# Patient Record
Sex: MALE | Race: WHITE | NOT HISPANIC OR LATINO | Employment: OTHER | URBAN - METROPOLITAN AREA
[De-identification: names, ages, dates, MRNs, and addresses within clinical notes are randomized per-mention and may not be internally consistent; named-entity substitution may affect disease eponyms.]

---

## 2017-01-09 ENCOUNTER — TRANSFERRED RECORDS (OUTPATIENT)
Dept: FAMILY MEDICINE | Facility: CLINIC | Age: 67
End: 2017-01-09

## 2017-01-19 ENCOUNTER — TRANSFERRED RECORDS (OUTPATIENT)
Dept: FAMILY MEDICINE | Facility: CLINIC | Age: 67
End: 2017-01-19

## 2017-02-08 ENCOUNTER — HOSPITAL ENCOUNTER (OUTPATIENT)
Dept: ULTRASOUND IMAGING | Facility: CLINIC | Age: 67
Discharge: HOME | End: 2017-02-08
Attending: ORTHOPAEDIC SURGERY | Admitting: ORTHOPAEDIC SURGERY
Payer: MEDICARE

## 2017-02-08 DIAGNOSIS — I82.4Z2 ACUTE DEEP VEIN THROMBOSIS (DVT) OF DISTAL VEIN OF LEFT LOWER EXTREMITY (H): ICD-10-CM

## 2017-02-08 PROCEDURE — 93971 EXTREMITY STUDY: CPT | Mod: LT

## 2017-02-09 ENCOUNTER — DOCUMENTATION ONLY (OUTPATIENT)
Dept: OTHER | Facility: CLINIC | Age: 67
End: 2017-02-09

## 2017-02-09 DIAGNOSIS — Z71.89 ACP (ADVANCE CARE PLANNING): Primary | Chronic | ICD-10-CM

## 2017-02-16 ENCOUNTER — TRANSFERRED RECORDS (OUTPATIENT)
Dept: FAMILY MEDICINE | Facility: CLINIC | Age: 67
End: 2017-02-16

## 2017-03-02 ENCOUNTER — OFFICE VISIT (OUTPATIENT)
Dept: FAMILY MEDICINE | Facility: CLINIC | Age: 67
End: 2017-03-02

## 2017-03-02 VITALS
HEIGHT: 72 IN | BODY MASS INDEX: 33.32 KG/M2 | SYSTOLIC BLOOD PRESSURE: 134 MMHG | RESPIRATION RATE: 16 BRPM | DIASTOLIC BLOOD PRESSURE: 84 MMHG | OXYGEN SATURATION: 94 % | HEART RATE: 69 BPM | WEIGHT: 246 LBS

## 2017-03-02 DIAGNOSIS — R21 RASH: Primary | ICD-10-CM

## 2017-03-02 DIAGNOSIS — R09.81 NASAL CONGESTION: ICD-10-CM

## 2017-03-02 PROCEDURE — 99213 OFFICE O/P EST LOW 20 MIN: CPT | Performed by: FAMILY MEDICINE

## 2017-03-02 RX ORDER — LIDOCAINE 50 MG/G
OINTMENT TOPICAL PRN
Refills: 10 | COMMUNITY
Start: 2017-01-19 | End: 2017-05-04

## 2017-03-02 RX ORDER — METHOCARBAMOL 500 MG/1
1-3 TABLET, FILM COATED ORAL 3 TIMES DAILY
COMMUNITY
Start: 2016-12-22 | End: 2017-05-04

## 2017-03-02 RX ORDER — CELECOXIB 200 MG/1
1 CAPSULE ORAL AT BEDTIME
COMMUNITY
Start: 2016-12-14 | End: 2017-03-02

## 2017-03-02 ASSESSMENT — PATIENT HEALTH QUESTIONNAIRE - PHQ9: 5. POOR APPETITE OR OVEREATING: SEVERAL DAYS

## 2017-03-02 ASSESSMENT — ANXIETY QUESTIONNAIRES
6. BECOMING EASILY ANNOYED OR IRRITABLE: SEVERAL DAYS
5. BEING SO RESTLESS THAT IT IS HARD TO SIT STILL: SEVERAL DAYS
GAD7 TOTAL SCORE: 4
3. WORRYING TOO MUCH ABOUT DIFFERENT THINGS: NOT AT ALL
1. FEELING NERVOUS, ANXIOUS, OR ON EDGE: SEVERAL DAYS
7. FEELING AFRAID AS IF SOMETHING AWFUL MIGHT HAPPEN: NOT AT ALL
2. NOT BEING ABLE TO STOP OR CONTROL WORRYING: NOT AT ALL
IF YOU CHECKED OFF ANY PROBLEMS ON THIS QUESTIONNAIRE, HOW DIFFICULT HAVE THESE PROBLEMS MADE IT FOR YOU TO DO YOUR WORK, TAKE CARE OF THINGS AT HOME, OR GET ALONG WITH OTHER PEOPLE: SOMEWHAT DIFFICULT

## 2017-03-02 NOTE — PROGRESS NOTES
Problem(s) Oriented visit        SUBJECTIVE:                                                    Jose Carlos Escamilla is a 67 year old male who presents to clinic today for the following health issues :        1. Rash  Working on getting off a number of medsl  Does have swelling of the legs since surgery    2. Nasal congestion  thoat will be congested for no reason at cande         Problem list, Medication list, Allergies, and Medical/Social/Surgical histories reviewed in [facility] and updated as appropriate.   Additional history: as documented    ROS:  General and CV completed and negative except as noted above    Histories:   Patient Active Problem List   Diagnosis     Hypercholesterolemia     Chronic rhinitis     IBS (irritable bowel syndrome)     Hiatal hernia     ACP (advance care planning)     Family history of hemochromatosis     Esophageal reflux     Esophageal stenosis     Diverticulosis of large intestine     Obesity     History of rheumatic fever     Chronic pain syndrome     Narcotic dependence (H)     Post-traumatic osteoarthritis of both knees     Benign essential hypertension     Total knee replacement status     Past Surgical History   Procedure Laterality Date     Discectomy cervical minimally invasive one level       Arthroplasty knee Left 12/2/2016     Procedure: ARTHROPLASTY KNEE;  Surgeon: Marisa Page MD;  Location:  OR       Social History   Substance Use Topics     Smoking status: Former Smoker     Types: Cigarettes     Quit date: 7/7/1995     Smokeless tobacco: Never Used     Alcohol use 0.0 - 2.0 oz/week     0 - 4 Standard drinks or equivalent per week     Family History   Problem Relation Age of Onset     DIABETES Mother            OBJECTIVE:                                                    /84  Pulse 69  Resp 16  Ht 1.829 m (6')  Wt 111.6 kg (246 lb)  SpO2 94%  BMI 33.36 kg/m2  Body mass index is 33.36 kg/(m^2).   APPEARANCE: = Relaxed and in no distress  Conj/Eyelids = noninjected and  lids and lashes are without inflammation  PERRLA/Irises = Pupils Round Reactive to Light and Irisis without inflammation  Neck = No anterior or posterior adenopathy appreciated.  Thyroid = Not enlarged and no masses felt  Resp effort = Calm regular breathing  Breath Sounds = Good air movement with no rales or rhonchi in any lung fields  Heart Rate, Rythym, & sounds (no Murm)  = Regular rate and rythym with no S3, S4, or murmer appreciated.  Carotid Art's = Pulses full and equal and no bruits appreciated  Abdomen = Soft, nontender, no masses, & bowel sounds in all quadrants  Liver/Spleen = Normal span and no splenomegaly noted  Digits and Nails = FROM in all finger joints, no nail dystrophy  Ext (edema) =  2+ and to mid shin  Musculsktl =  Strength and ROM of major joints are within normal limits  SKIN = absent significant rashes or lesions   Recent/Remote Memory = Alert and Oriented x 3  Mood/Affect = Cooperative and interested     ASSESSMENT/PLAN:                                                        Jose Carlos was seen today for leg swelling, ear problem and derm problem.    Diagnoses and all orders for this visit:    Rash    Nasal congestion    Other orders  -     Cancel: HCV Antibody ([facility])        Try and see if getting off the meds is helpful    The following health maintenance items are reviewed in Epic and correct as of today:  Health Maintenance   Topic Date Due     MELISSA QUESTIONNAIRE 1 YEAR  1950     PHQ-9 Q1YR (NO INBASKET)  1950     URINE DRUG SCREEN Q1 YR  01/29/1967     HEPATITIS C SCREENING  01/29/1970     FALL RISK ASSESSMENT  01/29/2017     PNEUMOCOCCAL (1 of 2 - PCV13) 01/29/2017     AORTIC ANEURYSM SCREENING (SYSTEM ASSIGNED)  01/29/2017     INFLUENZA VACCINE (SYSTEM ASSIGNED)  09/01/2017     COLON CANCER SCREEN (SYSTEM ASSIGNED)  05/07/2018     LIPID SCREEN Q5 YR MALE (SYSTEM ASSIGNED)  06/11/2020     TETANUS IMMUNIZATION (SYSTEM ASSIGNED)  09/30/2020     ADVANCE DIRECTIVE PLANNING Q5 YRS  (NO INBASKET)  02/09/2022       Baljit Salazar MD  [facility] [place]  [phone]    For any issues my office # is [phone]

## 2017-03-03 ASSESSMENT — PATIENT HEALTH QUESTIONNAIRE - PHQ9: SUM OF ALL RESPONSES TO PHQ QUESTIONS 1-9: 3

## 2017-03-03 ASSESSMENT — ANXIETY QUESTIONNAIRES: GAD7 TOTAL SCORE: 4

## 2017-03-14 ENCOUNTER — OFFICE VISIT (OUTPATIENT)
Dept: FAMILY MEDICINE | Facility: CLINIC | Age: 67
End: 2017-03-14

## 2017-03-14 VITALS
DIASTOLIC BLOOD PRESSURE: 70 MMHG | HEART RATE: 79 BPM | OXYGEN SATURATION: 94 % | WEIGHT: 250 LBS | SYSTOLIC BLOOD PRESSURE: 122 MMHG | HEIGHT: 72 IN | BODY MASS INDEX: 33.86 KG/M2

## 2017-03-14 DIAGNOSIS — J32.0 CHRONIC MAXILLARY SINUSITIS: Primary | ICD-10-CM

## 2017-03-14 PROCEDURE — 99213 OFFICE O/P EST LOW 20 MIN: CPT | Performed by: FAMILY MEDICINE

## 2017-03-14 NOTE — MR AVS SNAPSHOT
After Visit Summary   3/14/2017    Jose Carlos Escamilla    MRN: [id number]           Patient Information     Date Of Birth          1950        Visit Information        Provider Department      3/14/2017 3:15 PM Baljit Salazar MD [facility]        Today's Diagnoses     Chronic maxillary sinusitis    -  1       Follow-ups after your visit        Additional Services     OTOLARYNGOLOGY REFERRAL       Your provider has referred you to: ENT Dr. Theodore Valenzuela  [facility]   [phone]    Please be aware that coverage of these services is subject to the terms and limitations of your health insurance plan.  Call member services at your health plan with any benefit or coverage questions.      Please bring the following to your appointment:  >>   Any x-rays, CTs or MRIs which have been performed.  Contact the facility where they were done to arrange for  prior to your scheduled appointment.  Any new CT, MRI or other procedures ordered by your specialist must be performed at a [place] facility or coordinated by your clinic's referral office.    >>   List of current medications   >>   This referral request   >>   Any documents/labs given to you for this referral                  Who to contact     If you have questions or need follow up information about today's clinic visit or your schedule please contact [facility] directly at [phone].  Normal or non-critical lab and imaging results will be communicated to you by MyChart, letter or phone within 4 business days after the clinic has received the results. If you do not hear from us within 7 days, please contact the clinic through MyChart or phone. If you have a critical or abnormal lab result, we will notify you by phone as soon as possible.  Submit refill requests through [employer] or call your pharmacy and they will forward the refill request to us. Please allow 3 business days for your refill to  "be completed.          Additional Information About Your Visit        [employer] Information     [employer] lets you send messages to your doctor, view your test results, renew your prescriptions, schedule appointments and more. To sign up, go to [email or web address] . Click on \"Log in\" on the left side of the screen, which will take you to the Welcome page. Then click on \"Sign up Now\" on the right side of the page.     You will be asked to enter the access code listed below, as well as some personal information. Please follow the directions to create your username and password.     Your access code is: [email or web address]  Expires: 2017  4:52 PM     Your access code will  in 90 days. If you need help or a new code, please call your [place] clinic or [phone].        Care EveryWhere ID     This is your Care EveryWhere ID. This could be used by other organizations to access your [place] medical records  [id number]        Your Vitals Were     Pulse Height Pulse Oximetry BMI (Body Mass Index)          79 1.829 m (6') 94% 33.91 kg/m2         Blood Pressure from Last 3 Encounters:   17 122/70   17 134/84   16 142/90    Weight from Last 3 Encounters:   17 113.4 kg (250 lb)   17 111.6 kg (246 lb)   16 111.1 kg (245 lb)              We Performed the Following     OTOLARYNGOLOGY REFERRAL          Today's Medication Changes          These changes are accurate as of: 3/14/17  3:44 PM.  If you have any questions, ask your nurse or doctor.               These medicines have changed or have updated prescriptions.        Dose/Directions    oxyCODONE 10 MG 12 hr tablet   Commonly known as:  OxyCONTIN   This may have changed:  Another medication with the same name was removed. Continue taking this medication, and follow the directions you see here.   Used for:  Status post total left knee replacement, Narcotic dependence (H), Peripheral polyneuropathy (H)   Changed by:  Baljit Salazar " MD Shamar        Dose:  10 mg   Take 1 tablet (10 mg) by mouth every morning   Quantity:  30 tablet   Refills:  0                Primary Care Provider Office Phone # Fax #    Baljit Shamar Salazar [phone]       [facility] [address]  [facility] [address]        Thank you!     Thank you for choosing [facility]  for your care. Our goal is always to provide you with excellent care. Hearing back from our patients is one way we can continue to improve our services. Please take a few minutes to complete the written survey that you may receive in the mail after your visit with us. Thank you!             Your Updated Medication List - Protect others around you: Learn how to safely use, store and throw away your medicines at www.disposemymeds.org.          This list is accurate as of: 3/14/17  3:44 PM.  Always use your most recent med list.                   Brand Name Dispense Instructions for use    celecoxib 200 MG capsule    celeBREX     TAKE ONE CAPSULE BY MOUTH AT BEDTIME       diclofenac 1 % Gel topical gel    VOLTAREN     Reported on 3/14/2017       GABAPENTIN PO      Take 900 mg by mouth 2 times daily (Patient takes 2 x 300 mg morning 2 x 300 mg bedtime 3 x 300 mg capsule = 2400mg dose) In the morning and at bedtime.       lidocaine 5 % ointment    XYLOCAINE     as needed Pain in legs       lisinopril 20 MG tablet    PRINIVIL/ZESTRIL    90 tablet    Take 1 tablet (20 mg) by mouth daily       LYRICA PO      Take 25 mg by mouth daily (with breakfast) Tapering off to be on Gabapentin       methocarbamol 500 MG tablet    ROBAXIN     Take 1-3 tablets by mouth 3 times daily       OMEPRAZOLE PO      Take 10 mg by mouth every morning (OTC)       oxyCODONE 10 MG 12 hr tablet    OxyCONTIN    30 tablet    Take 1 tablet (10 mg) by mouth every morning       senna-docusate 8.6-50 MG per tablet    SENOKOT-S;PERICOLACE    20 tablet    Take 1-2 tablets by mouth 2 times daily        terbinafine 1 % cream    lamISIL     Apply topically daily as needed (Fungal rash on chest)       TRAZODONE HCL PO      Take 100 mg by mouth At Bedtime (Patient takes 2 x 50 mg tablet = 100 mg dose)       UNABLE TO FIND      MEDICATION NAME: pill for rash on chest From dermatologist

## 2017-03-14 NOTE — PROGRESS NOTES
If sits around symptoms of nausea come on      Rash and itch are part of the issue and the same time above is going on.    Pains in the legs are unable to tolerate at all,    Assessment/Plan:  Jose Carlos was seen today for sinus problem and derm problem.    Diagnoses and all orders for this visit:    Chronic maxillary sinusitis  -     OTOLARYNGOLOGY REFERRAL      Baljit Salazar MD  [facility]  [phone]

## 2017-03-20 ENCOUNTER — TRANSFERRED RECORDS (OUTPATIENT)
Dept: FAMILY MEDICINE | Facility: CLINIC | Age: 67
End: 2017-03-20

## 2017-05-03 ENCOUNTER — DOCUMENTATION ONLY (OUTPATIENT)
Dept: OTHER | Facility: CLINIC | Age: 67
End: 2017-05-03

## 2017-05-04 ENCOUNTER — OFFICE VISIT (OUTPATIENT)
Dept: FAMILY MEDICINE | Facility: CLINIC | Age: 67
End: 2017-05-04

## 2017-05-04 VITALS
OXYGEN SATURATION: 97 % | BODY MASS INDEX: 33.09 KG/M2 | DIASTOLIC BLOOD PRESSURE: 88 MMHG | SYSTOLIC BLOOD PRESSURE: 130 MMHG | WEIGHT: 244 LBS | HEART RATE: 72 BPM

## 2017-05-04 DIAGNOSIS — F11.20 NARCOTIC DEPENDENCE (H): ICD-10-CM

## 2017-05-04 DIAGNOSIS — G89.4 CHRONIC PAIN SYNDROME: Primary | ICD-10-CM

## 2017-05-04 PROCEDURE — 99213 OFFICE O/P EST LOW 20 MIN: CPT | Performed by: FAMILY MEDICINE

## 2017-05-04 RX ORDER — OXYCODONE HCL 10 MG/1
TABLET, FILM COATED, EXTENDED RELEASE ORAL
COMMUNITY
Start: 2017-04-19 | End: 2017-05-04

## 2017-05-04 RX ORDER — HYDROCODONE BITARTRATE AND ACETAMINOPHEN 10; 325 MG/1; MG/1
TABLET ORAL
COMMUNITY
Start: 2017-04-21 | End: 2017-05-20

## 2017-05-04 NOTE — PROGRESS NOTES
>15 min spent with patient, greater than 50% spent on discussion/education/planning, etc about The encounter diagnosis was Narcotic dependence (H).   He will try and wean with the pain clinics help.  TAMI

## 2017-05-04 NOTE — MR AVS SNAPSHOT
"              After Visit Summary   5/4/2017    Jose Carlos Escamilla    MRN: [id number]           Patient Information     Date Of Birth          1950        Visit Information        Provider Department      5/4/2017 12:30 PM Baljit Salazar MD [facility]        Today's Diagnoses     Chronic pain syndrome    -  1    Narcotic dependence (H)           Follow-ups after your visit        Your next 10 appointments already scheduled     May 04, 2017 12:30 PM CDT   [facility]    [address] [place] [address] [phone]            May 15, 2017  9:45 AM CDT   [facility] physical with [facility]    [address] [place] [address] [phone]              Who to contact     If you have questions or need follow up information about today's clinic visit or your schedule please contact [facility] directly at [phone].  Normal or non-critical lab and imaging results will be communicated to you by [employer], letter or phone within 4 business days after the clinic has received the results. If you do not hear from us within 7 days, please contact the clinic through [employer] or phone. If you have a critical or abnormal lab result, we will notify you by phone as soon as possible.  Submit refill requests through [email or web address] or call your pharmacy and they will forward the refill request to us. Please allow 3 business days for your refill to be completed.          Additional Information About Your Visit        [employer] Information     [email or web address] lets you send messages to your doctor, view your test results, renew your prescriptions, schedule appointments and more. To sign up, go to [email or web address] . Click on \"Log in\" on the left side of the screen, which will take you to the Welcome page. Then click on \"Sign up Now\" on the right side of " the page.     You will be asked to enter the access code listed below, as well as some personal information. Please follow the directions to create your username and password.     Your access code is: [email or web address]  Expires: 2017  4:52 PM     Your access code will  in 90 days. If you need help or a new code, please call your [place] clinic or [phone].        Care EveryWhere ID     This is your Care EveryWhere ID. This could be used by other organizations to access your [place] medical records  [id number]        Your Vitals Were     Pulse Pulse Oximetry BMI (Body Mass Index)             72 97% 33.09 kg/m2          Blood Pressure from Last 3 Encounters:   17 130/88   17 122/70   17 134/84    Weight from Last 3 Encounters:   17 110.7 kg (244 lb)   17 113.4 kg (250 lb)   17 111.6 kg (246 lb)              Today, you had the following     No orders found for display         Today's Medication Changes          These changes are accurate as of: 17 12:29 PM.  If you have any questions, ask your nurse or doctor.               These medicines have changed or have updated prescriptions.        Dose/Directions    oxyCODONE 10 MG 12 hr tablet   Commonly known as:  OxyCONTIN   This may have changed:  additional instructions   Used for:  Status post total left knee replacement, Narcotic dependence (H), Peripheral polyneuropathy (H)        Dose:  10 mg   Take 1 tablet (10 mg) by mouth every morning   Quantity:  30 tablet   Refills:  0                Primary Care Provider Office Phone # Fax #    Baljit Salazar [phone]       [facility] [address]  [facility] [address]        Thank you!     Thank you for choosing [facility]  for your care. Our goal is always to provide you with excellent care. Hearing back from our patients is one way we can continue to improve our services. Please take a few minutes to complete the  written survey that you may receive in the mail after your visit with us. Thank you!             Your Updated Medication List - Protect others around you: Learn how to safely use, store and throw away your medicines at www.disposemymeds.org.          This list is accurate as of: 5/4/17 12:29 PM.  Always use your most recent med list.                   Brand Name Dispense Instructions for use    celecoxib 200 MG capsule    celeBREX     TAKE ONE CAPSULE BY MOUTH AT BEDTIME       diclofenac 1 % Gel topical gel    VOLTAREN     Reported on 3/14/2017       GABAPENTIN PO      Take 900 mg by mouth 2 times daily (Patient takes 3 x 300 mg morning, 4 x 300 mg bedtime= 2100mg dose) In the morning and at bedtime.       HYDROcodone-acetaminophen  MG per tablet    NORCO         lisinopril 20 MG tablet    PRINIVIL/ZESTRIL    90 tablet    Take 1 tablet (20 mg) by mouth daily       OMEPRAZOLE PO      Take 10 mg by mouth every morning (OTC)       oxyCODONE 10 MG 12 hr tablet    OxyCONTIN    30 tablet    Take 1 tablet (10 mg) by mouth every morning       senna-docusate 8.6-50 MG per tablet    SENOKOT-S;PERICOLACE    20 tablet    Take 1-2 tablets by mouth 2 times daily       TRAZODONE HCL PO      Take 100 mg by mouth At Bedtime (Patient takes 2 x 50 mg tablet = 100 mg dose)       ZYRTEC ALLERGY PO

## 2017-05-07 DIAGNOSIS — Z76.0 ENCOUNTER FOR MEDICATION REFILL: ICD-10-CM

## 2017-05-08 RX ORDER — TRAZODONE HYDROCHLORIDE 50 MG/1
TABLET, FILM COATED ORAL
Qty: 180 TABLET | Refills: 2 | Status: SHIPPED | OUTPATIENT
Start: 2017-05-08 | End: 2018-01-31

## 2017-05-15 ENCOUNTER — TRANSFERRED RECORDS (OUTPATIENT)
Dept: FAMILY MEDICINE | Facility: CLINIC | Age: 67
End: 2017-05-15

## 2017-05-15 ENCOUNTER — OFFICE VISIT (OUTPATIENT)
Dept: FAMILY MEDICINE | Facility: CLINIC | Age: 67
End: 2017-05-15

## 2017-05-15 VITALS
SYSTOLIC BLOOD PRESSURE: 128 MMHG | HEART RATE: 72 BPM | BODY MASS INDEX: 33.05 KG/M2 | DIASTOLIC BLOOD PRESSURE: 82 MMHG | WEIGHT: 244 LBS | HEIGHT: 72 IN | OXYGEN SATURATION: 95 %

## 2017-05-15 DIAGNOSIS — F11.20 NARCOTIC DEPENDENCE (H): ICD-10-CM

## 2017-05-15 DIAGNOSIS — G62.9 PERIPHERAL POLYNEUROPATHY: ICD-10-CM

## 2017-05-15 DIAGNOSIS — Z01.818 PREOP GENERAL PHYSICAL EXAM: Primary | ICD-10-CM

## 2017-05-15 DIAGNOSIS — E78.00 HYPERCHOLESTEROLEMIA: ICD-10-CM

## 2017-05-15 DIAGNOSIS — K21.00 GASTROESOPHAGEAL REFLUX DISEASE WITH ESOPHAGITIS: ICD-10-CM

## 2017-05-15 LAB
% GRANULOCYTES: 69 % (ref 42.2–75.2)
HCT VFR BLD AUTO: 45.1 % (ref 39–51)
HEMOGLOBIN: 15 G/DL (ref 13.4–17.5)
LYMPHOCYTES NFR BLD AUTO: 23.5 % (ref 20.5–51.1)
MCH RBC QN AUTO: 30.1 PG (ref 27–31)
MCHC RBC AUTO-ENTMCNC: 33.3 G/DL (ref 33–37)
MCV RBC AUTO: 90.2 FL (ref 80–100)
MONOCYTES NFR BLD AUTO: 7.5 % (ref 1.7–9.3)
PLATELET # BLD AUTO: 272 K/UL (ref 140–450)
RBC # BLD AUTO: 5 X10/CMM (ref 4.2–5.9)
WBC # BLD AUTO: 5.4 X10/CMM (ref 3.8–11)

## 2017-05-15 PROCEDURE — 85025 COMPLETE CBC W/AUTO DIFF WBC: CPT | Performed by: FAMILY MEDICINE

## 2017-05-15 PROCEDURE — 36415 COLL VENOUS BLD VENIPUNCTURE: CPT | Performed by: FAMILY MEDICINE

## 2017-05-15 PROCEDURE — 99215 OFFICE O/P EST HI 40 MIN: CPT | Performed by: FAMILY MEDICINE

## 2017-05-15 NOTE — PROGRESS NOTES
[facility]  [address] [place] [id number] [phone]  Dept: [phone]    PRE-OP EVALUATION:  Today's date: 5/15/2017    Jose Carlos Escamilla (: 1950) presents for pre-operative evaluation assessment as requested by Dr. Macias.  He requires evaluation and anesthesia risk assessment prior to undergoing surgery/procedure for treatment of sinus/breathing problems .  Proposed procedure: Septoplasty    Date of Surgery/ Procedure: 17  Time of Surgery/ Procedure: [facility]  Hospital/Surgical Facility: [facility]  Fax number for surgical facility: [phone]  Primary Physician: Baljit Salazar  Type of Anesthesia Anticipated: to be determined    Patient has a Health Care Directive or Living Will:  YES     1. NO - Do you have a history of heart attack, stroke, stent, bypass or surgery on an artery in the head, neck, heart or legs?  2. NO - Do you ever have any pain or discomfort in your chest?  3. NO - Do you have a history of  Heart Failure?  4. NO - Are you troubled by shortness of breath when: walking on the level, up a slight hill or at night?  5. NO - Do you currently have a cold, bronchitis or other respiratory infection?  6. NO - Do you have a cough, shortness of breath or wheezing?  7. YES - DO YOU SOMETIMES GET PAINS IN THE CALVES OF YOUR LEGS WHEN YOU WALK?   8. NO - Do you or anyone in your family have previous history of blood clots?  9. NO - Do you or does anyone in your family have a serious bleeding problem such as prolonged bleeding following surgeries or cuts?  10. NO - Have you ever had problems with anemia or been told to take iron pills?  11. NO - Have you had any abnormal blood loss such as black, tarry or bloody stools, or abnormal vaginal bleeding?  12. NO - Have you ever had a blood transfusion?  13. NO - Have you or any of your relatives ever had problems with anesthesia?  14. NO - Do you have sleep apnea, excessive snoring or daytime drowsiness?  15.  NO - Do you have any prosthetic heart valves?  16. YES - DO YOU HAVE PROSTHETIC JOINTS? LEFT knee  17. NO - Is there any chance that you may be pregnant?      HPI:                                                      Brief HPI related to upcoming procedure: Difficulty breathing due to deviated septum now ready for surgical intervention.      See problem list for active medical problems.  Problems all longstanding and stable, except as noted/documented.  See ROS for pertinent symptoms related to these conditions.                                                                                                  .    MEDICAL HISTORY:                                                      Patient Active Problem List    Diagnosis Date Noted     Peripheral polyneuropathy (H) 05/15/2017     Priority: Medium     Total knee replacement status 12/02/2016     Priority: Medium     Post-traumatic osteoarthritis of both knees 11/21/2016     Priority: Medium     Benign essential hypertension 11/21/2016     Priority: Medium     Narcotic dependence (H) 09/08/2016     Priority: Medium     1/19/2017 Patient is now seeing [facility].  They are witting his prescriptions for pain medication now.  Dr. Salazar was made aware.  Ramon Sheldon,   [facility]  [phone]      Patient is followed by Baljit Salazar MD for ongoing prescription of pain medication.  All refills should be approved by this provider, or covering partner.    Medication(s): Norco.   Maximum quantity per month: 90  Clinic visit frequency required: Q 3 months     Controlled substance agreement:  Encounter-Level CSA:     There are no encounter-level csa.        Pain Clinic evaluation in the past: No  [facility] next  DIRE Total Score(s):  No flowsheet data found.    Last [facility] website verification:  done on 9/8/16   [email or web address]         Chronic pain syndrome 06/20/2016     Priority: Medium     Pain is in the legs  and seemed to start after starting a statin. Huge w/u at [facility]. Done, gabapentin helps They are referring him to the [facility] as nothing is making sense for pain.       History of rheumatic fever 02/16/2016     Priority: Medium     Esophageal stenosis 06/11/2015     Priority: Medium     Diverticulosis of large intestine 06/11/2015     Priority: Medium     Obesity 06/11/2015     Priority: Medium     Esophageal reflux 04/10/2014     Priority: Medium     Family history of hemochromatosis 03/06/2013     Priority: Medium     ACP (advance care planning)      Priority: Medium     Advance Care Planning 2/9/2017: Receipt of ACP document:  Received: invalid HCD document dated 11-28-16.  Document previously scanned on 12-5-16.  Validation form completed indicating invalid document. Copy sent to client with information and facilitation resources. Validation form sent to be scanned as notation of invalid document received. Request made to delete invalid document.  Confirmed/documented designated decision maker(s).  Added by Trena Ronquillo RN Advance Care Planning Liaison with [employer]             Hypercholesterolemia      Priority: Medium     Chronic rhinitis      Priority: Medium     IBS (irritable bowel syndrome)      Priority: Medium     Hiatal hernia      Priority: Medium      Past Medical History:   Diagnosis Date     ACP (advance care planning)      Chronic rhinitis      Diverticulosis      Esophageal stenosis      Hiatal hernia      HTN (hypertension)      Hypercholesterolemia      IBS (irritable bowel syndrome)      Post-traumatic osteoarthritis of both knees 11/21/2016     Past Surgical History:   Procedure Laterality Date     ARTHROPLASTY KNEE Left 12/2/2016    Procedure: ARTHROPLASTY KNEE;  Surgeon: Marisa Page MD;  Location: [facility] OR     DISCECTOMY CERVICAL MINIMALLY INVASIVE ONE LEVEL       Current Outpatient Prescriptions   Medication Sig Dispense Refill     traZODone (DESYREL) 50 MG tablet TAKE  ONE TABLET BY MOUTH TWICE DAILY 180 tablet 2     HYDROcodone-acetaminophen (NORCO)  MG per tablet        Cetirizine HCl (ZYRTEC ALLERGY PO)        diclofenac (VOLTAREN) 1 % GEL topical gel Reported on 3/14/2017  3     celecoxib (CELEBREX) 200 MG capsule TAKE ONE CAPSULE BY MOUTH AT BEDTIME  4     oxyCODONE (OXYCONTIN) 10 MG 12 hr tablet Take 1 tablet (10 mg) by mouth every morning (Patient taking differently: Take 10 mg by mouth every morning Takes 1 in AM, 1 at PM) 30 tablet 0     senna-docusate (SENOKOT-S;PERICOLACE) 8.6-50 MG per tablet Take 1-2 tablets by mouth 2 times daily 20 tablet 0     GABAPENTIN PO Take 900 mg by mouth 2 times daily (Patient takes 3 x 300 mg morning, 4 x 300 mg bedtime= 2100mg dose) In the morning and at bedtime.       lisinopril (PRINIVIL,ZESTRIL) 20 MG tablet Take 1 tablet (20 mg) by mouth daily 90 tablet 2     OMEPRAZOLE PO Take 10 mg by mouth every morning (OTC)       [DISCONTINUED] TRAZODONE HCL PO Take 100 mg by mouth At Bedtime (Patient takes 2 x 50 mg tablet = 100 mg dose)       OTC products: None, except as noted above    Allergies   Allergen Reactions     Amoxicillin      Amoxicillin [A-Cillin]      In his 30's     Penicillin G      Penicillins Rash     Other reaction(s): Breathing Difficulty      Latex Allergy: NO    Social History   Substance Use Topics     Smoking status: Former Smoker     Types: Cigarettes     Quit date: 7/7/1995     Smokeless tobacco: Never Used     Alcohol use 0.0 - 2.0 oz/week     0 - 4 Standard drinks or equivalent per week     History   Drug Use No       REVIEW OF SYSTEMS:                                                    Constitutional, neuro, ENT, endocrine, pulmonary, cardiac, gastrointestinal, genitourinary, musculoskeletal, integument and psychiatric systems are negative, except as otherwise noted.    EXAM:                                                    /82  Pulse 72  Ht 1.829 m (6')  Wt 110.7 kg (244 lb)  SpO2 95%  BMI 33.09  kg/m2    GENERAL APPEARANCE: healthy, alert and no distress     EYES: EOMI,  PERRL     HENT: ear canals and TM's normal and nose and mouth without ulcers or lesions     NECK: no adenopathy, no asymmetry, masses, or scars and thyroid normal to palpation     RESP: lungs clear to auscultation - no rales, rhonchi or wheezes     CV: regular rates and rhythm, normal S1 S2, no S3 or S4 and no murmur, click or rub     ABDOMEN:  soft, nontender, no HSM or masses and bowel sounds normal     MS: extremities normal- no gross deformities noted, no evidence of inflammation in joints, FROM in all extremities.     SKIN: no suspicious lesions or rashes     NEURO: Normal strength and tone, sensory exam grossly normal, mentation intact and speech normal     PSYCH: mentation appears normal. and affect normal/bright     LYMPHATICS: No axillary, cervical, or supraclavicular nodes    DIAGNOSTICS:                                                    EKG: Not indicated due to non-vascular surgery and last ekg on 11/21/2016     Recent Labs   Lab Test  12/04/16   0710  12/03/16   0610  12/02/16   0829  11/21/16   1616  11/21/16   1355  02/16/16   1616  02/16/16   1612   08/31/15   1710   HGB  11.3*  12.3*   --    --   13.8  14.1   --    < >   --    PLT   --    --    --    --   277  308   --    < >   --    NA   --    --    --    --    --    --   138   --   140   POTASSIUM   --    --   4.3  4.5   --    --   4.3   --   5.0   CR   --    --   0.75   --    --    --   0.70*   --   0.79    < > = values in this interval not displayed.        IMPRESSION:                                                    Reason for surgery/procedure: Difficulty breathing due to deviated septum now ready for surgical intervention.      The proposed surgical procedure is considered LOW risk.    REVISED CARDIAC RISK INDEX  The patient has the following serious cardiovascular risks for perioperative complications such as (MI, PE, VFib and 3  AV Block):  No serious cardiac  risks  INTERPRETATION: 0 risks: Class I (very low risk - 0.4% complication rate)    The patient has the following additional risks for perioperative complications:  No identified additional risks      ICD-10-CM    1. Preop general physical exam Z01.818 CBC with Diff/Plt ([facility])   2. Hypercholesterolemia E78.00    3. Gastroesophageal reflux disease with esophagitis K21.0    4. Narcotic dependence (H) F11.20    5. Peripheral polyneuropathy (H) G62.9        RECOMMENDATIONS:                                                        --Patient is to take all scheduled medications on the day of surgery EXCEPT for modifications listed below.    APPROVAL GIVEN to proceed with proposed procedure, without further diagnostic evaluation       Signed Electronically by: Baljit Salazar MD    Copy of this evaluation report is provided to requesting physician.    [facility] Preop Guidelines

## 2017-05-15 NOTE — MR AVS SNAPSHOT
After Visit Summary   5/15/2017    Jose Carlos Escamilla    MRN: [id number]           Patient Information     Date Of Birth          1950        Visit Information        Provider Department      5/15/2017 9:45 AM Baljit Salazar MD [facility]        Today's Diagnoses     Preop general physical exam    -  1    Hypercholesterolemia        Gastroesophageal reflux disease with esophagitis        Narcotic dependence (H)        Peripheral polyneuropathy (H)          Care Instructions      Before Your Surgery      Call your surgeon if there is any change in your health. This includes signs of a cold or flu (such as a sore throat, runny nose, cough, rash or fever).    Do not smoke, drink alcohol or take over the counter medicine (unless your surgeon or primary care doctor tells you to) for the 24 hours before and after surgery.    If you take prescribed drugs: Follow your doctor s orders about which medicines to take and which to stop until after surgery.    Eating and drinking prior to surgery: follow the instructions from your surgeon    Take a shower or bath the night before surgery. Use the soap your surgeon gave you to gently clean your skin. If you do not have soap from your surgeon, use your regular soap. Do not shave or scrub the surgery site.  Wear clean pajamas and have clean sheets on your bed.         Follow-ups after your visit        Who to contact     If you have questions or need follow up information about today's clinic visit or your schedule please contact [facility] directly at [phone].  Normal or non-critical lab and imaging results will be communicated to you by MyChart, letter or phone within 4 business days after the clinic has received the results. If you do not hear from us within 7 days, please contact the clinic through MyChart or phone. If you have a critical or abnormal lab result, we will notify you by phone as soon as possible.  Submit refill  "requests through [employer] or call your pharmacy and they will forward the refill request to us. Please allow 3 business days for your refill to be completed.          Additional Information About Your Visit        [employer] Information     [employer] lets you send messages to your doctor, view your test results, renew your prescriptions, schedule appointments and more. To sign up, go to [email or web address] . Click on \"Log in\" on the left side of the screen, which will take you to the Welcome page. Then click on \"Sign up Now\" on the right side of the page.     You will be asked to enter the access code listed below, as well as some personal information. Please follow the directions to create your username and password.     Your access code is: [email or web address]  Expires: 2017  4:52 PM     Your access code will  in 90 days. If you need help or a new code, please call your [place] clinic or [phone].        Care EveryWhere ID     This is your Care EveryWhere ID. This could be used by other organizations to access your [place] medical records  [id number]        Your Vitals Were     Pulse Height Pulse Oximetry BMI (Body Mass Index)          72 1.829 m (6') 95% 33.09 kg/m2         Blood Pressure from Last 3 Encounters:   05/15/17 128/82   17 130/88   17 122/70    Weight from Last 3 Encounters:   05/15/17 110.7 kg (244 lb)   17 110.7 kg (244 lb)   17 113.4 kg (250 lb)              We Performed the Following     CBC with Diff/Plt ([facility])          Today's Medication Changes          These changes are accurate as of: 5/15/17 10:48 AM.  If you have any questions, ask your nurse or doctor.               These medicines have changed or have updated prescriptions.        Dose/Directions    oxyCODONE 10 MG 12 hr tablet   Commonly known as:  OxyCONTIN   This may have changed:  additional instructions   Used for:  Status post total left knee replacement, Narcotic dependence (H), Peripheral " polyneuropathy (H)        Dose:  10 mg   Take 1 tablet (10 mg) by mouth every morning   Quantity:  30 tablet   Refills:  0                Primary Care Provider Office Phone # Fax #    Baljit Salazar [phone]       [facility] [address]  [facility] [address]        Thank you!     Thank you for choosing [facility]  for your care. Our goal is always to provide you with excellent care. Hearing back from our patients is one way we can continue to improve our services. Please take a few minutes to complete the written survey that you may receive in the mail after your visit with us. Thank you!             Your Updated Medication List - Protect others around you: Learn how to safely use, store and throw away your medicines at www.disposemymeds.org.          This list is accurate as of: 5/15/17 10:48 AM.  Always use your most recent med list.                   Brand Name Dispense Instructions for use    celecoxib 200 MG capsule    celeBREX     TAKE ONE CAPSULE BY MOUTH AT BEDTIME       diclofenac 1 % Gel topical gel    VOLTAREN     Reported on 3/14/2017       GABAPENTIN PO      Take 900 mg by mouth 2 times daily (Patient takes 3 x 300 mg morning, 4 x 300 mg bedtime= 2100mg dose) In the morning and at bedtime.       HYDROcodone-acetaminophen  MG per tablet    NORCO         lisinopril 20 MG tablet    PRINIVIL/ZESTRIL    90 tablet    Take 1 tablet (20 mg) by mouth daily       OMEPRAZOLE PO      Take 10 mg by mouth every morning (OTC)       oxyCODONE 10 MG 12 hr tablet    OxyCONTIN    30 tablet    Take 1 tablet (10 mg) by mouth every morning       senna-docusate 8.6-50 MG per tablet    SENOKOT-S;PERICOLACE    20 tablet    Take 1-2 tablets by mouth 2 times daily       traZODone 50 MG tablet    DESYREL    180 tablet    TAKE ONE TABLET BY MOUTH TWICE DAILY       ZYRTEC ALLERGY PO

## 2017-06-06 NOTE — PROGRESS NOTES
5/24/17 faxed this to [facility] @ [phone]    Ramon Sheldon,   [facility]  [phone]

## 2017-06-13 ENCOUNTER — TRANSFERRED RECORDS (OUTPATIENT)
Dept: FAMILY MEDICINE | Facility: CLINIC | Age: 67
End: 2017-06-13

## 2017-07-19 ENCOUNTER — TRANSFERRED RECORDS (OUTPATIENT)
Dept: FAMILY MEDICINE | Facility: CLINIC | Age: 67
End: 2017-07-19

## 2017-09-01 ENCOUNTER — TRANSFERRED RECORDS (OUTPATIENT)
Dept: FAMILY MEDICINE | Facility: CLINIC | Age: 67
End: 2017-09-01

## 2017-09-30 DIAGNOSIS — Z76.0 ENCOUNTER FOR MEDICATION REFILL: ICD-10-CM

## 2017-09-30 RX ORDER — LISINOPRIL 20 MG/1
TABLET ORAL
Qty: 30 TABLET | Refills: 0 | Status: SHIPPED | OUTPATIENT
Start: 2017-09-30 | End: 2017-10-29

## 2017-09-30 NOTE — TELEPHONE ENCOUNTER
BP Medication refill        BP Readings from Last 3 Encounters:   05/15/17 128/82   05/04/17 130/88   03/14/17 122/70         BMP within 6 months? NO DUE FOR LABS  Last Basic Metabolic Panel:  Lab Results   Component Value Date     02/16/2016      Lab Results   Component Value Date    POTASSIUM 4.3 12/02/2016     Lab Results   Component Value Date    CHLORIDE 99 02/16/2016     Lab Results   Component Value Date    TRI 9.2 02/16/2016     No results found for: CO2  Lab Results   Component Value Date    BUN 16 02/16/2016    BUN 23 02/16/2016     Lab Results   Component Value Date    CR 0.75 12/02/2016     Lab Results   Component Value Date     12/04/2016

## 2017-10-29 DIAGNOSIS — Z76.0 ENCOUNTER FOR MEDICATION REFILL: ICD-10-CM

## 2017-10-29 RX ORDER — LISINOPRIL 20 MG/1
TABLET ORAL
Qty: 30 TABLET | Refills: 0 | Status: SHIPPED | OUTPATIENT
Start: 2017-10-29 | End: 2017-12-03

## 2017-11-14 ENCOUNTER — TRANSFERRED RECORDS (OUTPATIENT)
Dept: FAMILY MEDICINE | Facility: CLINIC | Age: 67
End: 2017-11-14

## 2017-12-03 DIAGNOSIS — Z76.0 ENCOUNTER FOR MEDICATION REFILL: ICD-10-CM

## 2017-12-03 RX ORDER — HYDROCODONE BITARTRATE AND ACETAMINOPHEN 10; 325 MG/1; MG/1
10-325 TABLET ORAL 4 TIMES DAILY PRN
Refills: 0 | Status: ON HOLD | COMMUNITY
Start: 2017-11-16 | End: 2021-07-16

## 2017-12-03 RX ORDER — LISINOPRIL 20 MG/1
TABLET ORAL
Qty: 30 TABLET | Refills: 0 | Status: SHIPPED | OUTPATIENT
Start: 2017-12-03 | End: 2017-12-20

## 2017-12-07 NOTE — TELEPHONE ENCOUNTER
Called patient to let him know he is due to see  for BP check and labs.  He says he will call to schedule an appointment.  Also states he does not get his pain meds from us anymore as he   [facility] meds.

## 2017-12-20 DIAGNOSIS — Z76.0 ENCOUNTER FOR MEDICATION REFILL: ICD-10-CM

## 2017-12-21 RX ORDER — LISINOPRIL 20 MG/1
TABLET ORAL
Qty: 30 TABLET | Refills: 0 | Status: SHIPPED | OUTPATIENT
Start: 2017-12-21 | End: 2018-01-05

## 2018-01-05 ENCOUNTER — OFFICE VISIT (OUTPATIENT)
Dept: FAMILY MEDICINE | Facility: CLINIC | Age: 68
End: 2018-01-05

## 2018-01-05 VITALS
WEIGHT: 239 LBS | TEMPERATURE: 98.5 F | BODY MASS INDEX: 32.37 KG/M2 | RESPIRATION RATE: 24 BRPM | HEART RATE: 76 BPM | OXYGEN SATURATION: 97 % | DIASTOLIC BLOOD PRESSURE: 84 MMHG | SYSTOLIC BLOOD PRESSURE: 122 MMHG | HEIGHT: 72 IN

## 2018-01-05 DIAGNOSIS — Z11.59 NEED FOR HEPATITIS C SCREENING TEST: ICD-10-CM

## 2018-01-05 DIAGNOSIS — E78.00 HYPERCHOLESTEROLEMIA: ICD-10-CM

## 2018-01-05 DIAGNOSIS — Z23 NEED FOR VACCINATION WITH 13-POLYVALENT PNEUMOCOCCAL CONJUGATE VACCINE: Primary | ICD-10-CM

## 2018-01-05 DIAGNOSIS — F11.20 NARCOTIC DEPENDENCE (H): ICD-10-CM

## 2018-01-05 DIAGNOSIS — K21.00 GASTROESOPHAGEAL REFLUX DISEASE WITH ESOPHAGITIS: ICD-10-CM

## 2018-01-05 DIAGNOSIS — Z12.5 SCREENING FOR PROSTATE CANCER: ICD-10-CM

## 2018-01-05 DIAGNOSIS — Z83.49 FAMILY HISTORY OF HEMOCHROMATOSIS: ICD-10-CM

## 2018-01-05 DIAGNOSIS — I10 BENIGN ESSENTIAL HYPERTENSION: ICD-10-CM

## 2018-01-05 DIAGNOSIS — Z76.0 ENCOUNTER FOR MEDICATION REFILL: ICD-10-CM

## 2018-01-05 PROCEDURE — 90670 PCV13 VACCINE IM: CPT | Performed by: FAMILY MEDICINE

## 2018-01-05 PROCEDURE — 99214 OFFICE O/P EST MOD 30 MIN: CPT | Mod: 25 | Performed by: FAMILY MEDICINE

## 2018-01-05 PROCEDURE — G0009 ADMIN PNEUMOCOCCAL VACCINE: HCPCS | Performed by: FAMILY MEDICINE

## 2018-01-05 RX ORDER — LISINOPRIL 20 MG/1
TABLET ORAL
Qty: 90 TABLET | Refills: 3 | Status: SHIPPED | OUTPATIENT
Start: 2018-01-05 | End: 2018-04-17

## 2018-01-05 NOTE — PATIENT INSTRUCTIONS
"Thanks for coming in to see me today!    Your next visit with us should be scheduled for Follow up in 6 months.    Listed next are the medical health Maintenance items we are tracking for your care and when they are next due (or overdue!)  Our goal is 100% \"up to date.\" Keep this list handy to call and schedule what you are due for in the future!    Health Maintenance   Topic Date Due     HEPATITIS C SCREENING  01/29/1970     PNEUMOCOCCAL (1 of 2 - PCV13) 01/29/2017     AORTIC ANEURYSM SCREENING (SYSTEM ASSIGNED)  01/29/2017     MELISSA QUESTIONNAIRE 1 YEAR  03/02/2018     PHQ-9 Q1YR  03/02/2018     FALL RISK ASSESSMENT  05/04/2018     COLON CANCER SCREEN (SYSTEM ASSIGNED)  05/07/2018     LIPID SCREEN Q5 YR MALE (SYSTEM ASSIGNED)  06/11/2020     TETANUS IMMUNIZATION (SYSTEM ASSIGNED)  09/30/2020     ADVANCE DIRECTIVE PLANNING Q5 YRS  02/09/2022     INFLUENZA VACCINE (SYSTEM ASSIGNED)  Completed         "

## 2018-01-05 NOTE — MR AVS SNAPSHOT
"              After Visit Summary   1/5/2018    Jose Carlos Escamilla    MRN: [id number]           Patient Information     Date Of Birth          1950        Visit Information        Provider Department      1/5/2018 11:45 AM Baljit Salazar MD [facility]        Today's Diagnoses     Need for vaccination with 13-polyvalent pneumococcal conjugate vaccine    -  1    Need for hepatitis C screening test        Hypercholesterolemia        Family history of hemochromatosis        Gastroesophageal reflux disease with esophagitis        Narcotic dependence (H)        Screening for prostate cancer        Encounter for medication refill        Benign essential hypertension          Care Instructions    Thanks for coming in to see me today!    Your next visit with us should be scheduled for Follow up in 6 months.    Listed next are the medical health Maintenance items we are tracking for your care and when they are next due (or overdue!)  Our goal is 100% \"up to date.\" Keep this list handy to call and schedule what you are due for in the future!    Health Maintenance   Topic Date Due     HEPATITIS C SCREENING  01/29/1970     PNEUMOCOCCAL (1 of 2 - PCV13) 01/29/2017     AORTIC ANEURYSM SCREENING (SYSTEM ASSIGNED)  01/29/2017     MELISSA QUESTIONNAIRE 1 YEAR  03/02/2018     PHQ-9 Q1YR  03/02/2018     FALL RISK ASSESSMENT  05/04/2018     COLON CANCER SCREEN (SYSTEM ASSIGNED)  05/07/2018     LIPID SCREEN Q5 YR MALE (SYSTEM ASSIGNED)  06/11/2020     TETANUS IMMUNIZATION (SYSTEM ASSIGNED)  09/30/2020     ADVANCE DIRECTIVE PLANNING Q5 YRS  02/09/2022     INFLUENZA VACCINE (SYSTEM ASSIGNED)  Completed                 Follow-ups after your visit        Future tests that were ordered for you today     Open Future Orders        Priority Expected Expires Ordered    HCV Antibody ([facility]) Routine  3/5/2018 1/5/2018    Comp. Metabolic Panel (14) ([facility]) Routine 1/12/2018 4/5/2018 1/5/2018    Lipid Panel ([facility]) Routine " "2018    CBC with Diff/Plt ([facility]) Routine 2018 3/7/2018 2018    PSA Serum ([facility]) Routine 2018            Who to contact     If you have questions or need follow up information about today's clinic visit or your schedule please contact [facility] directly at [phone].  Normal or non-critical lab and imaging results will be communicated to you by Reebeehart, letter or phone within 4 business days after the clinic has received the results. If you do not hear from us within 7 days, please contact the clinic through Reebeehart or phone. If you have a critical or abnormal lab result, we will notify you by phone as soon as possible.  Submit refill requests through Fotoshkola or call your pharmacy and they will forward the refill request to us. Please allow 3 business days for your refill to be completed.          Additional Information About Your Visit        [facility] Information     Fotoshkola lets you send messages to your doctor, view your test results, renew your prescriptions, schedule appointments and more. To sign up, go to [email or web address] . Click on \"Log in\" on the left side of the screen, which will take you to the Welcome page. Then click on \"Sign up Now\" on the right side of the page.     You will be asked to enter the access code listed below, as well as some personal information. Please follow the directions to create your username and password.     Your access code is: [email or web address]  Expires: 2018 12:18 PM     Your access code will  in 90 days. If you need help or a new code, please call your [place] clinic or [phone].        Care EveryWhere ID     This is your Care EveryWhere ID. This could be used by other organizations to access your [place] medical records  [id number]        Your Vitals Were     Pulse Temperature Respirations Height Pulse Oximetry BMI (Body Mass Index)    76 98.5  F (36.9  C) (Oral) 24 1.835 m (6' 0.25\") " 97% 32.19 kg/m2       Blood Pressure from Last 3 Encounters:   01/05/18 122/84   05/15/17 128/82   05/04/17 130/88    Weight from Last 3 Encounters:   01/05/18 108.4 kg (239 lb)   05/15/17 110.7 kg (244 lb)   05/04/17 110.7 kg (244 lb)              We Performed the Following     PNEUMOCOCCAL CONJ VACCINE 13 VALENT IM          Today's Medication Changes          These changes are accurate as of: 1/5/18 12:18 PM.  If you have any questions, ask your nurse or doctor.               These medicines have changed or have updated prescriptions.        Dose/Directions    oxyCODONE 10 MG 12 hr tablet   Commonly known as:  OxyCONTIN   This may have changed:  additional instructions   Used for:  Status post total left knee replacement, Narcotic dependence (H), Peripheral polyneuropathy        Dose:  10 mg   Take 1 tablet (10 mg) by mouth every morning   Quantity:  30 tablet   Refills:  0       traZODone 50 MG tablet   Commonly known as:  DESYREL   This may have changed:  See the new instructions.   Used for:  Encounter for medication refill        TAKE ONE TABLET BY MOUTH TWICE DAILY   Quantity:  180 tablet   Refills:  2            Where to get your medicines      These medications were sent to [place] [address] [place] [facility] [address] [place] [address] [address]  [address] [facility], [facility] [address]     Phone:  [phone]     lisinopril 20 MG tablet                Primary Care Provider Office Phone # Fax #    Baljit Salazar [phone] [address]  [facility] [address]        Equal Access to Services     [facility] Hospital[facility] : Hadii eris ku hadasho Soomaali, waaxda luqadaha, qaybta kaalmada adeegyada, ryder escudero . So [facility] [phone].    ATENCIÓN: Si habla español, tiene a ramirez disposición servicios gratuitos de asistencia lingüística. Llame al [phone].    We comply with applicable federal civil rights laws and [place] laws. We do not discriminate on the basis of race,  color, national origin, age, disability, sex, sexual orientation, or gender identity.            Thank you!     Thank you for choosing [facility]  for your care. Our goal is always to provide you with excellent care. Hearing back from our patients is one way we can continue to improve our services. Please take a few minutes to complete the written survey that you may receive in the mail after your visit with us. Thank you!             Your Updated Medication List - Protect others around you: Learn how to safely use, store and throw away your medicines at www.disposemymeds.org.          This list is accurate as of: 1/5/18 12:18 PM.  Always use your most recent med list.                   Brand Name Dispense Instructions for use Diagnosis    celecoxib 200 MG capsule    celeBREX     TAKE ONE CAPSULE BY MOUTH AT BEDTIME    Status post total left knee replacement, Narcotic dependence (H), Peripheral polyneuropathy       diclofenac 1 % Gel topical gel    VOLTAREN     Reported on 3/14/2017        GABAPENTIN PO      Take 900 mg by mouth 2 times daily (Patient takes 4 x 300 mg morning, 5 x 300 mg bedtime= 2700mg dose) In the morning and at bedtime.        HYDROcodone-acetaminophen  MG per tablet    NORCO     Take  tablets by mouth 4 times daily as needed for pain        lisinopril 20 MG tablet    PRINIVIL/ZESTRIL    90 tablet    TAKE 1 TABLET (20 MG) BY MOUTH DAILY. NEED APPOINTMENT FOR REFILLS!    Encounter for medication refill       OMEPRAZOLE PO      Take 10 mg by mouth every morning (OTC)        oxyCODONE 10 MG 12 hr tablet    OxyCONTIN    30 tablet    Take 1 tablet (10 mg) by mouth every morning    Status post total left knee replacement, Narcotic dependence (H), Peripheral polyneuropathy       RANITIDINE HCL PO      Take 75 mg by mouth every morning (before breakfast)        senna-docusate 8.6-50 MG per tablet    SENOKOT-S;PERICOLACE    20 tablet    Take 1-2 tablets by mouth 2 times daily     Status post total left knee replacement       traZODone 50 MG tablet    DESYREL    180 tablet    TAKE ONE TABLET BY MOUTH TWICE DAILY    Encounter for medication refill       ZYRTEC ALLERGY PO

## 2018-01-05 NOTE — PROGRESS NOTES
Going to the pain clinic regularly,    Did go to [place] [facility] and then to their pain clinic PT, psychs,   Has now gone 5 days, feeling well and that is very unusal.  Using much less narcotics.    Life is reasonably doable.    Patient Active Problem List    Diagnosis Date Noted     Peripheral polyneuropathy 05/15/2017     Priority: Medium     Total knee replacement status 12/02/2016     Priority: Medium     Post-traumatic osteoarthritis of both knees 11/21/2016     Priority: Medium     Benign essential hypertension 11/21/2016     Priority: Medium     Narcotic dependence (H) 09/08/2016     Priority: Medium     1/19/2017 Patient is now seeing [facility].  They are witting his prescriptions for pain medication now.  Dr. Salazar was made aware.  Ramon Sheldon,   [facility]  [phone]      Patient is followed by Baljit Salazar MD for ongoing prescription of pain medication.  All refills should be approved by this provider, or covering partner.    Medication(s): Norco.   Maximum quantity per month: 90  Clinic visit frequency required: Q 3 months     Controlled substance agreement:  Encounter-Level CSA:     There are no encounter-level csa.        Pain Clinic evaluation in the past: No  [place] clinic next  DIRE Total Score(s):  No flowsheet data found.    Last [facility] website verification:  done on 9/8/16   [email or web address]         Chronic pain syndrome 06/20/2016     Priority: Medium     Pain is in the legs and seemed to start after starting a statin. Huge w/u at [facility]. Done, gabapentin helps They are referring him to the [facility] as nothing is making sense for pain.       History of rheumatic fever 02/16/2016     Priority: Medium     Esophageal stenosis 06/11/2015     Priority: Medium     Diverticulosis of large intestine 06/11/2015     Priority: Medium     Obesity 06/11/2015     Priority: Medium     Esophageal reflux 04/10/2014     Priority: Medium      Family history of hemochromatosis 03/06/2013     Priority: Medium     ACP (advance care planning)      Priority: Medium     Advance Care Planning 2/9/2017: Receipt of ACP document:  Received: invalid HCD document dated 11-28-16.  Document previously scanned on 12-5-16.  Validation form completed indicating invalid document. Copy sent to client with information and facilitation resources. Validation form sent to be scanned as notation of invalid document received. Request made to delete invalid document.  Confirmed/documented designated decision maker(s).  Added by Trena Ronquillo RN Advance Care Planning Liaison with [employer]             Hypercholesterolemia      Priority: Medium     Chronic rhinitis      Priority: Medium     IBS (irritable bowel syndrome)      Priority: Medium     Hiatal hernia      Priority: Medium     >25 min spent with patient, greater than 50% spent on discussion/education/planning, etc. About The primary encounter diagnosis was Need for vaccination with 13-polyvalent pneumococcal conjugate vaccine. Diagnoses of Need for hepatitis C screening test, Hypercholesterolemia, Family history of hemochromatosis, Gastroesophageal reflux disease with esophagitis, Narcotic dependence (H), Screening for prostate cancer, Encounter for medication refill, and Benign essential hypertension were also pertinent to this visit.    Assessment/Plan:  Jose Carlos was seen today for recheck medication, orders and musculoskeletal problem.    Diagnoses and all orders for this visit:    Need for vaccination with 13-polyvalent pneumococcal conjugate vaccine  -     PNEUMOCOCCAL CONJ VACCINE 13 VALENT IM    Need for hepatitis C screening test  -     Cancel: HCV Antibody ([facility])  -     HCV Antibody ([facility]); Future    Hypercholesterolemia  -     Lipid Panel ([facility]); Future    Family history of hemochromatosis    Gastroesophageal reflux disease with esophagitis  -     Comp. Metabolic Panel (14) ([facility]); Future  -      CBC with Diff/Plt ([facility]); Future    Narcotic dependence (H)    Screening for prostate cancer  -     PSA Serum ([facility]); Future    Encounter for medication refill  -     lisinopril (PRINIVIL/ZESTRIL) 20 MG tablet; TAKE 1 TABLET (20 MG) BY MOUTH DAILY. NEED APPOINTMENT FOR REFILLS!    Benign essential hypertension    Other orders  -     Cancel: ToxASSURE Urine Drug Screen ([facility])      Baljit Salazar MD  [facility]  [phone]

## 2018-01-09 ENCOUNTER — TRANSFERRED RECORDS (OUTPATIENT)
Dept: FAMILY MEDICINE | Facility: CLINIC | Age: 68
End: 2018-01-09

## 2018-01-15 DIAGNOSIS — G89.4 CHRONIC PAIN SYNDROME: ICD-10-CM

## 2018-01-15 DIAGNOSIS — Z79.899 ENCOUNTER FOR LONG-TERM (CURRENT) USE OF MEDICATIONS: Primary | ICD-10-CM

## 2018-01-15 DIAGNOSIS — E78.00 HYPERCHOLESTEROLEMIA: ICD-10-CM

## 2018-01-15 DIAGNOSIS — Z12.5 SCREENING FOR PROSTATE CANCER: ICD-10-CM

## 2018-01-15 DIAGNOSIS — K21.00 GASTROESOPHAGEAL REFLUX DISEASE WITH ESOPHAGITIS: ICD-10-CM

## 2018-01-15 DIAGNOSIS — Z11.59 NEED FOR HEPATITIS C SCREENING TEST: ICD-10-CM

## 2018-01-15 LAB
% GRANULOCYTES: 65.4 % (ref 42.2–75.2)
HCT VFR BLD AUTO: 44.1 % (ref 39–51)
HEMOGLOBIN: 14.7 G/DL (ref 13.4–17.5)
LYMPHOCYTES NFR BLD AUTO: 27.2 % (ref 20.5–51.1)
MCH RBC QN AUTO: 30.6 PG (ref 27–31)
MCHC RBC AUTO-ENTMCNC: 33.4 G/DL (ref 33–37)
MCV RBC AUTO: 91.8 FL (ref 80–100)
MONOCYTES NFR BLD AUTO: 7.4 % (ref 1.7–9.3)
PLATELET # BLD AUTO: 262 K/UL (ref 140–450)
RBC # BLD AUTO: 4.8 X10/CMM (ref 4.2–5.9)
WBC # BLD AUTO: 5.6 X10/CMM (ref 3.8–11)

## 2018-01-15 PROCEDURE — 36415 COLL VENOUS BLD VENIPUNCTURE: CPT | Performed by: FAMILY MEDICINE

## 2018-01-15 PROCEDURE — G0103 PSA SCREENING: HCPCS | Mod: 90 | Performed by: FAMILY MEDICINE

## 2018-01-15 PROCEDURE — 80053 COMPREHEN METABOLIC PANEL: CPT | Mod: 90 | Performed by: FAMILY MEDICINE

## 2018-01-15 PROCEDURE — 85025 COMPLETE CBC W/AUTO DIFF WBC: CPT | Performed by: FAMILY MEDICINE

## 2018-01-15 PROCEDURE — 86803 HEPATITIS C AB TEST: CPT | Mod: 90 | Performed by: FAMILY MEDICINE

## 2018-01-15 PROCEDURE — 80061 LIPID PANEL: CPT | Mod: 90 | Performed by: FAMILY MEDICINE

## 2018-01-15 PROCEDURE — 86618 LYME DISEASE ANTIBODY: CPT | Mod: 90 | Performed by: FAMILY MEDICINE

## 2018-01-17 ENCOUNTER — THERAPY VISIT (OUTPATIENT)
Dept: PHYSICAL THERAPY | Facility: CLINIC | Age: 68
End: 2018-01-17
Payer: MEDICARE

## 2018-01-17 DIAGNOSIS — M25.511 BILATERAL SHOULDER PAIN: Primary | ICD-10-CM

## 2018-01-17 DIAGNOSIS — M25.512 BILATERAL SHOULDER PAIN: Primary | ICD-10-CM

## 2018-01-17 LAB
ALBUMIN SERPL-MCNC: 4.4 G/DL (ref 3.6–4.8)
ALBUMIN/GLOB SERPL: 2.2 {RATIO} (ref 1.2–2.2)
ALP SERPL-CCNC: 57 IU/L (ref 39–117)
ALT SERPL-CCNC: 18 IU/L (ref 0–44)
AST SERPL-CCNC: 15 IU/L (ref 0–40)
BILIRUB SERPL-MCNC: 0.5 MG/DL (ref 0–1.2)
BUN SERPL-MCNC: 17 MG/DL (ref 8–27)
BUN/CREATININE RATIO: 21 (ref 10–24)
CALCIUM SERPL-MCNC: 9.2 MG/DL (ref 8.6–10.2)
CHLORIDE SERPLBLD-SCNC: 101 MMOL/L (ref 96–106)
CHOLEST SERPL-MCNC: 218 MG/DL (ref 100–199)
CREAT SERPL-MCNC: 0.81 MG/DL (ref 0.76–1.27)
EGFR IF AFRICN AM: 108 ML/MIN/1.73
EGFR IF NONAFRICN AM: 93 ML/MIN/1.73
GLOBULIN, TOTAL: 2 G/DL (ref 1.5–4.5)
GLUCOSE SERPL-MCNC: 100 MG/DL (ref 65–99)
HCV AB SERPL QL IA: 0.1 S/CO RATIO (ref 0–0.9)
HDLC SERPL-MCNC: 58 MG/DL
LDL/HDL RATIO: 2.4 RATIO UNITS (ref 0–3.6)
LDLC SERPL CALC-MCNC: 137 MG/DL (ref 0–99)
LYME IGG/IGM AB: <0.91 ISR (ref 0–0.9)
POTASSIUM SERPL-SCNC: 5.2 MMOL/L (ref 3.5–5.2)
PROT SERPL-MCNC: 6.4 G/DL (ref 6–8.5)
PSA NG/ML: 1 NG/ML (ref 0–4)
SODIUM SERPL-SCNC: 139 MMOL/L (ref 134–144)
TOTAL CO2: 24 MMOL/L (ref 18–28)
TRIGL SERPL-MCNC: 114 MG/DL (ref 0–149)
VLDLC SERPL CALC-MCNC: 23 MG/DL (ref 5–40)

## 2018-01-17 PROCEDURE — 97161 PT EVAL LOW COMPLEX 20 MIN: CPT | Mod: GP | Performed by: PHYSICAL THERAPIST

## 2018-01-17 PROCEDURE — 97110 THERAPEUTIC EXERCISES: CPT | Mod: GP | Performed by: PHYSICAL THERAPIST

## 2018-01-17 PROCEDURE — G8985 CARRY GOAL STATUS: HCPCS | Mod: GP | Performed by: PHYSICAL THERAPIST

## 2018-01-17 PROCEDURE — G8984 CARRY CURRENT STATUS: HCPCS | Mod: GP | Performed by: PHYSICAL THERAPIST

## 2018-01-17 NOTE — LETTER
[facility] PHYSICAL [facility]  [address] [facility] [address]  [phone]    2018    Re: Jose Carlos Escamilla   :   1950  MRN:  [id number]   REFERRING PHYSICIAN:   Alfonso Matos    [facility] PHYSICAL [facility]    Date of Initial Evaluation:  18  Visits:  Rxs Used: 1  Reason for Referral:  Bilateral shoulder pain    EVALUATION SUMMARY    [facility]    Subjective:  Pt presents to PT with a chief complaint of B shoulder pain (L>R) with reported onset ~2 months ago with potential correlation to swimming and repetitive overhead activity (washing his boat). Pain reported to be worse with reaching and lifting overhead. Pt also has complaints of widespread chronic muscle pain in which he has visit multiple specialists at [facility] and has not received a diagnosis. Pt reports that his deep muscle pain seems to flare up after increased activity, and is concerned about exercise progressions related to his shoulder pain. Pt also has secondary c/o significant R hip pain and has a CSI planned for tomorrow. PT to initially focus on Bilateral shoulder pain with plans to assess R hip pain in future visits.    Patient is a 67 year old male presenting with rehab left shoulder hpi.   Jose Carlos Escamilla is a 67 year old male with a bilateral shoulders (L>R) condition.  Condition occurred with:  Repetition/overuse.  Condition occurred: during recreation/sport (swimming and cleaning).  This is a new condition  ~2 months ago; chronic muscle pain since 2016.    Patient reports pain:  Lateral.  Radiates to:  Upper arm.  Pain is described as sharp and is intermittent and reported as 4/10.  Associated symptoms:  Loss of motion/stiffness, loss of strength and painful arc. Pain is worse in the P.M. and worse during the night.  Symptoms are exacerbated by using arm at shoulder level, using arm behind  back, using arm overhead, lifting and carrying and relieved by rest.  Since onset symptoms are gradually improving.  Special tests:  X-ray.      General health as reported by patient is good.  Pertinent medical history includes:  Overweight and high blood pressure.  Medical allergies: yes (see chart).  Other surgeries include:  Orthopedic surgery (L TKA 2016).  Current medications:  Sleep medication, pain medication and high blood pressure medication.  Current occupation .  Patient is working in normal job without restrictions.  Primary job tasks include:  Prolonged sitting.    Barriers include:  None as reported by the patient.    Red flags:  None as reported by the patient.       Re: Jose Carlos Escamilla   :   1950    Objective:  Standing Alignment:    Cervical/Thoracic:  Forward head  Shoulder/UE:  Rounded shoulders    Shoulder Evaluation:  ROM:  AROM:    Flexion:  Left:  160    Right:  165  Abduction:  Left: 145   Right:  150  Internal Rotation:  Left:  Thumb to L2    Right:  Thumb to T10  External Rotation:  Left:  80    Right:  80  Pain: Pain with end range IR on L     Strength:    Flexion: Left:5-/5   Pain:    Right: 5-/5     Pain:   Abduction:  Left: 5-/5   Pain:+    Right: 5-/5     Pain:  Internal Rotation:  Left:5-/5      Pain:++    Right: 5/5     Pain:  External Rotation:   Left:4+/5     Pain:   Right:4+/5     Pain:      Special Tests:    Left shoulder positive for the following special tests:  Impingement  Left shoulder negative for the following special tests:  Rotator cuff tear    Mobility Tests:    Glenohumeral posterior left:  Hypomobile      Assessment/Plan:    Patient is a 67 year old male with both sides shoulder complaints.    Patient has the following significant findings with corresponding treatment plan.                Diagnosis 1:  Bilateral shoulder pain    Pain -  hot/cold therapy, manual therapy, splint/taping/bracing/orthotics, self management and education  Decreased  ROM/flexibility - manual therapy and therapeutic exercise  Decreased joint mobility - manual therapy and therapeutic exercise  Decreased strength - therapeutic exercise and therapeutic activities  Impaired muscle performance - neuro re-education  Impaired posture - neuro re-education    Therapy Evaluation Codes:   1) History comprised of:   Personal factors that impact the plan of care:      Overall behavior pattern, Past/current experiences and Time since onset of   symptoms.    Comorbidity factors that impact the plan of care are:      Pain at night/rest.     Medications impacting care: None.  2) Examination of Body Systems comprised of:   Body structures and functions that impact the plan of care:      Shoulder.  Re: Jose Carlos Escamilla   :   1950     Activity limitations that impact the plan of care are:      Bathing, Cooking, Driving, Dressing, Lifting and Throwing.  3) Clinical presentation characteristics are:   Evolving/Changing.  4) Decision-Making    Moderate complexity using standardized patient assessment instrument and/or   measureable assessment of functional outcome.  Cumulative Therapy Evaluation is: Moderate complexity.    Previous and current functional limitations:  (See Goal Flow Sheet for this information)    Short term and Long term goals: (See Goal Flow Sheet for this information)     Communication ability:  Patient appears to be able to clearly communicate and understand verbal and written communication and follow directions correctly.  Treatment Explanation - The following has been discussed with the patient:   RX ordered/plan of care  Anticipated outcomes  Possible risks and side effects  This patient would benefit from PT intervention to resume normal activities.   Rehab potential is fair. Pt presents with manageable shoulder findings however exhibits widespread generalized muscle pain preventing progressive strengthening/exercises per patient report    Frequency:  1 X week, once  daily  Duration:  for 6 weeks tapering to 2 X a month over 2 months  Discharge Plan:  Achieve all LTG.  Independent in home treatment program.  Reach maximal therapeutic benefit.    Please refer to the daily flowsheet for treatment today, total treatment time and time spent performing 1:1 timed codes.       Thank you for your referral.    INQUIRIES  Therapist: Rashard Gupta DPT  [facility] PHYSICAL THERAPY  [address] [place] [address]  Phone: [phone]  Fax: [phone]

## 2018-01-17 NOTE — LETTER
DEPARTMENT OF HEALTH AND HUMAN SERVICES  CENTERS FOR MEDICARE & MEDICAID SERVICES    PLAN/UPDATED PLAN OF PROGRESS FOR OUTPATIENT REHABILITATION    PATIENTS NAME:  Jose Carlos Escamilla   : 1950  PROVIDER NUMBER:    [id number]  [id number]   PROVIDER NAME: [facility] [facility] [place] [facility]  MEDICAL RECORD NUMBER: [id number]   START OF CARE DATE:  SOC Date: 18   TYPE:  PT  PRIMARY/TREATMENT DIAGNOSIS: (Pertinent Medical Diagnosis)  Bilateral shoulder pain    VISITS FROM START OF CARE:  Rxs Used: 1     [place] [facility] Initial Evaluation    Subjective:  Pt presents to PT with a chief complaint of B shoulder pain (L>R) with reported onset ~2 months ago(2017) with potential correlation to swimming and repetitive overhead activity (washing his boat). Pain reported to be worse with reaching and lifting overhead. Pt also has complaints of widespread chronic muscle pain in which he has visit multiple specialists at [facility] and has not received a diagnosis. Pt reports that his deep muscle pain seems to flare up after increased activity, and is concerned about exercise progressions related to his shoulder pain. Pt also has secondary c/o significant R hip pain and has a CSI planned for tomorrow. PT to initially focus on Bilateral shoulder pain with plans to assess R hip pain in future visits.    Patient is a 67 year old male presenting with rehab left shoulder hpi.   Jose Carlos Escamilla is a 67 year old male with a bilateral shoulders (L>R) condition.  Condition occurred with:  Repetition/overuse.  Condition occurred: during recreation/sport (swimming and cleaning).  This is a new condition  ~2 months ago; chronic muscle pain since 2016.    Patient reports pain:  Lateral.  Radiates to:  Upper arm.  Pain is described as sharp and is intermittent and reported as 4/10.  Associated symptoms:  Loss of motion/stiffness, loss of strength and painful arc. Pain is worse in  the P.M. and worse during the night.  Symptoms are exacerbated by using arm at shoulder level, using arm behind back, using arm overhead, lifting and carrying and relieved by rest.  Since onset symptoms are gradually improving.  Special tests:  X-ray.      General health as reported by patient is good.  Pertinent medical history includes:  Overweight and high blood pressure.  Medical allergies: yes (see chart).  Other surgeries include:  Orthopedic surgery (L TKA 2016).  Current medications:  Sleep medication, pain medication and high blood pressure medication.  Current occupation .  Patient is working in normal job without restrictions.  Primary job tasks include:  Prolonged sitting.    Barriers include:  None as reported by the patient.    Red flags:  None as reported by the patient.    Objective:  Standing Alignment:    Cervical/Thoracic:  Forward head  Shoulder/UE:  Rounded shoulders            PATIENTS NAME:  Jose Carlos Escamilla   : 1950    Shoulder Evaluation:  ROM:  AROM:    Flexion:  Left:  160    Right:  165  Abduction:  Left: 145   Right:  150  Internal Rotation:  Left:  Thumb to L2    Right:  Thumb to T10  External Rotation:  Left:  80    Right:  80  Pain: Pain with end range IR on L     Strength:    Flexion: Left:5-/5   Pain:    Right: 5-/5     Pain:   Abduction:  Left: 5-/5   Pain:+    Right: 5-/5     Pain:  Internal Rotation:  Left:5-/5      Pain:++    Right: 5/5     Pain:  External Rotation:   Left:4+/5     Pain:   Right:4+/5     Pain:      Special Tests:    Left shoulder positive for the following special tests:  Impingement  Left shoulder negative for the following special tests:  Rotator cuff tear      Mobility Tests:    Glenohumeral posterior left:  Hypomobile      Assessment/Plan:    Patient is a 67 year old male with both sides shoulder complaints.    Patient has the following significant findings with corresponding treatment plan.                Diagnosis 1:  Bilateral shoulder  pain    Pain -  hot/cold therapy, manual therapy, splint/taping/bracing/orthotics, self management and education  Decreased ROM/flexibility - manual therapy and therapeutic exercise  Decreased joint mobility - manual therapy and therapeutic exercise  Decreased strength - therapeutic exercise and therapeutic activities  Impaired muscle performance - neuro re-education  Impaired posture - neuro re-education    Therapy Evaluation Codes:   1) History comprised of:   Personal factors that impact the plan of care:      Overall behavior pattern, Past/current experiences and Time since onset of   symptoms.    Comorbidity factors that impact the plan of care are:      Pain at night/rest.     Medications impacting care: None.  2) Examination of Body Systems comprised of:   Body structures and functions that impact the plan of care:      Shoulder.   Activity limitations that impact the plan of care are:      Bathing, Cooking, Driving, Dressing, Lifting and Throwing.  3) Clinical presentation characteristics are:   Evolving/Changing.  4) Decision-Making    Moderate complexity using standardized patient assessment instrument and/or   measureable assessment of functional outcome.    Cumulative Therapy Evaluation is: Moderate complexity.  PATIENTS NAME:  Jose Carlos Escamilla   : 1950    Previous and current functional limitations:  (See Goal Flow Sheet for this information)    Short term and Long term goals: (See Goal Flow Sheet for this information)     Communication ability:  Patient appears to be able to clearly communicate and understand verbal and written communication and follow directions correctly.  Treatment Explanation - The following has been discussed with the patient:   RX ordered/plan of care  Anticipated outcomes  Possible risks and side effects  This patient would benefit from PT intervention to resume normal activities.   Rehab potential is fair. Pt presents with manageable shoulder findings however exhibits  "widespread generalized muscle pain preventing progressive strengthening/exercises per patient report    Frequency:  1 X week, once daily  Duration:  for 6 weeks tapering to 2 X a month over 2 months  Discharge Plan:  Achieve all LTG.  Independent in home treatment program.  Reach maximal therapeutic benefit.    Please refer to the daily flowsheet for treatment today, total treatment time and time spent performing 1:1 timed codes.           Caregiver Signature/Credentials _____________________________ Date ________        Rashard Gupta DPT     I have reviewed and certified the need for these services and plan of treatment while under my care.          PHYSICIAN'S SIGNATURE:   _____________________________________ Date___________     Alfonso Matos MD    Certification period:  Beginning of Cert date period: 01/17/18 to  End of Cert period date: 04/16/18     Functional Level Progress Report: Please see attached \"Goal Flow sheet for Functional level.\"    ____X____ Continue Services or       ________ DC Services                Service dates: From  SOC Date: 01/17/18 date to present                         "

## 2018-01-17 NOTE — PROGRESS NOTES
[place] [facility] Initial Evaluation    Subjective:  Pt presents to PT with a chief complaint of B shoulder pain (L>R) with reported onset ~2 months ago (11/2017) with potential correlation to swimming and repetitive overhead activity (washing his boat). Pain reported to be worse with reaching and lifting overhead. Pt also has complaints of widespread chronic muscle pain in which he has visit multiple specialists at [facility] and has not received a diagnosis. Pt reports that his deep muscle pain seems to flare up after increased activity, and is concerned about exercise progressions related to his shoulder pain. Pt also has secondary c/o significant R hip pain and has a CSI planned for tomorrow. PT to initially focus on Bilateral shoulder pain with plans to assess R hip pain in future visits.    Patient is a 67 year old male presenting with rehab left shoulder hpi.   Jose Carlos Escamilla is a 67 year old male with a bilateral shoulders (L>R) condition.  Condition occurred with:  Repetition/overuse.  Condition occurred: during recreation/sport (swimming and cleaning).  This is a new condition  ~2 months ago 11/2017; chronic muscle pain since 2016.    Patient reports pain:  Lateral.  Radiates to:  Upper arm.  Pain is described as sharp and is intermittent and reported as 4/10.  Associated symptoms:  Loss of motion/stiffness, loss of strength and painful arc. Pain is worse in the P.M. and worse during the night.  Symptoms are exacerbated by using arm at shoulder level, using arm behind back, using arm overhead, lifting and carrying and relieved by rest.  Since onset symptoms are gradually improving.  Special tests:  X-ray.      General health as reported by patient is good.  Pertinent medical history includes:  Overweight and high blood pressure.  Medical allergies: yes (see chart).  Other surgeries include:  Orthopedic surgery (L TKA 2016).  Current medications:  Sleep medication, pain medication and high  blood pressure medication.  Current occupation .  Patient is working in normal job without restrictions.  Primary job tasks include:  Prolonged sitting.    Barriers include:  None as reported by the patient.    Red flags:  None as reported by the patient.                        Objective:  Standing Alignment:    Cervical/Thoracic:  Forward head  Shoulder/UE:  Rounded shoulders                                       Shoulder Evaluation:  ROM:  AROM:    Flexion:  Left:  160    Right:  165    Abduction:  Left: 145   Right:  150    Internal Rotation:  Left:  Thumb to L2    Right:  Thumb to T10  External Rotation:  Left:  80    Right:  80                  Pain: Pain with end range IR on L     Strength:    Flexion: Left:5-/5   Pain:    Right: 5-/5     Pain:     Abduction:  Left: 5-/5   Pain:+    Right: 5-/5     Pain:    Internal Rotation:  Left:5-/5      Pain:++    Right: 5/5     Pain:  External Rotation:   Left:4+/5     Pain:   Right:4+/5     Pain:              Special Tests:    Left shoulder positive for the following special tests:  Impingement  Left shoulder negative for the following special tests:  Rotator cuff tear      Mobility Tests:      Glenohumeral posterior left:  Hypomobile                                               General     ROS    Assessment/Plan:    Patient is a 67 year old male with both sides shoulder complaints.    Patient has the following significant findings with corresponding treatment plan.                Diagnosis 1:  Bilateral shoulder pain    Pain -  hot/cold therapy, manual therapy, splint/taping/bracing/orthotics, self management and education  Decreased ROM/flexibility - manual therapy and therapeutic exercise  Decreased joint mobility - manual therapy and therapeutic exercise  Decreased strength - therapeutic exercise and therapeutic activities  Impaired muscle performance - neuro re-education  Impaired posture - neuro re-education    Therapy Evaluation Codes:   1) History  comprised of:   Personal factors that impact the plan of care:      Overall behavior pattern, Past/current experiences and Time since onset of symptoms.    Comorbidity factors that impact the plan of care are:      Pain at night/rest.     Medications impacting care: None.  2) Examination of Body Systems comprised of:   Body structures and functions that impact the plan of care:      Shoulder.   Activity limitations that impact the plan of care are:      Bathing, Cooking, Driving, Dressing, Lifting and Throwing.  3) Clinical presentation characteristics are:   Evolving/Changing.  4) Decision-Making    Moderate complexity using standardized patient assessment instrument and/or measureable assessment of functional outcome.  Cumulative Therapy Evaluation is: Moderate complexity.    Previous and current functional limitations:  (See Goal Flow Sheet for this information)    Short term and Long term goals: (See Goal Flow Sheet for this information)     Communication ability:  Patient appears to be able to clearly communicate and understand verbal and written communication and follow directions correctly.  Treatment Explanation - The following has been discussed with the patient:   RX ordered/plan of care  Anticipated outcomes  Possible risks and side effects  This patient would benefit from PT intervention to resume normal activities.   Rehab potential is fair. Pt presents with manageable shoulder findings however exhibits widespread generalized muscle pain preventing progressive strengthening/exercises per patient report    Frequency:  1 X week, once daily  Duration:  for 6 weeks tapering to 2 X a month over 2 months  Discharge Plan:  Achieve all LTG.  Independent in home treatment program.  Reach maximal therapeutic benefit.    Please refer to the daily flowsheet for treatment today, total treatment time and time spent performing 1:1 timed codes.

## 2018-01-17 NOTE — MR AVS SNAPSHOT
After Visit Summary   1/17/2018    Jose Carlos Escamilla    MRN: [id number]           Patient Information     Date Of Birth          1950        Visit Information        Provider Department      1/17/2018 10:10 AM Rashard Gupta PT [facility] Physical Therapy        Today's Diagnoses     Bilateral shoulder pain    -  1       Follow-ups after your visit        Your next 10 appointments already scheduled     Jan 31, 2018  9:30 AM CST   [facility] Spine with Rashard Gupta PT   [facility] ([facility]  )    [address] [place] [id number]   [phone]            Feb 07, 2018  9:30 AM CST   [facility] Sp[facility] with Rashard Gupta PT   [facility] ([facility]  )    [address] [place] [id number]   [phone]            Apr 17, 2018  1:00 PM CDT   PHYSICAL with Baljit Salazar MD   [facility] ([facility])    [address] [place] [address] [phone]              Who to contact     If you have questions or need follow up information about today's clinic visit or your schedule please contact [facility] PHYSICAL THERAP[facility] directly at [phone].  Normal or non-critical lab and imaging results will be communicated to you by [employer], letter or phone within 4 business days after the clinic has received the results. If you do not hear from us within 7 days, please contact the clinic through [employer] or phone. If you have a critical or abnormal lab result, we will notify you by phone as soon as possible.  Submit refill requests through [employer] or call your pharmacy and they will forward the refill request to us. Please allow 3 business days for your refill to be completed.          Additional Information About Your Visit        [employer] Information     [employer] lets you send  "messages to your doctor, view your test results, renew your prescriptions, schedule appointments and more. To sign up, go to [email or web address] . Click on \"Log in\" on the left side of the screen, which will take you to the Welcome page. Then click on \"Sign up Now\" on the right side of the page.     You will be asked to enter the access code listed below, as well as some personal information. Please follow the directions to create your username and password.     Your access code is: [email or web address]  Expires: 2018 12:18 PM     Your access code will  in 90 days. If you need help or a new code, please call your [facility] clinic or [phone].        Care EveryWhere ID     This is your Care EveryWhere ID. This could be used by other organizations to access your [facility] medical records  [id number]         Blood Pressure from Last 3 Encounters:   18 122/84   05/15/17 128/82   17 130/88    Weight from Last 3 Encounters:   18 108.4 kg (239 lb)   05/15/17 110.7 kg (244 lb)   17 110.7 kg (244 lb)              We Performed the Following     HC PT EVAL, LOW COMPLEXITY     TERRANCE CERT REPORT     [facility]ANCE INITIAL EVAL REPORT     THERAPEUTIC EXERCISES          Today's Medication Changes          These changes are accurate as of: 18  1:54 PM.  If you have any questions, ask your nurse or doctor.               These medicines have changed or have updated prescriptions.        Dose/Directions    oxyCODONE 10 MG 12 hr tablet   Commonly known as:  OxyCONTIN   This may have changed:  additional instructions   Used for:  Status post total left knee replacement, Narcotic dependence (H), Peripheral polyneuropathy        Dose:  10 mg   Take 1 tablet (10 mg) by mouth every morning   Quantity:  30 tablet   Refills:  0       traZODone 50 MG tablet   Commonly known as:  DESYREL   This may have changed:  See the new instructions.   Used for:  Encounter for medication refill        TAKE ONE TABLET BY MOUTH " TWICE DAILY   Quantity:  180 tablet   Refills:  2                Primary Care Provider Office Phone # Fax #    Baljit Salazar [phone] [address]  [facility] [place] 2[facility] [address]        Equal Access to Services     BRUNILDAVANESA LANA : Hadii eris ku hadlorrieo Sojordonali, waaxda luqadaha, qaybta kaalmada adeegyada, waxobie jackelynin hayaan adediana mendenhall kena . So [facility] [phone].    ATENCIÓN: Si habla español, tiene a ramirez disposición servicios gratuitos de asistencia lingüística. Nickame al [phone].    We comply with applicable federal civil rights laws and [place] laws. We do not discriminate on the basis of race, color, national origin, age, disability, sex, sexual orientation, or gender identity.            Thank you!     Thank you for choosing [place] [facility]ERAP[facility]  for your care. Our goal is always to provide you with excellent care. Hearing back from our patients is one way we can continue to improve our services. Please take a few minutes to complete the written survey that you may receive in the mail after your visit with us. Thank you!             Your Updated Medication List - Protect others around you: Learn how to safely use, store and throw away your medicines at www.disposemymeds.org.          This list is accurate as of: 1/17/18  1:54 PM.  Always use your most recent med list.                   Brand Name Dispense Instructions for use Diagnosis    celecoxib 200 MG capsule    celeBREX     TAKE ONE CAPSULE BY MOUTH AT BEDTIME    Status post total left knee replacement, Narcotic dependence (H), Peripheral polyneuropathy       diclofenac 1 % Gel topical gel    VOLTAREN     Reported on 3/14/2017        GABAPENTIN PO      Take 900 mg by mouth 2 times daily (Patient takes 4 x 300 mg morning, 5 x 300 mg bedtime= 2700mg dose) In the morning and at bedtime.        HYDROcodone-acetaminophen  MG per tablet    NORCO     Take  tablets by  mouth 4 times daily as needed for pain        lisinopril 20 MG tablet    PRINIVIL/ZESTRIL    90 tablet    TAKE 1 TABLET (20 MG) BY MOUTH DAILY. NEED APPOINTMENT FOR REFILLS!    Encounter for medication refill       OMEPRAZOLE PO      Take 10 mg by mouth every morning (OTC)        oxyCODONE 10 MG 12 hr tablet    OxyCONTIN    30 tablet    Take 1 tablet (10 mg) by mouth every morning    Status post total left knee replacement, Narcotic dependence (H), Peripheral polyneuropathy       RANITIDINE HCL PO      Take 75 mg by mouth every morning (before breakfast)        senna-docusate 8.6-50 MG per tablet    SENOKOT-S;PERICOLACE    20 tablet    Take 1-2 tablets by mouth 2 times daily    Status post total left knee replacement       traZODone 50 MG tablet    DESYREL    180 tablet    TAKE ONE TABLET BY MOUTH TWICE DAILY    Encounter for medication refill       ZYRTEC ALLERGY PO

## 2018-01-31 ENCOUNTER — THERAPY VISIT (OUTPATIENT)
Dept: PHYSICAL THERAPY | Facility: CLINIC | Age: 68
End: 2018-01-31
Payer: MEDICARE

## 2018-01-31 DIAGNOSIS — Z79.899 ENCOUNTER FOR LONG-TERM (CURRENT) USE OF MEDICATIONS: Primary | ICD-10-CM

## 2018-01-31 DIAGNOSIS — M25.511 BILATERAL SHOULDER PAIN: ICD-10-CM

## 2018-01-31 DIAGNOSIS — M25.512 BILATERAL SHOULDER PAIN: ICD-10-CM

## 2018-01-31 PROCEDURE — 97110 THERAPEUTIC EXERCISES: CPT | Mod: GP | Performed by: PHYSICAL THERAPIST

## 2018-01-31 PROCEDURE — 97112 NEUROMUSCULAR REEDUCATION: CPT | Mod: GP | Performed by: PHYSICAL THERAPIST

## 2018-01-31 RX ORDER — TRAZODONE HYDROCHLORIDE 50 MG/1
TABLET, FILM COATED ORAL
Qty: 180 TABLET | Refills: 2 | Status: SHIPPED | OUTPATIENT
Start: 2018-01-31 | End: 2018-04-17

## 2018-01-31 NOTE — TELEPHONE ENCOUNTER
trazodone (DESYREL) 50 MG tablet   LAST  Med check 1/5/18   last labs(for RX) 1/15/18  Next  appt scheduled =  none  Maribeth uHang MA

## 2018-02-14 ENCOUNTER — THERAPY VISIT (OUTPATIENT)
Dept: PHYSICAL THERAPY | Facility: CLINIC | Age: 68
End: 2018-02-14
Payer: MEDICARE

## 2018-02-14 DIAGNOSIS — M25.512 BILATERAL SHOULDER PAIN: ICD-10-CM

## 2018-02-14 DIAGNOSIS — M25.511 BILATERAL SHOULDER PAIN: ICD-10-CM

## 2018-02-14 PROCEDURE — 97110 THERAPEUTIC EXERCISES: CPT | Mod: GP | Performed by: PHYSICAL THERAPIST

## 2018-02-14 PROCEDURE — G8985 CARRY GOAL STATUS: HCPCS | Mod: GP | Performed by: PHYSICAL THERAPIST

## 2018-02-14 PROCEDURE — G8984 CARRY CURRENT STATUS: HCPCS | Mod: GP | Performed by: PHYSICAL THERAPIST

## 2018-02-14 PROCEDURE — 97112 NEUROMUSCULAR REEDUCATION: CPT | Mod: GP | Performed by: PHYSICAL THERAPIST

## 2018-02-14 NOTE — MR AVS SNAPSHOT
"              After Visit Summary   2/14/2018    Jose Carlos Escamilla    MRN: [id number]           Patient Information     Date Of Birth          1950        Visit Information        Provider Department      2/14/2018 10:50 AM Rashard Gupta PT [facility]        Today's Diagnoses     Bilateral shoulder pain           Follow-ups after your visit        Your next 10 appointments already scheduled     Apr 17, 2018  1:00 PM CDT   PHYSICAL with Baljit Salazar MD   [facility] ([facility])    [address] [place] [address] [phone]              Who to contact     If you have questions or need follow up information about today's clinic visit or your schedule please contact [facility] PHYSICAL [facility] directly at [phone].  Normal or non-critical lab and imaging results will be communicated to you by [employer], letter or phone within 4 business days after the clinic has received the results. If you do not hear from us within 7 days, please contact the clinic through [employer] or phone. If you have a critical or abnormal lab result, we will notify you by phone as soon as possible.  Submit refill requests through TextMaster or call your pharmacy and they will forward the refill request to us. Please allow 3 business days for your refill to be completed.          Additional Information About Your Visit        [employer] Information     TextMaster lets you send messages to your doctor, view your test results, renew your prescriptions, schedule appointments and more. To sign up, go to [email or web address] . Click on \"Log in\" on the left side of the screen, which will take you to the Welcome page. Then click on \"Sign up Now\" on the right side of the page.     You will be asked to enter the access code listed below, as well as some personal information. Please follow the directions to create " your username and password.     Your access code is: [email or web address]  Expires: 2018 12:18 PM     Your access code will  in 90 days. If you need help or a new code, please call your [place] clinic or [phone].        Care EveryWhere ID     This is your Care EveryWhere ID. This could be used by other organizations to access your [place] medical records  [id number]         Blood Pressure from Last 3 Encounters:   18 122/84   05/15/17 128/82   17 130/88    Weight from Last 3 Encounters:   18 108.4 kg (239 lb)   05/15/17 110.7 kg (244 lb)   17 110.7 kg (244 lb)              We Performed the Following     TERRANCE PROGRESS NOTES REPORT     NEUROMUSCULAR RE-EDUCATION     THERAPEUTIC EXERCISES          Today's Medication Changes          These changes are accurate as of 18 11:59 PM.  If you have any questions, ask your nurse or doctor.               These medicines have changed or have updated prescriptions.        Dose/Directions    oxyCODONE 10 MG 12 hr tablet   Commonly known as:  OxyCONTIN   This may have changed:  additional instructions   Used for:  Status post total left knee replacement, Narcotic dependence (H), Peripheral polyneuropathy        Dose:  10 mg   Take 1 tablet (10 mg) by mouth every morning   Quantity:  30 tablet   Refills:  0                Primary Care Provider Office Phone # Fax #    Baljit Salazar [phone] [address]  [facility] [address]        Equal Access to Services     [facility] : Hadii aad ku hadasho Soomaali, waaxda luqadaha, qaybta kaalmada adeegyada, ryder escudero . So [facility] [phone].    ATENCIÓN: Si habla español, tiene a ramirez disposición servicios gratuitos de asistencia lingüística. Llame al [phone].    We comply with applicable federal civil rights laws and [place] laws. We do not discriminate on the basis of race, color, national origin, age, disability, sex, sexual  orientation, or gender identity.            Thank you!     Thank you for choosing [place] [facility] PH[facility] THERAP[facility]  for your care. Our goal is always to provide you with excellent care. Hearing back from our patients is one way we can continue to improve our services. Please take a few minutes to complete the written survey that you may receive in the mail after your visit with us. Thank you!             Your Updated Medication List - Protect others around you: Learn how to safely use, store and throw away your medicines at www.disposemymeds.org.          This list is accurate as of 2/14/18 11:59 PM.  Always use your most recent med list.                   Brand Name Dispense Instructions for use Diagnosis    celecoxib 200 MG capsule    celeBREX     TAKE ONE CAPSULE BY MOUTH AT BEDTIME    Status post total left knee replacement, Narcotic dependence (H), Peripheral polyneuropathy       diclofenac 1 % Gel topical gel    VOLTAREN     Reported on 3/14/2017        GABAPENTIN PO      Take 900 mg by mouth 2 times daily (Patient takes 4 x 300 mg morning, 5 x 300 mg bedtime= 2700mg dose) In the morning and at bedtime.        HYDROcodone-acetaminophen  MG per tablet    NORCO     Take  tablets by mouth 4 times daily as needed for pain        lisinopril 20 MG tablet    PRINIVIL/ZESTRIL    90 tablet    TAKE 1 TABLET (20 MG) BY MOUTH DAILY. NEED APPOINTMENT FOR REFILLS!    Encounter for medication refill       OMEPRAZOLE PO      Take 10 mg by mouth every morning (OTC)        oxyCODONE 10 MG 12 hr tablet    OxyCONTIN    30 tablet    Take 1 tablet (10 mg) by mouth every morning    Status post total left knee replacement, Narcotic dependence (H), Peripheral polyneuropathy       RANITIDINE HCL PO      Take 75 mg by mouth every morning (before breakfast)        senna-docusate 8.6-50 MG per tablet    SENOKOT-S;PERICOLACE    20 tablet    Take 1-2 tablets by mouth 2 times daily    Status post  total left knee replacement       traZODone 50 MG tablet    DESYREL    180 tablet    TAKE ONE TABLET BY MOUTH TWICE DAILY    Encounter for long-term (current) use of medications       ZYRTEC ALLERGY PO

## 2018-02-14 NOTE — LETTER
[facility] ATHLETIC [facility] PHYSICAL THERAPY  [address] [address]  [phone]    February 15, 2018    Re: Jose Carlos Escamilla   :   1950  MRN:  [id number]   REFERRING PHYSICIAN:   Alfonso Matos    [facility] PHYSICAL THERAPY    Date of Initial Evaluation:  18  Visits:  Rxs Used: 3  Reason for Referral:  Bilateral shoulder pain    PROGRESS  REPORT    Progress reporting period is from 18 to 18. Pt has attended 3 PT sessions addressing patient's chief complaints of B shoulder pain (L>R) with reported onset ~3 months ago (2017) with potential correlation to swimming and repetitive overhead activity (washing his boat). Pt reports moderate improvement in shoulder pain throughout PT with a primary treatment emphasis on RTC strengthening, scapular stabilization, and posterior capsule stretching. Pt to continues to exhibit a slight loss of L shoulder elevation and IR ROM, but much improved. Pt would like to continue HEP independently with plans to f/u as needed.       SUBJECTIVE  Subjective: Pt reports moderate improvement in shoulder pain and feels like his exercises have been helpful. Would like to continue HEP independently with plans to f/u as needed. Plans to have a [place] appt to manage his chronic widespread muscle pain with PT    Current Pain level: 3/10.     Initial Pain level: 9/10.   Changes in function:  Yes (See Goal flowsheet attached for changes in current functional level)  Adverse reaction to treatment or activity: None    OBJECTIVE  Changes noted in objective findings:  Yes,   Mild loss of L shoulder elevation and IR, but much improved.   Painfree B shoulder resisted motions in all directions.   Recommend cont emphasis on RTC and scapular strength with post capsule stretching     ASSESSMENT/PLAN  Updated problem list and treatment plan: Diagnosis 1:  B RTC tendinosis    STG/LTGs have been met or progress  has been made towards goals:  Yes (See Goal flow sheet completed today.)  Assessment of Progress: The patient's condition is improving.  Self Management Plans:  Patient has been instructed in a home treatment program.  Patient is independent in a home treatment program.  Re: Jose Carlos Escamilla   :   1950    I have re-evaluated this patient and find that the nature, scope, duration and intensity of the therapy is appropriate for the medical condition of the patient.  Jose Carlos continues to require the following intervention to meet STG and LTG's:  PT    Recommendations:  This patient would benefit from continued HEP management with plans to f/u as needed        Please refer to the daily flowsheet for treatment today, total treatment time and time spent performing 1:1 timed codes.      Thank you for your referral.    INQUIRIES  Therapist: Rashard Gupta DPT  [facility] PH[facility] TH[facility]AP[facility]  [address] [place] [address]  Phone: [phone]  Fax: [phone]

## 2018-02-15 NOTE — PROGRESS NOTES
Subjective:  HPI                    Objective:  System    Physical Exam    General     ROS    Assessment/Plan:    PROGRESS  REPORT    Progress reporting period is from 1/17/18 to 2/14/18. Pt has attended 3 PT sessions addressing patient's chief complaints of B shoulder pain (L>R) with reported onset ~3 months ago (11/2017) with potential correlation to swimming and repetitive overhead activity (washing his boat). Pt reports moderate improvement in shoulder pain throughout PT with a primary treatment emphasis on RTC strengthening, scapular stabilization, and posterior capsule stretching. Pt to continues to exhibit a slight loss of L shoulder elevation and IR ROM, but much improved. Pt would like to continue HEP independently with plans to f/u as needed.       SUBJECTIVE  Subjective: Pt reports moderate improvement in shoulder pain and feels like his exercises have been helpful. Would like to continue HEP independently with plans to f/u as needed. Plans to have a [place] appt to manage his chronic widespread muscle pain with PT    Current Pain level: 3/10.     Initial Pain level: 9/10.   Changes in function:  Yes (See Goal flowsheet attached for changes in current functional level)  Adverse reaction to treatment or activity: None    OBJECTIVE  Changes noted in objective findings:  Yes,   Mild loss of L shoulder elevation and IR, but much improved.   Painfree B shoulder resisted motions in all directions.   Recommend cont emphasis on RTC and scapular strength with post capsule stretching     ASSESSMENT/PLAN  Updated problem list and treatment plan: Diagnosis 1:  B RTC tendinosis    STG/LTGs have been met or progress has been made towards goals:  Yes (See Goal flow sheet completed today.)  Assessment of Progress: The patient's condition is improving.  Self Management Plans:  Patient has been instructed in a home treatment program.  Patient is independent in a home treatment program.  I have re-evaluated this patient and  find that the nature, scope, duration and intensity of the therapy is appropriate for the medical condition of the patient.  Jose Carlos continues to require the following intervention to meet STG and LTG's:  PT    Recommendations:  This patient would benefit from continued HEP management with plans to f/u as needed        Please refer to the daily flowsheet for treatment today, total treatment time and time spent performing 1:1 timed codes.

## 2018-02-16 ENCOUNTER — TRANSFERRED RECORDS (OUTPATIENT)
Dept: FAMILY MEDICINE | Facility: CLINIC | Age: 68
End: 2018-02-16

## 2018-02-17 DIAGNOSIS — Z76.0 ENCOUNTER FOR MEDICATION REFILL: ICD-10-CM

## 2018-02-19 RX ORDER — LISINOPRIL 20 MG/1
TABLET ORAL
Qty: 30 TABLET | Refills: 0 | Status: SHIPPED | OUTPATIENT
Start: 2018-02-19 | End: 2018-03-23

## 2018-02-28 ENCOUNTER — TRANSFERRED RECORDS (OUTPATIENT)
Dept: FAMILY MEDICINE | Facility: CLINIC | Age: 68
End: 2018-02-28

## 2018-03-06 ENCOUNTER — THERAPY VISIT (OUTPATIENT)
Dept: PHYSICAL THERAPY | Facility: CLINIC | Age: 68
End: 2018-03-06
Payer: MEDICARE

## 2018-03-06 DIAGNOSIS — M72.2 PLANTAR FASCIITIS: Primary | ICD-10-CM

## 2018-03-06 PROCEDURE — 97110 THERAPEUTIC EXERCISES: CPT | Mod: GP | Performed by: PHYSICAL THERAPIST

## 2018-03-06 PROCEDURE — 97161 PT EVAL LOW COMPLEX 20 MIN: CPT | Mod: GP | Performed by: PHYSICAL THERAPIST

## 2018-03-06 PROCEDURE — G8978 MOBILITY CURRENT STATUS: HCPCS | Mod: GP | Performed by: PHYSICAL THERAPIST

## 2018-03-06 PROCEDURE — G8979 MOBILITY GOAL STATUS: HCPCS | Mod: GP | Performed by: PHYSICAL THERAPIST

## 2018-03-06 NOTE — LETTER
DEPARTMENT OF HEALTH AND HUMAN SERVICES  C[facility]ERS FOR MEDICARE & MEDICAID SERVICES    PLAN/UPDATED PLAN OF PROGRESS FOR OUTPATIENT REHABILITATION    PATIENTS NAME:  Jose Carlos Escamilla   : 1950  PROVIDER NUMBER:    [id number]  [place] Chapel[place]:  [id number]  PROVIDER NAME: [place] [facility] PH[facility]  MEDICAL RECORD NUMBER: [id number]   START OF CARE DATE:  SOC Date: 18   TYPE:  PT  PRIMARY/TREATMENT DIAGNOSIS: (Pertinent Medical Diagnosis)  Plantar fasciitis    VISITS FROM START OF CARE:  Rxs Used: 1     [place] [facility] Initial Evaluation    Subjective:  Pt presents to PT with a chief complaint of R plantar fascia with reported onset ~2 months ago with recent worsening when attempted to use a heel cup for correction. R foot pain reported to be worse early in the morning and with prolonged ambulation.   Patient is a 68 year old male presenting with rehab right ankle/foot hpi.   Jose Carlos Escamilla is a 68 year old male with a right foot condition.  Condition occurred with:  Repetition/overuse.  Condition occurred: for unknown reasons.  This is a new condition  2 months ago (2018).    Patient reports pain:  Medial calcaneal tuberosity and longitudinal arch.  Radiates to:  No radiation.  Pain is described as sharp and is intermittent and reported as 8/10.  Associated symptoms:  Loss of motion/stiffness and loss of strength. Pain is worse in the A.M..  Symptoms are exacerbated by walking, ascending stairs, descending stairs and weight bearing and relieved by rest.  Since onset symptoms are gradually worsening.        General health as reported by patient is fair.  Pertinent medical history includes:  Overweight and high blood pressure (general nerve/muscle pain).  Medical allergies: see chart.  Surgical history: TKA.  Current medications:  Anti-seizure medication and pain medication.  Current occupation .  Barriers include:  None as reported by patient.  Red  flags:  None as reported by patient.    Objective:  Ankle/Foot Evaluation  ROM:    PROM:    Dorsiflexion:  Left:    15     Right:   10   Plantarflexion:  Left:    45     Right:  45  Inversion:  Left:      40     Right:   40  Eversion: Left:   15     Right:  15    Strength:    Dorsiflexion:  Left: 5/5     Pain:   Right: 5/5   Pain:  Plantarflexion: Left: 4/5   Pain:   Right: 4/5  Pain:  Inversion:Left: 5-/5  Pain:     Right: 5-/5  Pain:  Eversion:Left: 4+/5  Pain:  Right: 4+/5  Pain:  PATIENTS NAME:  Jose Carlos Escamilla   : 1950    PALPATION:   Right ankle tenderness present at:   plantar fascia    FUNCTIONAL TESTS:     Quad:  Bilateral Leg Squat:  Control is mild loss of control and excessive anterior knee excursion    Assessment/Plan:    Patient is a 68 year old male with right side ankle complaints.    Patient has the following significant findings with corresponding treatment plan.                Diagnosis 1:  R plantar fasciitis    Pain -  manual therapy, splint/taping/bracing/orthotics, self management and education  Decreased ROM/flexibility - manual therapy and therapeutic exercise  Decreased strength - therapeutic exercise and therapeutic activities  Impaired muscle performance - neuro re-education    Therapy Evaluation Codes:   1) History comprised of:   Personal factors that impact the plan of care:      Widespread muscle pain.    Comorbidity factors that impact the plan of care are:      None.     Medications impacting care: None.  2) Examination of Body Systems comprised of:   Body structures and functions that impact the plan of care:      Ankle.   Activity limitations that impact the plan of care are:      Stairs and Walking.  3) Clinical presentation characteristics are:   Stable/Uncomplicated.  4) Decision-Making    Low complexity using standardized patient assessment instrument and/or   measureable assessment of functional outcome.    Cumulative Therapy Evaluation is: Low complexity.    Previous  "and current functional limitations:  (See Goal Flow Sheet for this information)    Short term and Long term goals: (See Goal Flow Sheet for this information)     Communication ability:  Patient appears to be able to clearly communicate and understand verbal and written communication and follow directions correctly.  Treatment Explanation - The following has been discussed with the patient:   RX ordered/plan of care  Anticipated outcomes  Possible risks and side effects  This patient would benefit from PT intervention to resume normal activities.   Rehab potential is good.    PATIENTS NAME:  Jose Carlos Escamilla   : 1950    Frequency:  2 X week, once daily  Duration:  for 2 weeks tapering to 1 x week for 4 weeks  Discharge Plan:  Achieve all LTG.  Independent in home treatment program.  Reach maximal therapeutic benefit.    Please refer to the daily flowsheet for treatment today, total treatment time and time spent performing 1:1 timed codes.         Caregiver Signature/Credentials _____________________________ Date ________        Rashard Gupta DPT   I have reviewed and certified the need for these services and plan of treatment while under my care.        PHYSICIAN'S SIGNATURE:   ______________________________________ Date___________     Alfonso Matos MD    Certification period:  Beginning of Cert date period: 18 to  End of Cert period date: 18     Functional Level Progress Report: Please see attached \"Goal Flow sheet for Functional level.\"    ____X____ Continue Services or       ________ DC Services                Service dates: From  SOC Date: 18 date to present                         "

## 2018-03-06 NOTE — MR AVS SNAPSHOT
"              After Visit Summary   3/6/2018    Jose Carlos Escamilla    MRN: [id number]           Patient Information     Date Of Birth          1950        Visit Information        Provider Department      3/6/2018 4:00 PM Rashard Gupta PT [facility]        Today's Diagnoses     Plantar fasciitis    -  1       Follow-ups after your visit        Your next 10 appointments already scheduled     Mar 13, 2018  8:10 AM CDT   TERRANCE Extremity with Rashard Gupta PT   [facility] ([facility]  )    [address] [place] [address] [phone]            Apr 17, 2018  1:00 PM CDT   PHYSICAL with Baljit Salazar MD   [facility] ([facility])    [address] [place] [address] [phone]              Who to contact     If you have questions or need follow up information about today's clinic visit or your schedule please contact [facility] PHYSICAL THERAPY directly at [phone].  Normal or non-critical lab and imaging results will be communicated to you by SlideRockethart, letter or phone within 4 business days after the clinic has received the results. If you do not hear from us within 7 days, please contact the clinic through ParkVut or phone. If you have a critical or abnormal lab result, we will notify you by phone as soon as possible.  Submit refill requests through Aductions or call your pharmacy and they will forward the refill request to us. Please allow 3 business days for your refill to be completed.          Additional Information About Your Visit        SlideRockethart Information     Aductions lets you send messages to your doctor, view your test results, renew your prescriptions, schedule appointments and more. To sign up, go to [email or web address] . Click on \"Log in\" on the left side of the screen, which will take you to " "the Welcome page. Then click on \"Sign up Now\" on the right side of the page.     You will be asked to enter the access code listed below, as well as some personal information. Please follow the directions to create your username and password.     Your access code is: [email or web address]  Expires: 2018 12:18 PM     Your access code will  in 90 days. If you need help or a new code, please call your [facility] clinic or [phone].        Care EveryWhere ID     This is your Care EveryWhere ID. This could be used by other organizations to access your [facility] medical records  [id number]         Blood Pressure from Last 3 Encounters:   18 122/84   05/15/17 128/82   17 130/88    Weight from Last 3 Encounters:   18 108.4 kg (239 lb)   05/15/17 110.7 kg (244 lb)   17 110.7 kg (244 lb)              We Performed the Following     HC PT EVAL, LOW COMPLEXITY     TERRANCE CERT REPORT     TERRANCE INITIAL EVAL REPORT     THERAPEUTIC EXERCISES          Today's Medication Changes          These changes are accurate as of 3/6/18 11:59 PM.  If you have any questions, ask your nurse or doctor.               These medicines have changed or have updated prescriptions.        Dose/Directions    oxyCODONE 10 MG 12 hr tablet   Commonly known as:  OxyCONTIN   This may have changed:  additional instructions   Used for:  Status post total left knee replacement, Narcotic dependence (H), Peripheral polyneuropathy        Dose:  10 mg   Take 1 tablet (10 mg) by mouth every morning   Quantity:  30 tablet   Refills:  0                Primary Care Provider Office Phone # Fax #    Baljit Salazar [phone] [address]  [facility] [address]        Equal Access to Services     DALJIT SCHWARTZ : Jadyn Eugene, ulises wells, yovana borjas, ryder alvarez. So [facility] [phone].    ATENCIÓN: Si habla español, tiene a ramirez disposición servicios " mary de asistencia lingüística. Brea hunter [phone].    We comply with applicable federal civil rights laws and [place] laws. We do not discriminate on the basis of race, color, national origin, age, disability, sex, sexual orientation, or gender identity.            Thank you!     Thank you for choosing [facility] PH[facility]  for your care. Our goal is always to provide you with excellent care. Hearing back from our patients is one way we can continue to improve our services. Please take a few minutes to complete the written survey that you may receive in the mail after your visit with us. Thank you!             Your Updated Medication List - Protect others around you: Learn how to safely use, store and throw away your medicines at www.disposemymeds.org.          This list is accurate as of 3/6/18 11:59 PM.  Always use your most recent med list.                   Brand Name Dispense Instructions for use Diagnosis    celecoxib 200 MG capsule    celeBREX     TAKE ONE CAPSULE BY MOUTH AT BEDTIME    Status post total left knee replacement, Narcotic dependence (H), Peripheral polyneuropathy       diclofenac 1 % Gel topical gel    VOLTAREN     Reported on 3/14/2017        GABAPENTIN PO      Take 900 mg by mouth 2 times daily (Patient takes 4 x 300 mg morning, 5 x 300 mg bedtime= 2700mg dose) In the morning and at bedtime.        HYDROcodone-acetaminophen  MG per tablet    NORCO     Take  tablets by mouth 4 times daily as needed for pain        * lisinopril 20 MG tablet    PRINIVIL/ZESTRIL    90 tablet    TAKE 1 TABLET (20 MG) BY MOUTH DAILY. NEED APPOINTMENT FOR REFILLS!    Encounter for medication refill       * lisinopril 20 MG tablet    PRINIVIL/ZESTRIL    30 tablet    TAKE 1 TABLET BY MOUTH DAILY. NEED APPOINTMENT FOR MORE REFILLS.    Encounter for medication refill       OMEPRAZOLE PO      Take 10 mg by mouth every morning (OTC)        oxyCODONE 10 MG  12 hr tablet    OxyCONTIN    30 tablet    Take 1 tablet (10 mg) by mouth every morning    Status post total left knee replacement, Narcotic dependence (H), Peripheral polyneuropathy       RANITIDINE HCL PO      Take 75 mg by mouth every morning (before breakfast)        senna-docusate 8.6-50 MG per tablet    SENOKOT-S;PERICOLACE    20 tablet    Take 1-2 tablets by mouth 2 times daily    Status post total left knee replacement       traZODone 50 MG tablet    DESYREL    180 tablet    TAKE ONE TABLET BY MOUTH TWICE DAILY    Encounter for long-term (current) use of medications       ZYRTEC ALLERGY PO           * Notice:  This list has 2 medication(s) that are the same as other medications prescribed for you. Read the directions carefully, and ask your doctor or other care provider to review them with you.

## 2018-03-07 PROBLEM — M25.511 BILATERAL SHOULDER PAIN: Status: RESOLVED | Noted: 2018-01-17 | Resolved: 2018-03-07

## 2018-03-07 PROBLEM — M72.2 PLANTAR FASCIITIS: Status: ACTIVE | Noted: 2018-03-07

## 2018-03-07 PROBLEM — M25.512 BILATERAL SHOULDER PAIN: Status: RESOLVED | Noted: 2018-01-17 | Resolved: 2018-03-07

## 2018-03-07 NOTE — PROGRESS NOTES
[facility] Initial Evaluation    Subjective:  Pt presents to PT with a chief complaint of R plantar fascia with reported onset ~2 months ago with recent worsening when attempted to use a heel cup for correction. R foot pain reported to be worse early in the morning and with prolonged ambulation.   Patient is a 68 year old male presenting with rehab right ankle/foot hpi.   Jose Carlos Escamilla is a 68 year old male with a right foot condition.  Condition occurred with:  Repetition/overuse.  Condition occurred: for unknown reasons.  This is a new condition  2 months ago (01/2018).    Patient reports pain:  Medial calcaneal tuberosity and longitudinal arch.  Radiates to:  No radiation.  Pain is described as sharp and is intermittent and reported as 8/10.  Associated symptoms:  Loss of motion/stiffness and loss of strength. Pain is worse in the A.M..  Symptoms are exacerbated by walking, ascending stairs, descending stairs and weight bearing and relieved by rest.  Since onset symptoms are gradually worsening.        General health as reported by patient is fair.  Pertinent medical history includes:  Overweight and high blood pressure (general nerve/muscle pain).  Medical allergies: see chart.  Surgical history: TKA.  Current medications:  Anti-seizure medication and pain medication.  Current occupation   .        Barriers include:  None as reported by patient.    Red flags:  None as reported by patient.                        Objective:  System    Ankle/Foot Evaluation  ROM:      PROM:    Dorsiflexion:  Left:    15     Right:   10   Plantarflexion:  Left:    45     Right:  45  Inversion:  Left:      40     Right:   40  Eversion: Left:   15     Right:  15          Strength:    Dorsiflexion:  Left: 5/5     Pain:   Right: 5/5   Pain:  Plantarflexion: Left: 4/5   Pain:   Right: 4/5  Pain:  Inversion:Left: 5-/5  Pain:     Right: 5-/5  Pain:  Eversion:Left: 4+/5  Pain:  Right: 4+/5   Pain:                      PALPATION:     Right ankle tenderness present at:   plantar fascia      FUNCTIONAL TESTS:         Quad:      Bilateral Leg Squat:  Control is mild loss of control and excessive anterior knee excursion                                                        General     ROS    Assessment/Plan:    Patient is a 68 year old male with right side ankle complaints.    Patient has the following significant findings with corresponding treatment plan.                Diagnosis 1:  R plantar fasciitis    Pain -  manual therapy, splint/taping/bracing/orthotics, self management and education  Decreased ROM/flexibility - manual therapy and therapeutic exercise  Decreased strength - therapeutic exercise and therapeutic activities  Impaired muscle performance - neuro re-education    Therapy Evaluation Codes:   1) History comprised of:   Personal factors that impact the plan of care:      Widespread muscle pain.    Comorbidity factors that impact the plan of care are:      None.     Medications impacting care: None.  2) Examination of Body Systems comprised of:   Body structures and functions that impact the plan of care:      Ankle.   Activity limitations that impact the plan of care are:      Stairs and Walking.  3) Clinical presentation characteristics are:   Stable/Uncomplicated.  4) Decision-Making    Low complexity using standardized patient assessment instrument and/or measureable assessment of functional outcome.  Cumulative Therapy Evaluation is: Low complexity.    Previous and current functional limitations:  (See Goal Flow Sheet for this information)    Short term and Long term goals: (See Goal Flow Sheet for this information)     Communication ability:  Patient appears to be able to clearly communicate and understand verbal and written communication and follow directions correctly.  Treatment Explanation - The following has been discussed with the patient:   RX ordered/plan of care  Anticipated  outcomes  Possible risks and side effects  This patient would benefit from PT intervention to resume normal activities.   Rehab potential is good.    Frequency:  2 X week, once daily  Duration:  for 2 weeks tapering to 1 x week for 4 weeks  Discharge Plan:  Achieve all LTG.  Independent in home treatment program.  Reach maximal therapeutic benefit.    Please refer to the daily flowsheet for treatment today, total treatment time and time spent performing 1:1 timed codes.

## 2018-03-08 ENCOUNTER — TRANSFERRED RECORDS (OUTPATIENT)
Dept: FAMILY MEDICINE | Facility: CLINIC | Age: 68
End: 2018-03-08

## 2018-03-13 ENCOUNTER — THERAPY VISIT (OUTPATIENT)
Dept: PHYSICAL THERAPY | Facility: CLINIC | Age: 68
End: 2018-03-13
Payer: MEDICARE

## 2018-03-13 DIAGNOSIS — M72.2 PLANTAR FASCIITIS: ICD-10-CM

## 2018-03-13 PROCEDURE — 97140 MANUAL THERAPY 1/> REGIONS: CPT | Mod: GP | Performed by: PHYSICAL THERAPIST

## 2018-03-13 PROCEDURE — 97110 THERAPEUTIC EXERCISES: CPT | Mod: GP | Performed by: PHYSICAL THERAPIST

## 2018-03-23 DIAGNOSIS — I10 BENIGN ESSENTIAL HYPERTENSION: Primary | ICD-10-CM

## 2018-03-23 DIAGNOSIS — Z79.899 ENCOUNTER FOR LONG-TERM (CURRENT) USE OF MEDICATIONS: ICD-10-CM

## 2018-03-23 RX ORDER — LISINOPRIL 20 MG/1
TABLET ORAL
Qty: 30 TABLET | Refills: 6 | Status: SHIPPED | OUTPATIENT
Start: 2018-03-23 | End: 2018-04-17

## 2018-03-23 NOTE — TELEPHONE ENCOUNTER
lisinopril (PRINIVIL/ZESTRIL) 20 MG   LAST  Med check 1/5/18   last labs(for RX) 1/15/18  Next  appt scheduled =  Has 4/17/18 yuly Huang MA    BP Readings from Last 3 Encounters:   01/05/18 122/84   05/15/17 128/82   05/04/17 130/88     Last Basic Metabolic Panel:  Lab Results   Component Value Date     01/15/2018      Lab Results   Component Value Date    POTASSIUM 5.2 01/15/2018     Lab Results   Component Value Date    CHLORIDE 101 01/15/2018     Lab Results   Component Value Date    TRI 9.2 01/15/2018     No results found for: CO2  Lab Results   Component Value Date    BUN 17 01/15/2018    BUN 21 01/15/2018     Lab Results   Component Value Date    CR 0.81 01/15/2018     Lab Results   Component Value Date     01/15/2018

## 2018-04-17 ENCOUNTER — OFFICE VISIT (OUTPATIENT)
Dept: FAMILY MEDICINE | Facility: CLINIC | Age: 68
End: 2018-04-17

## 2018-04-17 VITALS
RESPIRATION RATE: 12 BRPM | HEART RATE: 72 BPM | SYSTOLIC BLOOD PRESSURE: 132 MMHG | HEIGHT: 72 IN | DIASTOLIC BLOOD PRESSURE: 88 MMHG | WEIGHT: 243.2 LBS | BODY MASS INDEX: 32.94 KG/M2

## 2018-04-17 DIAGNOSIS — Z13.6 SCREENING FOR AAA (ABDOMINAL AORTIC ANEURYSM): ICD-10-CM

## 2018-04-17 DIAGNOSIS — K44.9 HIATAL HERNIA: ICD-10-CM

## 2018-04-17 DIAGNOSIS — Z79.899 ENCOUNTER FOR LONG-TERM (CURRENT) USE OF MEDICATIONS: ICD-10-CM

## 2018-04-17 DIAGNOSIS — Z87.891 PERSONAL HISTORY OF TOBACCO USE, PRESENTING HAZARDS TO HEALTH: ICD-10-CM

## 2018-04-17 DIAGNOSIS — Z00.00 MEDICARE ANNUAL WELLNESS VISIT, SUBSEQUENT: Primary | ICD-10-CM

## 2018-04-17 DIAGNOSIS — Z76.0 ENCOUNTER FOR MEDICATION REFILL: ICD-10-CM

## 2018-04-17 DIAGNOSIS — Z00.00 ROUTINE GENERAL MEDICAL EXAMINATION AT A HEALTH CARE FACILITY: ICD-10-CM

## 2018-04-17 DIAGNOSIS — G62.9 PERIPHERAL POLYNEUROPATHY: ICD-10-CM

## 2018-04-17 DIAGNOSIS — E78.00 HYPERCHOLESTEROLEMIA: ICD-10-CM

## 2018-04-17 DIAGNOSIS — I10 BENIGN ESSENTIAL HYPERTENSION: ICD-10-CM

## 2018-04-17 PROCEDURE — 99214 OFFICE O/P EST MOD 30 MIN: CPT | Mod: 25 | Performed by: FAMILY MEDICINE

## 2018-04-17 PROCEDURE — 99397 PER PM REEVAL EST PAT 65+ YR: CPT | Performed by: FAMILY MEDICINE

## 2018-04-17 RX ORDER — TRAZODONE HYDROCHLORIDE 50 MG/1
TABLET, FILM COATED ORAL
Qty: 180 TABLET | Refills: 2 | Status: SHIPPED | OUTPATIENT
Start: 2018-04-17 | End: 2018-11-27

## 2018-04-17 RX ORDER — LISINOPRIL 20 MG/1
TABLET ORAL
Qty: 90 TABLET | Refills: 3 | Status: SHIPPED | OUTPATIENT
Start: 2018-04-17 | End: 2019-03-05

## 2018-04-17 ASSESSMENT — PAIN SCALES - GENERAL: PAINLEVEL: MODERATE PAIN (4)

## 2018-04-17 NOTE — PATIENT INSTRUCTIONS
Preventive Health Recommendations:       Male Ages 65 and over    Yearly exam:             See your health care provider every year in order to  o   Review health changes.   o   Discuss preventive care.    o   Review your medicines if your doctor has prescribed any.    Talk with your health care provider about whether you should have a test to screen for prostate cancer (PSA).    Every 3 years, have a diabetes test (fasting glucose). If you are at risk for diabetes, you should have this test more often.    Every 5 years, have a cholesterol test. Have this test more often if you are at risk for high cholesterol or heart disease.     Every 10 years, have a colonoscopy. Or, have a yearly FIT test (stool test). These exams will check for colon cancer.    Talk to with your health care provider about screening for Abdominal Aortic Aneurysm if you have a family history of AAA or have a history of smoking.  Shots:     Get a flu shot each year.     Get a tetanus shot every 10 years.     Talk to your doctor about your pneumonia vaccines. There are now two you should receive - Pneumovax (PPSV 23) and Prevnar (PCV 13).    Talk to your doctor about a shingles vaccine.     Talk to your doctor about the hepatitis B vaccine.  Nutrition:     Eat at least 5 servings of fruits and vegetables each day.     Eat whole-grain bread, whole-wheat pasta and brown rice instead of white grains and rice.     Talk to your doctor about Calcium and Vitamin D.   Lifestyle    Exercise for at least 150 minutes a week (30 minutes a day, 5 days a week). This will help you control your weight and prevent disease.     Limit alcohol to one drink per day.     No smoking.     Wear sunscreen to prevent skin cancer.     See your dentist every six months for an exam and cleaning.     See your eye doctor every 1 to 2 years to screen for conditions such as glaucoma, macular degeneration and cataracts.    Preventive Health Recommendations:       Male Ages 65 and  over    Yearly exam:             See your health care provider every year in order to  o   Review health changes.   o   Discuss preventive care.    o   Review your medicines if your doctor has prescribed any.    Talk with your health care provider about whether you should have a test to screen for prostate cancer (PSA).    Every 3 years, have a diabetes test (fasting glucose). If you are at risk for diabetes, you should have this test more often.    Every 5 years, have a cholesterol test. Have this test more often if you are at risk for high cholesterol or heart disease.     Every 10 years, have a colonoscopy. Or, have a yearly FIT test (stool test). These exams will check for colon cancer.    Talk to with your health care provider about screening for Abdominal Aortic Aneurysm if you have a family history of AAA or have a history of smoking.  Shots:     Get a flu shot each year.     Get a tetanus shot every 10 years.     Talk to your doctor about your pneumonia vaccines. There are now two you should receive - Pneumovax (PPSV 23) and Prevnar (PCV 13).    Talk to your doctor about a shingles vaccine.     Talk to your doctor about the hepatitis B vaccine.  Nutrition:     Eat at least 5 servings of fruits and vegetables each day.     Eat whole-grain bread, whole-wheat pasta and brown rice instead of white grains and rice.     Talk to your doctor about Calcium and Vitamin D.   Lifestyle    Exercise for at least 150 minutes a week (30 minutes a day, 5 days a week). This will help you control your weight and prevent disease.     Limit alcohol to one drink per day.     No smoking.     Wear sunscreen to prevent skin cancer.     See your dentist every six months for an exam and cleaning.     See your eye doctor every 1 to 2 years to screen for conditions such as glaucoma, macular degeneration and cataracts.

## 2018-04-17 NOTE — LETTER
[facility]  [address] [place] [address]  [phone]      April 17, 2018      Jose Carlos SALAZAR Andi  [address]  [facility] [address]              Dear Jose Carlos    [facility]  [address] [place] [address]  [phone]    Agreement on Controlled Medications    April 17, 2018         Jose Carlos SALAZAR Andi 1950 [id number]      I understand that Baljit Salazar MD has prescribed controlled substances (narcotics, tranquilizers, and/or stimulants) to help manage my condition(s).  I am taking this medicine to help me function or work.  I know that this is strong medicine.  It could have serious side effects and even cause a dependency on the drug.  If I stop these medicines suddenly, I could have severe withdrawal symptoms.    The risks, benefits, and side effects of these medication(s) were explained to me.  I agree that:  1. I will take part in other treatments as advised by my provider.  This may be psychiatry or counseling, physical therapy, behavioral therapy, group treatment, or a referral to a pain clinic.  I will reduce or stop my medicine when my provider tells me to do so.   2. I will take my medicines as prescribed.  I will not change the dose or schedule unless my provider tells me to.  There will be no refills if I  run out early.   I may be contacted at any time without warning and asked to complete a drug test or pill count.   3. [facility] requires a medication follow up appointment with Baljit Salazar MD  every three months. The medication prescribed for you requires a written prescription script; this script will be given to you at your scheduled medication follow up visits and you will receive three one month written scripts for your medication. There may be times when your medication will need a refill between appointments. Refills will be made only during regular office hours. Refills will not be made at night, on weekends or during  holidays. It is your responsibility to make appointment arrangements 7-10 days before your medication runs out.  You may be given a refill for 5-7 day quantity if your provider is out of the office for an extended period to last until they return.    4. Only Baljit Salazar MD of  [facility] will be prescribing all controlled substance medications for chronic pain and other medication. We expect that you will not accept or seek controlled substance medications from other providers, Emergency Room or Urgent Care.  5. I will use one pharmacy to fill all of my controlled substance prescriptions.  If my prescription is mailed to my pharmacy, it may take 5 to 7 days for my medicine to be ready. Pharmacy:   6. [id number] [place] [facility], [place] - [address] [place] [address] [address]  7. [address]  8. [facility] [address]  9. Phone: [phone] Fax: [phone]  10.   11. I understand that my provider, clinic care team, and pharmacy can track controlled substance prescriptions from other providers through a central database (prescription monitoring program).  12. I will bring in my list of medications (or my medicine bottles) each time I come to the clinic.  13. Refills of controlled substances will be made only during office hours.  It is up to me to make sure that I do not run out of my medicines on weekends or holidays.     14. I am responsible for my prescriptions.  If the medicine is lost or stolen, it will not be replaced.   I also agree not to share these medicines with anyone.  15. I agree to not use ANY illegal or recreational drugs.  This includes marijuana, cocaine, bath salts or other drugs.  I agree not to use alcohol unless my provider says I may.  I agree to give urine samples whenever asked.  If I fail to give a urine sample, the provider may stop my medicine.    16. I understand that this medicine can affect my thinking and judgment.  It may be unsafe for me to drive, use machinery and do dangerous  tasks.  I will not do any of these things until I know how the medicine affects me.  If my dose changes, I will wait to see how it affects me.  I will contact my provider if I have concerns about medicine side effects.   17. We have agreed to use the following medications on a regular scheduled basis to control pain: to be discussed  18. We have agreed to use the following medications for breakthrough pain:to be discussed       I understand that if I do not follow any of the conditions above, my prescriptions or treatment may be stopped.    I agree that my provider, clinic care team, and pharmacy may work with any city, state or federal law enforcement agency that investigates the misuse, sale, or other diversion of my controlled medicine. I will allow my provider to discuss my care with or share a copy of this agreement with any other treating provider, pharmacy or emergency room where I receive care.  I agree to give up (waive) any right of privacy or confidentiality with respect to these authorizations.   I have read this agreement and have asked questions about anything I did not understand.   ___________________________________    ___________________________  Jose Carlos SALAZAR Pinedal                                                          April 17, 2018  ___________________________________     ____________________________  Baljit Salazar MD                                                     April 17, 2018              Sincerely,    Baljit Salazar M.D.

## 2018-04-17 NOTE — PROGRESS NOTES
Terrible ongoing pains with headache miserable , nausea, tiredeness complete misery.  2 out of 5 days are pretty bad, 4/7 are pretty reasonable.  Headaches 6 hours a day. Two types, 1 seems TMJ and then other across the upper head.  Not a migraine knock you out but almost and horrible.  Cant read the paper or watch the tv.  Sleep helps a good night sleep is really helpful  Lots of muscle aches.  With out opiods he would be really messed up.  Overnight plane ride put him into bed for 2 days.    Blood presure remains well controlled when checked out of clinic.    Reviewed last 6 BP readings in chart:  BP Readings from Last 6 Encounters:   04/17/18 132/88   01/05/18 122/84   05/15/17 128/82   05/04/17 130/88   03/14/17 122/70   03/02/17 134/84       he has not experienced any significant side effects from medications for hypertension.    NO active cardiac complaints or symptoms with exerciselon      Long discussion about the above pain.    Depression:  Has known history of depression.  Has been on medication for this.  The patient does not report any significant side effects of this medication.  The prior symptoms leading to the original diagnosis and decision to start medication therapy are better.     Appetite is stable.  Sleeping patterns are stable.  No reported thoughts of suicide or homicide.  Last PHQ-9 score on record=   PHQ-9 SCORE 3/2/2017 3/2/2017 4/17/2018   Total Score 3 3 9           SUBJECTIVE:   Jose Carlos Escamilla is a 68 year old male who presents for Preventive Visit.          Healthy Habits:    Do you get at least three servings of calcium containing foods daily (dairy, green leafy vegetables, etc.)? yes    Amount of exercise or daily activities, outside of work: 4 day(s) per week    Problems taking medications regularly No    Medication side effects: No    Have you had an eye exam in the past two years? yes    Do you see a dentist twice per year? yes    Do you have sleep apnea, excessive snoring or  daytime drowsiness?no      Ability to successfully perform activities of daily living: Yes, no assistance needed    Home safety:  none identified     Hearing impairment: No    Fall risk:  Fallen 2 or more times in the past year?: No  Any fall with injury in the past year?: No      2  HEARING FREQUENCY TESTED BOTH EARS:Failed  Right Ear/Left Ear      500 Hz: passed/failed: pass    1000 Hz: Passed   2000 Hz: Passed   4000 Hz: failed right , passed left  Maribeth Huang MA 4/17/2018      COGNITIVE SCREEN  1) Repeat 3 items (Banana, Sunrise, Chair)    2) Clock draw: NORMAL  3) 3 item recall: Recalls 3 objects  Results: 3 items recalled: COGNITIVE IMPAIRMENT LESS LIKELY    Mini-CogTM Copyright S Luan. Licensed by the author for use in [facility]; reprinted with permission (diana@[facility]). All rights reserved.            PROBLEMS TO ADD ON...    Reviewed and updated as needed this visit by clinical staff  Tobacco  Allergies  Meds         Reviewed and updated as needed this visit by Provider        Social History   Substance Use Topics     Smoking status: Former Smoker     Types: Cigarettes     Quit date: 7/7/1995     Smokeless tobacco: Never Used     Alcohol use 0.0 - 2.0 oz/week     0 - 4 Standard drinks or equivalent per week       If you drink alcohol do you typically have >3 drinks per day or >7 drinks per week? No                        Today's PHQ-2 Score:   PHQ-2 ( 1999 Pfizer) 4/17/2018 6/11/2015   Q1: Little interest or pleasure in doing things 1 0   Q2: Feeling down, depressed or hopeless 1 0   PHQ-2 Score 2 0       Do you feel safe in your environment - Yes    Do you have a Health Care Directive?: Yes: Advance Directive has been received and scanned.    Current providers sharing in care for this patient include:   Patient Care Team:  Baljit Salazar MD as PCP - General (Family Practice)    The following health maintenance items are reviewed in Epic and correct as of today:  Health  Maintenance   Topic Date Due     URINE DRUG SCREEN Q1 YR  [birth year]/29/1965     AORTIC ANEURYSM SCREENING (SYSTEM ASSIGNED)  01/29/2017     MELISSA QUESTIONNAIRE 1 YEAR  03/02/2018     PHQ-9 Q1YR  03/02/2018     FALL RISK ASSESSMENT  05/04/2018     COLON CANCER SCREEN (SYSTEM ASSIGNED)  05/07/2018     INFLUENZA VACCINE (SYSTEM ASSIGNED)  09/01/2018     PNEUMOCOCCAL (2 of 2 - PPSV23) 01/05/2019     TETANUS IMMUNIZATION (SYSTEM ASSIGNED)  09/30/2020     ADVANCE DIRECTIVE PLANNING Q5 YRS  02/09/2022     LIPID SCREEN Q5 YR MALE (SYSTEM ASSIGNED)  01/15/2023     HEPATITIS C SCREENING  Completed             ROS:  Constitutional, HEENT, cardiovascular, pulmonary, GI, , musculoskeletal, neuro, skin, endocrine and psych systems are negative, except as otherwise noted.    OBJECTIVE:   /88  Pulse 72  Resp 12  Ht 1.829 m (6')  Wt 110.3 kg (243 lb 3.2 oz)  BMI 32.98 kg/m2 Estimated body mass index is 32.98 kg/(m^2) as calculated from the following:    Height as of this encounter: 1.829 m (6').    Weight as of this encounter: 110.3 kg (243 lb 3.2 oz).  EXAM:   GENERAL: healthy, alert and no distress  EYES: Eyes grossly normal to inspection, PERRL and conjunctivae and sclerae normal  HENT: ear canals and TM's normal, nose and mouth without ulcers or lesions  NECK: no adenopathy, no asymmetry, masses, or scars and thyroid normal to palpation  RESP: lungs clear to auscultation - no rales, rhonchi or wheezes  BREAST: normal without masses, tenderness or nipple discharge and no palpable axillary masses or adenopathy  CV: regular rate and rhythm, normal S1 S2, no S3 or S4, no murmur, click or rub, no peripheral edema and peripheral pulses strong  ABDOMEN: soft, nontender, no hepatosplenomegaly, no masses and bowel sounds normal   (male): normal male genitalia without lesions or urethral discharge, no hernia  RECTAL: normal sphincter tone, no rectal masses, prostate normal size, smooth, nontender without nodules or masses  MS: no  gross musculoskeletal defects noted, no edema  SKIN: no suspicious lesions or rashes  NEURO: Normal strength and tone, mentation intact and speech normal  PSYCH: mentation appears normal, affect normal/bright    ASSESSMENT / PLAN:   Jose Carlos was seen today for physical and hearing screening.    Diagnoses and all orders for this visit:    Medicare annual wellness visit, subsequent    Encounter for long-term (current) use of medications  -     traZODone (DESYREL) 50 MG tablet; TAKE TWO TABLETS  BY MOUTH AT BEDTIME    Encounter for medication refill    Personal history of tobacco use, presenting hazards to health  -     Abdominal Aortic Aneurysm Screening/Tracking    Routine general medical examination at a health care facility    Screening for AAA (abdominal aortic aneurysm)  -     Abdominal Aortic Aneurysm Screening/Tracking    Benign essential hypertension  -     lisinopril (PRINIVIL/ZESTRIL) 20 MG tablet; TAKE 1 TABLET (20 MG) BY MOUTH DAILY. NEED APPOINTMENT FOR REFILLS!  -     OFFICE/OUTPT VISIT,EST,LEVL IV    Hypercholesterolemia  -     OFFICE/OUTPT VISIT,EST,LEVL IV    Hiatal hernia  -     OFFICE/OUTPT VISIT,EST,LEVL IV    Peripheral polyneuropathy  -     OFFICE/OUTPT VISIT,EST,LEVL IV      Long discussion about next steps, ogin to the [facility] for opinion.    End of Life Planning:  Patient currently has an advanced directive: Yes.  Practitioner is supportive of decision.    COUNSELING:  Reviewed preventive health counseling, as reflected in patient instructions       Healthy diet/nutrition        Estimated body mass index is 32.98 kg/(m^2) as calculated from the following:    Height as of this encounter: 1.829 m (6').    Weight as of this encounter: 110.3 kg (243 lb 3.2 oz).  Weight management plan: Discussed healthy diet and exercise guidelines and patient will follow up in 6 months in clinic to re-evaluate.     reports that he quit smoking about 22 years ago. His smoking use included Cigarettes. He has never used  smokeless tobacco.      Appropriate preventive services were discussed with this patient, including applicable screening as appropriate for cardiovascular disease, diabetes, osteopenia/osteoporosis, and glaucoma.  As appropriate for age/gender, discussed screening for colorectal cancer, prostate cancer, breast cancer, and cervical cancer. Checklist reviewing preventive services available has been given to the patient.    Reviewed patients plan of care and provided an AVS. The Basic Care Plan (routine screening as documented in Health Maintenance) for Jose Carlos meets the Care Plan requirement. This Care Plan has been established and reviewed with the Patient.    Counseling Resources:  ATP IV Guidelines  Pooled Cohorts Equation Calculator  Breast Cancer Risk Calculator  FRAX Risk Assessment  ICSI Preventive Guidelines  Dietary Guidelines for Americans, 2010  USDA's MyPlate  ASA Prophylaxis  Lung CA Screening    Baljit Salazar MD  [facility]

## 2018-04-17 NOTE — MR AVS SNAPSHOT
After Visit Summary   4/17/2018    Jose Carlos Escamilla    MRN: [id number]           Patient Information     Date Of Birth          1950        Visit Information        Provider Department      4/17/2018 1:00 PM Baljit Salazar MD [facility]        Today's Diagnoses     Medicare annual wellness visit, subsequent    -  1    Encounter for long-term (current) use of medications        Encounter for medication refill        Personal history of tobacco use, presenting hazards to health        Routine general medical examination at a health care facility        Screening for AAA (abdominal aortic aneurysm)        Benign essential hypertension        Hypercholesterolemia        Hiatal hernia        Peripheral polyneuropathy          Care Instructions      Preventive Health Recommendations:       Male Ages 65 and over    Yearly exam:             See your health care provider every year in order to  o   Review health changes.   o   Discuss preventive care.    o   Review your medicines if your doctor has prescribed any.    Talk with your health care provider about whether you should have a test to screen for prostate cancer (PSA).    Every 3 years, have a diabetes test (fasting glucose). If you are at risk for diabetes, you should have this test more often.    Every 5 years, have a cholesterol test. Have this test more often if you are at risk for high cholesterol or heart disease.     Every 10 years, have a colonoscopy. Or, have a yearly FIT test (stool test). These exams will check for colon cancer.    Talk to with your health care provider about screening for Abdominal Aortic Aneurysm if you have a family history of AAA or have a history of smoking.  Shots:     Get a flu shot each year.     Get a tetanus shot every 10 years.     Talk to your doctor about your pneumonia vaccines. There are now two you should receive - Pneumovax (PPSV 23) and Prevnar (PCV 13).    Talk to your doctor about a  shingles vaccine.     Talk to your doctor about the hepatitis B vaccine.  Nutrition:     Eat at least 5 servings of fruits and vegetables each day.     Eat whole-grain bread, whole-wheat pasta and brown rice instead of white grains and rice.     Talk to your doctor about Calcium and Vitamin D.   Lifestyle    Exercise for at least 150 minutes a week (30 minutes a day, 5 days a week). This will help you control your weight and prevent disease.     Limit alcohol to one drink per day.     No smoking.     Wear sunscreen to prevent skin cancer.     See your dentist every six months for an exam and cleaning.     See your eye doctor every 1 to 2 years to screen for conditions such as glaucoma, macular degeneration and cataracts.    Preventive Health Recommendations:       Male Ages 65 and over    Yearly exam:             See your health care provider every year in order to  o   Review health changes.   o   Discuss preventive care.    o   Review your medicines if your doctor has prescribed any.    Talk with your health care provider about whether you should have a test to screen for prostate cancer (PSA).    Every 3 years, have a diabetes test (fasting glucose). If you are at risk for diabetes, you should have this test more often.    Every 5 years, have a cholesterol test. Have this test more often if you are at risk for high cholesterol or heart disease.     Every 10 years, have a colonoscopy. Or, have a yearly FIT test (stool test). These exams will check for colon cancer.    Talk to with your health care provider about screening for Abdominal Aortic Aneurysm if you have a family history of AAA or have a history of smoking.  Shots:     Get a flu shot each year.     Get a tetanus shot every 10 years.     Talk to your doctor about your pneumonia vaccines. There are now two you should receive - Pneumovax (PPSV 23) and Prevnar (PCV 13).    Talk to your doctor about a shingles vaccine.     Talk to your doctor about the  "hepatitis B vaccine.  Nutrition:     Eat at least 5 servings of fruits and vegetables each day.     Eat whole-grain bread, whole-wheat pasta and brown rice instead of white grains and rice.     Talk to your doctor about Calcium and Vitamin D.   Lifestyle    Exercise for at least 150 minutes a week (30 minutes a day, 5 days a week). This will help you control your weight and prevent disease.     Limit alcohol to one drink per day.     No smoking.     Wear sunscreen to prevent skin cancer.     See your dentist every six months for an exam and cleaning.     See your eye doctor every 1 to 2 years to screen for conditions such as glaucoma, macular degeneration and cataracts.          Follow-ups after your visit        Who to contact     If you have questions or need follow up information about today's clinic visit or your schedule please contact [facility] directly at [phone].  Normal or non-critical lab and imaging results will be communicated to you by GoTuneshart, letter or phone within 4 business days after the clinic has received the results. If you do not hear from us within 7 days, please contact the clinic through GoTuneshart or phone. If you have a critical or abnormal lab result, we will notify you by phone as soon as possible.  Submit refill requests through Kindling or call your pharmacy and they will forward the refill request to us. Please allow 3 business days for your refill to be completed.          Additional Information About Your Visit        Kindling Information     Kindling lets you send messages to your doctor, view your test results, renew your prescriptions, schedule appointments and more. To sign up, go to [email or web address] . Click on \"Log in\" on the left side of the screen, which will take you to the Welcome page. Then click on \"Sign up Now\" on the right side of the page.     You will be asked to enter the access code listed below, as well as some personal information. Please " follow the directions to create your username and password.     Your access code is: [email or web address]  Expires: 2018  2:00 PM     Your access code will  in 90 days. If you need help or a new code, please call your [facility] clinic or [phone].        Care EveryWhere ID     This is your Care EveryWhere ID. This could be used by other organizations to access your [facility] medical records  [id number]        Your Vitals Were     Pulse Respirations Height BMI (Body Mass Index)          72 12 1.829 m (6') 32.98 kg/m2         Blood Pressure from Last 3 Encounters:   18 132/88   18 122/84   05/15/17 128/82    Weight from Last 3 Encounters:   18 110.3 kg (243 lb 3.2 oz)   18 108.4 kg (239 lb)   05/15/17 110.7 kg (244 lb)              We Performed the Following     Abdominal Aortic Aneurysm Screening/Tracking          Today's Medication Changes          These changes are accurate as of 18  2:01 PM.  If you have any questions, ask your nurse or doctor.               These medicines have changed or have updated prescriptions.        Dose/Directions    oxyCODONE 10 MG 12 hr tablet   Commonly known as:  OxyCONTIN   This may have changed:  additional instructions   Used for:  Status post total left knee replacement, Narcotic dependence (H), Peripheral polyneuropathy        Dose:  10 mg   Take 1 tablet (10 mg) by mouth every morning   Quantity:  30 tablet   Refills:  0       traZODone 50 MG tablet   Commonly known as:  DESYREL   This may have changed:  See the new instructions.   Used for:  Encounter for long-term (current) use of medications   Changed by:  Baljit Salazar MD        TAKE TWO TABLETS  BY MOUTH AT BEDTIME   Quantity:  180 tablet   Refills:  2            Where to get your medicines      These medications were sent to [facility] [address] [address] [address]     Phone:  [phone]      lisinopril 20 MG tablet    traZODone 50 MG tablet                Primary Care Provider Office Phone # Fax #    Baljit Salazar [phone] [address]  [facility] [address]        Equal Access to Services     [facility] LANA : Hadii aad ku hadasho Soomaali, waaxda luqadaha, qaybta kaalmada adeegyada, waxay jackelynin haymarthan adediana abdirashid kena . So [facility] [phone].    ATENCIÓN: Si habla español, tiene a ramirez disposición servicios gratuitos de asistencia lingüística. Llame al [phone].    We comply with applicable federal civil rights laws and [place] laws. We do not discriminate on the basis of race, color, national origin, age, disability, sex, sexual orientation, or gender identity.            Thank you!     Thank you for choosing [facility]  for your care. Our goal is always to provide you with excellent care. Hearing back from our patients is one way we can continue to improve our services. Please take a few minutes to complete the written survey that you may receive in the mail after your visit with us. Thank you!             Your Updated Medication List - Protect others around you: Learn how to safely use, store and throw away your medicines at www.disposemymeds.org.          This list is accurate as of 4/17/18  2:01 PM.  Always use your most recent med list.                   Brand Name Dispense Instructions for use Diagnosis    celecoxib 200 MG capsule    celeBREX     TAKE ONE CAPSULE BY MOUTH AT BEDTIME    Status post total left knee replacement, Narcotic dependence (H), Peripheral polyneuropathy       diclofenac 1 % Gel topical gel    VOLTAREN     Reported on 3/14/2017        GABAPENTIN PO      Take 900 mg by mouth 3 times daily (Patient takes  x 300 mg morning, 5 x 300 mg bedtime= 2700mg dose) In the morning and at bedtime.        HYDROcodone-acetaminophen  MG per tablet    NORCO     Take  tablets by mouth 4 times daily as needed for pain         lisinopril 20 MG tablet    PRINIVIL/ZESTRIL    90 tablet    TAKE 1 TABLET (20 MG) BY MOUTH DAILY. NEED APPOINTMENT FOR REFILLS!    Benign essential hypertension       oxyCODONE 10 MG 12 hr tablet    OxyCONTIN    30 tablet    Take 1 tablet (10 mg) by mouth every morning    Status post total left knee replacement, Narcotic dependence (H), Peripheral polyneuropathy       RANITIDINE HCL PO      Take 75 mg by mouth every morning (before breakfast)        senna-docusate 8.6-50 MG per tablet    SENOKOT-S;PERICOLACE    20 tablet    Take 1-2 tablets by mouth 2 times daily    Status post total left knee replacement       traZODone 50 MG tablet    DESYREL    180 tablet    TAKE TWO TABLETS  BY MOUTH AT BEDTIME    Encounter for long-term (current) use of medications

## 2018-04-17 NOTE — LETTER
[facility] [address] [place] [address] [phone]      April 17, 2018      Jose Carlos Escamilla  [address]  [facility] [address]              Dear Jose Carlos,          Sincerely,    Baljit Salazar M.D.

## 2018-04-18 ASSESSMENT — PATIENT HEALTH QUESTIONNAIRE - PHQ9: SUM OF ALL RESPONSES TO PHQ QUESTIONS 1-9: 9

## 2018-05-07 ENCOUNTER — TRANSFERRED RECORDS (OUTPATIENT)
Dept: FAMILY MEDICINE | Facility: CLINIC | Age: 68
End: 2018-05-07

## 2018-05-08 NOTE — PROGRESS NOTES
"  SUBJECTIVE:   Jose Carlos Escamilla is a 68 year old male who presents to clinic today for the following health issues:    Job   Hobbies fish, sail, painting abstract acrylic  This is good day  Friday was bad   Saturday/Sunday fair      [facility] dx him with fibromyalgia  There recommendations  Pain Clinic TC pain switching gabapentin to lyrica and added generic cymbalta.  Acupunture Thia chi and personal sesar  White male with glasses    Night sweats around collar some nights.  Wt Readings from Last 4 Encounters:   05/14/18 110.7 kg (244 lb)   04/17/18 110.3 kg (243 lb 3.2 oz)   01/05/18 108.4 kg (239 lb)   05/15/17 110.7 kg (244 lb)         Adrenal gland test was done at [facility] cortisol  and seeing Endocrinologist at [facility] in 2 weeks    Sleep well most night but body pain arms or legs. Sometimes wakes up with HA. Gets back to sleep. Recent travel day and could not sleep and then sick for two days.  8 pm. Up for 5 am. , Nocturia 0-5  Appetite too good  Today granola and carrots, coffee, water, oj.  Exercise limited  Plantar fascia toes curls, foot yoga stretching, PT. Shoes  Fatigue  Was trying to swim  Small daily exercise  Yesterday walked several miles  Consider pool therapy    Smoking former  ETOH 5 per week  Street drugs/MJ no  Caffeine coffee    Depression ok today, someday down  Anxiety no  Panic no  SI/SP no  Hallucinations no  Paranoid no  Manic no  OCD no  PTSD no  Gambling no  Chem dep no  Guns yes locked        Headache  Onset: at least 3 months  Two kinds:  Cluster no  Right TMJ kind- no mouth guard. Dentist did not see grinding. 6 months.   Forehead and all day today mild. \"nerve tense and flu like with bad day\" HA can be separate from that. All day every day.Neurology no CT brain yes [facility] and [facility]   Poor sleep then worse, more HA.   Fhx sister migraine  H/o migraine as teen not severe nausea, light sensitive, noise.   Tried ergotomines in the past, gabapentin 3 years ago and gone.     Description: "   Location: bilateral in the frontal area, bilateral in the occipital area   Character: throbbing pain, pain level from 2-8  Frequency:  daily  Duration:  20 hrs per day    Intensity: severe, 2-8/10    Progression of Symptoms:  improving and constant    Accompanying Signs & Symptoms:  Stiff neck: YES- has fibramyalgia  Neck or upper back pain: no  Fever: no   Sinus pressure: YES- on occasion  Nausea or vomiting: no  Dizziness: no  Numbness: no  Weakness: YES- not related to headaches- fibramyalgia  Visual changes: no    History:   Head trauma: no  Family history of migraines: YES  Previous tests for headaches: YES  Neurologist evaluations: no   Able to do daily activities: no   Wake with a headaches: YES  Do headaches wake you up: no  Daily pain medication use: YES  Work/school stressors/changes: in the past      Night sweats  Onset 3-4 months  4 out of 7 nights per week, 1-3 times per night  Soaking at night.  Temperature sensitive during day sometimes.    Problem list and histories reviewed & adjusted, as indicated.  Additional history: as documented    Patient Active Problem List   Diagnosis     Hypercholesterolemia     Chronic rhinitis     IBS (irritable bowel syndrome)     Hiatal hernia     ACP (advance care planning)     Family history of hemochromatosis     Esophageal reflux     Esophageal stenosis     Diverticulosis of large intestine     Obesity     History of rheumatic fever     Chronic pain syndrome     Narcotic dependence (H)     Post-traumatic osteoarthritis of both knees     Benign essential hypertension     Total knee replacement status     Peripheral polyneuropathy     Encounter for long-term (current) use of medications     Plantar fasciitis     Past Surgical History:   Procedure Laterality Date     ARTHROPLASTY KNEE Left 12/2/2016    Procedure: ARTHROPLASTY KNEE;  Surgeon: Marisa Page MD;  Location: [facility] OR     DISCECTOMY CERVICAL MINIMALLY INVASIVE ONE LEVEL         Social History   Substance  Use Topics     Smoking status: Former Smoker     Types: Cigarettes     Quit date: 7/7/1995     Smokeless tobacco: Never Used     Alcohol use 0.0 - 2.0 oz/week     0 - 4 Standard drinks or equivalent per week     Family History   Problem Relation Age of Onset     DIABETES Mother          Current Outpatient Prescriptions   Medication Sig Dispense Refill     GABAPENTIN PO Take 900 mg by mouth 3 times daily (Patient takes  x 300 mg morning, 5 x 300 mg bedtime= 2700mg dose) In the morning and at bedtime.       HYDROcodone-acetaminophen (NORCO)  MG per tablet Take  tablets by mouth 4 times daily as needed for pain  0     lisinopril (PRINIVIL/ZESTRIL) 20 MG tablet TAKE 1 TABLET (20 MG) BY MOUTH DAILY. NEED APPOINTMENT FOR REFILLS! 90 tablet 3     oxyCODONE (OXYCONTIN) 10 MG 12 hr tablet Take 1 tablet (10 mg) by mouth every morning (Patient taking differently: Take 10 mg by mouth every morning Takes 1 in AM, 1 at PM) 30 tablet 0     RANITIDINE HCL PO Take 75 mg by mouth every morning (before breakfast)       senna-docusate (SENOKOT-S;PERICOLACE) 8.6-50 MG per tablet Take 1-2 tablets by mouth 2 times daily 20 tablet 0     traZODone (DESYREL) 50 MG tablet TAKE TWO TABLETS  BY MOUTH AT BEDTIME 180 tablet 2     Allergies   Allergen Reactions     Amoxicillin      Amoxicillin [A-Cillin]      In his 30's     Penicillin G      Penicillins Rash     Other reaction(s): Breathing Difficulty     Recent Labs   Lab Test  01/15/18   0859  12/02/16   0829   02/16/16   1612  08/31/15   1710  06/11/15   1053  10/28/14   1309   LDL  137*   --    --    --    --   117*  121*   HDL  58   --    --    --    --   55  65   TRIG  114   --    --    --    --   82  70   ALT  18   --    --   19  19  18  18   CR  0.81  0.75   --   0.70*  0.79  0.79  0.81   GFRESTIMATED   --   >90  Non  GFR Calc     --    --    --    --    --    GFRESTBLACK   --   >90   GFR Calc     --    --    --    --    --    POTASSIUM  5.2   4.3   < >  4.3  5.0  4.9  4.7    < > = values in this interval not displayed.      BP Readings from Last 3 Encounters:   05/14/18 (!) 130/92   04/17/18 132/88   01/05/18 122/84    Wt Readings from Last 3 Encounters:   05/14/18 110.7 kg (244 lb)   04/17/18 110.3 kg (243 lb 3.2 oz)   01/05/18 108.4 kg (239 lb)                  Labs reviewed in EPIC    Reviewed and updated as needed this visit by clinical staff       Reviewed and updated as needed this visit by Provider    Hydrocodone 2-4 tabs per day [facility] pain clinic Oxycontin BID    Wt Readings from Last 4 Encounters:   05/14/18 110.7 kg (244 lb)   04/17/18 110.3 kg (243 lb 3.2 oz)   01/05/18 108.4 kg (239 lb)   05/15/17 110.7 kg (244 lb)   Estimated body mass index is 33.09 kg/(m^2) as calculated from the following:    Height as of 4/17/18: 1.829 m (6').    Weight as of this encounter: 110.7 kg (244 lb).  Weight loss is recommended          Plane from [facility] Daughter graduated from [employer].          ROS:  Constitutional, HEENT, cardiovascular, pulmonary, GI, , musculoskeletal, neuro, skin, endocrine and psych systems are negative, except as otherwise noted.    OBJECTIVE:     BP (!) 130/92  Pulse 68  Temp 98.1  F (36.7  C) (Oral)  Resp 12  Wt 110.7 kg (244 lb)  BMI 33.09 kg/m2  Body mass index is 33.09 kg/(m^2).  GENERAL: healthy, alert and no distress  EYES: Eyes grossly normal to inspection, PERRL and conjunctivae and sclerae normal  HENT: ear canals and TM's normal, nose and mouth without ulcers or lesions  NECK: no adenopathy, no asymmetry, masses, or scars and thyroid normal to palpation  RESP: lungs clear to auscultation - no rales, rhonchi or wheezes  CV: regular rate and rhythm, normal S1 S2, no S3 or S4, no murmur, click or rub, no peripheral edema and peripheral pulses strong  ABDOMEN: soft, nontender, no hepatosplenomegaly, no masses and bowel sounds normal  MS: no gross musculoskeletal defects noted, no edema  SKIN: no suspicious  lesions or rashes  NEURO: Normal strength and tone, mentation intact and speech normal  PSYCH: mentation appears normal, affect normal/bright  LYMPH: no cervical, supraclavicular, axillary, or inguinal adenopathy    Diagnostic Test Results:  Results for orders placed or performed in visit on 01/15/18   Comp. Metabolic Panel (14) ([facility])   Result Value Ref Range    Glucose 100 (H) 65 - 99 mg/dL    Urea Nitrogen 17 8 - 27 mg/dL    Creatinine 0.81 0.76 - 1.27 mg/dL    eGFR If NonAfricn Am 93 >59 mL/min/1.73    eGFR If Africn Am 108 >59 mL/min/1.73    BUN/Creatinine Ratio 21 10 - 24    Sodium 139 134 - 144 mmol/L    Potassium 5.2 3.5 - 5.2 mmol/L    Chloride 101 96 - 106 mmol/L    Total CO2 24 18 - 28 mmol/L    Calcium 9.2 8.6 - 10.2 mg/dL    Protein Total 6.4 6.0 - 8.5 g/dL    Albumin 4.4 3.6 - 4.8 g/dL    Globulin, Total 2.0 1.5 - 4.5 g/dL    A/G Ratio 2.2 1.2 - 2.2    Bilirubin Total 0.5 0.0 - 1.2 mg/dL    Alkaline Phosphatase 57 39 - 117 IU/L    AST 15 0 - 40 IU/L    ALT 18 0 - 44 IU/L    Narrative    Performed at:  01 - [facility] [address], [place], [place]  [id number]  : Ted Escoabr MD, Phone:  [phone]   Lipid Panel ([facility])   Result Value Ref Range    Cholesterol 218 (H) 100 - 199 mg/dL    Triglycerides 114 0 - 149 mg/dL    HDL Cholesterol 58 >39 mg/dL    VLDL Cholesterol Reginaldo 23 5 - 40 mg/dL    LDL Cholesterol Calculated 137 (H) 0 - 99 mg/dL    LDL/HDL Ratio 2.4 0.0 - 3.6 ratio units    Narrative    Performed at:  01 - [facility] [address], [place], [place]  [id number]  : Ted Escboar MD, Phone:  [phone]   CBC with Diff/Plt ([facility])   Result Value Ref Range    WBC x10/cmm 5.6 3.8 - 11.0 x10/cmm    % Lymphocytes 27.2 20.5 - 51.1 %    % Monocytes 7.4 1.7 - 9.3 %    % Granulocytes 65.4 42.2 - 75.2 %    RBC x10/cmm 4.80 4.2 - 5.9 x10/cmm    Hemoglobin 14.7 13.4 - 17.5 g/dl    Hematocrit 44.1 39 - 51 %    MCV 91.8 80 - 100 fL    MCH 30.6 27.0 - 31.0 pg    MCHC 33.4  33.0 - 37.0 g/dL    Platelet Count 262 140 - 450 K/uL   PSA Serum ([facility])   Result Value Ref Range    PSA NG/ML 1.0 0.0 - 4.0 ng/mL    Narrative    Performed at:  01 - [facility] [address], [place], [place]  [id number]  : Ted Escobar MD, Phone:  [phone]   HCV Antibody ([facility])   Result Value Ref Range    Hep C Virus Ab 0.1 0.0 - 0.9 s/co ratio    Narrative    Performed at:  01 - [facility] [address], [place], [place]  [id number]  : Ted Escobar MD, Phone:  [phone]   Lyme  Total Ab Test/Reflex ([facility])   Result Value Ref Range    Lyme IgG/IgM Ab <0.91 0.00 - 0.90 ISR    Narrative    Performed at:  02 - [facility]  [id number]  : CRISTOBAL Camargo MD, Phone:  [phone]       ASSESSMENT/PLAN:     ASSESSMENT / PLAN:  (Z87.891) Former smoker  (primary encounter diagnosis)  Comment:   Plan: continue not smoking    (R51) Mixed headache  Comment:   Plan: try the fibromyalgia plan per Gordon and see if this helps the HAs    (M79.7) Fibromyalgia  Comment: Tyler plans to try Lyrica and cymbalta per his report. As well as Naren Chi and acupuncture.  Plan: as above    (R61) Night sweats  Comment:   Plan: observe for now. He is seeing endocrine for adrenal issue per Tyler      Patient Instructions   CALM KANU          Ilda Rush MD  [facility]

## 2018-05-14 ENCOUNTER — OFFICE VISIT (OUTPATIENT)
Dept: FAMILY MEDICINE | Facility: CLINIC | Age: 68
End: 2018-05-14

## 2018-05-14 VITALS
BODY MASS INDEX: 33.09 KG/M2 | SYSTOLIC BLOOD PRESSURE: 130 MMHG | DIASTOLIC BLOOD PRESSURE: 92 MMHG | HEART RATE: 68 BPM | WEIGHT: 244 LBS | TEMPERATURE: 98.1 F | RESPIRATION RATE: 12 BRPM

## 2018-05-14 DIAGNOSIS — R51.9 MIXED HEADACHE: ICD-10-CM

## 2018-05-14 DIAGNOSIS — R61 NIGHT SWEATS: ICD-10-CM

## 2018-05-14 DIAGNOSIS — M79.7 FIBROMYALGIA: ICD-10-CM

## 2018-05-14 DIAGNOSIS — Z87.891 FORMER SMOKER: Primary | ICD-10-CM

## 2018-05-14 PROCEDURE — 99214 OFFICE O/P EST MOD 30 MIN: CPT | Performed by: FAMILY MEDICINE

## 2018-05-14 NOTE — MR AVS SNAPSHOT
"              After Visit Summary   5/14/2018    Jose Carlos Escamilla    MRN: [id number]           Patient Information     Date Of Birth          1950        Visit Information        Provider Department      5/14/2018 3:30 PM Ilda Rush MD [facility]        Today's Diagnoses     Former smoker    -  1    Mixed headache        Fibromyalgia        Night sweats          Care Instructions    CALM KANU              Follow-ups after your visit        Your next 10 appointments already scheduled     May 25, 2018 11:00 AM CDT   SHORT with Baljit Salazar MD   [facility] ([facility])    [address] [place] [address] [phone]              Who to contact     If you have questions or need follow up information about today's clinic visit or your schedule please contact [facility] directly at [phone].  Normal or non-critical lab and imaging results will be communicated to you by [employer], letter or phone within 4 business days after the clinic has received the results. If you do not hear from us within 7 days, please contact the clinic through [employer] or phone. If you have a critical or abnormal lab result, we will notify you by phone as soon as possible.  Submit refill requests through [employer] or call your pharmacy and they will forward the refill request to us. Please allow 3 business days for your refill to be completed.          Additional Information About Your Visit        [employer] Information     [employer] lets you send messages to your doctor, view your test results, renew your prescriptions, schedule appointments and more. To sign up, go to [email or web address] . Click on \"Log in\" on the left side of the screen, which will take you to the Welcome page. Then click on \"Sign up Now\" on the right side of the page.     You will be asked to enter the access code listed below, as well as some personal information. Please follow the " directions to create your username and password.     Your access code is: [email or web address]  Expires: 2018  2:00 PM     Your access code will  in 90 days. If you need help or a new code, please call your [facility] clinic or [phone].        Care EveryWhere ID     This is your Care EveryWhere ID. This could be used by other organizations to access your [facility] medical records  [id number]        Your Vitals Were     Pulse Temperature Respirations BMI (Body Mass Index)          68 98.1  F (36.7  C) (Oral) 12 33.09 kg/m2         Blood Pressure from Last 3 Encounters:   18 (!) 130/92   18 132/88   18 122/84    Weight from Last 3 Encounters:   18 110.7 kg (244 lb)   18 110.3 kg (243 lb 3.2 oz)   18 108.4 kg (239 lb)              Today, you had the following     No orders found for display         Today's Medication Changes          These changes are accurate as of 18  5:21 PM.  If you have any questions, ask your nurse or doctor.               These medicines have changed or have updated prescriptions.        Dose/Directions    oxyCODONE 10 MG 12 hr tablet   Commonly known as:  OxyCONTIN   This may have changed:  additional instructions   Used for:  Status post total left knee replacement, Narcotic dependence (H), Peripheral polyneuropathy        Dose:  10 mg   Take 1 tablet (10 mg) by mouth every morning   Quantity:  30 tablet   Refills:  0                Primary Care Provider Office Phone # Fax #    Baljit Salazar [phone] [address]  [facility] [address]        Equal Access to Services     [facility]: Hadii eris Eugene, ulises wells, qacarolynta ryder haro . So [facility] [phone].    ATENCIÓN: Si habla español, tiene a ramirez disposición servicios gratuitos de asistencia lingüística. Llame al [phone].    We comply with applicable federal civil rights laws and  [place] laws. We do not discriminate on the basis of race, color, national origin, age, disability, sex, sexual orientation, or gender identity.            Thank you!     Thank you for choosing [facility]  for your care. Our goal is always to provide you with excellent care. Hearing back from our patients is one way we can continue to improve our services. Please take a few minutes to complete the written survey that you may receive in the mail after your visit with us. Thank you!             Your Updated Medication List - Protect others around you: Learn how to safely use, store and throw away your medicines at www.disposemymeds.org.          This list is accurate as of 5/14/18  5:21 PM.  Always use your most recent med list.                   Brand Name Dispense Instructions for use Diagnosis    GABAPENTIN PO      Take 900 mg by mouth 3 times daily (Patient takes  x 300 mg morning, 5 x 300 mg bedtime= 2700mg dose) In the morning and at bedtime.        HYDROcodone-acetaminophen  MG per tablet    NORCO     Take  tablets by mouth 4 times daily as needed for pain        lisinopril 20 MG tablet    PRINIVIL/ZESTRIL    90 tablet    TAKE 1 TABLET (20 MG) BY MOUTH DAILY. NEED APPOINTMENT FOR REFILLS!    Benign essential hypertension       oxyCODONE 10 MG 12 hr tablet    OxyCONTIN    30 tablet    Take 1 tablet (10 mg) by mouth every morning    Status post total left knee replacement, Narcotic dependence (H), Peripheral polyneuropathy       RANITIDINE HCL PO      Take 75 mg by mouth every morning (before breakfast)        senna-docusate 8.6-50 MG per tablet    SENOKOT-S;PERICOLACE    20 tablet    Take 1-2 tablets by mouth 2 times daily    Status post total left knee replacement       traZODone 50 MG tablet    DESYREL    180 tablet    TAKE TWO TABLETS  BY MOUTH AT BEDTIME    Encounter for long-term (current) use of medications

## 2018-06-28 ENCOUNTER — TRANSFERRED RECORDS (OUTPATIENT)
Dept: FAMILY MEDICINE | Facility: CLINIC | Age: 68
End: 2018-06-28

## 2018-07-12 DIAGNOSIS — E27.3 DRUG-INDUCED ADRENOCORTICAL INSUFFICIENCY (H): Primary | ICD-10-CM

## 2018-07-12 NOTE — NURSING NOTE
Please fax results to : Authorized Provider is  Trisha Thomas [phone] The ordering provider is Shira Norman R.N ATT to both parties.

## 2018-07-16 ENCOUNTER — OFFICE VISIT (OUTPATIENT)
Dept: FAMILY MEDICINE | Facility: CLINIC | Age: 68
End: 2018-07-16

## 2018-07-16 VITALS
SYSTOLIC BLOOD PRESSURE: 118 MMHG | WEIGHT: 247 LBS | HEART RATE: 72 BPM | BODY MASS INDEX: 33.5 KG/M2 | RESPIRATION RATE: 12 BRPM | DIASTOLIC BLOOD PRESSURE: 86 MMHG

## 2018-07-16 DIAGNOSIS — E27.3 DRUG-INDUCED ADRENOCORTICAL INSUFFICIENCY (H): ICD-10-CM

## 2018-07-16 DIAGNOSIS — E27.40 ADRENAL INSUFFICIENCY (H): Primary | ICD-10-CM

## 2018-07-16 DIAGNOSIS — R61 NIGHT SWEATS: ICD-10-CM

## 2018-07-16 DIAGNOSIS — E34.9 HYPOTESTOSTERONEMIA: ICD-10-CM

## 2018-07-16 PROCEDURE — 84439 ASSAY OF FREE THYROXINE: CPT | Mod: 90 | Performed by: FAMILY MEDICINE

## 2018-07-16 PROCEDURE — 36415 COLL VENOUS BLD VENIPUNCTURE: CPT | Performed by: FAMILY MEDICINE

## 2018-07-16 PROCEDURE — 99213 OFFICE O/P EST LOW 20 MIN: CPT | Performed by: FAMILY MEDICINE

## 2018-07-16 PROCEDURE — 84403 ASSAY OF TOTAL TESTOSTERONE: CPT | Mod: 90 | Performed by: FAMILY MEDICINE

## 2018-07-16 RX ORDER — DULOXETIN HYDROCHLORIDE 20 MG/1
CAPSULE, DELAYED RELEASE ORAL
Refills: 1 | COMMUNITY
Start: 2018-06-28 | End: 2018-10-26

## 2018-07-16 RX ORDER — TESTOSTERONE 30 MG/1.5ML
30 SOLUTION TOPICAL DAILY
COMMUNITY
Start: 2018-06-14

## 2018-07-16 RX ORDER — LISINOPRIL 20 MG/1
20 TABLET ORAL
COMMUNITY
Start: 2018-04-17 | End: 2018-07-16

## 2018-07-16 RX ORDER — DULOXETIN HYDROCHLORIDE 20 MG/1
20 CAPSULE, DELAYED RELEASE ORAL
COMMUNITY
Start: 2018-05-07 | End: 2018-07-16 | Stop reason: DRUGHIGH

## 2018-07-16 RX ORDER — DEXAMETHASONE SODIUM PHOSPHATE 4 MG/ML
INJECTION, SOLUTION INTRA-ARTICULAR; INTRALESIONAL; INTRAMUSCULAR; INTRAVENOUS; SOFT TISSUE
Refills: 3 | COMMUNITY
Start: 2018-06-15

## 2018-07-16 RX ORDER — HYDROCORTISONE 5 MG/1
TABLET ORAL
COMMUNITY
Start: 2018-06-12 | End: 2023-03-22

## 2018-07-16 RX ORDER — PREGABALIN 25 MG/1
CAPSULE ORAL
Refills: 0 | COMMUNITY
Start: 2018-05-08 | End: 2018-10-26

## 2018-07-16 RX ORDER — TESTOSTERONE 30 MG/1.5ML
SOLUTION TOPICAL
Refills: 5 | COMMUNITY
Start: 2018-06-20 | End: 2018-07-16

## 2018-07-16 RX ORDER — PREGABALIN 50 MG/1
50 CAPSULE ORAL
COMMUNITY
Start: 2018-05-07 | End: 2018-07-16 | Stop reason: DRUGHIGH

## 2018-07-16 RX ORDER — HYDROCORTISONE 5 MG/1
TABLET ORAL
Refills: 3 | COMMUNITY
Start: 2018-06-14 | End: 2018-07-16

## 2018-07-16 ASSESSMENT — ANXIETY QUESTIONNAIRES
IF YOU CHECKED OFF ANY PROBLEMS ON THIS QUESTIONNAIRE, HOW DIFFICULT HAVE THESE PROBLEMS MADE IT FOR YOU TO DO YOUR WORK, TAKE CARE OF THINGS AT HOME, OR GET ALONG WITH OTHER PEOPLE: SOMEWHAT DIFFICULT
3. WORRYING TOO MUCH ABOUT DIFFERENT THINGS: NOT AT ALL
7. FEELING AFRAID AS IF SOMETHING AWFUL MIGHT HAPPEN: NOT AT ALL
1. FEELING NERVOUS, ANXIOUS, OR ON EDGE: SEVERAL DAYS
6. BECOMING EASILY ANNOYED OR IRRITABLE: SEVERAL DAYS
GAD7 TOTAL SCORE: 2
2. NOT BEING ABLE TO STOP OR CONTROL WORRYING: NOT AT ALL
5. BEING SO RESTLESS THAT IT IS HARD TO SIT STILL: NOT AT ALL

## 2018-07-16 ASSESSMENT — PAIN SCALES - GENERAL: PAINLEVEL: MILD PAIN (3)

## 2018-07-16 ASSESSMENT — PATIENT HEALTH QUESTIONNAIRE - PHQ9: 5. POOR APPETITE OR OVEREATING: NOT AT ALL

## 2018-07-16 NOTE — LETTER
[facility]  [address] [place]  [address]  Phone: [phone]    July 19, 2018      Jose Carlos SALAZAR Andi  [address]  [facility] [address]              Dear Jose Carlos,    I am writing to report that your included test results are within expected ranges. I do not suggest that we make any changes at this time.        Sincerely,     Baljit Salazar M.D.    Results for orders placed or performed in visit on 07/16/18   Testosterone  Serum ([facility])   Result Value Ref Range    Testosterone Total 314 264 - 916 ng/dL    Narrative    Performed at:  01 - [facility] [address], [place], [place]  [id number]  : Ted Escobar MD, Phone:  [phone]   Thyroxine (T4) Free  Direct  S ([facility])   Result Value Ref Range    T4 Free 1.03 0.82 - 1.77 ng/dL    Narrative    Performed at:  01 - [facility] [address], [place], [place]  [id number]  : Ted Escobar MD, Phone:  [phone]

## 2018-07-16 NOTE — PROGRESS NOTES
1. Saw at [facility], got on testosterone and needs a little    2. Pituitary gland is not telling the gland to make cortisol.  Now on supplementation.  Worse at other times of the day. Really improved!    3. Was having some night sweats, now suddenly much improved.  Might be just readjusting to the above meds.    4.   he has Hypertension which is currently not well controlled. he has been compliant with his medications and is here today to follow up on the this issue and see if we can't work on better control of the blood pressure.    Reviewed last 6 BP readings in chart:  BP Readings from Last 6 Encounters:   07/16/18 118/86   05/14/18 (!) 130/92   04/17/18 132/88   01/05/18 122/84   05/15/17 128/82   05/04/17 130/88     he  has not experienced any significant side effects from current medications for hypertension.    NO active cardiac complaints or symptoms with exercise.      Assessment/Plan:  Diagnoses and all orders for this visit:    Adrenal insufficiency (H)    Hypotestosteronemia  -     Testosterone  Serum ([facility])    Night sweats    >25 min spent with patient, greater than 50% spent on discussion/education/planning, etc. About The primary encounter diagnosis was Adrenal insufficiency (H). Diagnoses of Hypotestosteronemia and Night sweats were also pertinent to this visit.    Baljit Salazar MD  [facility]  [phone]

## 2018-07-16 NOTE — MR AVS SNAPSHOT
"              After Visit Summary   2018    Jose Carlos Escamilla    MRN: [id number]           Patient Information     Date Of Birth          1950        Visit Information        Provider Department      2018 11:30 AM Baljit Salazar MD [facility]        Today's Diagnoses     Adrenal insufficiency (H)    -  1    Hypotestosteronemia        Night sweats           Follow-ups after your visit        Who to contact     If you have questions or need follow up information about today's clinic visit or your schedule please contact [facility] directly at [phone].  Normal or non-critical lab and imaging results will be communicated to you by Operaxhart, letter or phone within 4 business days after the clinic has received the results. If you do not hear from us within 7 days, please contact the clinic through Semmlet or phone. If you have a critical or abnormal lab result, we will notify you by phone as soon as possible.  Submit refill requests through schoox or call your pharmacy and they will forward the refill request to us. Please allow 3 business days for your refill to be completed.          Additional Information About Your Visit        MyChart Information     schoox lets you send messages to your doctor, view your test results, renew your prescriptions, schedule appointments and more. To sign up, go to [email or web address] . Click on \"Log in\" on the left side of the screen, which will take you to the Welcome page. Then click on \"Sign up Now\" on the right side of the page.     You will be asked to enter the access code listed below, as well as some personal information. Please follow the directions to create your username and password.     Your access code is: [email or web address]  Expires: 2018  2:00 PM     Your access code will  in 90 days. If you need help or a new code, please call your [place] clinic or [phone].        Care EveryWhere ID     This is your Care " EveryWhere ID. This could be used by other organizations to access your [place] medical records  [id number]        Your Vitals Were     Pulse Respirations BMI (Body Mass Index)             72 12 33.5 kg/m2          Blood Pressure from Last 3 Encounters:   07/16/18 118/86   05/14/18 (!) 130/92   04/17/18 132/88    Weight from Last 3 Encounters:   07/16/18 112 kg (247 lb)   05/14/18 110.7 kg (244 lb)   04/17/18 110.3 kg (243 lb 3.2 oz)              We Performed the Following     Testosterone  Serum ([facility])          Today's Medication Changes          These changes are accurate as of 7/16/18 12:28 PM.  If you have any questions, ask your nurse or doctor.               These medicines have changed or have updated prescriptions.        Dose/Directions    DULoxetine 20 MG EC capsule   Commonly known as:  CYMBALTA   This may have changed:  Another medication with the same name was removed. Continue taking this medication, and follow the directions you see here.   Changed by:  Baljit Salazar MD        TK 1 C PO BID   Refills:  1       LYRICA 25 MG capsule   This may have changed:  Another medication with the same name was removed. Continue taking this medication, and follow the directions you see here.   Generic drug:  pregabalin   Changed by:  Baljit Salazar MD        TK 1 C PO daily- weaning off 7/16/18   Refills:  0       oxyCODONE 10 MG 12 hr tablet   Commonly known as:  OxyCONTIN   This may have changed:  additional instructions   Used for:  Status post total left knee replacement, Narcotic dependence (H), Peripheral polyneuropathy        Dose:  10 mg   Take 1 tablet (10 mg) by mouth every morning   Quantity:  30 tablet   Refills:  0                Primary Care Provider Office Phone # Fax #    Baljit Salazar [phone] [address]  [facility] [address]        Equal Access to Services     DALJIT SCHWARTZ AH: Jadyn Eugene, ulises wells, yovana fernandes  ryder borjasdiana blockaan ah. [facility] [phone].    ATENCIÓN: Si elizabethla derrek, tiene a ramirez disposición servicios gratuitos de asistencia lingüística. Brea al [phone].    We comply with applicable federal civil rights laws and [place] laws. We do not discriminate on the basis of race, color, national origin, age, disability, sex, sexual orientation, or gender identity.            Thank you!     Thank you for choosing [facility]  for your care. Our goal is always to provide you with excellent care. Hearing back from our patients is one way we can continue to improve our services. Please take a few minutes to complete the written survey that you may receive in the mail after your visit with us. Thank you!             Your Updated Medication List - Protect others around you: Learn how to safely use, store and throw away your medicines at www.disposemymeds.org.          This list is accurate as of 7/16/18 12:28 PM.  Always use your most recent med list.                   Brand Name Dispense Instructions for use Diagnosis    dexamethasone 4 MG/ML injection    DECADRON     INJECT 1ML INTRAMUSCULARLY UTD WHEN UNABLE TO TAKE ORAL STEROIDS        diclofenac 1 % Gel topical gel    VOLTAREN     as needed.        DULoxetine 20 MG EC capsule    CYMBALTA     TK 1 C PO BID        GABAPENTIN PO      Take 900 mg by mouth 3 times daily (Patient takes  X 900 mg morning, 5 x 300 mg bedtime= 2700mg dose) In the morning and at bedtime. In adjustment dosage during July        HYDROcodone-acetaminophen  MG per tablet    NORCO     Take  tablets by mouth 4 times daily as needed for pain        hydrocortisone 5 MG tablet    CORTEF     15 mg on waking and 5 mg at noon,double in sickness        lisinopril 20 MG tablet    PRINIVIL/ZESTRIL    90 tablet    TAKE 1 TABLET (20 MG) BY MOUTH DAILY. NEED APPOINTMENT FOR REFILLS!    Benign essential hypertension       LYRICA 25 MG capsule    Generic drug:  pregabalin      TK 1 C PO daily- weaning off 7/16/18        oxyCODONE 10 MG 12 hr tablet    OxyCONTIN    30 tablet    Take 1 tablet (10 mg) by mouth every morning    Status post total left knee replacement, Narcotic dependence (H), Peripheral polyneuropathy       RANITIDINE HCL PO      Take 75 mg by mouth every morning (before breakfast)        senna-docusate 8.6-50 MG per tablet    SENOKOT-S;PERICOLACE    20 tablet    Take 1-2 tablets by mouth 2 times daily    Status post total left knee replacement       testosterone 30 MG/ACT topical solution    AXIRON     Place 30 mg onto the skin        traZODone 50 MG tablet    DESYREL    180 tablet    TAKE TWO TABLETS  BY MOUTH AT BEDTIME    Encounter for long-term (current) use of medications

## 2018-07-17 ENCOUNTER — DOCUMENTATION ONLY (OUTPATIENT)
Dept: VASCULAR SURGERY | Facility: CLINIC | Age: 68
End: 2018-07-17

## 2018-07-17 LAB
T4 FREE SERPL-MCNC: 1.03 NG/DL (ref 0.82–1.77)
TESTOST SERPL-MCNC: 314 NG/DL (ref 264–916)

## 2018-07-17 ASSESSMENT — ANXIETY QUESTIONNAIRES: GAD7 TOTAL SCORE: 2

## 2018-07-19 NOTE — PROGRESS NOTES
Dear Jose Carlos,   I am writing to report that your included test results are within expected ranges. I do not suggest that we make any changes at this time.    Baljit Salazar M.D.

## 2018-08-09 ENCOUNTER — TRANSFERRED RECORDS (OUTPATIENT)
Dept: FAMILY MEDICINE | Facility: CLINIC | Age: 68
End: 2018-08-09

## 2018-09-17 DIAGNOSIS — Z79.899 ENCOUNTER FOR LONG-TERM (CURRENT) USE OF MEDICATIONS: Primary | ICD-10-CM

## 2018-09-17 PROCEDURE — 36415 COLL VENOUS BLD VENIPUNCTURE: CPT | Performed by: FAMILY MEDICINE

## 2018-10-18 ENCOUNTER — TRANSFERRED RECORDS (OUTPATIENT)
Dept: FAMILY MEDICINE | Facility: CLINIC | Age: 68
End: 2018-10-18

## 2018-10-26 ENCOUNTER — OFFICE VISIT (OUTPATIENT)
Dept: FAMILY MEDICINE | Facility: CLINIC | Age: 68
End: 2018-10-26

## 2018-10-26 VITALS
HEART RATE: 77 BPM | DIASTOLIC BLOOD PRESSURE: 82 MMHG | OXYGEN SATURATION: 96 % | BODY MASS INDEX: 34.72 KG/M2 | SYSTOLIC BLOOD PRESSURE: 132 MMHG | WEIGHT: 256 LBS | RESPIRATION RATE: 16 BRPM

## 2018-10-26 DIAGNOSIS — Z23 NEED FOR PROPHYLACTIC VACCINATION AND INOCULATION AGAINST INFLUENZA: Primary | ICD-10-CM

## 2018-10-26 DIAGNOSIS — L82.1 SEBORRHEIC KERATOSES: ICD-10-CM

## 2018-10-26 PROCEDURE — G0008 ADMIN INFLUENZA VIRUS VAC: HCPCS | Performed by: FAMILY MEDICINE

## 2018-10-26 PROCEDURE — 99213 OFFICE O/P EST LOW 20 MIN: CPT | Mod: 25 | Performed by: FAMILY MEDICINE

## 2018-10-26 PROCEDURE — 90662 IIV NO PRSV INCREASED AG IM: CPT | Performed by: FAMILY MEDICINE

## 2018-10-26 NOTE — PROGRESS NOTES
Here to check spots on the hand.    Currently on cortisol replacement from the [facility]  Able to decrease the pain meds.  Gaining wait from the meds.  Feels better but is what what it is .    .

## 2018-10-26 NOTE — MR AVS SNAPSHOT
"              After Visit Summary   10/26/2018    Jose Carlos Escamilla    MRN: [id number]           Patient Information     Date Of Birth          1950        Visit Information        Provider Department      10/26/2018 9:45 AM Baljit Salazar MD [facility]        Today's Diagnoses     Need for prophylactic vaccination and inoculation against influenza    -  1    Seborrheic keratoses           Follow-ups after your visit        Who to contact     If you have questions or need follow up information about today's clinic visit or your schedule please contact [facility] directly at [phone].  Normal or non-critical lab and imaging results will be communicated to you by AltaSenshart, letter or phone within 4 business days after the clinic has received the results. If you do not hear from us within 7 days, please contact the clinic through AltaSenshart or phone. If you have a critical or abnormal lab result, we will notify you by phone as soon as possible.  Submit refill requests through OWM or call your pharmacy and they will forward the refill request to us. Please allow 3 business days for your refill to be completed.          Additional Information About Your Visit        MyChart Information     OWM lets you send messages to your doctor, view your test results, renew your prescriptions, schedule appointments and more. To sign up, go to [email or web address] . Click on \"Log in\" on the left side of the screen, which will take you to the Welcome page. Then click on \"Sign up Now\" on the right side of the page.     You will be asked to enter the access code listed below, as well as some personal information. Please follow the directions to create your username and password.     Your access code is: [email or web address]  Expires: 2019  9:55 PM     Your access code will  in 90 days. If you need help or a new code, please call your [place] clinic or [phone].        Care EveryWhere " ID     This is your Care EveryWhere ID. This could be used by other organizations to access your [place] medical records  [id number]        Your Vitals Were     Pulse Respirations Pulse Oximetry BMI (Body Mass Index)          77 16 96% 34.72 kg/m2         Blood Pressure from Last 3 Encounters:   10/26/18 132/82   07/16/18 118/86   05/14/18 (!) 130/92    Weight from Last 3 Encounters:   10/26/18 116.1 kg (256 lb)   07/16/18 112 kg (247 lb)   05/14/18 110.7 kg (244 lb)              We Performed the Following     ADMIN INFLUENZA (For MEDICARE Patients ONLY) []     FLU VACCINE, INCREASED ANTIGEN, PRESV FREE, AGE 65+ [41188]          Today's Medication Changes          These changes are accurate as of 10/26/18 11:59 PM.  If you have any questions, ask your nurse or doctor.               These medicines have changed or have updated prescriptions.        Dose/Directions    oxyCODONE 10 MG 12 hr tablet   Commonly known as:  OxyCONTIN   This may have changed:  additional instructions   Used for:  Status post total left knee replacement, Narcotic dependence (H), Peripheral polyneuropathy        Dose:  10 mg   Take 1 tablet (10 mg) by mouth every morning   Quantity:  30 tablet   Refills:  0                Primary Care Provider Office Phone # Fax #    Baljit Salazar [phone] [address]  [facility] [address]        Equal Access to Services     [facility]: Hadii eris herrera hadasho Sojordonali, waaxda luqadaha, qaybta kaalmada adeegyada, ryder escudero . So [facility] [phone].    ATENCIÓN: Si habla español, tiene a ramirez disposición servicios gratuitos de asistencia lingüística. Llame al [phone].    We comply with applicable federal civil rights laws and [place] laws. We do not discriminate on the basis of race, color, national origin, age, disability, sex, sexual orientation, or gender identity.            Thank you!     Thank you for choosing [facility]  for your care. Our goal is always to provide you with excellent care. Hearing back from our patients is one way we can continue to improve our services. Please take a few minutes to complete the written survey that you may receive in the mail after your visit with us. Thank you!             Your Updated Medication List - Protect others around you: Learn how to safely use, store and throw away your medicines at www.disposemymeds.org.          This list is accurate as of 10/26/18 11:59 PM.  Always use your most recent med list.                   Brand Name Dispense Instructions for use Diagnosis    dexamethasone 4 MG/ML injection    DECADRON     INJECT 1ML INTRAMUSCULARLY UTD WHEN UNABLE TO TAKE ORAL STEROIDS        diclofenac 1 % Gel topical gel    VOLTAREN     as needed.        GABAPENTIN PO      Take 900 mg by mouth 3 times daily (Patient takes  X 900 mg morning, 5 x 300 mg bedtime= 2700mg dose) In the morning and at bedtime. In adjustment dosage during July        HYDROcodone-acetaminophen  MG per tablet    NORCO     Take  tablets by mouth 4 times daily as needed for pain        hydrocortisone 5 MG tablet    CORTEF     15 mg on waking and 5 mg at noon,double in sickness        lisinopril 20 MG tablet    PRINIVIL/ZESTRIL    90 tablet    TAKE 1 TABLET (20 MG) BY MOUTH DAILY. NEED APPOINTMENT FOR REFILLS!    Benign essential hypertension       oxyCODONE 10 MG 12 hr tablet    OxyCONTIN    30 tablet    Take 1 tablet (10 mg) by mouth every morning    Status post total left knee replacement, Narcotic dependence (H), Peripheral polyneuropathy       RANITIDINE HCL PO      Take 75 mg by mouth every morning (before breakfast)        senna-docusate 8.6-50 MG per tablet    SENOKOT-S;PERICOLACE    20 tablet    Take 1-2 tablets by mouth 2 times daily    Status post total left knee replacement       testosterone 30 MG/ACT topical solution    AXIRON     Place 30 mg onto the skin        traZODone 50 MG  tablet    DESYREL    180 tablet    TAKE TWO TABLETS  BY MOUTH AT BEDTIME    Encounter for long-term (current) use of medications

## 2018-10-26 NOTE — PROGRESS NOTES
Injectable Influenza Immunization Documentation    1.  Is the person to be vaccinated sick today?   No    2. Does the person to be vaccinated have an allergy to a component   of the vaccine?   No  Egg Allergy Algorithm Link    3. Has the person to be vaccinated ever had a serious reaction   to influenza vaccine in the past?   No    4. Has the person to be vaccinated ever had Guillain-Barré syndrome?   No    Form completed by Sarah Elena, CMA         LAB[facility] FOR [facility]

## 2018-10-26 NOTE — NURSING NOTE
I spoke with endocrine office of Dr Moran - patient has brought in Guin blood collection kit-   We at [facility] do not have to reorder any test - just package blood as directed- done  Froze specimen for 3hrs prior to shipping  Maribeth Huang MA October 26, 2018 1:57 PM

## 2018-11-27 DIAGNOSIS — Z79.899 ENCOUNTER FOR LONG-TERM (CURRENT) USE OF MEDICATIONS: ICD-10-CM

## 2018-11-27 RX ORDER — TRAZODONE HYDROCHLORIDE 50 MG/1
TABLET, FILM COATED ORAL
Qty: 180 TABLET | Refills: 0 | Status: SHIPPED | OUTPATIENT
Start: 2018-11-27 | End: 2019-03-05

## 2018-12-18 ENCOUNTER — TRANSFERRED RECORDS (OUTPATIENT)
Dept: FAMILY MEDICINE | Facility: CLINIC | Age: 68
End: 2018-12-18

## 2019-02-14 ENCOUNTER — TRANSFERRED RECORDS (OUTPATIENT)
Dept: FAMILY MEDICINE | Facility: CLINIC | Age: 69
End: 2019-02-14

## 2019-03-05 DIAGNOSIS — Z79.899 ENCOUNTER FOR LONG-TERM (CURRENT) USE OF MEDICATIONS: ICD-10-CM

## 2019-03-05 DIAGNOSIS — I10 BENIGN ESSENTIAL HYPERTENSION: ICD-10-CM

## 2019-03-08 RX ORDER — TRAZODONE HYDROCHLORIDE 50 MG/1
TABLET, FILM COATED ORAL
Qty: 60 TABLET | Refills: 0 | Status: SHIPPED | OUTPATIENT
Start: 2019-03-08 | End: 2019-03-21

## 2019-03-08 RX ORDER — LISINOPRIL 20 MG/1
TABLET ORAL
Qty: 30 TABLET | Refills: 0 | Status: SHIPPED | OUTPATIENT
Start: 2019-03-08 | End: 2019-03-21

## 2019-03-21 ENCOUNTER — TRANSFERRED RECORDS (OUTPATIENT)
Dept: FAMILY MEDICINE | Facility: CLINIC | Age: 69
End: 2019-03-21

## 2019-03-21 ENCOUNTER — OFFICE VISIT (OUTPATIENT)
Dept: FAMILY MEDICINE | Facility: CLINIC | Age: 69
End: 2019-03-21

## 2019-03-21 VITALS
RESPIRATION RATE: 16 BRPM | SYSTOLIC BLOOD PRESSURE: 142 MMHG | OXYGEN SATURATION: 96 % | HEART RATE: 69 BPM | WEIGHT: 257.8 LBS | BODY MASS INDEX: 34.96 KG/M2 | DIASTOLIC BLOOD PRESSURE: 96 MMHG

## 2019-03-21 DIAGNOSIS — I10 BENIGN ESSENTIAL HYPERTENSION: ICD-10-CM

## 2019-03-21 DIAGNOSIS — E27.3 DRUG-INDUCED ADRENOCORTICAL INSUFFICIENCY (H): ICD-10-CM

## 2019-03-21 DIAGNOSIS — F11.20 NARCOTIC DEPENDENCE (H): ICD-10-CM

## 2019-03-21 DIAGNOSIS — E27.40 ADRENAL INSUFFICIENCY (H): ICD-10-CM

## 2019-03-21 DIAGNOSIS — M35.3 PMR (POLYMYALGIA RHEUMATICA) (H): ICD-10-CM

## 2019-03-21 PROCEDURE — 99214 OFFICE O/P EST MOD 30 MIN: CPT | Performed by: FAMILY MEDICINE

## 2019-03-21 PROCEDURE — 36415 COLL VENOUS BLD VENIPUNCTURE: CPT | Performed by: FAMILY MEDICINE

## 2019-03-21 RX ORDER — LISINOPRIL 20 MG/1
TABLET ORAL
Qty: 90 TABLET | Refills: 3 | Status: SHIPPED | OUTPATIENT
Start: 2019-03-21 | End: 2020-03-27

## 2019-03-21 RX ORDER — MORPHINE SULFATE 15 MG/1
15 TABLET, FILM COATED, EXTENDED RELEASE ORAL EVERY 12 HOURS
COMMUNITY
End: 2021-04-16

## 2019-03-21 RX ORDER — TRAZODONE HYDROCHLORIDE 50 MG/1
TABLET, FILM COATED ORAL
Qty: 180 TABLET | Refills: 3 | Status: SHIPPED | OUTPATIENT
Start: 2019-03-21 | End: 2020-05-28

## 2019-03-21 NOTE — PROGRESS NOTES
Problem(s) Oriented visit        SUBJECTIVE:                                                    Jose Carlos Escamilla is a 69 year old male who presents to clinic today for the following health issues :  1. Narcotic dependence (H)  Under the care of the [facility] and this med is on their list    2. PMR (polymyalgia rheumatica) (H)  Not sure if it is continuing from his history but is on daily steroids.      3. Adrenal insufficiency (H)  Much much improved with steroids.    4. Benign essential hypertension  he has Hypertension which is currently not well controlled. he has been compliant with his medications and is here today to follow up on the this issue and see if we can't work on better control of the blood pressure.    Reviewed last 6 BP readings in chart:  BP Readings from Last 6 Encounters:   03/21/19 (!) 142/96   10/26/18 132/82   07/16/18 118/86   05/14/18 (!) 130/92   04/17/18 132/88   01/05/18 122/84     he  has not experienced any significant side effects from current medications for hypertension.    NO active cardiac complaints or symptoms with exercise.    - lisinopril (PRINIVIL/ZESTRIL) 20 MG tablet; TAKE 1 TABLET(20 MG) BY MOUTH DAILY  Dispense: 90 tablet; Refill: 3         Problem list, Medication list, Allergies, and Medical/Social/Surgical histories reviewed in [facility] and updated as appropriate.   Additional history: as documented    ROS:  General and CV completed and negative except as noted above    Histories:   Patient Active Problem List   Diagnosis     Hypercholesterolemia     Chronic rhinitis     IBS (irritable bowel syndrome)     Hiatal hernia     ACP (advance care planning)     Family history of hemochromatosis     Esophageal reflux     Esophageal stenosis     Diverticulosis of large intestine     Obesity     History of rheumatic fever     Chronic pain syndrome     Narcotic dependence (H)     Post-traumatic osteoarthritis of both knees     Benign essential hypertension     Total knee replacement  status     Peripheral polyneuropathy     Encounter for long-term (current) use of medications     Plantar fasciitis     Adrenal insufficiency (H)     PMR (polymyalgia rheumatica) (H)     Past Surgical History:   Procedure Laterality Date     ARTHROPLASTY KNEE Left 2016    Procedure: ARTHROPLASTY KNEE;  Surgeon: Marisa Page MD;  Location: [facility] OR     DISCECTOMY CERVICAL MINIMALLY INVASIVE ONE LEVEL         Social History     Tobacco Use     Smoking status: Former Smoker     Types: Cigarettes     Last attempt to quit: 1995     Years since quittin.7     Smokeless tobacco: Never Used   Substance Use Topics     Alcohol use: Yes     Alcohol/week: 0.0 - 2.0 oz     Family History   Problem Relation Age of Onset     Diabetes Mother            OBJECTIVE:                                                    BP (!) 142/96   Pulse 69   Resp 16   Wt 116.9 kg (257 lb 12.8 oz)   SpO2 96%   BMI 34.96 kg/m    Body mass index is 34.96 kg/m .   APPEARANCE: = Relaxed and in no distress  Conj/Eyelids = noninjected and lids and lashes are without inflammation  PERRLA/Irises = Pupils Round Reactive to Light and Irisis without inflammation  Neck = No anterior or posterior adenopathy appreciated.  Thyroid = Not enlarged and no masses felt  Resp effort = Calm regular breathing  Breath Sounds = Good air movement with no rales or rhonchi in any lung fields  Heart Rate, Rhythm, & sounds (no Murm)  = Regular rate and rhythm with no S3, S4, or murmur appreciated.  Carotid Art's = Pulses full and equal and no bruits appreciated  Abdomen = Soft, nontender, no masses, & bowel sounds in all quadrants  Liver/Spleen = Normal span and no splenomegaly noted  Digits and Nails = FROM in all finger joints, no nail dystrophy  Ext (edema) = No pretibial edema noted or elsewhere  Musculsktl =  Strength and ROM of major joints are within normal limits  SKIN = absent significant rashes or lesions   Recent/Remote Memory = Alert and Oriented x  3  Mood/Affect = Cooperative and interested     ASSESSMENT/PLAN:                                                        Jose Carlos was seen today for consult and recheck medication.    Diagnoses and all orders for this visit:    Narcotic dependence (H)    PMR (polymyalgia rheumatica) (H)    Adrenal insufficiency (H)    Benign essential hypertension  Discussed current hypertension treatment guidelines, including indications for treatment and treatment options.  Discussed the importance for aggressive management of HTN to prevent vascular complications later.  Recommended lower fat, lower carbohydrate, and lower sodium (<2000 mg)diet.  Discussed required intervals for follow up on HTN, lab studies.  Recommened pt. follow their blood pressures outside the clinic to ensure that BPs are remaining within guidelines, and to contact me if the readings are not within guidelines on a regular basis so we can adjust treatment as needed.    -     lisinopril (PRINIVIL/ZESTRIL) 20 MG tablet; TAKE 1 TABLET(20 MG) BY MOUTH DAILY    Sleep disorder.  We discussed sleep hygiene, stimulus control and sleep restriction. He was advised to avoid caffeine within 6 hours of bed, avoid alcohol at night, avoid late meals and late exercise, avoid late physical activity. He was advised to keep his sleep environment cool, dark and quiet. He was advised to try to keep a regular sleep-wake schedule, which he does for the most part, and avoid naps, which he does. For stimulus control, he was advised to avoid being in bed for more than 20 minutes if unable to sleep. If he does get out of bed, he should keep the lights dim, read a book that is not particularly entertaining, and return to sleep if he starts feeling drowsy.    -     traZODone (DESYREL) 50 MG tablet; TAKE 2 TABLETS BY MOUTH AT BEDTIME        Work on weight loss!!!  .    The following health maintenance items are reviewed in Epic and correct as of today:  Health Maintenance   Topic Date Due      URINE DRUG SCREEN Q1 YR  01/29/1967     ZOSTER IMMUNIZATION (1 of 2) 01/29/2002     MEDICARE ANNUAL WELLNESS VISIT  04/17/2019     FALL RISK ASSESSMENT  04/17/2019     PHQ-9 Q1YR  04/17/2019     MELISSA QUESTIONNAIRE 1 YEAR  07/16/2019     DTAP/TDAP/TD IMMUNIZATION (2 - Td) 09/30/2020     ADVANCE DIRECTIVE PLANNING Q5 YRS  02/09/2022     LIPID SCREEN Q5 YR MALE (SYSTEM ASSIGNED)  01/15/2023     INFLUENZA VACCINE  Completed     AORTIC ANEURYSM SCREENING (SYSTEM ASSIGNED)  Completed     HEPATITIS C SCREENING  Completed     IPV IMMUNIZATION  Aged Out     MENINGITIS IMMUNIZATION  Aged Out     FIT-DNA (Cologuard) Q3 YR  Discontinued       Baljit Salazar MD  [facility]  Family Practice  [facility]  [phone]    For any issues my office # is [phone]

## 2019-03-21 NOTE — LETTER
[facility]  [address] [place]  [address]  Phone: [phone]    March 27, 2019      Jose Carlos SALAZAR Andi  [address]  [facility] [address]              Dear Jose Carlos,     Here are your test results to share with the [facility]. Remind them that the blood was drawn in the afternoon.         Sincerely,     Baljit Salazar M.D.    Results for orders placed or performed in visit on 03/21/19   Thyroxine (T4) Free  Direct  S ([facility])   Result Value Ref Range    T4 Free 1.16 0.82 - 1.77 ng/dL    Narrative    Performed at:  01 - [facility] [address], [place], [place]  [id number]  : Marvin Gordon MD, Phone:  [phone]   Testosterone  Serum ([facility])   Result Value Ref Range    Testosterone Total 160 (L) 264 - 916 ng/dL    Narrative    Performed at:  01 - [facility] [address], [place], [place]  [id number]  : Marvin Gordon MD, Phone:  [phone]

## 2019-03-22 DIAGNOSIS — E27.3 DRUG-INDUCED ADRENOCORTICAL INSUFFICIENCY (H): ICD-10-CM

## 2019-03-22 NOTE — NURSING NOTE
Please fax results to : Authorized Provider is  Trisha Thomas -[phone] The ordering provider is Shira Norman R.N ATT to both parties.  Maribeth Huang MA March 22, 2019 3:15 PM

## 2019-03-22 NOTE — NURSING NOTE
Verbal per Martin - add on Testosterone level & T4 Free  Extended expiration date for labs to be sent to specialty [facility]  Fax [phone]  Maribeth Huang MA March 22, 2019 8:39 AM

## 2019-03-23 LAB
T4 FREE SERPL-MCNC: 1.16 NG/DL (ref 0.82–1.77)
TESTOST SERPL-MCNC: 160 NG/DL (ref 264–916)

## 2019-03-26 NOTE — RESULT ENCOUNTER NOTE
Dear Jose Carlos,   Here are your test results to share with the [facility]. Remind them that the blood was drawn in the afternoon.    Baljit Salazar M.D.

## 2019-03-26 NOTE — PROGRESS NOTES
Please fax results to : Authorized Provider is  Trisha Thomas [phone] The ordering provider is Shira Norman R.N ATT to both parties  Faxed results today  Maribeth Huang MA March 26, 2019 8:42 AM

## 2019-04-09 ENCOUNTER — TRANSFERRED RECORDS (OUTPATIENT)
Dept: FAMILY MEDICINE | Facility: CLINIC | Age: 69
End: 2019-04-09

## 2019-05-21 ENCOUNTER — TRANSFERRED RECORDS (OUTPATIENT)
Dept: FAMILY MEDICINE | Facility: CLINIC | Age: 69
End: 2019-05-21

## 2019-07-17 ENCOUNTER — TRANSFERRED RECORDS (OUTPATIENT)
Dept: FAMILY MEDICINE | Facility: CLINIC | Age: 69
End: 2019-07-17

## 2019-08-13 ENCOUNTER — TRANSFERRED RECORDS (OUTPATIENT)
Dept: FAMILY MEDICINE | Facility: CLINIC | Age: 69
End: 2019-08-13

## 2019-09-17 ENCOUNTER — TRANSFERRED RECORDS (OUTPATIENT)
Dept: FAMILY MEDICINE | Facility: CLINIC | Age: 69
End: 2019-09-17

## 2019-10-16 ENCOUNTER — TRANSFERRED RECORDS (OUTPATIENT)
Dept: FAMILY MEDICINE | Facility: CLINIC | Age: 69
End: 2019-10-16

## 2019-11-15 ENCOUNTER — TRANSFERRED RECORDS (OUTPATIENT)
Dept: FAMILY MEDICINE | Facility: CLINIC | Age: 69
End: 2019-11-15

## 2019-12-18 ENCOUNTER — TRANSFERRED RECORDS (OUTPATIENT)
Dept: FAMILY MEDICINE | Facility: CLINIC | Age: 69
End: 2019-12-18

## 2020-01-23 ENCOUNTER — TRANSFERRED RECORDS (OUTPATIENT)
Dept: FAMILY MEDICINE | Facility: CLINIC | Age: 70
End: 2020-01-23

## 2020-01-29 ENCOUNTER — OFFICE VISIT (OUTPATIENT)
Dept: FAMILY MEDICINE | Facility: CLINIC | Age: 70
End: 2020-01-29

## 2020-01-29 VITALS
RESPIRATION RATE: 16 BRPM | HEART RATE: 76 BPM | SYSTOLIC BLOOD PRESSURE: 152 MMHG | DIASTOLIC BLOOD PRESSURE: 94 MMHG | BODY MASS INDEX: 33.52 KG/M2 | OXYGEN SATURATION: 93 % | WEIGHT: 247.5 LBS | HEIGHT: 72 IN

## 2020-01-29 DIAGNOSIS — R42 DIZZINESS: Primary | ICD-10-CM

## 2020-01-29 DIAGNOSIS — E27.40 ADRENAL INSUFFICIENCY (H): ICD-10-CM

## 2020-01-29 DIAGNOSIS — E27.3 DRUG-INDUCED ADRENOCORTICAL INSUFFICIENCY (H): ICD-10-CM

## 2020-01-29 PROCEDURE — 90732 PPSV23 VACC 2 YRS+ SUBQ/IM: CPT | Performed by: NURSE PRACTITIONER

## 2020-01-29 PROCEDURE — G0009 ADMIN PNEUMOCOCCAL VACCINE: HCPCS | Performed by: NURSE PRACTITIONER

## 2020-01-29 PROCEDURE — 99214 OFFICE O/P EST MOD 30 MIN: CPT | Mod: 25 | Performed by: NURSE PRACTITIONER

## 2020-01-29 PROCEDURE — G0008 ADMIN INFLUENZA VIRUS VAC: HCPCS | Performed by: NURSE PRACTITIONER

## 2020-01-29 PROCEDURE — 92552 PURE TONE AUDIOMETRY AIR: CPT | Performed by: NURSE PRACTITIONER

## 2020-01-29 PROCEDURE — 90662 IIV NO PRSV INCREASED AG IM: CPT | Performed by: NURSE PRACTITIONER

## 2020-01-29 PROCEDURE — 69209 REMOVE IMPACTED EAR WAX UNI: CPT | Mod: 50 | Performed by: NURSE PRACTITIONER

## 2020-01-29 ASSESSMENT — MIFFLIN-ST. JEOR: SCORE: 1930.65

## 2020-01-29 NOTE — PROGRESS NOTES
Problem(s) Oriented visit        SUBJECTIVE:                                                    Jose Carlos Escamilla is a 70 year old male who presents to clinic today for the following health issues :  Dizziness/Vertigo    Onset: One month ago     Description:   Do you feel like you are going to faint: YES- occasionally when he is standing up or bending over  Do you feel unsteady/off balance: no  Does it feel like the surroundings (bed, room) are moving: YES  Have you passed out or fallen: no  History of head trauma: no  Do certain movements/positions of the head make it worse: standing up quickly, sitting up from prone position, turning head to the right, turning head to the left and bending over    Accompanying Signs & Symptoms:       Visual Changes: no  Ringing in ears (Tinnitus): YES  Ear pain: no  Hearing Loss: no  Fatigue: no Palpitations: no  Nausea, vomiting: no  Weakness in arms or legs: no  Staggering gait: no  Black tarry stool (Melena): no       Precipitating and/or Alleviating factors:  Any new  medications: no  Alcohol/drugabuse/withdrawl: no one drink after work of [employer], four to five drinks on the weekend.     Therapies tried and outcome: nothing with no relief      Dizziness  Onset: 3-4 weeks    Description:   Do you feel faint:  YES  Does it feel like the surroundings (bed, room) are moving: YES  Unsteady/off balance: YES  Have you passed out or fallen: no     Intensity: severe    Progression of Symptoms:  worsening    Accompanying Signs & Symptoms:  Heart palpitations: no   Nausea, vomiting: no   Weakness in arms or legs: no   Fatigue: no   Vision or speech changes: no   Ringing in ears (Tinnitus): no   Hearing Loss: no but states tinnitus has returned     History:   Head trauma/concussion hx: no   Previous similar symptoms: no   Recent bleeding history: no     Precipitating factors:   Worse with activity or head movement: YES  Any new medications (BP?): no   Alcohol/drug abuse/withdrawal: no      Alleviating factors:   Does staying in a fixed position give relief:  YES    Therapies Tried and outcome: none        Problem list, Medication list, Allergies, and Medical/Social/Surgical histories reviewed in EPIC and updated as appropriate.   Additional history: as documented    ROS:  10 point ROS completed and negative except noted above, including Gen, HEENT, CV, Resp, GI, , MS, Neurologic, Psych    Histories:   Patient Active Problem List   Diagnosis     Hypercholesterolemia     Chronic rhinitis     IBS (irritable bowel syndrome)     Hiatal hernia     ACP (advance care planning)     Family history of hemochromatosis     Esophageal reflux     Esophageal stenosis     Diverticulosis of large intestine     Obesity     History of rheumatic fever     Chronic pain syndrome     Narcotic dependence (H)     Post-traumatic osteoarthritis of both knees     Benign essential hypertension     Total knee replacement status     Peripheral polyneuropathy     Encounter for long-term (current) use of medications     Plantar fasciitis     Adrenal insufficiency (H)     PMR (polymyalgia rheumatica) (H)     Past Surgical History:   Procedure Laterality Date     ARTHROPLASTY KNEE Left 2016    Procedure: ARTHROPLASTY KNEE;  Surgeon: Marisa Page MD;  Location: [facility]     DISCECTOMY CERVICAL MINIMALLY INVASIVE ONE LEVEL         Social History     Tobacco Use     Smoking status: Former Smoker     Types: Cigarettes     Last attempt to quit: 1995     Years since quittin.6     Smokeless tobacco: Never Used   Substance Use Topics     Alcohol use: Yes     Alcohol/week: 0.0 - 3.3 standard drinks     Family History   Problem Relation Age of Onset     Diabetes Mother            OBJECTIVE:                                                    BP (!) 152/94   Pulse 76   Resp 16   Ht 1.829 m (6')   Wt 112.3 kg (247 lb 8 oz)   SpO2 93%   BMI 33.57 kg/m    Body mass index is 33.57 kg/m .   APPEARANCE: = Relaxed and in no  distress  Conj/Eyelids = noninjected and lids and lashes are without inflammation  PERRLA/Irises = Pupils Round Reactive to Light and Irisis without inflammation  Ear canals and TM's = Canals are occluded with ear wax  Neck = No anterior or posterior adenopathy appreciated.  Thyroid = Not enlarged and no masses felt  Resp effort = Calm regular breathing  Breath Sounds = Good air movement with no rales or rhonchi in any lung fields  Heart Rate, Rhythm, & sounds (no Murm)  = Regular rate and rhythm with no S3, S4, or murmur appreciated.  Carotid Art's = Pulses full and equal and no bruits appreciated  Abdomen = Soft, nontender, no masses, & bowel sounds in all quadrants  Liver/Spleen = Normal span and no splenomegaly noted  Digits and Nails = FROM in all finger joints, no nail dystrophy  Ext (edema) = No pretibial edema noted or elsewhere  Musculsktl =  Strength and ROM of major joints are within normal limits  SKIN = absent significant rashes or lesions   Recent/Remote Memory = Alert and Oriented x 3  Mood/Affect = Cooperative and interested     ASSESSMENT/PLAN:                                                        Diagnoses and all orders for this visit:    Dizziness  -      CBC with Diff/Plt ([facility])  -      Comp. Metabolic Panel (14) ([facility])  -     PURE TONE AUDIOMETRY, AIR  -     HC REMOVAL IMPACTED CERUMEN IRRIGATION/LVG UNILAT  Ear wax successfully removed with combination of loop removal and irrigation with water.    Discussed ear wax and proper methods of controlling it inclduing OTC preparation such as Debrox.  Also recommended never placing any small object INTO the ear canal.  Qtips are only for around the ear, not inside.    Adrenal insufficiency (H) / Drug-induced adrenocortical insufficiency (H)  -     : Thyroxine (T4) Free  Direct  S ([facility])  -     : Testosterone  Serum ([facility])  Labs drawn at the request of your specialty provider. We will contact you with any abnormal lab results.     Other  orders  -     FLU VACCINE, INCREASED ANTIGEN, PRESV FREE  -     ADMIN PNEUMOCOCCAL VACCINE  -     PPSV23, IM/SUBQ (2+ YRS) - [id number]  -     ADMIN 1st VACCINE        ASSESSMENT/PLAN:       The following health maintenance items are reviewed in Epic and correct as of today:  Health Maintenance   Topic Date Due     URINE DRUG SCREEN  1950     ZOSTER IMMUNIZATION (1 of 2) 01/29/2002     MEDICARE ANNUAL WELLNESS VISIT  04/17/2019     FALL RISK ASSESSMENT  04/17/2019     PHQ-2  01/01/2020     ADVANCE CARE PLANNING  02/09/2022     LIPID  01/15/2023     DTAP/TDAP/TD IMMUNIZATION (3 - Td) 03/22/2029     HEPATITIS C SCREENING  Completed     INFLUENZA VACCINE  Completed     PNEUMOCOCCAL IMMUNIZATION 65+ LOW/MEDIUM RISK  Completed     AORTIC ANEURYSM SCREENING (SYSTEM ASSIGNED)  Completed     IPV IMMUNIZATION  Aged Out     MENINGITIS IMMUNIZATION  Aged Out     FIT-DNA (Cologuard)  Discontinued       Lili Marion NP  [facility]  Family Practice  [facility]  [phone]    For any issues my office # is [phone]

## 2020-02-19 ENCOUNTER — TRANSFERRED RECORDS (OUTPATIENT)
Dept: FAMILY MEDICINE | Facility: CLINIC | Age: 70
End: 2020-02-19

## 2020-03-20 NOTE — PROGRESS NOTES
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 Stress increases symptoms of illness such as body aches, nausea, irritability, a feeling of agitation. Feeling \"worn-out\" and \"stressed\". Has been increasing steroid use, was given zolpidem but does not like to take it. Has tried wellbutrin in the past, took for about three months, did help with situational depression.    Reports he does think he is feeling depressed only anxious.     Has to limit physical activity, but does like to be active.     Problem list, Medication list, Allergies, and Medical/Social/Surgical histories reviewed in EPIC and updated as appropriate.   Additional history: as documented    ROS:  5 point ROS completed and negative except noted above, including Gen, CV, Resp, GI, MS    Histories:   Patient Active Problem List   Diagnosis     Hypercholesterolemia     Chronic rhinitis     IBS (irritable bowel syndrome)     Hiatal hernia     ACP (advance care planning)     Family history of hemochromatosis     Esophageal reflux     Esophageal stenosis     Diverticulosis of large intestine     Obesity     History of rheumatic fever     Chronic pain syndrome     Narcotic dependence (H)     Post-traumatic osteoarthritis of both knees     Benign essential hypertension     Total knee replacement status     Peripheral polyneuropathy     Encounter for long-term (current) use of medications     Plantar fasciitis     Adrenal insufficiency (H)     PMR (polymyalgia rheumatica) (H)     Anxiety     Past Surgical History:   Procedure Laterality Date     ARTHROPLASTY KNEE Left 12/2/2016    Procedure: ARTHROPLASTY KNEE;  Surgeon: Marisa Page MD;  Location: [facility] OR     DISCECTOMY CERVICAL MINIMALLY INVASIVE ONE LEVEL   "       Social History     Tobacco Use     Smoking status: Former Smoker     Types: Cigarettes     Last attempt to quit: 1995     Years since quittin.7     Smokeless tobacco: Never Used   Substance Use Topics     Alcohol use: Yes     Alcohol/week: 0.0 - 3.3 standard drinks     Family History   Problem Relation Age of Onset     Diabetes Mother          Current Outpatient Medications   Medication Sig Dispense Refill     dexamethasone (DECADRON) 4 MG/ML injection INJECT 1ML INTRAMUSCULARLY UTD WHEN UNABLE TO TAKE ORAL STEROIDS  3     escitalopram (LEXAPRO) 5 MG tablet Take 1 tablet (5 mg) by mouth daily for 7 days, THEN 2 tablets (10 mg) daily for 21 days. 49 tablet 0     GABAPENTIN PO Take 900 mg by mouth 3 times daily (Patient takes  X 900 mg morning, 5 x 300 mg bedtime= 2700mg dose) In the morning and at bedtime.  In adjustment dosage during July       HYDROcodone-acetaminophen (NORCO)  MG per tablet Take  tablets by mouth 4 times daily as needed for pain  0     hydrocortisone (CORTEF) 5 MG tablet 15 mg on waking and 5 mg at noon,double in sickness       lisinopril (PRINIVIL/ZESTRIL) 20 MG tablet TAKE 1 TABLET(20 MG) BY MOUTH DAILY 90 tablet 3     morphine (MS CONTIN) 15 MG CR tablet Take 15 mg by mouth every 12 hours       omeprazole (PRILOSEC) 20 MG DR capsule Take 20 mg by mouth daily       propranolol (INDERAL) 10 MG tablet Take 1 tablet (10 mg) by mouth 3 times daily 90 tablet 0     senna-docusate (SENOKOT-S;PERICOLACE) 8.6-50 MG per tablet Take 1-2 tablets by mouth 2 times daily 20 tablet 0     testosterone (AXIRON) 30 MG/ACT topical solution Place 30 mg onto the skin       traZODone (DESYREL) 50 MG tablet TAKE 2 TABLETS BY MOUTH AT BEDTIME 180 tablet 3     diclofenac (VOLTAREN) 1 % GEL topical gel as needed.       oxyCODONE (OXYCONTIN) 10 MG 12 hr tablet Take 1 tablet (10 mg) by mouth every morning (Patient not taking: Reported on 2020) 30 tablet 0     RANITIDINE HCL PO Take 75 mg by  mouth every morning (before breakfast)         OBJECTIVE:                                                    There were no vitals taken for this visit.  There is no height or weight on file to calculate BMI.   APPEARANCE: = healthy, alert and mild distress  Conj/Eyelids = noninjected and lids and lashes are without inflammation  Resp effort = Calm regular breathing  Recent/Remote Memory = Alert and Oriented x 3  Mood/Affect = Cooperative and interested     ASSESSMENT/PLAN:                                                        Diagnoses and all orders for this visit:    Anxiety  -     propranolol (INDERAL) 10 MG tablet; Take 1 tablet (10 mg) by mouth 3 times daily  -     escitalopram (LEXAPRO) 5 MG tablet; Take 1 tablet (5 mg) by mouth daily for 7 days, THEN 2 tablets (10 mg) daily for 21 days.    Spoke at length about mood disorders including suspected pathophysiology, role of neurotransmitters, and treatment options including medication options (SSRIs that treat cause of sx and Betablockers which treat the sx.) and counselling.    Pt. Will use the propranolol while the serotonin specific reuptake inhibitor is taking effect and reaching therapeutic levels. Please call with any side effects.    >25 min spent with patient, greater than 50% spent on discussion/education/planning, etc. About The encounter diagnosis was Anxiety.      Patient needs assistance with ADLs: none identified today  Patient needs assistance with iADLs: none identified today    The following health maintenance items are reviewed in Epic and correct as of today:  Health Maintenance   Topic Date Due     URINE DRUG SCREEN  1950     ZOSTER IMMUNIZATION (1 of 2) 01/29/2002     MEDICARE ANNUAL WELLNESS VISIT  04/17/2019     FALL RISK ASSESSMENT  04/17/2019     PHQ-2  01/01/2020     ADVANCE CARE PLANNING  02/09/2022     LIPID  01/15/2023     DTAP/TDAP/TD IMMUNIZATION (3 - Td) 03/22/2029     HEPATITIS C SCREENING  Completed     INFLUENZA VACCINE   Completed     PNEUMOCOCCAL IMMUNIZATION 65+ LOW/MEDIUM RISK  Completed     AORTIC ANEURYSM SCREENING (SYSTEM ASSIGNED)  Completed     IPV IMMUNIZATION  Aged Out     MENINGITIS IMMUNIZATION  Aged Out     COLORECTAL CANCER SCREENING  Discontinued     Lili Marion NP  [facility]  [phone]    For any issues my office # is [phone]

## 2020-03-23 ENCOUNTER — VIRTUAL VISIT (OUTPATIENT)
Dept: FAMILY MEDICINE | Facility: CLINIC | Age: 70
End: 2020-03-23

## 2020-03-23 DIAGNOSIS — I10 BENIGN ESSENTIAL HYPERTENSION: ICD-10-CM

## 2020-03-23 DIAGNOSIS — F41.9 ANXIETY: Primary | ICD-10-CM

## 2020-03-23 PROCEDURE — 99214 OFFICE O/P EST MOD 30 MIN: CPT | Mod: GT | Performed by: NURSE PRACTITIONER

## 2020-03-23 RX ORDER — ESCITALOPRAM OXALATE 5 MG/1
TABLET ORAL
Qty: 49 TABLET | Refills: 0 | Status: SHIPPED | OUTPATIENT
Start: 2020-03-23 | End: 2020-06-15

## 2020-03-23 RX ORDER — PROPRANOLOL HYDROCHLORIDE 10 MG/1
10 TABLET ORAL 3 TIMES DAILY
Qty: 90 TABLET | Refills: 0 | Status: SHIPPED | OUTPATIENT
Start: 2020-03-23 | End: 2020-06-15

## 2020-03-23 NOTE — PATIENT INSTRUCTIONS
Patient Education   Patient Education     Patient Education    Propranolol Hydrochloride Oral capsule, extended-release    Propranolol Hydrochloride Oral solution    Propranolol Hydrochloride Oral tablet    Propranolol Hydrochloride Solution for injection  Propranolol Hydrochloride Oral capsule, extended-release  What is this medicine?  PROPRANOLOL (proe PRAN oh lole) is a beta-blocker. Beta-blockers reduce the workload on the heart and help it to beat more regularly. This medicine is used to treat high blood pressure, heart muscle disease, and prevent chest pain caused by angina. It is also used to prevent migraine headaches. You should not use this medicine to treat a migraine that has already started.  This medicine may be used for other purposes; ask your health care provider or pharmacist if you have questions.  What should I tell my health care provider before I take this medicine?  They need to know if you have any of these conditions:    circulation problems, or blood vessel disease    diabetes    history of heart attack or heart disease, vasospastic angina    kidney disease    liver disease    lung or breathing disease, like asthma or emphysema    pheochromocytoma    slow heart rate    thyroid disease    an unusual or allergic reaction to propranolol, other beta-blockers, medicines, foods, dyes, or preservatives    pregnant or trying to get pregnant    breast-feeding  How should I use this medicine?  Take this medicine by mouth with a glass of water. Follow the directions on the prescription label. Do not crush or chew. Take your doses at regular intervals. Do not take your medicine more often than directed. Do not stop taking except on the advice of your doctor or health care professional.  Talk to your pediatrician regarding the use of this medicine in children. Special care may be needed.  Overdosage: If you think you have taken too much of this medicine contact a poison control center or emergency room  at once.  NOTE: This medicine is only for you. Do not share this medicine with others.  What if I miss a dose?  If you miss a dose, take it as soon as you can. If it is almost time for your next dose, take only that dose. Do not take double or extra doses.  What may interact with this medicine?  Do not take this medicine with any of the following medications:    feverfew    phenothiazines like chlorpromazine, mesoridazine, prochlorperazine, thioridazine  This medicine may also interact with the following medications:    aluminum hydroxide gel    antipyrine    antiviral medicines for HIV or AIDS    barbiturates like phenobarbital    certain medicines for blood pressure, heart disease, irregular heart beat    cimetidine    ciprofloxacin    diazepam    fluconazole    haloperidol    isoniazid    medicines for cholesterol like cholestyramine or colestipol    medicines for mental depression    medicines for migraine headache like almotriptan, eletriptan, frovatriptan, naratriptan, rizatriptan, sumatriptan, zolmitriptan    NSAIDs, medicines for pain and inflammation, like ibuprofen or naproxen    phenytoin    rifampin    teniposide    theophylline    thyroid medicines    tolbutamide    warfarin    zileuton  This list may not describe all possible interactions. Give your health care provider a list of all the medicines, herbs, non-prescription drugs, or dietary supplements you use. Also tell them if you smoke, drink alcohol, or use illegal drugs. Some items may interact with your medicine.  What should I watch for while using this medicine?  Visit your doctor or health care professional for regular check ups. Contact your doctor right away if your symptoms worsen. Check your blood pressure and pulse rate regularly. Ask your health care professional what your blood pressure and pulse rate should be, and when you should contact them.  Do not stop taking this medicine suddenly. This could lead to serious heart-related  effects.  You may get drowsy or dizzy. Do not drive, use machinery, or do anything that needs mental alertness until you know how this drug affects you. Do not stand or sit up quickly, especially if you are an older patient. This reduces the risk of dizzy or fainting spells. Alcohol can make you more drowsy and dizzy. Avoid alcoholic drinks.  This medicine can affect blood sugar levels. If you have diabetes, check with your doctor or health care professional before you change your diet or the dose of your diabetic medicine.  Do not treat yourself for coughs, colds, or pain while you are taking this medicine without asking your doctor or health care professional for advice. Some ingredients may increase your blood pressure.  What side effects may I notice from receiving this medicine?  Side effects that you should report to your doctor or health care professional as soon as possible:    allergic reactions like skin rash, itching or hives, swelling of the face, lips, or tongue    breathing problems    changes in blood sugar    cold hands or feet    difficulty sleeping, nightmares    dry peeling skin    hallucinations    muscle cramps or weakness    slow heart rate    swelling of the legs and ankles    vomiting  Side effects that usually do not require medical attention (report to your doctor or health care professional if they continue or are bothersome):    change in sex drive or performance    diarrhea    dry sore eyes    hair loss    nausea    weak or tired  This list may not describe all possible side effects. Call your doctor for medical advice about side effects. You may report side effects to FDA at 5-756-FDA-0059.  Where should I keep my medicine?  Keep out of the reach of children.  Store at room temperature between 15 and 30 degrees C (59 and 86 degrees F). Protect from light, moisture and freezing. Keep container tightly closed. Throw away any unused medicine after the expiration date.  NOTE:This sheet is a  summary. It may not cover all possible information. If you have questions about this medicine, talk to your doctor, pharmacist, or health care provider. Copyright  2016 Gold Standard           Patient Education    Escitalopram Oral solution    Escitalopram Oral tablet  Escitalopram Oral tablet  What is this medicine?  ESCITALOPRAM (es sye CALVIN oh pram) is used to treat depression and certain types of anxiety.  This medicine may be used for other purposes; ask your health care provider or pharmacist if you have questions.  What should I tell my health care provider before I take this medicine?  They need to know if you have any of these conditions:    bipolar disorder or a family history of bipolar disorder    diabetes    glaucoma    heart disease    kidney or liver disease    receiving electroconvulsive therapy    seizures (convulsions)    suicidal thoughts, plans, or attempt by you or a family member    an unusual or allergic reaction to escitalopram, the related drug citalopram, other medicines, foods, dyes, or preservatives    pregnant or trying to become pregnant    breast-feeding  How should I use this medicine?  Take this medicine by mouth with a glass of water. Follow the directions on the prescription label. You can take it with or without food. If it upsets your stomach, take it with food. Take your medicine at regular intervals. Do not take it more often than directed. Do not stop taking this medicine suddenly except upon the advice of your doctor. Stopping this medicine too quickly may cause serious side effects or your condition may worsen.  A special MedGuide will be given to you by the pharmacist with each prescription and refill. Be sure to read this information carefully each time.  Talk to your pediatrician regarding the use of this medicine in children. Special care may be needed.  Overdosage: If you think you have taken too much of this medicine contact a poison control center or emergency room at  once.  NOTE: This medicine is only for you. Do not share this medicine with others.  What if I miss a dose?  If you miss a dose, take it as soon as you can. If it is almost time for your next dose, take only that dose. Do not take double or extra doses.  What may interact with this medicine?  Do not take this medicine with any of the following medications:    certain medicines for fungal infections like fluconazole, itraconazole, ketoconazole, posaconazole, voriconazole    cisapride    citalopram    dofetilide    dronedarone    linezolid    MAOIs like Carbex, Eldepryl, Marplan, Nardil, and Parnate    methylene blue (injected into a vein)    pimozide    thioridazine    ziprasidone  This medicine may also interact with the following medications:    alcohol    aspirin and aspirin-like medicines    carbamazepine    certain medicines for depression, anxiety, or psychotic disturbances    certain medicines for migraine headache like almotriptan, eletriptan, frovatriptan, naratriptan, rizatriptan, sumatriptan, zolmitriptan    certain medicines for sleep    certain medicines that treat or prevent blood clots like warfarin, enoxaparin, dalteparin    cimetidine    diuretics    fentanyl    furazolidone    isoniazid    lithium    metoprolol    NSAIDs, medicines for pain and inflammation, like ibuprofen or naproxen    other medicines that prolong the QT interval (cause an abnormal heart rhythm)    procarbazine    rasagiline    supplements like Melisa's wort, kava kava, valerian    tramadol    tryptophan  This list may not describe all possible interactions. Give your health care provider a list of all the medicines, herbs, non-prescription drugs, or dietary supplements you use. Also tell them if you smoke, drink alcohol, or use illegal drugs. Some items may interact with your medicine.  What should I watch for while using this medicine?  Tell your doctor if your symptoms do not get better or if they get worse. Visit your doctor  or health care professional for regular checks on your progress. Because it may take several weeks to see the full effects of this medicine, it is important to continue your treatment as prescribed by your doctor.  Patients and their families should watch out for new or worsening thoughts of suicide or depression. Also watch out for sudden changes in feelings such as feeling anxious, agitated, panicky, irritable, hostile, aggressive, impulsive, severely restless, overly excited and hyperactive, or not being able to sleep. If this happens, especially at the beginning of treatment or after a change in dose, call your health care professional.  You may get drowsy or dizzy. Do not drive, use machinery, or do anything that needs mental alertness until you know how this medicine affects you. Do not stand or sit up quickly, especially if you are an older patient. This reduces the risk of dizzy or fainting spells. Alcohol may interfere with the effect of this medicine. Avoid alcoholic drinks.  Your mouth may get dry. Chewing sugarless gum or sucking hard candy, and drinking plenty of water may help. Contact your doctor if the problem does not go away or is severe.  What side effects may I notice from receiving this medicine?  Side effects that you should report to your doctor or health care professional as soon as possible:    allergic reactions like skin rash, itching or hives, swelling of the face, lips, or tongue    confusion    feeling faint or lightheaded, falls    fast talking and excited feelings or actions that are out of control    hallucination, loss of contact with reality    seizures    suicidal thoughts or other mood changes    unusual bleeding or bruising  Side effects that usually do not require medical attention (report to your doctor or health care professional if they continue or are bothersome):    blurred vision    changes in appetite    change in sex drive or performance    headache    increased  sweating    nausea  This list may not describe all possible side effects. Call your doctor for medical advice about side effects. You may report side effects to FDA at 5-765-EQE-5122.  Where should I keep my medicine?  Keep out of reach of children.  Store at room temperature between 15 and 30 degrees C (59 and 86 degrees F). Throw away any unused medicine after the expiration date.  NOTE:This sheet is a summary. It may not cover all possible information. If you have questions about this medicine, talk to your doctor, pharmacist, or health care provider. Copyright  2016 Gold Standard

## 2020-03-27 RX ORDER — LISINOPRIL 20 MG/1
TABLET ORAL
Qty: 90 TABLET | Refills: 0 | Status: SHIPPED | OUTPATIENT
Start: 2020-03-27 | End: 2020-05-28

## 2020-04-22 ENCOUNTER — TRANSFERRED RECORDS (OUTPATIENT)
Dept: FAMILY MEDICINE | Facility: CLINIC | Age: 70
End: 2020-04-22

## 2020-05-27 DIAGNOSIS — M35.3 PMR (POLYMYALGIA RHEUMATICA) (H): ICD-10-CM

## 2020-05-27 DIAGNOSIS — I10 BENIGN ESSENTIAL HYPERTENSION: ICD-10-CM

## 2020-05-28 RX ORDER — LISINOPRIL 20 MG/1
TABLET ORAL
Qty: 90 TABLET | Refills: 0 | Status: SHIPPED | OUTPATIENT
Start: 2020-05-28 | End: 2020-08-30

## 2020-05-28 RX ORDER — TRAZODONE HYDROCHLORIDE 50 MG/1
TABLET, FILM COATED ORAL
Qty: 180 TABLET | Refills: 3 | Status: SHIPPED | OUTPATIENT
Start: 2020-05-28 | End: 2021-04-08

## 2020-06-15 ENCOUNTER — VIRTUAL VISIT (OUTPATIENT)
Dept: FAMILY MEDICINE | Facility: CLINIC | Age: 70
End: 2020-06-15

## 2020-06-15 DIAGNOSIS — N52.9 ERECTILE DYSFUNCTION, UNSPECIFIED ERECTILE DYSFUNCTION TYPE: ICD-10-CM

## 2020-06-15 DIAGNOSIS — I10 ESSENTIAL HYPERTENSION: Primary | ICD-10-CM

## 2020-06-15 PROCEDURE — 99214 OFFICE O/P EST MOD 30 MIN: CPT | Mod: GT | Performed by: FAMILY MEDICINE

## 2020-06-15 RX ORDER — TADALAFIL 20 MG/1
20 TABLET ORAL DAILY PRN
Qty: 10 TABLET | Refills: 11 | Status: SHIPPED | OUTPATIENT
Start: 2020-06-15 | End: 2021-08-05

## 2020-06-15 RX ORDER — CHLORTHALIDONE 25 MG/1
25 TABLET ORAL DAILY
Qty: 90 TABLET | Refills: 1 | Status: SHIPPED | OUTPATIENT
Start: 2020-06-15 | End: 2020-09-24

## 2020-06-15 NOTE — PROGRESS NOTES
"    Video Problem(s) Oriented visit      Video Start Time: 8:00AM    The patient has been notified of following:     \"This video visit will be conducted via a call between you and your physician/provider. We have found that certain health care needs can be provided without the need for an in-person physical exam.  This service lets us provide the care you need with a video conversation.  If a prescription is necessary we can send it directly to your pharmacy.  If lab work is needed we can place an order for that and you can then stop by our lab to have the test done at a later time.    Video visits are billed at different rates depending on your insurance coverage.  Please reach out to your insurance provider with any questions.    If during the course of the call the physician/provider feels a video visit is not appropriate, you will not be charged for this service.\"    Patient has given verbal consent for Video visit? Yes    How would you like to obtain your AVS? MyChart    Will anyone else be joining your video visit? No  SUBJECTIVE:                                                    Jose Carlos Escamilla is a 70 year old male who presents to clinic today for the following health issues :    Pt. reports difficulty achieving and maintaining erections.  Denies other specific  complaints.  Has not tried VIAGRA or similar medication.  Is interested in trying VIAGRA.    he has Hypertension which is currently not well controlled. he has been compliant with his medications and is here today to follow up on the this issue and see if we can't work on better control of the blood pressure.    Reviewed last 6 BP readings in chart:  BP Readings from Last 6 Encounters:   01/29/20 (!) 152/94   03/21/19 (!) 142/96   10/26/18 132/82   07/16/18 118/86   05/14/18 (!) 130/92   04/17/18 132/88     he  has not experienced any significant side effects from current medications for hypertension.    NO active cardiac complaints or symptoms " with exercise.    Problem list, Medication list, Allergies, and Medical/Social/Surgical histories reviewed in [facility] and updated as appropriate.   Additional history: as documented    ROS:  General and Resp. completed and negative except as noted above    Histories:   Patient Active Problem List   Diagnosis     Hypercholesterolemia     Chronic rhinitis     IBS (irritable bowel syndrome)     Hiatal hernia     ACP (advance care planning)     Family history of hemochromatosis     Esophageal reflux     Esophageal stenosis     Diverticulosis of large intestine     Obesity     History of rheumatic fever     Chronic pain syndrome     Narcotic dependence (H)     Post-traumatic osteoarthritis of both knees     Benign essential hypertension     Total knee replacement status     Peripheral polyneuropathy     Encounter for long-term (current) use of medications     Plantar fasciitis     Adrenal insufficiency (H)     PMR (polymyalgia rheumatica) (H)     Anxiety     Past Surgical History:   Procedure Laterality Date     ARTHROPLASTY KNEE Left 2016    Procedure: ARTHROPLASTY KNEE;  Surgeon: Marisa Page MD;  Location: [facility] OR     DISCECTOMY CERVICAL MINIMALLY INVASIVE ONE LEVEL         Social History     Tobacco Use     Smoking status: Former Smoker     Types: Cigarettes     Last attempt to quit: 1995     Years since quittin.9     Smokeless tobacco: Never Used   Substance Use Topics     Alcohol use: Yes     Alcohol/week: 0.0 - 3.3 standard drinks     Family History   Problem Relation Age of Onset     Diabetes Mother            OBJECTIVE:                                                    There were no vitals taken for this visit.  There is no height or weight on file to calculate BMI.   APPEARANCE: = Relaxed and in no distress  Resp effort = Calm regular breathing  Recent/Remote Memory = Alert and Oriented x 3  Mood/Affect = Cooperative and interested     ASSESSMENT/PLAN:                                                         Diagnoses and all orders for this visit:    Essential hypertension  -     chlorthalidone (HYGROTON) 25 MG tablet; Take 1 tablet (25 mg) by mouth daily  Discussed current hypertension treatment guidelines, including indications for treatment and treatment options.  Discussed the importance for aggressive management of HTN to prevent vascular complications later.  Recommended lower fat, lower carbohydrate, and lower sodium (<2000 mg)diet.  Discussed required intervals for follow up on HTN, lab studies.  Recommened pt. follow their blood pressures outside the clinic to ensure that BPs are remaining within guidelines, and to contact me if the readings are not within guidelines on a regular basis so we can adjust treatment as needed.    Erectile dysfunction, unspecified erectile dysfunction type  Discussed erectile dysfunction in detail including a review of the physiology that produces erections.  Reviewed typical causes of impotence such as medication side effect, diabetes, neuropathies, drugs/alcohol, underlying mood disorder, relationship issues, neurogenic causes, hypogonadism.  Discussed typical medical evaluation including physical exam findings, labs (thyroid, testosterone, BMP, etc.)  Treatment will include modifying any causative features if able, stopping nay substance use, treating low testosterone levels, and consideration of medications (Viagra/Levitra/Cialis).     Discussed the risks and benefits of these meds.  Discussed the potential side effects of Viagra/Levitra/Cialis including dizziness, headache, vision changes, syncope, GI upset/dyspepsia, hypotension.  Recommended avoiding taking with large meal to improve absorption.  Avoid taking with alcohol.  Never take nitroglycerin containing compounds when on these medications.    WIll start with a lower dose and titrate upward as needed, aiming for the lowest effective dose.  If effective, they will call for a prescription provided the  medication if effective and there are no side effects.     -     tadalafil (CIALIS) 20 MG tablet; Take 1 tablet (20 mg) by mouth daily as needed        Work on weight loss  There are no Patient Instructions on file for this visit.  There are no Patient Instructions on file for this visit.    The following health maintenance items are reviewed in Epic and correct as of today:  Health Maintenance   Topic Date Due     URINE DRUG SCREEN  1950     ZOSTER IMMUNIZATION (1 of 2) 01/29/2002     MEDICARE ANNUAL WELLNESS VISIT  04/17/2019     FALL RISK ASSESSMENT  04/17/2019     PHQ-2  01/01/2020     ADVANCE CARE PLANNING  02/09/2022     LIPID  01/15/2023     DTAP/TDAP/TD IMMUNIZATION (3 - Td) 03/22/2029     HEPATITIS C SCREENING  Completed     INFLUENZA VACCINE  Completed     PNEUMOCOCCAL IMMUNIZATION 65+ LOW/MEDIUM RISK  Completed     AORTIC ANEURYSM SCREENING (SYSTEM ASSIGNED)  Completed     IPV IMMUNIZATION  Aged Out     MENINGITIS IMMUNIZATION  Aged Out     COLORECTAL CANCER SCREENING  Discontinued       Video-Visit Details    Type of service:  Video Visit    Originating Location (pt. Location): Home    Distant Location (provider location):  [facility]     Platform used for Video Visit: Apple Facetime    Baljit Salazar MD  [facility]  Family Practice  [facility]  [phone]    Video End Time:8:22 AM  For any issues my office # is [phone]

## 2020-06-16 DIAGNOSIS — I10 ESSENTIAL HYPERTENSION: Primary | ICD-10-CM

## 2020-06-16 DIAGNOSIS — Z13.6 SCREENING FOR CARDIOVASCULAR CONDITION: ICD-10-CM

## 2020-06-16 DIAGNOSIS — I10 BENIGN ESSENTIAL HYPERTENSION: ICD-10-CM

## 2020-06-16 LAB
% GRANULOCYTES: 77.9 % (ref 42.2–75.2)
HCT VFR BLD AUTO: 44.9 % (ref 39–51)
HEMOGLOBIN: 15.6 G/DL (ref 13.4–17.5)
LYMPHOCYTES NFR BLD AUTO: 17.1 % (ref 20.5–51.1)
MCH RBC QN AUTO: 31.9 PG (ref 27–31)
MCHC RBC AUTO-ENTMCNC: 34.7 G/DL (ref 33–37)
MCV RBC AUTO: 91.8 FL (ref 80–100)
MONOCYTES NFR BLD AUTO: 5 % (ref 1.7–9.3)
PLATELET # BLD AUTO: 274 K/UL (ref 140–450)
RBC # BLD AUTO: 4.89 X10/CMM (ref 4.2–5.9)
WBC # BLD AUTO: 6.3 X10/CMM (ref 3.8–11)

## 2020-06-16 PROCEDURE — 85025 COMPLETE CBC W/AUTO DIFF WBC: CPT | Performed by: FAMILY MEDICINE

## 2020-06-16 PROCEDURE — 36415 COLL VENOUS BLD VENIPUNCTURE: CPT | Performed by: FAMILY MEDICINE

## 2020-06-16 NOTE — PROGRESS NOTES
Abn signed for labs  Verbal per patient from MARI Salazar- comp, lipid, cbc  Maribeth Huang MA June 16, 2020 9:16 AM

## 2020-06-16 NOTE — LETTER
June 19, 2020      Jose Carlos Escamilla  [address]  [facility] [address]        Dear Jose Carlos,       I am writing to report that your included test results are within expected ranges. I do not suggest that we make any changes at this time.     Resulted Orders   Lipid Panel ([facility])   Result Value Ref Range    Cholesterol 226 (H) 100 - 199 mg/dL    Triglycerides 84 0 - 149 mg/dL    HDL Cholesterol 61 >39 mg/dL    VLDL Cholesterol Reginaldo 17 5 - 40 mg/dL    LDL Cholesterol Calculated 148 (H) 0 - 99 mg/dL    LDL/HDL Ratio 2.4 0.0 - 3.6 ratio      Comment:                                          LDL/HDL Ratio                                              Men  Women                                1/2 Avg.Risk  1.0    1.5                                    Avg.Risk  3.6    3.2                                 2X Avg.Risk  6.2    5.0                                 3X Avg.Risk  8.0    6.1      Narrative    Performed at:  01 - [facility] [address], [place], [place]  [id number]  : Marvin Gordon MD, Phone:  [phone]   Comp. Metabolic Panel (14) ([facility])   Result Value Ref Range    Glucose 108 (H) 65 - 99 mg/dL    Urea Nitrogen 14 8 - 27 mg/dL    Creatinine 0.77 0.76 - 1.27 mg/dL    eGFR If NonAfricn Am 93 >59 mL/min/1.73    eGFR If Africn Am 108 >59 mL/min/1.73    BUN/Creatinine Ratio 18 10 - 24    Sodium 138 134 - 144 mmol/L    Potassium 4.6 3.5 - 5.2 mmol/L    Chloride 101 96 - 106 mmol/L    Total CO2 26 20 - 29 mmol/L    Calcium 9.0 8.6 - 10.2 mg/dL    Protein Total 6.5 6.0 - 8.5 g/dL    Albumin 4.5 3.8 - 4.8 g/dL    Globulin, Total 2.0 1.5 - 4.5 g/dL    A/G Ratio 2.3 (H) 1.2 - 2.2    Bilirubin Total 0.8 0.0 - 1.2 mg/dL    Alkaline Phosphatase 52 39 - 117 IU/L    AST 18 0 - 40 IU/L    ALT 19 0 - 44 IU/L    Narrative    Performed at:  01 - [facility]  [email or web address], [place]  [id number]  : Marvin Gordon MD, Phone:  [phone]   CBC with Diff/Plt ([facility])   Result Value Ref Range     WBC x10/cmm 6.3 3.8 - 11.0 x10/cmm    % Lymphocytes 17.1 (A) 20.5 - 51.1 %    % Monocytes 5.0 1.7 - 9.3 %    % Granulocytes 77.9 (A) 42.2 - 75.2 %    RBC x10/cmm 4.89 4.2 - 5.9 x10/cmm    Hemoglobin 15.6 13.4 - 17.5 g/dl    Hematocrit 44.9 39 - 51 %    MCV 91.8 80 - 100 fL    MCH 31.9 (A) 27.0 - 31.0 pg    MCHC 34.7 33.0 - 37.0 g/dL    Platelet Count 274 140 - 450 K/uL       If you have any questions or concerns, please call the clinic at the number listed above.       Sincerely,        [facility]

## 2020-06-17 LAB
ALBUMIN SERPL-MCNC: 4.5 G/DL (ref 3.8–4.8)
ALBUMIN/GLOB SERPL: 2.3 {RATIO} (ref 1.2–2.2)
ALP SERPL-CCNC: 52 IU/L (ref 39–117)
ALT SERPL-CCNC: 19 IU/L (ref 0–44)
AST SERPL-CCNC: 18 IU/L (ref 0–40)
BILIRUB SERPL-MCNC: 0.8 MG/DL (ref 0–1.2)
BUN SERPL-MCNC: 14 MG/DL (ref 8–27)
BUN/CREATININE RATIO: 18 (ref 10–24)
CALCIUM SERPL-MCNC: 9 MG/DL (ref 8.6–10.2)
CHLORIDE SERPLBLD-SCNC: 101 MMOL/L (ref 96–106)
CHOLEST SERPL-MCNC: 226 MG/DL (ref 100–199)
CREAT SERPL-MCNC: 0.77 MG/DL (ref 0.76–1.27)
EGFR IF AFRICN AM: 108 ML/MIN/1.73
EGFR IF NONAFRICN AM: 93 ML/MIN/1.73
GLOBULIN, TOTAL: 2 G/DL (ref 1.5–4.5)
GLUCOSE SERPL-MCNC: 108 MG/DL (ref 65–99)
HDLC SERPL-MCNC: 61 MG/DL
LDL/HDL RATIO: 2.4 RATIO (ref 0–3.6)
LDLC SERPL CALC-MCNC: 148 MG/DL (ref 0–99)
POTASSIUM SERPL-SCNC: 4.6 MMOL/L (ref 3.5–5.2)
PROT SERPL-MCNC: 6.5 G/DL (ref 6–8.5)
SODIUM SERPL-SCNC: 138 MMOL/L (ref 134–144)
TOTAL CO2: 26 MMOL/L (ref 20–29)
TRIGL SERPL-MCNC: 84 MG/DL (ref 0–149)
VLDLC SERPL CALC-MCNC: 17 MG/DL (ref 5–40)

## 2020-06-19 ENCOUNTER — TRANSFERRED RECORDS (OUTPATIENT)
Dept: FAMILY MEDICINE | Facility: CLINIC | Age: 70
End: 2020-06-19

## 2020-08-20 ENCOUNTER — TRANSFERRED RECORDS (OUTPATIENT)
Dept: FAMILY MEDICINE | Facility: CLINIC | Age: 70
End: 2020-08-20

## 2020-08-29 DIAGNOSIS — I10 BENIGN ESSENTIAL HYPERTENSION: ICD-10-CM

## 2020-08-30 RX ORDER — LISINOPRIL 20 MG/1
TABLET ORAL
Qty: 90 TABLET | Refills: 0 | Status: SHIPPED | OUTPATIENT
Start: 2020-08-30 | End: 2020-11-30

## 2020-09-22 VITALS — TEMPERATURE: 97.9 F

## 2020-09-24 DIAGNOSIS — I10 ESSENTIAL HYPERTENSION: ICD-10-CM

## 2020-09-24 RX ORDER — CHLORTHALIDONE 25 MG/1
TABLET ORAL
Qty: 90 TABLET | Refills: 1 | Status: SHIPPED | OUTPATIENT
Start: 2020-09-24 | End: 2021-06-02

## 2020-10-07 DIAGNOSIS — Z23 FLU VACCINE NEED: Primary | ICD-10-CM

## 2020-10-07 PROCEDURE — 90662 IIV NO PRSV INCREASED AG IM: CPT | Performed by: FAMILY MEDICINE

## 2020-10-07 PROCEDURE — G0008 ADMIN INFLUENZA VIRUS VAC: HCPCS | Performed by: FAMILY MEDICINE

## 2020-10-23 ENCOUNTER — TELEPHONE (OUTPATIENT)
Dept: FAMILY MEDICINE | Facility: CLINIC | Age: 70
End: 2020-10-23

## 2020-10-23 DIAGNOSIS — G89.29 CHRONIC PAIN OF LOWER EXTREMITY, BILATERAL: Primary | ICD-10-CM

## 2020-10-23 DIAGNOSIS — M79.605 CHRONIC PAIN OF LOWER EXTREMITY, BILATERAL: Primary | ICD-10-CM

## 2020-10-23 DIAGNOSIS — M79.604 CHRONIC PAIN OF LOWER EXTREMITY, BILATERAL: Primary | ICD-10-CM

## 2020-10-23 NOTE — TELEPHONE ENCOUNTER
Patient calls requesting referral to [facility] for fibromyalgia treatment. Patient  has called [facility] and was told to have PCP fax referral to [phone].      has struggled with bilateral leg pain and burning discomfort for years and was recently dx with fibromyalgia at [facility] but was told they only dx they don't treat it.     Has been patient at [facility] for past few years for his chronic leg pain and on opioids and gabapentin but would like to pursue the fibromyalgia avenue with rheumatology at [facility] to see if better treatment options.     Per Care Everywhere has seen rheum at [facility] in 2017 multiple times. 8/17/17 visit note with Dr. Colin Erickson rheum at [facility] contains fibromyalgia related info. Has also seen neurology - patient says they told him impossible that this is fibro.     Plan: per sherly Hernandez per patient request for rheumatology referral to [facility]. Referral placed in EPIC and faxed per patient request.  Ashanti Green RN

## 2020-11-11 ENCOUNTER — TELEPHONE (OUTPATIENT)
Dept: FAMILY MEDICINE | Facility: CLINIC | Age: 70
End: 2020-11-11

## 2020-11-11 DIAGNOSIS — M17.2 POST-TRAUMATIC OSTEOARTHRITIS OF BOTH KNEES: ICD-10-CM

## 2020-11-11 DIAGNOSIS — G89.4 CHRONIC PAIN SYNDROME: Primary | ICD-10-CM

## 2020-11-11 DIAGNOSIS — G62.9 PERIPHERAL POLYNEUROPATHY: ICD-10-CM

## 2020-11-11 DIAGNOSIS — F11.20 NARCOTIC DEPENDENCE (H): ICD-10-CM

## 2020-11-11 NOTE — TELEPHONE ENCOUNTER
Patient calls still seeking treatment for his fibromyalgia that was diagnosed at [facility]. States he has spoken with rheumatology and neurology clinic staff at [facility] regarding this and it has been recommended he seek an evaluation at [facility]. Patient would like Dr. Salazar to send referral there at fax: [phone].     --Per 10/23/20 phone call with this RN:    has struggled with bilateral leg pain and burning discomfort for years and was recently dx with fibromyalgia at [facility] but was told they only dx they don't treat it.      Has been patient at [facility] for past few years for his chronic leg pain and on opioids and gabapentin but would like to pursue the fibromyalgia avenue at [facility] to see if better treatment options. ---    Plan: okay per Dr. Salazar for [facility] referral. Patient informed. Referral placed in [facility] and faxed per patient's request to [phone].  Ashanti Green RN

## 2020-11-30 DIAGNOSIS — I10 BENIGN ESSENTIAL HYPERTENSION: ICD-10-CM

## 2020-11-30 RX ORDER — LISINOPRIL 20 MG/1
20 TABLET ORAL DAILY
Qty: 90 TABLET | Refills: 0 | Status: SHIPPED | OUTPATIENT
Start: 2020-11-30 | End: 2021-03-19

## 2020-11-30 NOTE — TELEPHONE ENCOUNTER
Lisinopril Last VV 6/15/2020    BP Readings from Last 3 Encounters:   01/29/20 (!) 152/94   03/21/19 (!) 142/96   10/26/18 132/82

## 2020-12-15 ENCOUNTER — TRANSFERRED RECORDS (OUTPATIENT)
Dept: FAMILY MEDICINE | Facility: CLINIC | Age: 70
End: 2020-12-15

## 2020-12-21 ENCOUNTER — OFFICE VISIT (OUTPATIENT)
Dept: FAMILY MEDICINE | Facility: CLINIC | Age: 70
End: 2020-12-21

## 2020-12-21 ENCOUNTER — OFFICE VISIT - HEALTHEAST (OUTPATIENT)
Dept: RHEUMATOLOGY | Facility: CLINIC | Age: 70
End: 2020-12-21

## 2020-12-21 ENCOUNTER — TRANSFERRED RECORDS (OUTPATIENT)
Dept: FAMILY MEDICINE | Facility: CLINIC | Age: 70
End: 2020-12-21

## 2020-12-21 VITALS
OXYGEN SATURATION: 94 % | BODY MASS INDEX: 33.85 KG/M2 | DIASTOLIC BLOOD PRESSURE: 90 MMHG | WEIGHT: 249.6 LBS | SYSTOLIC BLOOD PRESSURE: 152 MMHG | TEMPERATURE: 97.8 F | HEART RATE: 88 BPM

## 2020-12-21 DIAGNOSIS — G89.4 CHRONIC PAIN SYNDROME: ICD-10-CM

## 2020-12-21 DIAGNOSIS — M79.661 PAIN IN BOTH LOWER LEGS: ICD-10-CM

## 2020-12-21 DIAGNOSIS — M54.50 CHRONIC BILATERAL LOW BACK PAIN WITHOUT SCIATICA: ICD-10-CM

## 2020-12-21 DIAGNOSIS — M79.662 PAIN IN BOTH LOWER LEGS: ICD-10-CM

## 2020-12-21 DIAGNOSIS — S09.90XA CLOSED HEAD INJURY, INITIAL ENCOUNTER: Primary | ICD-10-CM

## 2020-12-21 DIAGNOSIS — I10 BENIGN ESSENTIAL HYPERTENSION: ICD-10-CM

## 2020-12-21 DIAGNOSIS — M48.061 SPINAL STENOSIS OF LUMBAR REGION WITHOUT NEUROGENIC CLAUDICATION: ICD-10-CM

## 2020-12-21 DIAGNOSIS — S06.0X0A CONCUSSION WITHOUT LOSS OF CONSCIOUSNESS, INITIAL ENCOUNTER: ICD-10-CM

## 2020-12-21 DIAGNOSIS — F11.20 NARCOTIC DEPENDENCE (H): ICD-10-CM

## 2020-12-21 DIAGNOSIS — G89.29 CHRONIC BILATERAL LOW BACK PAIN WITHOUT SCIATICA: ICD-10-CM

## 2020-12-21 PROCEDURE — 93050 ART PRESSURE WAVEFORM ANALYS: CPT | Performed by: FAMILY MEDICINE

## 2020-12-21 PROCEDURE — 99214 OFFICE O/P EST MOD 30 MIN: CPT | Performed by: FAMILY MEDICINE

## 2020-12-21 NOTE — PATIENT INSTRUCTIONS
"  Patient Education   Concussion Discharge Instructions  You were seen today for signs of a concussion. The symptoms will vary, depending on the nature of your injury and your health. You may have: headache, confusion, nausea (feel sick to your stomach), vomiting (throwing up) and problems with memory, concentrating or sleep. You may feel dizzy, irritable, and tired.   Children and teens may need help from their parents, teachers and coaches to watch for symptoms as they recover.  Follow-up  It is important for you to see a doctor for follow-up care to see how you are recovering. Please see your primary doctor within the next 5 to 7 days. You may have also been referred to the Concussion  service. They will contact you and arrange a follow-up visit if needed. If you need help sooner, you may call them at [phone].  Warning signs  Call your doctor or come back to Emergency if you suddenly have any of these symptoms:    Headaches that get worse    Feeling more and more drowsy    You keep repeating yourself    Strange behavior    Seizures    Repeat vomiting (throwing up)    Trouble walking    Growing confusion    Feeling more irritable    Neck pain that gets worse    Slurred speech    Weakness or numbness    Loss of consciousness    Fluid or blood coming from ears or nose  Self-care    Get lots of rest and get enough sleep at night. Take daytime naps or rest if you feel tired.    Limit physical activity and \"thinking\" activities. These can make symptoms worse.  ? Physical activity includes gym, sports, weight training, running, exercise and heavy lifting.  ? Thinking activities include homework, class work, job-related work and screen time (phone, computer, tablet, TV and video games).    Stick to a healthy diet and drink lots of fluids.    As symptoms improve, you may slowly return to your daily activities. If symptoms get worse   or return, reduce your activity.    Know that it is normal to feel sad and " frustrated when you do not feel right and are less active.  Going back to work    Your care team will tell you when you are ready to return to work.    Limit the amount of work you do soon after your injury. This may speed healing. Take breaks if your symptoms get worse. You should also reduce your physical activity as well as activities that require a lot of thinking until you see your doctor.    You may need shorter work days and a lighter workload.    Avoid heavy lifting, working with machinery, driving and working at heights until your symptoms are gone or you are cleared by a doctor.  Returning to sports    Never return to play if you have any symptoms. A full recovery will reduce the chances of getting hurt again. Remember, it is better to miss one or two games than a whole season.    You should rest from all physical activity until you see your doctor. Generally, if all symptoms have completely cleared, your doctor can help guide you to slowly return to sports. If symptoms return or worsen, stop the activity and see your doctor.    Important: If you are in an organized sport and under age 18, you will need written consent from a healthcare provider before you return to sports. Typically, this will be your primary care or sports medicine doctor. Please make an appointment.  Going back to school    If you are still having symptoms, you may need extra help at school.    Tell your teachers and school nurse about your injury and symptoms. Ask them to watch for problems with learning, memory and concentrating. Symptoms may get worse when you do schoolwork, and you may become more irritable.    You may need shorter school days, a reduced workload, and to postpone testing.    Do not drive or take gym class (physical activity) until cleared by a doctor.    For informational purposes only. Not to replace the advice of your health care provider.   2009 [employer]. Used with permission.  "This form is adapted from the \"Heads Up: Brain Injury in Your Practice\" tool kit developed by the Centers for Disease Control and Prevention (CDC). All rights reserved. [facility]. Relative.ai [id number] - Rev 03/17.       "

## 2020-12-21 NOTE — PROGRESS NOTES
SUBJECTIVE:                                                    Jose Carlos Escamilla is a 70 year old male who presents to clinic today for evaluation.  He has a history of chronic pain, felt to be related to fibromyalgia, and sees a pain clinic.  He is on opiates and gabapentin.    Yesterday he slipped on the ice, fell back, and struck his head.  He did not lose consciousness.  His head hurt and has continued to hurt.  The pain is diffuse, improves with rest, and worsens with stress and focus.  He has mild light sensitivity.  No nausea, visual changes, double vision or focal neurologic deficits.      He saw a rheumatologist this morning.  Note reviewed.  Due to bilateral leg pain he was advised to have testing to rule out intermittent claudication and spinal stenosis.  A spine referral was placed.      His pain is better than it used to be but still a major issue.  He has sleep trouble (on trazodone 100) and does have anxiety.  He has a daughter with bipolar and work stressors.        Problem list, Medication list, Allergies, and Medical/Social/Surgical histories reviewed in EPIC and updated as appropriate.   Additional history: as documented    ROS:    A 10 system review was completed and is as noted in HPI and otherwise negative.      Histories:   Patient Active Problem List   Diagnosis     Hypercholesterolemia     Chronic rhinitis     IBS (irritable bowel syndrome)     Hiatal hernia     ACP (advance care planning)     Family history of hemochromatosis     Esophageal reflux     Esophageal stenosis     Diverticulosis of large intestine     Obesity     History of rheumatic fever     Chronic pain syndrome     Narcotic dependence (H)     Post-traumatic osteoarthritis of both knees     Benign essential hypertension     Total knee replacement status     Peripheral polyneuropathy     Encounter for long-term (current) use of medications     Plantar fasciitis     Adrenal insufficiency (H)     PMR (polymyalgia rheumatica) (H)      Anxiety     Past Surgical History:   Procedure Laterality Date     ARTHROPLASTY KNEE Left 2016    Procedure: ARTHROPLASTY KNEE;  Surgeon: Marisa Page MD;  Location: [facility] OR     DISCECTOMY CERVICAL MINIMALLY INVASIVE ONE LEVEL         Social History     Tobacco Use     Smoking status: Former Smoker     Types: Cigarettes     Quit date: 1995     Years since quittin.4     Smokeless tobacco: Never Used   Substance Use Topics     Alcohol use: Yes     Alcohol/week: 0.0 - 3.3 standard drinks     Family History   Problem Relation Age of Onset     Diabetes Mother            OBJECTIVE:                                                    BP (!) 152/90   Pulse 88   Temp 97.8  F (36.6  C)   Wt 113.2 kg (249 lb 9.6 oz)   SpO2 94%   BMI 33.85 kg/m    Body mass index is 33.85 kg/m .     General: Well appearing, NAD  Head: no swelling, bony stepoff or crepitus  Neck: no midline cervical pain  Eyes: clear.  EMOI, PERRLA  Oropharynx: Clear  Neuro: CN 2-12 intact, no focal deficits  Skin: Clear without lesions or rash  Psych: Normal mood and affect         ASSESSMENT/PLAN:                                                        Closed head injury, initial encounter: c/w concussion based on injury and symptoms.  Discussed ICH cannot be ruled out but no red flags and normal neuro exam, not on blood thinners.  Defer head CT after discussion unless symptoms worsen despite rest.  Red flags discussed in detail.    Concussion without loss of consciousness, initial encounter: We discussed that the patient's symptoms are consistent with concussion.  We reviewed what is, and is not, understood about concussions.  We further discussed that treatment involves complete cognitive and physical rest until symptoms resolve and the patient feels back to baseline.  At that point may begin slow, step-wise progression back to normal activity.  Handout given reviewing all of this and red flag symptoms that should prompt immediate  evaluation.     Benign essential hypertension: above goal, recheck at follow up  -     CT ART PRESS WAVEFORM ANALYS CENTRAL ART PRESSURE    Chronic pain: cont pain clinic and current meds.  Consider adding duloxetine (given probably comorbid anxiety) or nortriptyline (given insomnia).  Favor former as side effect profile better for his age.    Narcotic dependence (H)    Pain in both lower legs  -     US SHREYA Doppler No Exercise; Future            The following health maintenance items are reviewed in Epic and correct as of today:  Health Maintenance   Topic Date Due     URINE DRUG SCREEN  1950     MEDICARE ANNUAL WELLNESS VISIT  04/17/2019     HEPATITIS B IMMUNIZATION (2 of 3 - Hep B Twinrix risk 3-dose series) 04/19/2019     FALL RISK ASSESSMENT  06/16/2021     ADVANCE CARE PLANNING  02/09/2022     LIPID  06/16/2025     DTAP/TDAP/TD IMMUNIZATION (3 - Td) 03/22/2029     HEPATITIS C SCREENING  Completed     PHQ-2  Completed     INFLUENZA VACCINE  Completed     Pneumococcal Vaccine: 65+ Years  Completed     ZOSTER IMMUNIZATION  Completed     AORTIC ANEURYSM SCREENING (SYSTEM ASSIGNED)  Completed     Pneumococcal Vaccine: Pediatrics (0 to 5 Years) and At-Risk Patients (6 to 64 Years)  Aged Out     IPV IMMUNIZATION  Aged Out     MENINGITIS IMMUNIZATION  Aged Out     COLORECTAL CANCER SCREENING  Discontinued         Beny Winters MD  [facility]

## 2021-01-06 ENCOUNTER — HOSPITAL ENCOUNTER (OUTPATIENT)
Dept: PHYSICAL MEDICINE AND REHAB | Facility: CLINIC | Age: 71
Discharge: HOME | End: 2021-01-06
Attending: INTERNAL MEDICINE

## 2021-01-06 DIAGNOSIS — R20.2 PARESTHESIA OF BOTH LOWER EXTREMITIES: ICD-10-CM

## 2021-01-06 DIAGNOSIS — M79.18 DIFFUSE MYOFASCIAL PAIN SYNDROME: ICD-10-CM

## 2021-01-06 DIAGNOSIS — M54.6 CHRONIC BILATERAL THORACIC BACK PAIN: ICD-10-CM

## 2021-01-06 DIAGNOSIS — G89.29 CHRONIC BILATERAL LOW BACK PAIN WITH BILATERAL SCIATICA: ICD-10-CM

## 2021-01-06 DIAGNOSIS — G89.29 CHRONIC BILATERAL THORACIC BACK PAIN: ICD-10-CM

## 2021-01-06 DIAGNOSIS — Z87.39 PERSONAL HISTORY OF FIBROMYALGIA: ICD-10-CM

## 2021-01-06 DIAGNOSIS — M54.41 CHRONIC BILATERAL LOW BACK PAIN WITH BILATERAL SCIATICA: ICD-10-CM

## 2021-01-06 DIAGNOSIS — M51.34 THORACIC DEGENERATIVE DISC DISEASE: ICD-10-CM

## 2021-01-06 DIAGNOSIS — M54.42 CHRONIC BILATERAL LOW BACK PAIN WITH BILATERAL SCIATICA: ICD-10-CM

## 2021-01-06 ASSESSMENT — MIFFLIN-ST. JEOR: SCORE: 1907.51

## 2021-01-07 ENCOUNTER — HOSPITAL ENCOUNTER (OUTPATIENT)
Dept: ULTRASOUND IMAGING | Facility: CLINIC | Age: 71
Discharge: HOME | End: 2021-01-07
Attending: FAMILY MEDICINE | Admitting: FAMILY MEDICINE
Payer: MEDICARE

## 2021-01-07 DIAGNOSIS — M79.661 PAIN IN BOTH LOWER LEGS: ICD-10-CM

## 2021-01-07 DIAGNOSIS — M79.662 PAIN IN BOTH LOWER LEGS: ICD-10-CM

## 2021-01-07 PROCEDURE — 93922 UPR/L XTREMITY ART 2 LEVELS: CPT

## 2021-01-11 ENCOUNTER — TELEPHONE (OUTPATIENT)
Dept: FAMILY MEDICINE | Facility: CLINIC | Age: 71
End: 2021-01-11

## 2021-01-11 NOTE — TELEPHONE ENCOUNTER
----- Message from Beny Winters MD sent at 1/8/2021  1:55 PM CST -----  Please let patient know that arterial blood flow in legs is normal based on testing recommended to him.    Beny Winters MD

## 2021-01-14 ENCOUNTER — RECORDS - HEALTHEAST (OUTPATIENT)
Dept: ADMINISTRATIVE | Facility: OTHER | Age: 71
End: 2021-01-14

## 2021-01-19 ENCOUNTER — HOSPITAL ENCOUNTER (OUTPATIENT)
Dept: PHYSICAL MEDICINE AND REHAB | Facility: CLINIC | Age: 71
Discharge: HOME | End: 2021-01-19
Attending: PHYSICAL MEDICINE & REHABILITATION

## 2021-01-19 ENCOUNTER — TRANSFERRED RECORDS (OUTPATIENT)
Dept: FAMILY MEDICINE | Facility: CLINIC | Age: 71
End: 2021-01-19

## 2021-01-19 DIAGNOSIS — M54.41 CHRONIC BILATERAL LOW BACK PAIN WITH BILATERAL SCIATICA: ICD-10-CM

## 2021-01-19 DIAGNOSIS — G89.29 CHRONIC BILATERAL LOW BACK PAIN WITH BILATERAL SCIATICA: ICD-10-CM

## 2021-01-19 DIAGNOSIS — R20.2 PARESTHESIA OF BOTH LOWER EXTREMITIES: ICD-10-CM

## 2021-01-19 DIAGNOSIS — M54.42 CHRONIC BILATERAL LOW BACK PAIN WITH BILATERAL SCIATICA: ICD-10-CM

## 2021-01-20 ENCOUNTER — HOSPITAL ENCOUNTER (OUTPATIENT)
Dept: MRI IMAGING | Facility: CLINIC | Age: 71
End: 2021-01-20
Attending: NURSE PRACTITIONER
Payer: MEDICARE

## 2021-01-20 ENCOUNTER — COMMUNICATION - HEALTHEAST (OUTPATIENT)
Dept: PHYSICAL MEDICINE AND REHAB | Facility: CLINIC | Age: 71
End: 2021-01-20

## 2021-01-20 DIAGNOSIS — M54.41 CHRONIC BILATERAL LOW BACK PAIN WITH BILATERAL SCIATICA: ICD-10-CM

## 2021-01-20 DIAGNOSIS — R20.2 PARESTHESIA OF BOTH LOWER EXTREMITIES: ICD-10-CM

## 2021-01-20 DIAGNOSIS — M54.42 CHRONIC BILATERAL LOW BACK PAIN WITH BILATERAL SCIATICA: ICD-10-CM

## 2021-01-20 DIAGNOSIS — G89.29 CHRONIC BILATERAL LOW BACK PAIN WITH BILATERAL SCIATICA: ICD-10-CM

## 2021-01-20 DIAGNOSIS — M51.34 THORACIC DEGENERATIVE DISC DISEASE: ICD-10-CM

## 2021-01-20 DIAGNOSIS — M54.6 CHRONIC BILATERAL THORACIC BACK PAIN: ICD-10-CM

## 2021-01-20 DIAGNOSIS — G89.29 CHRONIC BILATERAL THORACIC BACK PAIN: ICD-10-CM

## 2021-01-20 PROCEDURE — 72148 MRI LUMBAR SPINE W/O DYE: CPT

## 2021-01-20 PROCEDURE — 72146 MRI CHEST SPINE W/O DYE: CPT

## 2021-01-26 ENCOUNTER — OFFICE VISIT (OUTPATIENT)
Dept: FAMILY MEDICINE | Facility: CLINIC | Age: 71
End: 2021-01-26

## 2021-01-26 VITALS
BODY MASS INDEX: 33 KG/M2 | WEIGHT: 249 LBS | SYSTOLIC BLOOD PRESSURE: 152 MMHG | HEIGHT: 73 IN | OXYGEN SATURATION: 95 % | HEART RATE: 91 BPM | TEMPERATURE: 97.8 F | DIASTOLIC BLOOD PRESSURE: 95 MMHG

## 2021-01-26 DIAGNOSIS — I10 BENIGN ESSENTIAL HYPERTENSION: ICD-10-CM

## 2021-01-26 DIAGNOSIS — F07.81 POSTCONCUSSION SYNDROME: Primary | ICD-10-CM

## 2021-01-26 PROCEDURE — 99214 OFFICE O/P EST MOD 30 MIN: CPT | Performed by: FAMILY MEDICINE

## 2021-01-26 PROCEDURE — 93050 ART PRESSURE WAVEFORM ANALYS: CPT | Performed by: FAMILY MEDICINE

## 2021-01-26 RX ORDER — DULOXETIN HYDROCHLORIDE 30 MG/1
2 CAPSULE, DELAYED RELEASE ORAL EVERY 24 HOURS
COMMUNITY
Start: 2021-01-06 | End: 2021-04-16

## 2021-01-26 ASSESSMENT — MIFFLIN-ST. JEOR: SCORE: 1953.34

## 2021-01-26 NOTE — PROGRESS NOTES
"  Pola Branch is a 70 year old patient who presents to clinic for follow up.  He was seen 12/21 after falling and suffering a closed head injury with symptoms of concussion.  He was treated conservatively and supportively and symptoms gradually resolved after about 10 days.  Unfortunately, 6 days ago, while having an MRI, the symptoms recurred.  He has had persistent headache since then.  No significant nausea.  Mild light sensitivity.  No focal neuro deficits.  No improvement since then.      Review of Systems   Constitutional, HEENT, cardiovascular, pulmonary, GI, , musculoskeletal, neuro, skin, endocrine and psych systems are negative, except as otherwise noted.      Objective    BP (!) 152/95   Pulse 91   Temp 97.8  F (36.6  C)   Ht 1.854 m (6' 1\")   Wt 112.9 kg (249 lb)   SpO2 95%   BMI 32.85 kg/m      General: Well appearing, NAD  Psych: normal mood and affect        Assessment & Plan     Postconcussion syndrome  We discussed that it seems the MRI exacerbated his concussion symptoms.  There has not been improvement over the past 6 days and feel he is a good candidate for concussion rehab.  Red flags that should prompt emergent evaluation discussed and understood.  - CONCUSSION  REFERRAL    Benign essential hypertension  Above goal, recheck at CPE, which he will schedule  - MS ART PRESS WAVEFORM ANALYS CENTRAL ART PRESSURE  [id number]}     BMI:   Estimated body mass index is 32.85 kg/m  as calculated from the following:    Height as of this encounter: 1.854 m (6' 1\").    Weight as of this encounter: 112.9 kg (249 lb).   Weight management plan: Discussed healthy diet and exercise guidelines      See Patient Instructions    Return in about 4 weeks (around 2/23/2021) for Physical Exam.    Beny Winters MD  [facility]          "

## 2021-01-29 ENCOUNTER — MEDICAL CORRESPONDENCE (OUTPATIENT)
Dept: HEALTH INFORMATION MANAGEMENT | Facility: CLINIC | Age: 71
End: 2021-01-29

## 2021-01-29 ENCOUNTER — OFFICE VISIT (OUTPATIENT)
Dept: PHYSICAL MEDICINE AND REHAB | Facility: CLINIC | Age: 71
End: 2021-01-29
Payer: MEDICARE

## 2021-01-29 VITALS
WEIGHT: 250 LBS | HEIGHT: 73 IN | HEART RATE: 101 BPM | SYSTOLIC BLOOD PRESSURE: 143 MMHG | OXYGEN SATURATION: 95 % | BODY MASS INDEX: 33.13 KG/M2 | DIASTOLIC BLOOD PRESSURE: 93 MMHG

## 2021-01-29 DIAGNOSIS — S06.0X0A CONCUSSION WITHOUT LOSS OF CONSCIOUSNESS, INITIAL ENCOUNTER: ICD-10-CM

## 2021-01-29 DIAGNOSIS — G44.319 ACUTE POST-TRAUMATIC HEADACHE, NOT INTRACTABLE: Primary | ICD-10-CM

## 2021-01-29 PROCEDURE — 99205 OFFICE O/P NEW HI 60 MIN: CPT | Performed by: PHYSICAL MEDICINE & REHABILITATION

## 2021-01-29 RX ORDER — BUTALBITAL, ACETAMINOPHEN AND CAFFEINE 50; 325; 40 MG/1; MG/1; MG/1
1 TABLET ORAL DAILY PRN
Qty: 14 TABLET | Refills: 1 | Status: SHIPPED | OUTPATIENT
Start: 2021-01-29 | End: 2021-02-12

## 2021-01-29 RX ORDER — PROPRANOLOL HYDROCHLORIDE 60 MG/1
1 TABLET ORAL DAILY
Qty: 30 TABLET | Refills: 1 | Status: SHIPPED | OUTPATIENT
Start: 2021-01-29 | End: 2021-04-16

## 2021-01-29 SDOH — HEALTH STABILITY: MENTAL HEALTH: HOW MANY STANDARD DRINKS CONTAINING ALCOHOL DO YOU HAVE ON A TYPICAL DAY?: 5 OR 6

## 2021-01-29 SDOH — HEALTH STABILITY: MENTAL HEALTH: HOW OFTEN DO YOU HAVE 6 OR MORE DRINKS ON ONE OCCASION?: NOT ASKED

## 2021-01-29 SDOH — HEALTH STABILITY: MENTAL HEALTH: HOW OFTEN DO YOU HAVE A DRINK CONTAINING ALCOHOL?: NOT ASKED

## 2021-01-29 ASSESSMENT — PAIN SCALES - GENERAL: PAINLEVEL: MODERATE PAIN (4)

## 2021-01-29 ASSESSMENT — MIFFLIN-ST. JEOR: SCORE: 1952.87

## 2021-01-29 NOTE — NURSING NOTE
"Chief Complaint   Patient presents with     Head Injury     concussion 12/20/2020 - Fall / Slip with posterior head strike       Vitals:    01/29/21 1352   BP: (!) 143/93   Pulse: 101   SpO2: 95%   Weight: 113.4 kg (250 lb)   Height: 1.854 m (6' 1\")       Body mass index is 32.98 kg/m .                            "

## 2021-01-29 NOTE — PROGRESS NOTES
PM&R Clinic Note     Patient Name: Jose Carlos Escamilla : 1950 Medical Record: [id number]     Requesting Physician/clinician: No att. providers found           History of Present Illness:     Jose Carlos Escamilla is a 71 year old male that per records and reporting patient had 20 fall, slipped on ice and hit back of head, no LOC. Felt woozy, felt headache and confused.  Went to PCP and was told had a concussion, then rested through out new year started to feel  Well.  Unitl recently went for MRI and has had headache since .  He describes the headache as frontal and whole head, a dull ache.  Sometimes bothersome, other times just hanging in there.  Ibuprofen helps some as and hydrocodone.         Symptoms  CONCUSSION SYMPTOMS ASSESSMENT 2021   Headache or Pressure In Head 3 - moderate   Upset Stomach or Throwing Up 0 - none   Problems with Balance 2 - mild to moderate   Feeling Dizzy 2 - mild to moderate   Sensitivity to Light 2 - mild to moderate   Sensitivity to Noise 3 - moderate   Mood Changes 2 - mild to moderate   Feeling sluggish, hazy, or foggy 4 - moderate to severe   Trouble Concentrating, Lack of Focus 4 - moderate to severe   Motion Sickness 1 - mild   Vision Changes 2 - mild to moderate   Memory Problems 3 - moderate   Feeling Confused 3 - moderate   Neck Pain 0 - none   Trouble Sleeping 6 - excruciating   Total Number of Symptoms 13   Symptom Severity Score 37       Sleep has been off for last 5 years.  Dizzy nad balance has been off for years, not worse.  Headaches are new.  No issues with bowel or bladder.   He has history of chronic central pain that he has been dealing with for years now as well as  adrenal insufficiency.              Past Medical and Surgical History:     Past Medical History:   Diagnosis Date     ACP (advance care planning)      Chronic rhinitis      Diverticulosis      Esophageal stenosis      Hiatal hernia      HTN (hypertension)      Hypercholesterolemia       IBS (irritable bowel syndrome)      Post-traumatic osteoarthritis of both knees 2016     Past Surgical History:   Procedure Laterality Date     ARTHROPLASTY KNEE Left 2016    Procedure: ARTHROPLASTY KNEE;  Surgeon: Marisa Page MD;  Location: [facility] OR     DISCECTOMY CERVICAL MINIMALLY INVASIVE ONE LEVEL              Social History:     Social History     Tobacco Use     Smoking status: Former Smoker     Types: Cigarettes     Quit date: 1995     Years since quittin.5     Smokeless tobacco: Never Used   Substance Use Topics     Alcohol use: Yes     Alcohol/week: 0.0 - 3.3 standard drinks     Drinks per session: 5 or 6     Living situation: house  Family support:  Daughter close by   Vocational History:  archeatic    Recreational drug use:  None          Functional history:     Jose Carlos Escamilla is independent with all aspects of life.    ADLs: I   Assistive devices: none   iADLs (medication management and finances): I  Hand dominance: L  Driving: yes            Family History:     Family History   Problem Relation Age of Onset     Diabetes Mother             Medications:     Current Outpatient Medications   Medication Sig Dispense Refill     chlorthalidone (HYGROTON) 25 MG tablet TAKE 1 TABLET(25 MG) BY MOUTH DAILY 90 tablet 1     diclofenac (VOLTAREN) 1 % GEL topical gel as needed.       DULoxetine (CYMBALTA) 30 MG capsule Take 2 capsules by mouth every 24 hours        GABAPENTIN PO Take 900 mg by mouth 3 times daily (Patient takes  X 900 mg morning, 5 x 300 mg bedtime= 2700mg dose) In the morning and at bedtime.  In adjustment dosage during July       HYDROcodone-acetaminophen (NORCO)  MG per tablet Take  tablets by mouth 4 times daily as needed for pain  0     hydrocortisone (CORTEF) 5 MG tablet 15 mg on waking and 5 mg at noon,double in sickness       lisinopril (ZESTRIL) 20 MG tablet Take 1 tablet (20 mg) by mouth daily 90 tablet 0     omeprazole (PRILOSEC) 20 MG DR capsule  "Take 20 mg by mouth daily       senna-docusate (SENOKOT-S;PERICOLACE) 8.6-50 MG per tablet Take 1-2 tablets by mouth 2 times daily 20 tablet 0     tadalafil (CIALIS) 20 MG tablet Take 1 tablet (20 mg) by mouth daily as needed 10 tablet 11     testosterone (AXIRON) 30 MG/ACT topical solution Place 30 mg onto the skin       traZODone (DESYREL) 50 MG tablet TAKE 2 TABLETS BY MOUTH AT BEDTIME 180 tablet 3     dexamethasone (DECADRON) 4 MG/ML injection INJECT 1ML INTRAMUSCULARLY UTD WHEN UNABLE TO TAKE ORAL STEROIDS  3     morphine (MS CONTIN) 15 MG CR tablet Take 15 mg by mouth every 12 hours              Allergies:     Allergies   Allergen Reactions     Amoxicillin      Amoxicillin [A-Cillin]      In his 30's     Penicillin G      Penicillins Rash     Other reaction(s): Breathing Difficulty              ROS:        ROS: 10 point ROS neg other than the symptoms noted above in the HPI.             Physical Examiniation:     VITAL SIGNS: BP (!) 143/93   Pulse 101   Ht 1.854 m (6' 1\")   Wt 113.4 kg (250 lb)   SpO2 95%   BMI 32.98 kg/m    BMI: Estimated body mass index is 32.98 kg/m  as calculated from the following:    Height as of this encounter: 1.854 m (6' 1\").    Weight as of this encounter: 113.4 kg (250 lb).    Gen: NAD, pleasant and cooperative   HEENT: NCAT, EOMI, no nystagmus, EMILIA, there is some reproducible headache and eye strain with VOMS. No taut or tender cervical paraspinal muscles, no facial asymmetry   Cardio: 2+ radial pulse, well perfused  Pulm: non-labored breathing in room air, symmetrical chest rise  Abd: benign  Ext: WWP, no edema in BLE, no tenderness in calves  Neuro/MSK: 5/5 in c5-t1 and l2-s1 myotomes, SILT, negative rodriguez's b/l, CN 2-12 intact, negative romberg, negative single leg stance, negative fukada. AAOx3.  GAIT: WNFL             Laboratory/Imaging:     [facility] Generic [id number] - 06/15/2018  7:39 PM CDT  EXAM: MR BRAIN WITHOUT AND WITH IV " CONTRAST      IMPRESSION: MRI brain without and with intravenous gadolinium (sella protocol)  06/15/2018. Indication pituitary deficiency. Comparison 06/24/2016.    The pituitary has normal morphologic, signal, and enhancement characteristics.  No suprasellar mass is present. The pituitary infundibulum appears intact. The  hypothalamus has a normal appearance. The brain continues to have normal  morphologic and FLAIR signal characteristics.           Assessment/Plan:     Jose Carlos was seen today for head injury.    Diagnoses and all orders for this visit:    Acute post-traumatic headache, not intractable  -     propranolol (INDERAL) 60 MG tablet; Take 1 tablet (60 mg) by mouth daily  -     butalbital-acetaminophen-caffeine (ESGIC) -40 MG tablet; Take 1 tablet by mouth daily as needed for headaches or migraine    Concussion without loss of consciousness, initial encounter  -     CONCUSSION  REFERRAL            1. Patient education: In depth discussion and education was provided about the assessment and implications of each of the below recommendations for management. Patient indicated readiness to learn, all questions were answered and understanding of material presented was confirmed.    2. Work-up: none     3. Therapy/equipment/braces:  Start therapy program    4. Medications:  Start inderal with breakthrough firocet    5. Interventions: discussed progressive return to activity.  Will call if headache does not improve or worsens.      6. Referral / follow up with other providers:  Routine PCP.      7. Follow up: 3 months for progress.       Karsten Escobar, DO  Physical Medicine & Rehabilitation    I spent a total of 69 minutes face-to-face with Jose Carlos Escamilla during today's office visit. Over 50% of this time was spent counseling the patient and/or coordinating care. See note for details.     69 minutes spent on the date of the encounter doing chart review, history and exam, documentation and  further activities as noted above

## 2021-01-29 NOTE — PATIENT INSTRUCTIONS
"    GENERAL ADVICE:  ~ Gradually ease back into your usual activities.   ~ Rest as needed to help your symptoms go away.  - Consider pairing 50 minutes of activity with 10 minutes of rest.  ~ Allow yourself more time for activities.  ~ Write things down.  At home, at work, whenever there is something that you should remember, even it is simple.  SCREENS:  ~ Change settings on your phone and computer using the \"Blue Light Filter\" (Night Shift on all  [employer] products)  ~ The goal is making screens more yellow and less blue.    ~ If this is not an option you can download this program, [employer], to adjust your screen resolution.  ~ Flux for various filter and font apps  ~ Turn screen brightness down  ~ Increase font size  ~ Limit screen activities including computer, TV and phones.  NECK PAIN:  ~ Ice or Heat are good~  ~ Massage is ok if it doesn't trigger more symptoms~  ~ Gentle stretches and range-of-motion are helpful.  DIZZINESS:  ~ No driving when dizzy.  ~ No biking, climbing heights or ladders if dizzy.  FATIGUE:  ~ Daily exercise is strongly encouraged.  Start with a 10 min walk and increase the time as tolerated until you are walking 30 minutes per day.    ~ Focus on Good sleep hygiene instead of napping . Your goal should be 8 hours of sleep every night.  ~ Try Melatonin 1 hour before bed  ANXIETY OR MOOD SWINGS:  ~ If you are irritable or anxious, take a break in a quiet room.  ~ Try using the free Calm reji for guided breathing and mindfulness/meditation.  ~ Explore DiversityDoctor (https://www.headspace.com) for free and easy-to-use meditation guidance.  LIGHT SENSITIVITIES:  ~ Avoid florescent lighting when possible.  ~Yellow or alysia tinted lenses may help reduce computer or night-time road glare.             ~ Amazon has a $10.00 option: [employer] yellow Night Vision.  NOISE SENSITIVITIES:  ~ Try listening to calming sounds such as the \"Calm Reji\" to help shift your focus off of more irritating " sounds.  ~ Avoid crowded areas at first then slowly introduce yourself to small increments of crowded, noisy areas.  ~ Try High fidelity earplugs used by [employer]. Etymotic ETY-Plugs, can be found on Amazon for $13.00.  DIET:  - In principle incorporate more protein, lots of veggies, some fruit, whole grains.    - Less sweets and saturated fat.   - Mediterranean Diet is an easy-to-follow example.  ~ Drink plenty of water throughout the day (8-10 glasses per day)    PM&R / M [facility]   Nurse Line # [phone]   Fax # [phone]  Scheduling; # [phone]      Thank you for allowing us to be a part of your care.

## 2021-01-31 ENCOUNTER — HEALTH MAINTENANCE LETTER (OUTPATIENT)
Age: 71
End: 2021-01-31

## 2021-02-09 ENCOUNTER — COMMUNICATION - HEALTHEAST (OUTPATIENT)
Dept: PHYSICAL MEDICINE AND REHAB | Facility: CLINIC | Age: 71
End: 2021-02-09

## 2021-02-10 ENCOUNTER — RECORDS - HEALTHEAST (OUTPATIENT)
Dept: RADIOLOGY | Facility: CLINIC | Age: 71
End: 2021-02-10

## 2021-02-12 ENCOUNTER — THERAPY VISIT (OUTPATIENT)
Dept: PHYSICAL THERAPY | Facility: CLINIC | Age: 71
End: 2021-02-12
Payer: MEDICARE

## 2021-02-12 DIAGNOSIS — S06.0X0A CONCUSSION WITHOUT LOSS OF CONSCIOUSNESS, INITIAL ENCOUNTER: Primary | ICD-10-CM

## 2021-02-12 PROCEDURE — 97162 PT EVAL MOD COMPLEX 30 MIN: CPT | Mod: GP | Performed by: PHYSICAL THERAPIST

## 2021-02-12 PROCEDURE — 97535 SELF CARE MNGMENT TRAINING: CPT | Mod: GP | Performed by: PHYSICAL THERAPIST

## 2021-02-12 PROCEDURE — 97110 THERAPEUTIC EXERCISES: CPT | Mod: GP | Performed by: PHYSICAL THERAPIST

## 2021-02-12 NOTE — PROGRESS NOTES
02/12/21 0800   Quick Adds   Quick Adds Certification   Type of Visit Initial OP PT Evaluation   General Information   Start of Care Date 02/12/21   Referring Physician Karsten Escobar DO   Orders Evaluate and Treat as Indicated   Order Date 01/29/21   Medical Diagnosis Concussion   Onset of illness/injury or Date of Surgery 12/20/20   Surgical/Medical history reviewed Yes   Pertinent history of current problem (include personal factors and/or comorbidities that impact the POC) Patient sustained fall on December 20, 2020-slipped on ice and hit back of his head; no loss of consciousness.  Patient reports no symptoms over holidays and into new year. Patient underwent MRI in January; intense headache initiated status post test-patient reports head was fixed in machine and loud noises for 1.5 hours and they have caused headache.  Headache symptoms vary between 2-10/10 pain level; decrease with meditation and rest breaks, increase with computer work and reading on iPad. Overall, headaches reported as worse in the morning when he gets up and moves around, better in the afternoon and worse again at night.  Patient denies significant headaches prior to concussion.  Past medical history: Hypertension, irritable bowel syndrome, hyperlipidemia, osteoarthritis of knees, fibromyalgia, adrenal insufficiency-follows up at [facility] regarding his condition.  Patient does report overall autonomic dysfunction worse after concussion now compared to previous state-headaches, sweating with activity.   Pertinent Visual History  Patient notes some blurry vision, especially with reading   Prior level of function comment Independent-working full-time, social.  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 No   Have you fallen and had an injury in the past year? Yes   Is patient a fall risk? No   Fall screen comments Slipped on ice resulting in current concussion   Pain   Patient currently in pain Yes   Pain comments headache 5/10- frontal, bilateral.  Chronic history of fibromyalgia causing arm/leg pain   Vitals Signs   Heart Rate 61   SpO2 95   Blood Pressure 137/93   Vital Signs Comments after walkin/92, HR 64   Cognitive Status Examination   Orientation orientation to person, place and time   Level of Consciousness alert   Follows Commands and Answers Questions 100% of the time;able to follow multistep instructions   Personal Safety and Judgment intact   Memory intact   Integumentary   Integumentary No deficits were identified   Posture   Posture Normal   Range of Motion (ROM)   ROM Quick Adds no deficits were identified   Strength   Manual Muscle Testing Quick Adds No deficits were identified   Transfer Skills   Transfer Comments Independent   Gait   Gait Comments Patient ambulates without a device independently; no apparent gait deviations demonstrated on solid surfaces.  Patient does report increase in headaches and sweating status post 5 minutes at comfortable walking speed   Balance   Balance Comments Patient denies balance changes   Sensory Examination   Sensory Perception Comments Patient reports history of numbness/tingling in legs; grossly intact on exam   Planned Therapy Interventions   Planned Therapy Interventions balance training;gait training;neuromuscular re-education;ROM;strengthening;stretching;transfer  training;manual therapy   Clinical Impression   Criteria for Skilled Therapeutic Interventions Met yes, treatment indicated   PT Diagnosis Exercise intolerance   Influenced by the following impairments Symptoms, pain, autonomic dysfunction   Functional limitations due to impairments Decrease participation in work activities, decrease participation in IADLs, impaired gait-community mobility due to increase in symptoms   Clinical Presentation Evolving/Changing   Clinical Presentation Rationale Personal factors, body systems involved, medical history   Clinical Decision Making (Complexity) Moderate complexity   Therapy Frequency other (see comments)  (Biweekly)   Predicted Duration of Therapy Intervention (days/wks) 4 to 6 weeks   Risk & Benefits of therapy have been explained Yes   Patient, Family & other staff in agreement with plan of care Yes   Clinical Impression Comments Patient is a 71-year-old male who presents to outpatient physical therapy with reports of headache and increased autonomic dysfunction status post concussion that occurred when he fell on ice and hit the back of his head on December 20, 2020; headache symptoms did not start until after MRI on 1/20/21.  Patient demonstrates impaired exercise tolerance with limited ambulation as demonstrated by increased headache and sweating which is impacting his participation in daily activities and safe mobility.  Patient with significant medical history including fibromyalgia and renal insufficiency, which are most likely impacting his recovery from this concussion.  Patient would benefit from skilled physical therapy services for exercise tolerance training in order to improve activity participation.  Patient is motivated and has high prior level function, but medical history may impact overall progression/length of time for recovery   Education Assessment   Preferred Learning Style Demonstration;Pictures/video   Barriers to Learning Other  (Medical)   GOALS    PT Eval Goals 1;2;3;4   Goal 1   Goal Identifier Exercise tolerance   Goal Description Patient will demonstrate stable vitals and less than two-point increase in headache with moderate intensity of exercise for 20 minutes in order for symptom management   Target Date 05/12/21   Goal 2   Goal Identifier Headache   Goal Description Patient will report headaches consistently less than 3/10 during daily activities for safe performance   Target Date 05/12/21   Goal 3   Goal Identifier CSA   Goal Description Patient will score 20 or less on the CSA to improve symptoms and activity tolerance   Target Date 05/12/21   Goal 4   Goal Identifier HEP   Goal Description Patient will be independent in home exercise program for maintenance of therapy gains at discharge   Target Date 05/12/21   Total Evaluation Time   PT Eval, Moderate Complexity Minutes (82434) 35   Therapy Certification   Certification date from 02/12/21   Certification date to 05/12/21   Medical Diagnosis Concussion   Certification I certify the need for these services furnished under this plan of treatment and while under my care.  (Physician co-signature of this document indicates review and certification of the therapy plan).     Jayne Ayoub PT, DPT  Physical Therapist  [facility] [address]  [address] [place] [address]  [email or web address]  Appointments: [phone]

## 2021-02-12 NOTE — DISCHARGE INSTRUCTIONS
I want you to work on exercise tolerance training  Walking program inside your house or at work  1. Rate your headache before walking  2. Walk at a comfortable pace until your headache increases 2-3 points; also monitor for other signs of autonomic dysfunction (sweating, dizziness)  3. Sit and rest, eyes closed and recover for at least 5 minutes or when your headache decreases back to baseline    Work on taking frequent rest breaks throughout the day  - At least every hour  - When your headache gets up 2-3 points    Blue light filter on phone, tablet and computer  I'll ask [facility] to put in a referral for Dr. Brewer (optometrist)    Meditation apps: Calm, 10% or Headspace    Jayne Ayoub PT, DPT  Physical Therapist  [facility] [address]  [address]  [place] [address]  [email or web address]  Appointments: [phone]

## 2021-02-12 NOTE — PROGRESS NOTES
Rehabilitation Services        OUTPATIENT PHYSICAL THERAPY FUNCTIONAL EVALUATION  PLAN OF TREATMENT FOR OUTPATIENT REHABILITATION  (COMPLETE FOR INITIAL CLAIMS ONLY)  Patient's Last Name, First Name, M.I.  YOB: 1950  Jose Carlos Escamilla     Provider's Name   Jayne Ayoub PT   Medical Record No.  [id number]     Start of Care Date:  02/12/21   Onset Date:  12/20/20   Type:     _X__PT   ____OT  ____SLP Medical Diagnosis:  Concussion     PT Diagnosis:  Exercise intolerance Visits from SOC:  1                              __________________________________________________________________________________  Plan of Treatment/Functional Goals:  balance training, gait training, neuromuscular re-education, ROM, strengthening, stretching, transfer training, manual therapy           GOALS  Exercise tolerance  Patient will demonstrate stable vitals and less than two-point increase in headache with moderate intensity of exercise for 20 minutes in order for symptom management  05/12/21    Headache  Patient will report headaches consistently less than 3/10 during daily activities for safe performance  05/12/21    CSA  Patient will score 20 or less on the CSA to improve symptoms and activity tolerance  05/12/21    HEP  Patient will be independent in home exercise program for maintenance of therapy gains at discharge  05/12/21          Therapy Frequency:  other (see comments)(Biweekly)   Predicted Duration of Therapy Intervention:  4 to 6 weeks    Jayne Ayoub PT                                    I CERTIFY THE NEED FOR THESE SERVICES FURNISHED UNDER        THIS PLAN OF TREATMENT AND WHILE UNDER MY CARE     (Physician attestation of this document indicates review and certification of the therapy plan).                Certification Date From:  02/12/21   Certification Date To:  05/12/21    Referring Provider:   Karsten Escobar, DO    Initial Assessment  See Epic Evaluation- Start of Care Date: 02/12/21

## 2021-03-02 ENCOUNTER — THERAPY VISIT (OUTPATIENT)
Dept: PHYSICAL THERAPY | Facility: CLINIC | Age: 71
End: 2021-03-02
Payer: MEDICARE

## 2021-03-02 DIAGNOSIS — S06.0X0A CONCUSSION WITHOUT LOSS OF CONSCIOUSNESS, INITIAL ENCOUNTER: Primary | ICD-10-CM

## 2021-03-02 PROCEDURE — 97750 PHYSICAL PERFORMANCE TEST: CPT | Mod: GP | Performed by: PHYSICAL THERAPIST

## 2021-03-02 NOTE — PROGRESS NOTES
[facility] Concussion Treadmill Test (BCTT) Test Results Form:    Min  HR RPE Concussion Symptoms (Likert Scale) Speed/Incline Observations            Rest  57 0 Headache: 2/10     1  64 3/10 Headache: 2/10 0    2  71 3/10 Headache: 2/10 1%    3  72 3/10 Headache: 3/10 2%    4  76 4/10  headache: 3/10  3%    5   77  6/10  headache: 3/10  4%    6   77  7/10  headache: 3/10 5%  patient reports leg pain/fatigue requiring stoppage of activity   7         8         9         10         11         12         13         14         15         16         17         18         19         20

## 2021-03-03 VITALS — TEMPERATURE: 97.8 F

## 2021-03-03 DIAGNOSIS — I10 BENIGN ESSENTIAL HYPERTENSION: Primary | ICD-10-CM

## 2021-03-03 PROCEDURE — 36415 COLL VENOUS BLD VENIPUNCTURE: CPT | Performed by: FAMILY MEDICINE

## 2021-03-19 DIAGNOSIS — I10 BENIGN ESSENTIAL HYPERTENSION: ICD-10-CM

## 2021-03-19 RX ORDER — LISINOPRIL 20 MG/1
TABLET ORAL
Qty: 90 TABLET | Refills: 0 | Status: SHIPPED | OUTPATIENT
Start: 2021-03-19 | End: 2021-06-02

## 2021-04-01 ENCOUNTER — OFFICE VISIT (OUTPATIENT)
Dept: OPTOMETRY | Facility: CLINIC | Age: 71
End: 2021-04-01
Payer: COMMERCIAL

## 2021-04-01 DIAGNOSIS — H51.11 CONVERGENCE INSUFFICIENCY: ICD-10-CM

## 2021-04-01 DIAGNOSIS — F07.81 POSTCONCUSSION SYNDROME: Primary | ICD-10-CM

## 2021-04-01 DIAGNOSIS — H52.4 PRESBYOPIA: ICD-10-CM

## 2021-04-01 ASSESSMENT — REFRACTION_MANIFEST
OS_CYLINDER: +1.25
OS_AXIS: 065
OD_AXIS: 105
OS_SPHERE: -0.75
OD_SPHERE: PLANO
OD_CYLINDER: +0.50
OD_ADD: +2.75
OS_ADD: +2.75

## 2021-04-01 ASSESSMENT — TONOMETRY
OD_IOP_MMHG: 18
OS_IOP_MMHG: 16
IOP_METHOD: ICARE

## 2021-04-01 ASSESSMENT — EXTERNAL EXAM - LEFT EYE: OS_EXAM: NORMAL

## 2021-04-01 ASSESSMENT — CONF VISUAL FIELD
OD_NORMAL: 1
OS_NORMAL: 1

## 2021-04-01 ASSESSMENT — SLIT LAMP EXAM - LIDS
COMMENTS: NORMAL
COMMENTS: NORMAL

## 2021-04-01 ASSESSMENT — VISUAL ACUITY
OS_CC: 20/30
OS_CC+: +1
OD_CC: 20/25
METHOD: SNELLEN - LINEAR

## 2021-04-01 ASSESSMENT — CUP TO DISC RATIO
OD_RATIO: 0.25
OS_RATIO: 0.25

## 2021-04-01 ASSESSMENT — EXTERNAL EXAM - RIGHT EYE: OD_EXAM: NORMAL

## 2021-04-01 NOTE — PROGRESS NOTES
Assessment/Plan  (F07.81) Postconcussion syndrome  (primary encounter diagnosis)  Comment: Injured 12/2020  Plan: Discussed findings with patient. Suspect that updating glasses will improve clarity and may possibly reduce fatigue brought on by squinting/straining. Mild convergence insufficiency today could benefit from low base in prism, but will recommend patient better pace himself (particularly with near work) at first instead. Patient is welcome to return to clinic as needed for follow up. Ocular health and binocular vision are otherwise within normal limits for his age.     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 Could consider designated near only prism specs if symptoms persist with new glasses.     (H52.4) Presbyopia  Plan: See above note.           47 minutes were spent on the date of the encounter doing chart review, history and exam, documentation, and further activities as noted above.    Complete documentation of historical and exam elements from today's encounter can  be found in the full encounter summary report (not reduplicated in this progress  note). I personally obtained the chief complaint(s) and history of present illness. I  confirmed and edited as necessary the review of systems, past medical/surgical  history, family history, social history, and examination findings as documented by  others; and I examined the patient myself. I personally reviewed the relevant tests,  images, and reports as documented above. I formulated and edited as necessary the  assessment and plan and discussed the findings and management plan with the  patient and family.    Tolu Brewer OD

## 2021-04-08 DIAGNOSIS — M35.3 PMR (POLYMYALGIA RHEUMATICA) (H): ICD-10-CM

## 2021-04-08 RX ORDER — TRAZODONE HYDROCHLORIDE 50 MG/1
TABLET, FILM COATED ORAL
Qty: 180 TABLET | Refills: 3 | Status: SHIPPED | OUTPATIENT
Start: 2021-04-08 | End: 2022-03-18

## 2021-04-13 ENCOUNTER — TRANSFERRED RECORDS (OUTPATIENT)
Dept: FAMILY MEDICINE | Facility: CLINIC | Age: 71
End: 2021-04-13

## 2021-04-16 ENCOUNTER — RECORDS - HEALTHEAST (OUTPATIENT)
Dept: RADIOLOGY | Facility: CLINIC | Age: 71
End: 2021-04-16

## 2021-04-16 ENCOUNTER — COMMUNICATION - HEALTHEAST (OUTPATIENT)
Dept: PHYSICAL MEDICINE AND REHAB | Facility: CLINIC | Age: 71
End: 2021-04-16

## 2021-04-16 ENCOUNTER — OFFICE VISIT (OUTPATIENT)
Dept: FAMILY MEDICINE | Facility: CLINIC | Age: 71
End: 2021-04-16

## 2021-04-16 VITALS
BODY MASS INDEX: 33.59 KG/M2 | DIASTOLIC BLOOD PRESSURE: 82 MMHG | SYSTOLIC BLOOD PRESSURE: 125 MMHG | HEART RATE: 70 BPM | OXYGEN SATURATION: 95 % | WEIGHT: 254.6 LBS | TEMPERATURE: 97.8 F

## 2021-04-16 DIAGNOSIS — M79.10 MUSCLE PAIN: Primary | ICD-10-CM

## 2021-04-16 DIAGNOSIS — G89.4 CHRONIC PAIN SYNDROME: ICD-10-CM

## 2021-04-16 DIAGNOSIS — F11.20 NARCOTIC DEPENDENCE (H): ICD-10-CM

## 2021-04-16 DIAGNOSIS — I10 BENIGN ESSENTIAL HYPERTENSION: ICD-10-CM

## 2021-04-16 DIAGNOSIS — E27.40 ADRENAL INSUFFICIENCY (H): ICD-10-CM

## 2021-04-16 LAB — ERYTHROCYTE [SEDIMENTATION RATE] IN BLOOD: 5 MM/HR (ref 0–15)

## 2021-04-16 PROCEDURE — 36415 COLL VENOUS BLD VENIPUNCTURE: CPT | Performed by: FAMILY MEDICINE

## 2021-04-16 PROCEDURE — 85651 RBC SED RATE NONAUTOMATED: CPT | Performed by: FAMILY MEDICINE

## 2021-04-16 PROCEDURE — 93050 ART PRESSURE WAVEFORM ANALYS: CPT | Performed by: FAMILY MEDICINE

## 2021-04-16 PROCEDURE — 99214 OFFICE O/P EST MOD 30 MIN: CPT | Performed by: FAMILY MEDICINE

## 2021-04-16 RX ORDER — HYDROXYZINE HYDROCHLORIDE 25 MG/1
TABLET, FILM COATED ORAL
COMMUNITY
Start: 2021-03-30 | End: 2021-04-16

## 2021-04-16 RX ORDER — MORPHINE SULFATE 30 MG/1
TABLET, FILM COATED, EXTENDED RELEASE ORAL
COMMUNITY
Start: 2021-02-15 | End: 2021-08-03

## 2021-04-16 RX ORDER — GABAPENTIN 300 MG/1
CAPSULE ORAL
COMMUNITY
Start: 2021-03-10 | End: 2021-04-16

## 2021-04-16 RX ORDER — TESTOSTERONE 30 MG/1.5ML
SOLUTION TOPICAL
COMMUNITY
Start: 2021-04-06 | End: 2021-04-16

## 2021-04-16 RX ORDER — PROPRANOLOL HYDROCHLORIDE 60 MG/1
TABLET ORAL
COMMUNITY
Start: 2021-02-25 | End: 2021-04-16

## 2021-04-16 NOTE — PROGRESS NOTES
Pola Branch is a 71 year old patient who presents to clinic for follow up.    Concussion: improving after treatment    Chronic pain: continues to struggle greatly.  Proximal leg and arm weakness, heaviness and pain.  Chart reports history of PMR.  On chronic opiates which helps manage pain but doesn't want this to be the end outcome.      Review of Systems   Constitutional, HEENT, cardiovascular, pulmonary, GI, , musculoskeletal, neuro, skin, endocrine and psych systems are negative, except as otherwise noted.      Objective    /82   Pulse 70   Temp 97.8  F (36.6  C)   Wt 115.5 kg (254 lb 9.6 oz)   SpO2 95%   BMI 33.59 kg/m      General: Well appearing, NAD  Psych: normal mood and affect        Results for orders placed or performed in visit on 04/16/21 (from the past 24 hour(s))   Sed Rate Westergren ([facility])   Result Value Ref Range    Sed Rate 5 0 - 15 mm/hr       Assessment & Plan     Muscle pain  Uncertain cause.  Check labs.  Referral to neuromuscular specialist.    - Creatine Kinase Total Serum ([facility])  - C-Reactive Protein  Quant ([facility])  - Sed Rate Westergren ([facility])  - NEUROLOGY ADULT REFERRAL  - VENOUS COLLECTION    Benign essential hypertension  At goal  - VT ART PRESS WAVEFORM ANALYS CENTRAL ART PRESSURE  - VENOUS COLLECTION    Chronic pain syndrome  Frustrating.  Cont current meds and pain clinic but he wants to pursue further eval again  - VENOUS COLLECTION    Narcotic dependence (H)  - VENOUS COLLECTION    Adrenal insufficiency (H)  - VENOUS COLLECTION    See Patient Instructions    No follow-ups on file.    Beny Winters MD  [facility]

## 2021-04-17 LAB
CK SERPL-CCNC: 102 U/L (ref 41–331)
CRP SERPL-MCNC: 6 MG/L (ref 0–10)

## 2021-04-19 ENCOUNTER — OFFICE VISIT (OUTPATIENT)
Dept: PHYSICAL MEDICINE AND REHAB | Facility: CLINIC | Age: 71
End: 2021-04-19
Payer: MEDICARE

## 2021-04-19 ENCOUNTER — TELEPHONE (OUTPATIENT)
Dept: FAMILY MEDICINE | Facility: CLINIC | Age: 71
End: 2021-04-19

## 2021-04-19 VITALS
BODY MASS INDEX: 31.81 KG/M2 | HEART RATE: 88 BPM | HEIGHT: 73 IN | SYSTOLIC BLOOD PRESSURE: 125 MMHG | DIASTOLIC BLOOD PRESSURE: 85 MMHG | WEIGHT: 240 LBS

## 2021-04-19 DIAGNOSIS — N28.9 RENAL LESION: Primary | ICD-10-CM

## 2021-04-19 DIAGNOSIS — G44.309 POST-TRAUMATIC HEADACHE, NOT INTRACTABLE, UNSPECIFIED CHRONICITY PATTERN: ICD-10-CM

## 2021-04-19 DIAGNOSIS — S06.0X0D CONCUSSION WITHOUT LOSS OF CONSCIOUSNESS, SUBSEQUENT ENCOUNTER: Primary | ICD-10-CM

## 2021-04-19 PROCEDURE — 99215 OFFICE O/P EST HI 40 MIN: CPT | Performed by: PHYSICAL MEDICINE & REHABILITATION

## 2021-04-19 ASSESSMENT — MIFFLIN-ST. JEOR: SCORE: 1907.51

## 2021-04-19 ASSESSMENT — PAIN SCALES - GENERAL: PAINLEVEL: WORST PAIN (10)

## 2021-04-19 ASSESSMENT — PATIENT HEALTH QUESTIONNAIRE - PHQ9: SUM OF ALL RESPONSES TO PHQ QUESTIONS 1-9: 9

## 2021-04-19 NOTE — TELEPHONE ENCOUNTER
Per Dr Winters referral to [facility] for renal U/S is entered and faxed. Discussed plan with patient who will schedule U/S. Our office will reach out when results are available. Kayla Schuster

## 2021-04-19 NOTE — PROGRESS NOTES
PM&R Clinic Note     Patient Name: Jose Carlos Escamilla : 1950 Medical Record: [id number]     Requesting Physician/clinician: No att. providers found           History of Present Illness:     Jose Carlos Escamilla is a 71 year old male that per records and reporting patient had 20 fall, slipped on ice and hit back of head, no LOC. Felt woozy, felt headache and confused.  Went to PCP and was told had a concussion, then rested through out new year started to feel  Well.  Unitl recently went for MRI and has had headache since .  He describes the headache as frontal and whole head, a dull ache.  Sometimes bothersome, other times just hanging in there.  Ibuprofen helps some as and hydrocodone.         Symptoms  CONCUSSION SYMPTOMS ASSESSMENT 2021 3/2/2021 2021   Headache or Pressure In Head 3 - moderate 3 - moderate 3 - moderate   Upset Stomach or Throwing Up 0 - none 0 - none 2 - mild to moderate   Problems with Balance 2 - mild to moderate 1 - mild 3 - moderate   Feeling Dizzy 2 - mild to moderate 1 - mild 2 - mild to moderate   Sensitivity to Light 2 - mild to moderate 3 - moderate 3 - moderate   Sensitivity to Noise 3 - moderate 4 - moderate to severe 3 - moderate   Mood Changes 2 - mild to moderate 3 - moderate 2 - mild to moderate   Feeling sluggish, hazy, or foggy 4 - moderate to severe 3 - moderate 3 - moderate   Trouble Concentrating, Lack of Focus 4 - moderate to severe 2 - mild to moderate 4 - moderate to severe   Motion Sickness 1 - mild 0 - none 1 - mild   Vision Changes 2 - mild to moderate 2 - mild to moderate 1 - mild   Memory Problems 3 - moderate 2 - mild to moderate 2 - mild to moderate   Feeling Confused 3 - moderate 2 - mild to moderate 2 - mild to moderate   Neck Pain 0 - none 1 - mild 2 - mild to moderate   Trouble Sleeping 6 - excruciating 5 - severe 3 - moderate   Total Number of Symptoms 13 13 15   Symptom Severity Score 37 32 36       Current: headaches did get better,  mild ones only, until yesterday morning.  He did too much activity yesterday and feels he flared up and right now today, feels he is doing better in regards to concussion.  He saw on consultation with PT and has since signed off.  He is getting new lenses from neuro-optometry.  Inderal was stopped due to side effect and Fioricet did not help, caused him to be too groggy.               Past Medical and Surgical History:     Past Medical History:   Diagnosis Date     ACP (advance care planning)      Chronic rhinitis      Diverticulosis      Esophageal stenosis      Hiatal hernia      HTN (hypertension)      Hypercholesterolemia      IBS (irritable bowel syndrome)      Post-traumatic osteoarthritis of both knees 2016     Past Surgical History:   Procedure Laterality Date     ARTHROPLASTY KNEE Left 2016    Procedure: ARTHROPLASTY KNEE;  Surgeon: Marisa Page MD;  Location: [facility] OR     DISCECTOMY CERVICAL MINIMALLY INVASIVE ONE LEVEL              Social History:     Social History     Tobacco Use     Smoking status: Former Smoker     Types: Cigarettes     Quit date: 1995     Years since quittin.8     Smokeless tobacco: Never Used   Substance Use Topics     Alcohol use: Yes     Alcohol/week: 0.0 - 3.3 standard drinks     Drinks per session: 5 or 6     Living situation: house  Family support:  Daughter close by   Vocational History:     Recreational drug use:  None          Functional history:     Jose Carlos Escamilla is independent with all aspects of life.    ADLs: I   Assistive devices: none   iADLs (medication management and finances): I  Hand dominance: L  Driving: yes            Family History:     Family History   Problem Relation Age of Onset     Diabetes Mother             Medications:     Current Outpatient Medications   Medication Sig Dispense Refill     chlorthalidone (HYGROTON) 25 MG tablet TAKE 1 TABLET(25 MG) BY MOUTH DAILY 90 tablet 1     diclofenac (VOLTAREN) 1 % GEL topical gel  "as needed.       GABAPENTIN PO Take 900 mg by mouth 3 times daily (Patient takes  X 900 mg morning, 5 x 300 mg bedtime= 2700mg dose) In the morning and at bedtime.  In adjustment dosage during July       HYDROcodone-acetaminophen (NORCO)  MG per tablet Take  tablets by mouth 4 times daily as needed for pain  0     hydrocortisone (CORTEF) 5 MG tablet 15 mg on waking and 5 mg at noon,double in sickness       lisinopril (ZESTRIL) 20 MG tablet TAKE 1 TABLET(20 MG) BY MOUTH DAILY 90 tablet 0     morphine (MS CONTIN) 30 MG CR tablet TAKE 1 TABLET BY MOUTH EVERY DAY AS NEEDED FOR CHRONIC PAIN       omeprazole (PRILOSEC) 20 MG DR capsule Take 20 mg by mouth daily       senna-docusate (SENOKOT-S;PERICOLACE) 8.6-50 MG per tablet Take 1-2 tablets by mouth 2 times daily 20 tablet 0     tadalafil (CIALIS) 20 MG tablet Take 1 tablet (20 mg) by mouth daily as needed 10 tablet 11     testosterone (AXIRON) 30 MG/ACT topical solution Place 30 mg onto the skin       traZODone (DESYREL) 50 MG tablet TAKE 2 TABLETS BY MOUTH AT BEDTIME 180 tablet 3     dexamethasone (DECADRON) 4 MG/ML injection INJECT 1ML INTRAMUSCULARLY UTD WHEN UNABLE TO TAKE ORAL STEROIDS  3            Allergies:     No Known Allergies           ROS:        ROS: 10 point ROS neg other than the symptoms noted above in the HPI.             Physical Examiniation:     VITAL SIGNS: /85   Pulse 88   Ht 1.854 m (6' 1\")   Wt 108.9 kg (240 lb)   BMI 31.66 kg/m    BMI: Estimated body mass index is 31.66 kg/m  as calculated from the following:    Height as of this encounter: 1.854 m (6' 1\").    Weight as of this encounter: 108.9 kg (240 lb).    Gen: NAD, pleasant and cooperative   HEENT: NCAT, EOMI, no nystagmus, EMILIA, there is some reproducible headache and eye strain with VOMS. No taut or tender cervical paraspinal muscles, no facial asymmetry   Cardio: 2+ radial pulse, well perfused  Pulm: non-labored breathing in room air, symmetrical chest rise  Abd: " benign  Ext: WWP, no edema in BLE, no tenderness in calves  Neuro/MSK: 5/5 in c5-t1 and l2-s1 myotomes, SILT, negative rodriguez's b/l, CN 2-12 intact, negative romberg, negative single leg stance, negative fukada. AAOx3.  GAIT: WNFL             Laboratory/Imaging:     [facility] Generic [id number] - 06/15/2018  7:39 PM CDT  EXAM: MR BRAIN WITHOUT AND WITH IV CONTRAST      IMPRESSION: MRI brain without and with intravenous gadolinium (sella protocol)  06/15/2018. Indication pituitary deficiency. Comparison 06/24/2016.    The pituitary has normal morphologic, signal, and enhancement characteristics.  No suprasellar mass is present. The pituitary infundibulum appears intact. The  hypothalamus has a normal appearance. The brain continues to have normal  morphologic and FLAIR signal characteristics.           Assessment/Plan:     Jose Carlos was seen today for head injury.    Diagnoses and all orders for this visit:    Concussion without loss of consciousness, subsequent encounter    Post-traumatic headache, not intractable, unspecified chronicity pattern            1. Patient education: In depth discussion and education was provided about the assessment and implications of each of the below recommendations for management. Patient indicated readiness to learn, all questions were answered and understanding of material presented was confirmed.    2. Work-up: none     3. Therapy/equipment/braces:  continue therapy program, discussed retraining program as well as exercise     4. Medications: stop inderal     5. Interventions: discussed progressive return to activity.  Will call if headache does not improve or worsens.      6. Referral / follow up with other providers:  Routine PCP.      7. Follow up: 3 months for progress.  May benefit from sphenopalatine block.       Karsten Escobar, DO  Physical Medicine & Rehabilitation        I spent a total of 51 minutes face to face and coordinating care of Jose Carlos SALAZAR  Andi. Over 50% of my time on the visit was spent counseling the patient and /or coordinating care regarding recent Concussion.

## 2021-04-19 NOTE — LETTER
2021       RE: Jose Carlos Escamilla  [address]  [facility] [address]     Dear Colleague,    Thank you for referring your patient, Jose Carlos Escamilla, to the [facility] PHYSICAL MEDICINE AND REHABILITATION CLINIC [place] at [facility]. Please see a copy of my visit note below.           PM&R Clinic Note     Patient Name: Jose Carlos Escamilla : 1950 Medical Record: [id number]     Requesting Physician/clinician: No att. providers found           History of Present Illness:     Jose Carlos Escamilla is a 71 year old male that per records and reporting patient had 20 fall, slipped on ice and hit back of head, no LOC. Felt woozy, felt headache and confused.  Went to PCP and was told had a concussion, then rested through out new year started to feel  Well.  Courtney recently went for MRI and has had headache since .  He describes the headache as frontal and whole head, a dull ache.  Sometimes bothersome, other times just hanging in there.  Ibuprofen helps some as and hydrocodone.         Symptoms  CONCUSSION SYMPTOMS ASSESSMENT 2021 3/2/2021 2021   Headache or Pressure In Head 3 - moderate 3 - moderate 3 - moderate   Upset Stomach or Throwing Up 0 - none 0 - none 2 - mild to moderate   Problems with Balance 2 - mild to moderate 1 - mild 3 - moderate   Feeling Dizzy 2 - mild to moderate 1 - mild 2 - mild to moderate   Sensitivity to Light 2 - mild to moderate 3 - moderate 3 - moderate   Sensitivity to Noise 3 - moderate 4 - moderate to severe 3 - moderate   Mood Changes 2 - mild to moderate 3 - moderate 2 - mild to moderate   Feeling sluggish, hazy, or foggy 4 - moderate to severe 3 - moderate 3 - moderate   Trouble Concentrating, Lack of Focus 4 - moderate to severe 2 - mild to moderate 4 - moderate to severe   Motion Sickness 1 - mild 0 - none 1 - mild   Vision Changes 2 - mild to moderate 2 - mild to moderate 1 - mild   Memory Problems 3 -  moderate 2 - mild to moderate 2 - mild to moderate   Feeling Confused 3 - moderate 2 - mild to moderate 2 - mild to moderate   Neck Pain 0 - none 1 - mild 2 - mild to moderate   Trouble Sleeping 6 - excruciating 5 - severe 3 - moderate   Total Number of Symptoms 13 13 15   Symptom Severity Score 37 32 36       Current: headaches did get better, mild ones only, until yesterday morning.  He did too much activity yesterday and feels he flared up and right now today, feels he is doing better in regards to concussion.  He saw on consultation with PT and has since signed off.  He is getting new lenses from neuro-optometry.  Inderal was stopped due to side effect and Fioricet did not help, caused him to be too groggy.               Past Medical and Surgical History:     Past Medical History:   Diagnosis Date     ACP (advance care planning)      Chronic rhinitis      Diverticulosis      Esophageal stenosis      Hiatal hernia      HTN (hypertension)      Hypercholesterolemia      IBS (irritable bowel syndrome)      Post-traumatic osteoarthritis of both knees 2016     Past Surgical History:   Procedure Laterality Date     ARTHROPLASTY KNEE Left 2016    Procedure: ARTHROPLASTY KNEE;  Surgeon: Marisa Page MD;  Location: [facility] OR     DISCECTOMY CERVICAL MINIMALLY INVASIVE ONE LEVEL              Social History:     Social History     Tobacco Use     Smoking status: Former Smoker     Types: Cigarettes     Quit date: 1995     Years since quittin.8     Smokeless tobacco: Never Used   Substance Use Topics     Alcohol use: Yes     Alcohol/week: 0.0 - 3.3 standard drinks     Drinks per session: 5 or 6     Living situation: house  Family support:  Daughter close by   Vocational History:     Recreational drug use:  None          Functional history:     Jose Carlos Escamilla is independent with all aspects of life.    ADLs: I   Assistive devices: none   iADLs (medication management and finances): I  Hand  "dominance: L  Driving: yes            Family History:     Family History   Problem Relation Age of Onset     Diabetes Mother             Medications:     Current Outpatient Medications   Medication Sig Dispense Refill     chlorthalidone (HYGROTON) 25 MG tablet TAKE 1 TABLET(25 MG) BY MOUTH DAILY 90 tablet 1     diclofenac (VOLTAREN) 1 % GEL topical gel as needed.       GABAPENTIN PO Take 900 mg by mouth 3 times daily (Patient takes  X 900 mg morning, 5 x 300 mg bedtime= 2700mg dose) In the morning and at bedtime.  In adjustment dosage during July       HYDROcodone-acetaminophen (NORCO)  MG per tablet Take  tablets by mouth 4 times daily as needed for pain  0     hydrocortisone (CORTEF) 5 MG tablet 15 mg on waking and 5 mg at noon,double in sickness       lisinopril (ZESTRIL) 20 MG tablet TAKE 1 TABLET(20 MG) BY MOUTH DAILY 90 tablet 0     morphine (MS CONTIN) 30 MG CR tablet TAKE 1 TABLET BY MOUTH EVERY DAY AS NEEDED FOR CHRONIC PAIN       omeprazole (PRILOSEC) 20 MG DR capsule Take 20 mg by mouth daily       senna-docusate (SENOKOT-S;PERICOLACE) 8.6-50 MG per tablet Take 1-2 tablets by mouth 2 times daily 20 tablet 0     tadalafil (CIALIS) 20 MG tablet Take 1 tablet (20 mg) by mouth daily as needed 10 tablet 11     testosterone (AXIRON) 30 MG/ACT topical solution Place 30 mg onto the skin       traZODone (DESYREL) 50 MG tablet TAKE 2 TABLETS BY MOUTH AT BEDTIME 180 tablet 3     dexamethasone (DECADRON) 4 MG/ML injection INJECT 1ML INTRAMUSCULARLY UTD WHEN UNABLE TO TAKE ORAL STEROIDS  3            Allergies:     No Known Allergies           ROS:        ROS: 10 point ROS neg other than the symptoms noted above in the HPI.             Physical Examiniation:     VITAL SIGNS: /85   Pulse 88   Ht 1.854 m (6' 1\")   Wt 108.9 kg (240 lb)   BMI 31.66 kg/m    BMI: Estimated body mass index is 31.66 kg/m  as calculated from the following:    Height as of this encounter: 1.854 m (6' 1\").    Weight as of " this encounter: 108.9 kg (240 lb).    Gen: NAD, pleasant and cooperative   HEENT: NCAT, EOMI, no nystagmus, EMILIA, there is some reproducible headache and eye strain with VOMS. No taut or tender cervical paraspinal muscles, no facial asymmetry   Cardio: 2+ radial pulse, well perfused  Pulm: non-labored breathing in room air, symmetrical chest rise  Abd: benign  Ext: WWP, no edema in BLE, no tenderness in calves  Neuro/MSK: 5/5 in c5-t1 and l2-s1 myotomes, SILT, negative rodriguez's b/l, CN 2-12 intact, negative romberg, negative single leg stance, negative fukada. AAOx3.  GAIT: WNFL             Laboratory/Imaging:     Nathaniel Sommers [facility] Radiology Generic [id number] - 06/15/2018  7:39 PM CDT  EXAM: MR BRAIN WITHOUT AND WITH IV CONTRAST      IMPRESSION: MRI brain without and with intravenous gadolinium (sella protocol)  06/15/2018. Indication pituitary deficiency. Comparison 06/24/2016.    The pituitary has normal morphologic, signal, and enhancement characteristics.  No suprasellar mass is present. The pituitary infundibulum appears intact. The  hypothalamus has a normal appearance. The brain continues to have normal  morphologic and FLAIR signal characteristics.           Assessment/Plan:     Jose Carlos was seen today for head injury.    Diagnoses and all orders for this visit:    Concussion without loss of consciousness, subsequent encounter    Post-traumatic headache, not intractable, unspecified chronicity pattern    1. Patient education: In depth discussion and education was provided about the assessment and implications of each of the below recommendations for management. Patient indicated readiness to learn, all questions were answered and understanding of material presented was confirmed.    2. Work-up: none     3. Therapy/equipment/braces:  continue therapy program, discussed retraining program as well as exercise     4. Medications: stop inderal     5. Interventions: discussed progressive return to activity.   Will call if headache does not improve or worsens.      6. Referral / follow up with other providers:  Routine PCP.      7. Follow up: 3 months for progress.  May benefit from sphenopalatine block.     Karsten Escobar, DO  Physical Medicine & Rehabilitation    I spent a total of 51 minutes face to face and coordinating care of Jose Carlos Escamilla. Over 50% of my time on the visit was spent counseling the patient and /or coordinating care regarding recent Concussion.

## 2021-04-19 NOTE — TELEPHONE ENCOUNTER
----- Message from Beny Winters MD sent at 4/16/2021  4:56 PM CDT -----  Yes, nothing alarming on the spine.  Needs ultrasound for renal mass.  Thanks!  ----- Message -----  From: Kayla Ibanez LPN  Sent: 4/16/2021   4:19 PM CDT  To: Beny Winters MD    Patient called to ask us what follow up is needed for findings on MRI ordered by [facility] Spine-imaging done 1/20/21.   IMPRESSION:  1. Multilevel degenerative disc disease and facet arthropathy of the  lumbar spine, as described.  2. No significant spinal canal stenosis.  3. Varying degrees of mild/mild to moderate multilevel neural  foraminal narrowing, most pronounced on the left at L5-S1.  4. Incompletely evaluated partially visualized apparent round lesion  along the left renal sinus, possibly representing a nonspecific left  renal mass. Recommend renal ultrasound for further evaluation.    He is asking us to what to do for left renal mass. I am assuming order a renal ultrasound, correct?  Any other follow up you'd like?  Thanks  Kayla

## 2021-04-23 ENCOUNTER — TELEPHONE (OUTPATIENT)
Dept: FAMILY MEDICINE | Facility: CLINIC | Age: 71
End: 2021-04-23

## 2021-04-23 DIAGNOSIS — N28.89 NODULE OF KIDNEY: Primary | ICD-10-CM

## 2021-04-23 DIAGNOSIS — R93.422 ABNORMAL ULTRASOUND OF LEFT KIDNEY: ICD-10-CM

## 2021-04-23 NOTE — TELEPHONE ENCOUNTER
----- Message from Beny Winters MD sent at 4/23/2021  9:26 AM CDT -----  Notified patient of indeterminate results of renal ultrasound.  There is a nodule on the left kidney and MRI of the kidney is now recommended.  Can you help arrange?  Unsure if this is an imaging study that would require contrast or not.  Thanks.

## 2021-04-23 NOTE — CONFIDENTIAL NOTE
Order faxed to [facility] for left renal MRI with and without contrast (confirmed contrast needed with radiology clinic). Patient informed and plans to [facility] today to schedule.  Ashanti Green RN

## 2021-04-26 ENCOUNTER — TRANSFERRED RECORDS (OUTPATIENT)
Dept: FAMILY MEDICINE | Facility: CLINIC | Age: 71
End: 2021-04-26

## 2021-04-27 NOTE — PROGRESS NOTES
Outpatient Physical Therapy Discharge Note     Patient: Jose Carlos Escamilla  : 1950    Beginning/End Dates of Reporting Period:  2021 to 3/2/2021    Referring Provider: DO Kalyan Anguiano Diagnosis: exercise intolerance     Client Self Report: Things are much better. I took a week off work and it helped. My headaches are much less- still get them if I hurry with a lot of activities at home. No headaches with long walks (causal)    Objective Measurements:  Objective Measure: Headache  Details: 2/10     Outcome Measures (most recent score):  Concussion Symptom Assessment (score out of 90). A higher score indicates greater impairment: 32    Goals:  Goal Identifier Exercise tolerance   Goal Description Patient will demonstrate stable vitals and less than two-point increase in headache with moderate intensity of exercise for 20 minutes in order for symptom management   Target Date 21   Date Met   3/221   Progress:     Goal Identifier Headache   Goal Description Patient will report headaches consistently less than 3/10 during daily activities for safe performance   Target Date 21   Date Met   3/2/21   Progress:     Goal Identifier CSA   Goal Description Patient will score 20 or less on the CSA to improve symptoms and activity tolerance   Target Date 21   Date Met      Progress: Patient with CSA of 32; did not reassess due to 2 visits performed     Goal Identifier HEP   Goal Description Patient will be independent in home exercise program for maintenance of therapy gains at discharge   Target Date 21   Date Met   3/2/21   Progress:       Progress Toward Goals:   Progress this reporting period: Patient reports significant decrease in exercise intolerance along with decrease headache elicitation.  Patient is independent home exercise program and demonstrates ability to self regulate and monitor vitals and symptoms for safe exercise progression    Plan:  Discharge from  therapy.    Discharge:    Reason for Discharge: Requested patient to follow-up if continued symptom elicitation with exercise.  No follow-up performed; discharge completed    Equipment Issued: N/A    Discharge Plan: Patient to continue home program.

## 2021-05-03 ENCOUNTER — TRANSFERRED RECORDS (OUTPATIENT)
Dept: FAMILY MEDICINE | Facility: CLINIC | Age: 71
End: 2021-05-03

## 2021-05-13 ENCOUNTER — TRANSFERRED RECORDS (OUTPATIENT)
Dept: FAMILY MEDICINE | Facility: CLINIC | Age: 71
End: 2021-05-13

## 2021-06-02 DIAGNOSIS — I10 BENIGN ESSENTIAL HYPERTENSION: ICD-10-CM

## 2021-06-02 DIAGNOSIS — I10 ESSENTIAL HYPERTENSION: ICD-10-CM

## 2021-06-02 RX ORDER — LISINOPRIL 20 MG/1
TABLET ORAL
Qty: 90 TABLET | Refills: 0 | Status: SHIPPED | OUTPATIENT
Start: 2021-06-02 | End: 2021-08-17

## 2021-06-02 RX ORDER — CHLORTHALIDONE 25 MG/1
TABLET ORAL
Qty: 90 TABLET | Refills: 1 | Status: SHIPPED | OUTPATIENT
Start: 2021-06-02 | End: 2021-12-30

## 2021-06-05 VITALS — BODY MASS INDEX: 31.81 KG/M2 | WEIGHT: 240 LBS | HEIGHT: 73 IN

## 2021-06-10 ENCOUNTER — TRANSFERRED RECORDS (OUTPATIENT)
Dept: FAMILY MEDICINE | Facility: CLINIC | Age: 71
End: 2021-06-10

## 2021-06-13 NOTE — PROGRESS NOTES
"  Jose Carlos Escamilla is a 70 y.o. male who is being evaluated via a billable video visit.      The patient has been notified of following:     \"This video visit will be conducted via a call between you and your physician/provider. We have found that certain health care needs can be provided without the need for an in-person physical exam.  This service lets us provide the care you need with a video conversation.  If a prescription is necessary we can send it directly to your pharmacy.  If lab work is needed we can place an order for that and you can then stop by our lab to have the test done at a later time.    Video visits are billed at different rates depending on your insurance coverage. Please reach out to your insurance provider with any questions.    If during the course of the call the physician/provider feels a video visit is not appropriate, you will not be charged for this service.\"    Patient has given verbal consent to a Video visit? Yes  How would you like to obtain your AVS? AVS Preference: [email or web address].  If dropped by the video visit, the video invitation should be sent to: Text to cell phone: [phone]  Will anyone else be joining your video visit? No              Video-Visit Details    Type of service:  Video Visit    Originating Location (pt. Location): Home    Distant Location (provider location):  [facility]     Platform used for Video Visit: [employer]    This document was created using a software with less than 100% fidelity, at times resulting in unintended, even erroneous syntax and grammar.  The reader is advised to keep this under consideration while reviewing, interpreting this note.    ASSESSMENT AND PLAN:    Diagnoses and all orders for this visit:    Spinal stenosis of lumbar region without neurogenic claudication  -     Ambulatory referral to Spine Care    Chronic bilateral low back pain without sciatica    This patient presents with lumbosacral area pain, worse " "and precipitated by activity, relieved by rest, in the background of a diagnosis of fibromyalgia, hypoadrenalism.  He has several features which are consistent with spinal stenosis.  We discussed other aspects such as intermittent claudication.  He is due to be seen in his family physician's office this afternoon where he would request an examination for his vascular status of the lower extremities.  Meanwhile we will arrange for him to be seen in the spine clinic.  We will meet here in 3 months or sooner.      HISTORY OF PRESENTING ILLNESS:  Jose Carlos Escamilla 70 y.o. is evaluated here via video link.  This is for evaluation of new onset of pain.  He describes nearly 9-month of the symptoms.  This comes on in activity.  He does not have to walk too far to start getting pain.  He calls this and that his waist area, buttocks, no definite radiation down the legs, since he has to keep his physical activity going on, he simply just keeps walking, and till he rests, lays down the pain does not go away.  He has noted no definite radiation down the legs.  He does not have pain in the upper extremities with activity.  He is not a smoker.  This has improved somewhat with physical therapy.  When he goes grocery shopping leaning forward in cart has helped there is no swelling of the lower extremities.  The symptoms are in the background of longstanding history of low back pain muscle pain, which was finally diagnosed for several years of work-up at [facility] with fibromyalgia, this had begun 5 years ago, he follows up at the pain clinic where he gets narcotics, previously he was started on gabapentin that he still takes.  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  He did and he describes no personal or family history of psoriasis ulcerative colitis Crohn's disease.  He is an  \"on the verge of " "FDC\".  Rest of the 12 point review of systems negative.  Today we also discussed the issues related to the current pandemic, the pros and cons of the current treatment plan, the CDC guidelines such as social distancing washing the hands covering the cough.  ALLERGIES:Patient has no known allergies.    PAST MEDICAL/ACTIVE PROBLEMS/MEDICATION/SOCIAL DATA  No past medical history on file.  Social History     Tobacco Use   Smoking Status Never Smoker   Smokeless Tobacco Never Used     There is no problem list on file for this patient.    Current Outpatient Medications   Medication Sig Dispense Refill     chlorthalidone (HYGROTEN) 25 MG tablet Take 25 mg by mouth daily.       gabapentin (NEURONTIN) 300 MG capsule Take 300 mg by mouth. Pt states taking 2700 mg total dose daily       hydrocortisone (CORTEF) 10 MG tablet Take 10 mg by mouth 4 (four) times a day.       lisinopriL (PRINIVIL,ZESTRIL) 20 MG tablet Take 20 mg by mouth daily.       testosterone (TESTIM) 50 mg/5 gram (1 %) Gel topical gel Apply topically daily. 60 MG tube       traZODone (DESYREL) 50 MG tablet Take 50 mg by mouth 2 (two) times a day.       No current facility-administered medications for this visit.          EXAMINATION:    Well appearing alert oriented.   Examination of the eyes revealed no redness, obvious scleromalacia.  ENT examination shows no nasal deformity such as of saddle type, external ear without signs of inflamm/deformity ation.  Cardiopulmonary examination without obvious signs of dyspnea, no wheezing is audible, no cyanosis.  There is no finger clubbing.  Skin examination performed for heliotrope, malar area eruption periungual erythema, lupus pernio.  Neurological examination shows the speech is fluent, no facial asymmetry, muscle power in the upper extremities proximally appears to be normal.  In the psychiatric examination the memory, orientation, attention, affect were observable and normal.  Joint examination of the " DIPs, PIPs, MCPs, IP joints of the thumbs, wrists, elbows, for swelling, range of motion, for shoulders range of motion evaluated.  He appears alert, oriented, speech is fluent, he has no dactylitis of the digits, no Maller area eruption.  Does not have heliotrope around the eyes.    LAB / IMAGING DATA:  No results found for: ALT, ALBUMIN, CREATININE    No results found for: WBC, HGB, PLT    Lab Results   Component Value Date    SEDRATE 3 11/30/2016       Call start: 9:17am  Call end:   9:39 am

## 2021-06-14 NOTE — PATIENT INSTRUCTIONS - HE
Please call [facility] to schedule your MRIs if you don't hear from them, [phone].    ~Please call our [facility] Nurse Navigation line [phone] with any questions or concerns about your treatment plan, if symptoms worsen and you would like to be seen urgently, or if you have problems controlling bladder and bowel function.

## 2021-06-14 NOTE — PROGRESS NOTES
ASSESSMENT: Jose Carlos Escamilla is a 70 y.o. male who presents for consultation at the request of PCP Baljit Salazar MD, with a past medical history significant for hypertension, hypercholesterolemia, family history hemochromatosis, history of rheumatic fever, hypoadrenalism, IBS, anxiety, esophageal stenosis/esophageal reflux, obesity, chronic LBP, fibromyalgia, polymyalgia rheumatica 3/21/2019 diagnosis, plantar fasciitis, peripheral neuropathy, Left total knee replacement 2016, narcotic dependence 2016 seen by [facility] pain clinic, who presents today for new patient evaluation of:    -Chronic generalized thoracic spine upper to mid region with intermittent severe flareups.    -Chronic more tolerable bilateral low back pain.    -Chronic progressive lower extremity pain with paresthesias and cold sensation.  Patient reports scheduled vascular study ordered by PCP for tomorrow.  Cannot completely rule out vascular etiology although he does have normal pedal pulses.    -Generalized diffuse myofascial pain consistent with chronic fibromyalgia    Patient is neurologically intact on exam. No myelopathic or red flag symptoms.     WANDER Score: 28    WHO 5: 13     Diagnoses and all orders for this visit:    Chronic bilateral thoracic back pain  -     MR Thoracic Spine Without Contrast; Future; Expected date: 01/06/2021  -     DULoxetine (CYMBALTA) 30 MG capsule; 30 mg, 1 tablet daily x1 week then 2 tablets daily thereafter  Dispense: 60 capsule; Refill: 3    Thoracic degenerative disc disease  -     MR Thoracic Spine Without Contrast; Future; Expected date: 01/06/2021    Chronic bilateral low back pain with bilateral sciatica  -     MR Lumbar Spine Without Contrast; Future; Expected date: 01/06/2021  -     EMG; Future; Expected date: 01/06/2021  -     DULoxetine (CYMBALTA) 30 MG capsule; 30 mg, 1 tablet daily x1 week then 2 tablets daily thereafter  Dispense: 60 capsule; Refill: 3    Paresthesia of both lower  extremities  -     MR Lumbar Spine Without Contrast; Future; Expected date: 01/06/2021  -     EMG; Future; Expected date: 01/06/2021    Diffuse myofascial pain syndrome  -     DULoxetine (CYMBALTA) 30 MG capsule; 30 mg, 1 tablet daily x1 week then 2 tablets daily thereafter  Dispense: 60 capsule; Refill: 3    Personal history of fibromyalgia  -     DULoxetine (CYMBALTA) 30 MG capsule; 30 mg, 1 tablet daily x1 week then 2 tablets daily thereafter  Dispense: 60 capsule; Refill: 3      PLAN:  Reviewed spine anatomy and disease process. Discussed diagnosis and treatment options with the patient today. A shared decision making model was used.  The patient's values and choices were respected. The following represents what was discussed and decided upon by the provider and the patient.      -DIAGNOSTIC TESTS:  Images were personally reviewed and interpreted and explained to patient today using spine model.   --Ordered bilateral lower extremity EMG to evaluate for possible radiculopathy versus peripheral neuropathy.  --Ordered updated lumbar and thoracic spine MRI to evaluate for chronic thoracic pain as well as lower extremity progressive pain with paresthesias.  --Lumbar spine [facility] with T12 nonspecific osseous lesion which was thought to be a hemangioma on bone scan 5/16/2016.  Otherwise grade 1 L5-S1 retrolisthesis with mild to moderate bilateral foraminal stenosis.  Thoracic spine [facility] again reveals T12 lesion.  Multilevel disc bulges noted in thoracic spine, no high-grade nerve compression noted however.    -PHYSICAL THERAPY: Can revisit physical therapy pending imaging and EMG results.  Patient may benefit from postural restoration physical therapy for chronic thoracic pain.  Discussed the importance of core strengthening, ROM, stretching exercises with the patient and how each of these entities is important in decreasing pain.  Explained to the patient that the purpose of  physical therapy is to teach the patient a home exercise program.  These exercises need to be performed every day in order to decrease pain and prevent future occurrences of pain.        -MEDICATIONS: Prescribe Cymbalta 30 mg once daily for 1 week then 2 tablets daily thereafter for chronic diffuse myofascial pain as well as thoracic and chronic LBP.  -Otherwise continue with gabapentin which is a long-term medication 3 mg 5 tablets twice daily.  Discussed multiple medication options today with patient. Discussed risks, side effects, and proper use of medications. Patient verbalized understanding.    -INTERVENTIONS: Could consider thoracic spine injections which he may be open to if indicated pending thoracic imaging.  Discussed risks and benefits of injections with patient today.    -PATIENT EDUCATION:  45 minutes of total visit time was spent face to face with the patient today, greater than 50% of total time spent with patient was spent on counseling, education, and coordinating care.   -15 minutes spent outside of visit time, non-face-to-face time, reviewing chart.  -Today we also discussed the issues related to the current COVID-19 pandemic, the pros and cons of the current treatment plan, the CDC guidelines such as social distancing, washing the hands, and covering the cough.  -The patient was masked and proper PPE was worn by the provider for the duration of this visit including a surgical mask, gloves, and protective eyewear.    -FOLLOW-UP:   Follow-up for EMG with Dr. Mendez and then post imaging review.    Advised patient to call the Spine Center if symptoms worsen or you have problems controlling bladder and bowel function.   ______________________________________________________________________    SUBJECTIVE:  HPI:  Jose Carlos Escamilla  Is a 70 y.o. male who presents today for new patient evaluation of chronic generalized thoracic pain ongoing for 20 years with intermittent significant flareups that  is his biggest concern.  Recently they have been happening on a regular occasion for 10 minutes or so that is more mild but he does have occasionally severe pain but that is more infrequent.  Currently he does report that his pain in general is an 8/10 throughout his whole body as he does have chronic fibromyalgia that causes muscles pain he reports throughout his neck thoracic and lumbar spine as well as upper and lower extremities.  Patient does have chronic bilateral low back pain but reports that that has actually improved with recent physical therapy and is more tolerable than his thoracic spine pain.    He does endorse lateral thigh pain as well as numbness and tingling and a cold sensation that occurs at times below his knees reports that this typically happens more so at bedtime.  Patient denies lower extremity weakness or recent trips or falls or trauma.  Patient denies bowel or bladder loss control.    History of fibromyalgia pain in 2016 he reports that that pain was much different and more severe than his current pain.    -Treatment to Date: No prior spinal surgery.  Physical therapy [facility] 10 6 sessions 2020 LBP with benefit.    -Medications:  Chronic gabapentin 300 mg 5-0-5 with benefit    -Multiple medications in the past but he does not report being on any opioids for the last couple of years.    Current Outpatient Medications on File Prior to Encounter   Medication Sig Dispense Refill     gabapentin (NEURONTIN) 300 MG capsule Take 1,500 mg by mouth 2 (two) times a day.        HYDROcodone-acetaminophen (NORCO )  mg per tablet Take 1-2 tablets by mouth every 6 (six) hours as needed.       hydrocortisone (CORTEF) 5 MG tablet TAKE 3 TABLETS BY MOUTH ON WAKING AND 1 TABLET AT NOON, DOUBLE IN SICKNESS       morphine (MS CONTIN) 15 MG 12 hr tablet Take 15 mg by mouth every 12 (twelve) hours as needed.       testosterone (AXIRON) 30 mg/actuation (1.5 mL) SlPm topical solution Place 2 act  (60 mg) on skin daily.       chlorthalidone (HYGROTEN) 25 MG tablet Take 25 mg by mouth daily.       lisinopriL (PRINIVIL,ZESTRIL) 20 MG tablet Take 20 mg by mouth daily.       omeprazole (PRILOSEC) 20 MG capsule Take 20 mg by mouth daily before breakfast.       testosterone (TESTIM) 50 mg/5 gram (1 %) Gel topical gel Apply topically daily. 60 MG tube       traZODone (DESYREL) 50 MG tablet Take 100 mg by mouth at bedtime.        [DISCONTINUED] hydrocortisone (CORTEF) 10 MG tablet Take 10 mg by mouth 4 (four) times a day.       No current facility-administered medications on file prior to encounter.        No Known Allergies    Past Medical History:   Diagnosis Date     Anxiety      Hypertension         Patient Active Problem List   Diagnosis     Chronic bilateral low back pain without sciatica       Past Surgical History:   Procedure Laterality Date     NECK SURGERY  2001    [place]       Family History   Problem Relation Age of Onset     Diabetes Mother      Hypertension Mother      Cancer Sister      Hypertension Brother        Reviewed past medical, surgical, and family history with patient found on new patient intake packet located in EMR Media tab.     SOCIAL HX: Patient is  and works as an , otherwise has a history of light exercise.  Patient denies tobacco use.  Does report occasional moderate alcohol use.  Denies history being heavy drinker.  Patient denies recreational drug use.    ROS: Positive for muscle pain, muscle fatigue, insomnia, anxiety, excessive tiredness, difficulty swallowing, headache, constipation, reflux.  Specifically negative for bowel/bladder dysfunction, balance changes, headache, dizziness, foot drop, fevers, chills, appetite changes, nausea/vomiting, unexplained weight loss. Otherwise 13 systems reviewed are negative. Please see the patient's intake questionnaire from today for details.    OBJECTIVE:  /78 (Patient Site: Right Arm, Patient Position: Sitting)    "Ht 6' 1\" (1.854 m)   Wt (!) 240 lb (108.9 kg)   BMI 31.66 kg/m      PHYSICAL EXAMINATION:    --CONSTITUTIONAL:  Vital signs as above.  No acute distress.  The patient is well nourished and well groomed.  --PSYCHIATRIC:  Appropriate mood and affect. The patient is awake, alert, oriented to person, place, time and answering questions appropriately with clear speech.    --SKIN:  Skin over the face, bilateral lower extremities, and posterior torso is clean, dry, intact without rashes.    --RESPIRATORY: Normal rhythm and effort. No abnormal accessory muscle breathing patterns noted.   --STANDING EXAMINATION:  Normal lumbar lordosis noted, no lateral shift.  --MUSCULOSKELETAL: Lumbar spine inspection reveals no evidence of deformity. Range of motion is not limited in lumbar flexion, extension, lateral rotation. No tenderness to palpation lumbar spine. Straight leg raising in the supine position is negative to radicular pain. Sciatic notch non-tender.  --SACROILIAC JOINT: One Finger point test negative.  --GROSS MOTOR: Gait is non-antalgic. Easily arises from a seated position.   --LOWER EXTREMITY MOTOR TESTING:  Plantar flexion left 5/5, right 5/5   Dorsiflexion left 5/5, right 5/5   Great toe MTP extension left 5/5, right 5/5  Knee flexion left 5/5, right 5/5  Knee extension left 5/5, right 5/5   Hip flexion left 5/5, right 5/5  Hip abduction left 5/5, right 5/5  Hip adduction left 5/5, right 5/5   --HIPS: Full range of motion bilaterally.   --NEUROLOGICAL:  2/4 patellar, medial hamstring, and achilles reflexes bilaterally.  Sensation to light touch is intact in the bilateral L4, L5, and S1 dermatomes. Babinski is negative. No clonus.  Negative Vinod reflex bilaterally.  --VASCULAR:  2/4 dorsalis pedis and posterior tibialsi pulses bilaterally.  Bilateral lower extremities are warm.  There is no pitting edema of the bilateral lower extremities.    CERVICAL:   --HEENT:  Sclera are non-injected.  Extraocular muscles " are intact.  Thyroid moves easily upon swallowing.  Moist oral mucosa.  --SKIN:  Skin over the face, bilateral upper extremities, and posterior torso is clean, dry, intact without rashes.  --UPPER EXTREMITY MOTOR TESTING:  Wrist flexion left 5/5, right 5/5  Wrist extension left 5/5, right 5/5  Pronators left 5/5, right 5/5  Biceps left 5/5, right 5/5   Triceps left 5/5, right 5/5   Shoulder abduction left 5/5, right 5/5   left 5/5, right 5/5  --NEUROLOGIC:  CN III-XII are grossly intact.  Bilateral patellar and achilles reflexes are physiologic and symmetric. 2/4 symmetric biceps, brachioradialis, triceps reflexes bilaterally.  Sensation to upper extremities is intact.  Negative Lees's bilaterally.    --VASCULAR:  2/4 radial pulses bilaterally.  Warm upper limbs bilaterally.  Capillary refill in the upper extremities is less than 1 second. No obvious lower extremity swelling or varicosities.   --CERVICAL SPINE: Inspection reveals no evidence of deformity. Range of motion is not limited in cervical flexion, extension, or lateral rotation. No tenderness to palpation. Spurlings maneuver is negative to radicular pain.      RESULTS: Prior medical records from [facility] 12/21/2020 Dr. Bowman and care everywhere were reviewed today.    Imaging:       Whole-body bone scan  [facility]-5/16/2016  IMPRESSION:    1. No clear cervical abnormal uptake to correlate with suspected atypical hemangiomas in the cervical spine.  2. Mild uptake along the right aspect of the T10 and T12 vertebral bodies which are nonspecific. There is a lesion at T12 extending to the right pedicle noted on the comparison MRI but an atypical hemangioma was favored.  3. More prominent areas of uptake at the knee joints medially left greater than right favored to represent osteoarthritis. There is also some focal uptake either within the left proximal fibula versus lateral aspect of the proximal tibia which is   nonspecific and could  represent healing fracture perhaps or stress reaction. Correlation with plain films may be helpful if clinically indicated.

## 2021-06-14 NOTE — PATIENT INSTRUCTIONS - HE
Thank you for choosing the [facility] for your EMG testing.    The ordering provider will receive your final EMG results within the next few days.  Please follow up with your provider for the results and further treatment recommendations.

## 2021-06-14 NOTE — TELEPHONE ENCOUNTER
Phone call to patient to review results and provider's recommendations. Results given and explained. Explained PSP is awaiting the MRI for further diagnostics to determine next steps in care. Stated understanding. He reports he is having his MRI this afternoon at [facility].

## 2021-06-16 NOTE — TELEPHONE ENCOUNTER
----- Message from Eloisa Jung CNP sent at 4/16/2021 12:32 PM CDT -----  Regarding: MRI results  Thoracic and lumbar spine MRI report reviewed from care everywhere [facility], lumbar spine MRI was reviewed in PACS.  However the thoracic spine MRI does not appear to be pushed through.  Please contact [facility] radiology to push the thoracic MRI through.    Please call patient and notify him of the findings below: -Lumbar spine MRI does show degenerative changes with facet arthritis with multilevel mild to moderate nerve compression greatest on the left at L5-S1.  -Thoracic spine MRI with degenerative changes T7-8 and T8-9 with disc protrusion as well as some degenerative disc and facet arthritis changes.  No high-grade or worrisome nerve compression however. In regards to spine findings, it would be reasonable to trial physical therapy as discussed back in January with postural restoration.  Otherwise if he wants to discuss injection options for thoracic or lumbar spine would recommend following up in clinic to discuss this more in detail.      ##Of incidental finding was a abnormal finding in the left kidney.  Recommend following up with primary care provider to discuss potential renal ultrasound for further evaluation.    Thanks,  Eloisa

## 2021-06-16 NOTE — TELEPHONE ENCOUNTER
PSP:  Eloisa Jung CNP  Last clinic visit:  1/6/21 OV/consult; 1/19/21 EMG  Reason for call: Called and left message on nurse line that he had MRIs done in January and hadn't heard of the results.   Clinical information:  Chart reviewed. Patient did have the MRIs on 1/20/21 and are uploaded from [employer].   Advice given to patient: Returned patient's call to discus the process and not aware of imaging if done outside system. Left message to call back.   Provider to address: Please review MRIs. Will call patient with results and your recommendations

## 2021-06-16 NOTE — TELEPHONE ENCOUNTER
"Phone call to patient to review results and provider's recommendations. Results given and explained. Discussed treatment options Of PT with postural restoration or returning for a follow up with PSP to discuss images further as well as injection options. \"The pain I am having is not from my spine. I have extensive pain throughout my body. I can have severe pain anywhere in my body at any given time. I was sent to Dr. Bowman the rheumatologist and all he did was send me to you. I told them when I got sent to him initially that this was not what I needed. He dropped the ball. Didn't order a thing, but sent me to you. No follow through at all.\" States he got a headache after the MRI that lasted for 2 months.     Chart reviewed. Patient had sent PSP a [employer] message in regards to his findings on 2/9/21. PSP did respond with the findings once they were pushed to our system. Unfortunately, patient never read this message.     States he did go back and see his PCP today. \"We are going in a new direction. Starting all over and will be referred to a muscular neurologist now which is what I think I need.\" Patient is informed of the abnormal finding on the left kidney. He will call his PCP to see if further work up is needed.     Patient appreciative of results and follow through today. Was offered to speak with clinic manager or patient relations number. He did not want that as he was not unhappy with care here.   "

## 2021-06-17 ENCOUNTER — TRANSFERRED RECORDS (OUTPATIENT)
Dept: FAMILY MEDICINE | Facility: CLINIC | Age: 71
End: 2021-06-17

## 2021-07-12 ENCOUNTER — TRANSFERRED RECORDS (OUTPATIENT)
Dept: FAMILY MEDICINE | Facility: CLINIC | Age: 71
End: 2021-07-12

## 2021-07-12 ENCOUNTER — OFFICE VISIT (OUTPATIENT)
Dept: FAMILY MEDICINE | Facility: CLINIC | Age: 71
End: 2021-07-12

## 2021-07-12 VITALS
SYSTOLIC BLOOD PRESSURE: 120 MMHG | OXYGEN SATURATION: 98 % | WEIGHT: 249.5 LBS | DIASTOLIC BLOOD PRESSURE: 69 MMHG | BODY MASS INDEX: 32.92 KG/M2 | TEMPERATURE: 98.2 F | HEART RATE: 96 BPM

## 2021-07-12 DIAGNOSIS — M35.3 PMR (POLYMYALGIA RHEUMATICA) (H): ICD-10-CM

## 2021-07-12 DIAGNOSIS — G89.4 CHRONIC PAIN SYNDROME: ICD-10-CM

## 2021-07-12 DIAGNOSIS — R10.31 RLQ ABDOMINAL PAIN: Primary | ICD-10-CM

## 2021-07-12 PROBLEM — G89.29 CHRONIC BILATERAL LOW BACK PAIN WITHOUT SCIATICA: Status: ACTIVE | Noted: 2020-12-21

## 2021-07-12 PROBLEM — E27.49 SECONDARY HYPOCORTISOLISM (H): Status: ACTIVE | Noted: 2018-05-31

## 2021-07-12 PROBLEM — M54.50 CHRONIC BILATERAL LOW BACK PAIN WITHOUT SCIATICA: Status: ACTIVE | Noted: 2020-12-21

## 2021-07-12 LAB
% GRANULOCYTES: 85 % (ref 42.2–75.2)
ERYTHROCYTE [SEDIMENTATION RATE] IN BLOOD: 0 MM/HR (ref 0–15)
HCT VFR BLD AUTO: 46.5 % (ref 39–51)
HEMOGLOBIN: 16.2 G/DL (ref 13.4–17.5)
LYMPHOCYTES NFR BLD AUTO: 11.9 % (ref 20.5–51.1)
MCH RBC QN AUTO: 31.9 PG (ref 27–31)
MCHC RBC AUTO-ENTMCNC: 34.8 G/DL (ref 33–37)
MCV RBC AUTO: 91.6 FL (ref 80–100)
MONOCYTES NFR BLD AUTO: 3.1 % (ref 1.7–9.3)
PLATELET # BLD AUTO: 256 K/UL (ref 140–450)
RBC # BLD AUTO: 5.07 X10/CMM (ref 4.2–5.9)
WBC # BLD AUTO: 11.8 X10/CMM (ref 3.8–11)

## 2021-07-12 PROCEDURE — 85651 RBC SED RATE NONAUTOMATED: CPT | Performed by: NURSE PRACTITIONER

## 2021-07-12 PROCEDURE — 85025 COMPLETE CBC W/AUTO DIFF WBC: CPT | Performed by: NURSE PRACTITIONER

## 2021-07-12 PROCEDURE — 36415 COLL VENOUS BLD VENIPUNCTURE: CPT | Performed by: NURSE PRACTITIONER

## 2021-07-12 PROCEDURE — 99214 OFFICE O/P EST MOD 30 MIN: CPT | Performed by: NURSE PRACTITIONER

## 2021-07-12 RX ORDER — GABAPENTIN 300 MG/1
CAPSULE ORAL
COMMUNITY
Start: 2021-06-02 | End: 2021-07-13

## 2021-07-12 NOTE — PROGRESS NOTES
"Problem(s) Oriented visit        SUBJECTIVE:                                                    Jose Carlos Escamilla is a 71 year old male who presents to clinic today for the following health issues :    Abdominal pain started Thursday in the morning (4 days ago). Saturday felt wiped out. Took laxative thinking just constipated. Takes Norco chronically. Had 10 BM's yesterday. Decreased appetite but had toast this morning. No nausea, vomiting. Rates pain 4-5/10 currently but as bad as 8/10 yesterday.  Has adrenal insufficiency & fibromyalgia, polymyalgia rheumatica - has pain all the time. Pain usually the worst when he wakes up.  Last colonoscopy 2008 - diverticulosis in large intestine noted. No history of abdominal surgeries.   Just got back from vacation in [facility].. visiting son & daughter last week. Thinks maybe large amount of raw oysters may have contributed to symptoms.  Denies fever, chills, chest pain, pressure, palpitations, shortness of breath, dysuria, flank pain.  Did mention \"odd\" symptoms of burning sensation inner thighs and groin area in the morning for the last couple of months. Also a \"massage-like\" feeling in groin about 3-4 times per day.    Problem list, Medication list, Allergies, and Medical/Social/Surgical histories reviewed in [email or web address] and updated as appropriate.   Additional history: as documented    ROS:  5 point ROS completed and negative except noted above, including Gen, CV, Resp, GI,     OBJECTIVE:                                                    /69   Pulse 96   Temp 98.2  F (36.8  C)   Wt 113.2 kg (249 lb 8 oz)   SpO2 98%   BMI 32.92 kg/m    Body mass index is 32.92 kg/m .   GENERAL: Adult male appears uncomfortable - squinting face intermittently with pain  RESP: lungs clear to auscultation - no rales, rhonchi or wheezes  CV: regular rates and rhythm, normal S1 S2, no S3 or S4 and no murmur, click or rub  ABDOMEN: obese, umbilical & RLQ pain, bowel sounds active x " 4  SKIN: no suspicious lesions or rashes  PSYCH: normal affect & mood    CBC RESULTS: Recent Labs   Lab Test 07/12/21  0000   WBC 11.8*   RBC 5.07   HGB 16.2   HCT 46.5   MCV 91.6   MCH 31.9*   MCHC 34.8        Sed Rate   Date Value Ref Range Status   04/16/2021 5 0 - 15 mm/hr Final          ASSESSMENT/PLAN:                                                      Jose Carlos was seen today for abdominal pain and constipation.    Diagnoses and all orders for this visit:    RLQ abdominal pain  -     CBC with Diff/Plt ([facility])  -     Sed Rate Westergren ([facility])  -     C-Reactive Protein  Quant ([facility])  -     Comp. Metabolic Panel (14) ([facility])  -     Referral to [facility]; Future  Abdominal pain Ddx:  Constipation, GERD/Ulcer, Appendix, Diverticular Disease, Non Specific and Viral Gastroenteritis    As per routine while the current symptoms do not suggest emergent need for ER evaluation, if the current symptoms increase or fail to resolve as expected we discussed the need for close follow up with us or to utilize the emergency room.    Will start with the following work up: labs & abdominal CT scan    PMR (polymyalgia rheumatica) ([facility])  Chronic pain syndrome  Known issue that I take into account for his medical decisions, no current exacerbations or new concerns.      See Patient Instructions  Patient Instructions   I will follow-up with lab results and after your CT scan.   Will base treatment plan off of test results    Go to ER if having: new fever, nausea/vomiting, worsening pain      NIMA Ruffin CNP  [facility]  Family Practice  [facility]  [phone]    For any issues my office # is [phone]

## 2021-07-12 NOTE — PATIENT INSTRUCTIONS
I will follow-up with lab results and after your CT scan.   Will base treatment plan off of test results    Go to ER if having: new fever, nausea/vomiting, worsening pain

## 2021-07-13 ENCOUNTER — HOSPITAL ENCOUNTER (INPATIENT)
Facility: CLINIC | Age: 71
LOS: 3 days | Discharge: HOME | DRG: 330 | End: 2021-07-16
Attending: EMERGENCY MEDICINE | Admitting: STUDENT IN AN ORGANIZED HEALTH CARE EDUCATION/TRAINING PROGRAM
Payer: MEDICARE

## 2021-07-13 ENCOUNTER — TELEPHONE (OUTPATIENT)
Dept: FAMILY MEDICINE | Facility: CLINIC | Age: 71
End: 2021-07-13

## 2021-07-13 DIAGNOSIS — G89.18 ACUTE POST-OPERATIVE PAIN: ICD-10-CM

## 2021-07-13 DIAGNOSIS — Q43.0 MECKEL'S DIVERTICULITIS: Primary | ICD-10-CM

## 2021-07-13 DIAGNOSIS — Z96.652 STATUS POST TOTAL LEFT KNEE REPLACEMENT: ICD-10-CM

## 2021-07-13 DIAGNOSIS — Q43.0 MECKEL'S DIVERTICULUM: ICD-10-CM

## 2021-07-13 LAB
ALBUMIN SERPL-MCNC: 3.7 G/DL (ref 3.4–5)
ALBUMIN SERPL-MCNC: 4.4 G/DL (ref 3.8–4.8)
ALBUMIN/GLOB SERPL: 2.1 {RATIO} (ref 1.2–2.2)
ALP SERPL-CCNC: 44 U/L (ref 40–150)
ALP SERPL-CCNC: 48 IU/L (ref 48–121)
ALT SERPL W P-5'-P-CCNC: 26 U/L (ref 0–70)
ALT SERPL-CCNC: 18 IU/L (ref 0–44)
ANION GAP SERPL CALCULATED.3IONS-SCNC: 2 MMOL/L (ref 3–14)
AST SERPL W P-5'-P-CCNC: 12 U/L (ref 0–45)
AST SERPL-CCNC: 13 IU/L (ref 0–40)
BASOPHILS # BLD AUTO: 0.1 10E3/UL (ref 0–0.2)
BASOPHILS NFR BLD AUTO: 1 %
BILIRUB SERPL-MCNC: 0.4 MG/DL (ref 0–1.2)
BILIRUB SERPL-MCNC: 0.6 MG/DL (ref 0.2–1.3)
BUN SERPL-MCNC: 14 MG/DL (ref 7–30)
BUN SERPL-MCNC: 15 MG/DL (ref 8–27)
BUN/CREATININE RATIO: 18 (ref 10–24)
CALCIUM SERPL-MCNC: 8.8 MG/DL (ref 8.6–10.2)
CALCIUM SERPL-MCNC: 8.9 MG/DL (ref 8.5–10.1)
CHLORIDE BLD-SCNC: 102 MMOL/L (ref 94–109)
CHLORIDE SERPLBLD-SCNC: 97 MMOL/L (ref 96–106)
CO2 SERPL-SCNC: 30 MMOL/L (ref 20–32)
CREAT SERPL-MCNC: 0.85 MG/DL (ref 0.76–1.27)
CREAT SERPL-MCNC: 0.89 MG/DL (ref 0.66–1.25)
CRP SERPL-MCNC: 51 MG/L (ref 0–10)
EGFR IF AFRICN AM: 103 ML/MIN/1.73
EGFR IF NONAFRICN AM: 89 ML/MIN/1.73
EOSINOPHIL # BLD AUTO: 0.1 10E3/UL (ref 0–0.7)
EOSINOPHIL NFR BLD AUTO: 1 %
ERYTHROCYTE [DISTWIDTH] IN BLOOD BY AUTOMATED COUNT: 12.3 % (ref 10–15)
GFR SERPL CREATININE-BSD FRML MDRD: 87 ML/MIN/1.73M2
GLOBULIN, TOTAL: 2.1 G/DL (ref 1.5–4.5)
GLUCOSE BLD-MCNC: 94 MG/DL (ref 70–99)
GLUCOSE SERPL-MCNC: 102 MG/DL (ref 65–99)
HCT VFR BLD AUTO: 48.1 % (ref 40–53)
HGB BLD-MCNC: 16.2 G/DL (ref 13.3–17.7)
IMM GRANULOCYTES # BLD: 0 10E3/UL
IMM GRANULOCYTES NFR BLD: 0 %
LYMPHOCYTES # BLD AUTO: 1.7 10E3/UL (ref 0.8–5.3)
LYMPHOCYTES NFR BLD AUTO: 17 %
MCH RBC QN AUTO: 31.7 PG (ref 26.5–33)
MCHC RBC AUTO-ENTMCNC: 33.7 G/DL (ref 31.5–36.5)
MCV RBC AUTO: 94 FL (ref 78–100)
MONOCYTES # BLD AUTO: 0.9 10E3/UL (ref 0–1.3)
MONOCYTES NFR BLD AUTO: 9 %
NEUTROPHILS # BLD AUTO: 7.1 10E3/UL (ref 1.6–8.3)
NEUTROPHILS NFR BLD AUTO: 72 %
NRBC # BLD AUTO: 0 10E3/UL
NRBC BLD AUTO-RTO: 0 /100
PLATELET # BLD AUTO: 263 10E3/UL (ref 150–450)
POTASSIUM BLD-SCNC: 3.6 MMOL/L (ref 3.4–5.3)
POTASSIUM SERPL-SCNC: 4.1 MMOL/L (ref 3.5–5.2)
PROT SERPL-MCNC: 6.5 G/DL (ref 6–8.5)
PROT SERPL-MCNC: 7.5 G/DL (ref 6.8–8.8)
RBC # BLD AUTO: 5.11 10E6/UL (ref 4.4–5.9)
SARS-COV-2 RNA RESP QL NAA+PROBE: NEGATIVE
SODIUM SERPL-SCNC: 134 MMOL/L (ref 133–144)
SODIUM SERPL-SCNC: 135 MMOL/L (ref 134–144)
TOTAL CO2: 24 MMOL/L (ref 20–29)
WBC # BLD AUTO: 10 10E3/UL (ref 4–11)

## 2021-07-13 PROCEDURE — 250N000011 HC RX IP 250 OP 636: Performed by: SURGERY

## 2021-07-13 PROCEDURE — 93005 ELECTROCARDIOGRAM TRACING: CPT

## 2021-07-13 PROCEDURE — 250N000011 HC RX IP 250 OP 636: Performed by: STUDENT IN AN ORGANIZED HEALTH CARE EDUCATION/TRAINING PROGRAM

## 2021-07-13 PROCEDURE — 250N000011 HC RX IP 250 OP 636

## 2021-07-13 PROCEDURE — 80053 COMPREHEN METABOLIC PANEL: CPT | Performed by: EMERGENCY MEDICINE

## 2021-07-13 PROCEDURE — 96365 THER/PROPH/DIAG IV INF INIT: CPT

## 2021-07-13 PROCEDURE — 85025 COMPLETE CBC W/AUTO DIFF WBC: CPT | Performed by: EMERGENCY MEDICINE

## 2021-07-13 PROCEDURE — 36592 COLLECT BLOOD FROM PICC: CPT | Performed by: EMERGENCY MEDICINE

## 2021-07-13 PROCEDURE — 99223 1ST HOSP IP/OBS HIGH 75: CPT | Mod: AI | Performed by: STUDENT IN AN ORGANIZED HEALTH CARE EDUCATION/TRAINING PROGRAM

## 2021-07-13 PROCEDURE — 250N000013 HC RX MED GY IP 250 OP 250 PS 637: Performed by: SURGERY

## 2021-07-13 PROCEDURE — 96375 TX/PRO/DX INJ NEW DRUG ADDON: CPT

## 2021-07-13 PROCEDURE — 87635 SARS-COV-2 COVID-19 AMP PRB: CPT | Performed by: EMERGENCY MEDICINE

## 2021-07-13 PROCEDURE — 250N000013 HC RX MED GY IP 250 OP 250 PS 637: Performed by: STUDENT IN AN ORGANIZED HEALTH CARE EDUCATION/TRAINING PROGRAM

## 2021-07-13 PROCEDURE — 36415 COLL VENOUS BLD VENIPUNCTURE: CPT | Performed by: EMERGENCY MEDICINE

## 2021-07-13 PROCEDURE — 258N000003 HC RX IP 258 OP 636: Performed by: STUDENT IN AN ORGANIZED HEALTH CARE EDUCATION/TRAINING PROGRAM

## 2021-07-13 PROCEDURE — 99222 1ST HOSP IP/OBS MODERATE 55: CPT | Mod: 57 | Performed by: SURGERY

## 2021-07-13 PROCEDURE — C9803 HOPD COVID-19 SPEC COLLECT: HCPCS

## 2021-07-13 PROCEDURE — 120N000001 HC R&B MED SURG/OB

## 2021-07-13 PROCEDURE — 99285 EMERGENCY DEPT VISIT HI MDM: CPT | Mod: 25

## 2021-07-13 RX ORDER — GABAPENTIN 300 MG/1
2100 CAPSULE ORAL DAILY
COMMUNITY

## 2021-07-13 RX ORDER — ACETAMINOPHEN 325 MG/1
975 TABLET ORAL EVERY 8 HOURS
Status: DISCONTINUED | OUTPATIENT
Start: 2021-07-13 | End: 2021-07-16 | Stop reason: HOSPADM

## 2021-07-13 RX ORDER — ONDANSETRON 2 MG/ML
4 INJECTION INTRAMUSCULAR; INTRAVENOUS EVERY 6 HOURS PRN
Status: DISCONTINUED | OUTPATIENT
Start: 2021-07-13 | End: 2021-07-16 | Stop reason: HOSPADM

## 2021-07-13 RX ORDER — BISACODYL 10 MG
10 SUPPOSITORY, RECTAL RECTAL ONCE
Status: COMPLETED | OUTPATIENT
Start: 2021-07-13 | End: 2021-07-13

## 2021-07-13 RX ORDER — ONDANSETRON 4 MG/1
4 TABLET, ORALLY DISINTEGRATING ORAL EVERY 6 HOURS PRN
Status: DISCONTINUED | OUTPATIENT
Start: 2021-07-13 | End: 2021-07-16 | Stop reason: HOSPADM

## 2021-07-13 RX ORDER — OXYCODONE HYDROCHLORIDE 5 MG/1
10 TABLET ORAL EVERY 4 HOURS PRN
Status: DISCONTINUED | OUTPATIENT
Start: 2021-07-13 | End: 2021-07-14

## 2021-07-13 RX ORDER — PIPERACILLIN SODIUM, TAZOBACTAM SODIUM 4; .5 G/20ML; G/20ML
4.5 INJECTION, POWDER, LYOPHILIZED, FOR SOLUTION INTRAVENOUS EVERY 6 HOURS
Status: DISCONTINUED | OUTPATIENT
Start: 2021-07-13 | End: 2021-07-13

## 2021-07-13 RX ORDER — DIPHENHYDRAMINE HYDROCHLORIDE 50 MG/ML
INJECTION INTRAMUSCULAR; INTRAVENOUS
Status: COMPLETED
Start: 2021-07-13 | End: 2021-07-13

## 2021-07-13 RX ORDER — NALOXONE HYDROCHLORIDE 0.4 MG/ML
0.2 INJECTION, SOLUTION INTRAMUSCULAR; INTRAVENOUS; SUBCUTANEOUS
Status: DISCONTINUED | OUTPATIENT
Start: 2021-07-13 | End: 2021-07-16 | Stop reason: HOSPADM

## 2021-07-13 RX ORDER — POLYETHYLENE GLYCOL 3350 17 G/17G
17 POWDER, FOR SOLUTION ORAL 2 TIMES DAILY
Status: DISCONTINUED | OUTPATIENT
Start: 2021-07-13 | End: 2021-07-13

## 2021-07-13 RX ORDER — GABAPENTIN 400 MG/1
1200 CAPSULE ORAL DAILY
Status: DISCONTINUED | OUTPATIENT
Start: 2021-07-14 | End: 2021-07-16 | Stop reason: HOSPADM

## 2021-07-13 RX ORDER — BISACODYL 5 MG
10 TABLET, DELAYED RELEASE (ENTERIC COATED) ORAL DAILY PRN
Status: DISCONTINUED | OUTPATIENT
Start: 2021-07-13 | End: 2021-07-16 | Stop reason: HOSPADM

## 2021-07-13 RX ORDER — CIPROFLOXACIN 2 MG/ML
400 INJECTION, SOLUTION INTRAVENOUS EVERY 12 HOURS
Status: DISCONTINUED | OUTPATIENT
Start: 2021-07-13 | End: 2021-07-14

## 2021-07-13 RX ORDER — POLYETHYLENE GLYCOL 3350 17 G/17G
17 POWDER, FOR SOLUTION ORAL DAILY
Status: DISCONTINUED | OUTPATIENT
Start: 2021-07-13 | End: 2021-07-15

## 2021-07-13 RX ORDER — SODIUM CHLORIDE 9 MG/ML
INJECTION, SOLUTION INTRAVENOUS CONTINUOUS
Status: DISCONTINUED | OUTPATIENT
Start: 2021-07-13 | End: 2021-07-15

## 2021-07-13 RX ORDER — PROCHLORPERAZINE 25 MG
12.5 SUPPOSITORY, RECTAL RECTAL EVERY 12 HOURS PRN
Status: DISCONTINUED | OUTPATIENT
Start: 2021-07-13 | End: 2021-07-16 | Stop reason: HOSPADM

## 2021-07-13 RX ORDER — AMOXICILLIN 250 MG
1-2 CAPSULE ORAL 2 TIMES DAILY
Status: DISCONTINUED | OUTPATIENT
Start: 2021-07-13 | End: 2021-07-16 | Stop reason: HOSPADM

## 2021-07-13 RX ORDER — MORPHINE SULFATE 30 MG/1
30 TABLET, FILM COATED, EXTENDED RELEASE ORAL DAILY PRN
Status: DISCONTINUED | OUTPATIENT
Start: 2021-07-13 | End: 2021-07-16 | Stop reason: HOSPADM

## 2021-07-13 RX ORDER — BISACODYL 5 MG
5 TABLET, DELAYED RELEASE (ENTERIC COATED) ORAL DAILY PRN
Status: DISCONTINUED | OUTPATIENT
Start: 2021-07-13 | End: 2021-07-16 | Stop reason: HOSPADM

## 2021-07-13 RX ORDER — GABAPENTIN 300 MG/1
1500 CAPSULE ORAL AT BEDTIME
Status: ON HOLD | COMMUNITY
End: 2022-02-25

## 2021-07-13 RX ORDER — PIPERACILLIN SODIUM, TAZOBACTAM SODIUM 3; .375 G/15ML; G/15ML
3.38 INJECTION, POWDER, LYOPHILIZED, FOR SOLUTION INTRAVENOUS EVERY 6 HOURS
Status: DISCONTINUED | OUTPATIENT
Start: 2021-07-13 | End: 2021-07-13 | Stop reason: SINTOL

## 2021-07-13 RX ORDER — BISACODYL 10 MG
10 SUPPOSITORY, RECTAL RECTAL DAILY PRN
Status: DISCONTINUED | OUTPATIENT
Start: 2021-07-13 | End: 2021-07-16 | Stop reason: HOSPADM

## 2021-07-13 RX ORDER — LIDOCAINE 40 MG/G
CREAM TOPICAL
Status: DISCONTINUED | OUTPATIENT
Start: 2021-07-13 | End: 2021-07-15

## 2021-07-13 RX ORDER — PROCHLORPERAZINE MALEATE 5 MG
5 TABLET ORAL EVERY 6 HOURS PRN
Status: DISCONTINUED | OUTPATIENT
Start: 2021-07-13 | End: 2021-07-16 | Stop reason: HOSPADM

## 2021-07-13 RX ORDER — HYDROMORPHONE HYDROCHLORIDE 1 MG/ML
0.5 INJECTION, SOLUTION INTRAMUSCULAR; INTRAVENOUS; SUBCUTANEOUS
Status: DISCONTINUED | OUTPATIENT
Start: 2021-07-13 | End: 2021-07-14

## 2021-07-13 RX ORDER — NALOXONE HYDROCHLORIDE 0.4 MG/ML
0.4 INJECTION, SOLUTION INTRAMUSCULAR; INTRAVENOUS; SUBCUTANEOUS
Status: DISCONTINUED | OUTPATIENT
Start: 2021-07-13 | End: 2021-07-16 | Stop reason: HOSPADM

## 2021-07-13 RX ORDER — DIPHENHYDRAMINE HYDROCHLORIDE 50 MG/ML
50 INJECTION INTRAMUSCULAR; INTRAVENOUS ONCE
Status: COMPLETED | OUTPATIENT
Start: 2021-07-13 | End: 2021-07-13

## 2021-07-13 RX ORDER — HYDROCORTISONE 5 MG/1
5 TABLET ORAL DAILY
Status: DISCONTINUED | OUTPATIENT
Start: 2021-07-14 | End: 2021-07-15

## 2021-07-13 RX ORDER — TRAZODONE HYDROCHLORIDE 100 MG/1
100 TABLET ORAL AT BEDTIME
Status: DISCONTINUED | OUTPATIENT
Start: 2021-07-13 | End: 2021-07-16 | Stop reason: HOSPADM

## 2021-07-13 RX ORDER — TESTOSTERONE 30 MG/1.5ML
30 SOLUTION TOPICAL DAILY
Status: DISCONTINUED | OUTPATIENT
Start: 2021-07-14 | End: 2021-07-16 | Stop reason: HOSPADM

## 2021-07-13 RX ADMIN — METRONIDAZOLE 500 MG: 500 INJECTION, SOLUTION INTRAVENOUS at 22:07

## 2021-07-13 RX ADMIN — DIPHENHYDRAMINE HYDROCHLORIDE 50 MG: 50 INJECTION INTRAMUSCULAR; INTRAVENOUS at 17:53

## 2021-07-13 RX ADMIN — GABAPENTIN 1500 MG: 300 CAPSULE ORAL at 22:07

## 2021-07-13 RX ADMIN — POLYETHYLENE GLYCOL 3350 17 G: 17 POWDER, FOR SOLUTION ORAL at 21:14

## 2021-07-13 RX ADMIN — SODIUM CHLORIDE: 9 INJECTION, SOLUTION INTRAVENOUS at 22:56

## 2021-07-13 RX ADMIN — BISACODYL 10 MG: 10 SUPPOSITORY RECTAL at 21:14

## 2021-07-13 RX ADMIN — PIPERACILLIN SODIUM AND TAZOBACTAM SODIUM 4.5 G: 4; .5 INJECTION, POWDER, LYOPHILIZED, FOR SOLUTION INTRAVENOUS at 16:34

## 2021-07-13 RX ADMIN — CIPROFLOXACIN 400 MG: 2 INJECTION, SOLUTION INTRAVENOUS at 20:23

## 2021-07-13 RX ADMIN — TRAZODONE HYDROCHLORIDE 100 MG: 100 TABLET ORAL at 22:56

## 2021-07-13 RX ADMIN — DOCUSATE SODIUM 286 ML: 50 LIQUID ORAL at 22:29

## 2021-07-13 RX ADMIN — ACETAMINOPHEN 975 MG: 325 TABLET, FILM COATED ORAL at 22:07

## 2021-07-13 RX ADMIN — SENNOSIDES AND DOCUSATE SODIUM 1 TABLET: 8.6; 5 TABLET ORAL at 21:13

## 2021-07-13 ASSESSMENT — ENCOUNTER SYMPTOMS
NAUSEA: 0
ABDOMINAL PAIN: 1
BLOOD IN STOOL: 0
VOMITING: 0

## 2021-07-13 ASSESSMENT — ACTIVITIES OF DAILY LIVING (ADL): ADLS_ACUITY_SCORE: 13

## 2021-07-13 ASSESSMENT — MIFFLIN-ST. JEOR: SCORE: 1907.51

## 2021-07-13 NOTE — PHARMACY-ADMISSION MEDICATION HISTORY
Pharmacy Medication History  Admission medication history interview status for the 7/13/2021  admission is complete. See EPIC admission navigator for prior to admission medications     Location of Interview: Phone  Medication history sources: Patient    In the past week, patient estimated taking medication this percent of the time: greater than 90%    Medication reconciliation completed by provider prior to medication history? No    Time spent in this activity: 10 minutes    Prior to Admission medications    Medication Sig Last Dose Taking? Auth Provider   chlorthalidone (HYGROTON) 25 MG tablet TAKE 1 TABLET(25 MG) BY MOUTH DAILY 7/13/2021 at Unknown time Yes Daisy Morillo APRN CNP   dexamethasone (DECADRON) 4 MG/ML injection INJECT 1ML INTRAMUSCULARLY UTD WHEN UNABLE TO TAKE ORAL STEROIDS prn med Yes Reported, Patient   diclofenac (VOLTAREN) 1 % GEL topical gel as needed. prn med Yes Reported, Patient   gabapentin (NEURONTIN) 300 MG capsule Take 1,200 mg by mouth daily 7/13/2021 at Unknown time Yes Unknown, Entered By History   gabapentin (NEURONTIN) 300 MG capsule Take 1,500 mg by mouth At Bedtime 7/12/2021 at Unknown time Yes Unknown, Entered By History   HYDROcodone-acetaminophen (NORCO)  MG per tablet Take  tablets by mouth 4 times daily as needed for pain prn med Yes Reported, Patient   hydrocortisone (CORTEF) 5 MG tablet 15 mg on waking and 5 mg at noon,double in sickness 7/13/2021 at x2 Yes Reported, Patient   lisinopril (ZESTRIL) 20 MG tablet TAKE 1 TABLET(20 MG) BY MOUTH DAILY 7/13/2021 at Unknown time Yes Daisy Morillo APRN CNP   omeprazole (PRILOSEC) 20 MG DR capsule Take 20 mg by mouth daily 7/13/2021 at Unknown time Yes Reported, Patient   psyllium (METAMUCIL/KONSYL) capsule Take 2 capsules by mouth daily 7/13/2021 at Unknown time Yes Unknown, Entered By History   senna-docusate (SENOKOT-S;PERICOLACE) 8.6-50 MG per tablet Take 1-2 tablets by mouth 2 times daily  Patient taking  differently: Take 1-2 tablets by mouth daily  7/13/2021 at Unknown time Yes Dashawn Laurent PA-C   tadalafil (CIALIS) 20 MG tablet Take 1 tablet (20 mg) by mouth daily as needed prn med Yes Baljit Salazar MD   testosterone (AXIRON) 30 MG/ACT topical solution Place 30 mg onto the skin daily  7/13/2021 at Unknown time Yes Reported, Patient   traZODone (DESYREL) 50 MG tablet TAKE 2 TABLETS BY MOUTH AT BEDTIME 7/12/2021 at Unknown time Yes Daisy Morillo APRN CNP   morphine (MS CONTIN) 30 MG CR tablet TAKE 1 TABLET BY MOUTH EVERY DAY AS NEEDED FOR CHRONIC PAIN  Patient not taking: Reported on 7/12/2021   Reported, Patient       The information provided in this note is only as accurate as the sources available at the time of update(s)   Augusto GuilloryD

## 2021-07-13 NOTE — ED TRIAGE NOTES
Pt is coming from [facility] with diverticulum on CT Scan. Pt was told to check into ED and receive surgery consult from there. Ct scan was performed yesterday.

## 2021-07-13 NOTE — H&P
[facility]    History and Physical - Hospitalist Service       Date of Admission:  7/13/2021    Assessment & Plan      Jose Carlos Escamilla is a 71 year old male admitted on 7/13/2021. He presents at behest of surgery team for admission due to meckel's diverticulitis.     Meckel's diverticulitis  CT abdomen consistent with diverticulitis.  Reports approximately 5 days of lower abdominal pain.  -admit to inpatient  -surgery planning on OR 7/14  -bowel prep per surgery  -NPO  -Reaction to zosyn in ED  -cipro flagyl for abx  -hold lisinopril and chlorthalidone pre-operatively  -recommend hydrocortisone 50mg IV immediately prior to OR followed by 25mg q8h for 24 hours followed by home dose  -otherwise medically optimized for surgery    Adrenal insufficiency  [hydrocortisone 15mg qAM and 5mg qNoon]  - recommend hydrocortisone 50mg IV immediately prior to OR followed by 25mg q8h for 24 hours followed by home dose  - continue home regimen prior to surgery      Chronic pain  [gabapentin 1200mg daily, 1500mg at bedtime, morphine CR 30mg PRN daily, norco  QID PRN]   - continue gabapentin and morphine PRN  - oxycodone 10mg q4hr PRN  - dilaudid IV PRN  - schedule tylenol  - would defer post operative pain management to surgical team    HTN  -hold chlorthalidone and lisinopril, resume post-op pending BP    PMR  - noted       Diet: NPO for Medical/Clinical Reasons Except for: Meds    DVT Prophylaxis: Pneumatic Compression Devices  [place] Catheter: Not present  Central Lines: None  Code Status:   DNR/DNI, Discussed on admission    Risk Factors Present on Admission                   Disposition Plan   Expected discharge: 2-5 days recommended to TBD once surgical clearance.     The patient's care was discussed with the Bedside Nurse, Patient and ED MD.    Shaheen Ackerman MD  [facility]  Securely message with the Vocera Web Console (learn more here)  Text page via [employer]  Paging/Directory      ______________________________________________________________________    Chief Complaint   Abdominal pain    History is obtained from the patient    History of Present Illness   Jose Carlos Escamilla is a 71 year old male who has history of adrenal insufficiency, chronic pain, PMR, hypertension, who presented to the hospital on 7/13/2021 after several days of lower abdominal pain.  An outpatient CT which demonstrated likely Meckel's diverticulitis and was instructed to present to the emergency department by his primary care provider.  Primary care provider reportedly talked with general surgery who advised presentation.  He was seen in the emergency department with Dr. Parker from general surgery who plans for the OR on 7/14/2021.    Patient states that he has had some poor appetite, but no nausea vomiting or diarrhea.  Denies any fevers, chills.  He otherwise felt well.    Review of Systems    The 10 point Review of Systems is negative other than noted in the HPI or here.     Past Medical History    I have reviewed this patient's medical history and updated it with pertinent information if needed.   Past Medical History:   Diagnosis Date     ACP (advance care planning)      Anxiety      Chronic rhinitis      Diverticulosis      Esophageal stenosis      Hiatal hernia      HTN (hypertension)      Hypercholesterolemia      Hypertension      IBS (irritable bowel syndrome)      Post-traumatic osteoarthritis of both knees 11/21/2016       Past Surgical History   I have reviewed this patient's surgical history and updated it with pertinent information if needed.  Past Surgical History:   Procedure Laterality Date     ARTHROPLASTY KNEE Left 12/2/2016    Procedure: ARTHROPLASTY KNEE;  Surgeon: Marisa Page MD;  Location:  OR     DISCECTOMY CERVICAL MINIMALLY INVASIVE ONE LEVEL       NECK SURGERY  2001    [place]       Social History   I have reviewed this patient's social history and updated it  with pertinent information if needed.  Social History     Tobacco Use     Smoking status: Former Smoker     Types: Cigarettes     Quit date: 1995     Years since quittin.0     Smokeless tobacco: Never Used   Substance Use Topics     Alcohol use: Yes     Alcohol/week: 0.0 - 3.3 standard drinks     Drug use: No       Family History   I have reviewed this patient's family history and updated it with pertinent information if needed.  Family History   Problem Relation Age of Onset     Diabetes Mother      Hypertension Mother      Cancer Sister      Hypertension Brother        Prior to Admission Medications   Prior to Admission Medications   Prescriptions Last Dose Informant Patient Reported? Taking?   GABAPENTIN PO  Self Yes No   Sig: Take 900 mg by mouth 3 times daily (Patient takes  X 900 mg morning, 5 x 300 mg bedtime= 2700mg dose) In the morning and at bedtime.  In adjustment dosage during July   HYDROcodone-acetaminophen (NORCO)  MG per tablet   Yes No   Sig: Take  tablets by mouth 4 times daily as needed for pain   chlorthalidone (HYGROTON) 25 MG tablet   No No   Sig: TAKE 1 TABLET(25 MG) BY MOUTH DAILY   dexamethasone (DECADRON) 4 MG/ML injection   Yes No   Sig: INJECT 1ML INTRAMUSCULARLY UTD WHEN UNABLE TO TAKE ORAL STEROIDS   diclofenac (VOLTAREN) 1 % GEL topical gel   Yes No   Sig: as needed.   gabapentin (NEURONTIN) 300 MG capsule   Yes No   Sig: TAKE 3 CAPSULES BY MOUTH THREE TIMES DAILY EVERY DAY   hydrocortisone (CORTEF) 5 MG tablet   Yes No   Sig: 15 mg on waking and 5 mg at noon,double in sickness   lisinopril (ZESTRIL) 20 MG tablet   No No   Sig: TAKE 1 TABLET(20 MG) BY MOUTH DAILY   morphine (MS CONTIN) 30 MG CR tablet   Yes No   Sig: TAKE 1 TABLET BY MOUTH EVERY DAY AS NEEDED FOR CHRONIC PAIN   Patient not taking: Reported on 2021   omeprazole (PRILOSEC) 20 MG DR capsule   Yes No   Sig: Take 20 mg by mouth daily   senna-docusate (SENOKOT-S;PERICOLACE) 8.6-50 MG per tablet   No  No   Sig: Take 1-2 tablets by mouth 2 times daily   tadalafil (CIALIS) 20 MG tablet   No No   Sig: Take 1 tablet (20 mg) by mouth daily as needed   testosterone (AXIRON) 30 MG/ACT topical solution   Yes No   Sig: Place 30 mg onto the skin   traZODone (DESYREL) 50 MG tablet   No No   Sig: TAKE 2 TABLETS BY MOUTH AT BEDTIME      Facility-Administered Medications: None     Allergies   No Known Allergies    Physical Exam   Vital Signs: Temp: 96.4  F (35.8  C) Temp src: Temporal BP: 133/88 Pulse: 62   Resp: 16 SpO2: 93 % O2 Device: None (Room air)    Weight: 240 lbs 0 oz    Constitutional: Awake, alert, cooperative, no apparent cardiopulmonary distress.  Eyes: Conjunctiva and pupils examined and normal.  HEENT: Moist mucous membranes, normal dentition.  Respiratory: Clear to auscultation bilaterally, no crackles or wheezing.  Cardiovascular: Regular rate and rhythm, normal S1 and S2, and no murmur noted.  GI: Soft, non-distended, lower abdominal, guarding, normal bowel sounds.  Lymph/Hematologic: No anterior cervical or supraclavicular adenopathy.  Skin: No rashes, no cyanosis, no edema noted on exposed skin.  Musculoskeletal: No joint swelling, erythema or tenderness. No gross bony abnormalities  Neurologic: Cranial nerves 2-12 grossly intact, normal strength and sensation.  Psychiatric: Alert, oriented to person, place and time, no obvious anxiety or depression.      Data   Data reviewed today: I reviewed all medications, new labs and imaging results over the last 24 hours. I personally reviewed the abdominal CT image(s) showing likely meckel's diverticulitis.    Recent Labs   Lab 07/13/21  1241 07/12/21  1115 07/12/21  0000   WBC 10.0  --  11.8*   HGB 16.2  --  16.2   MCV 94  --  91.6     --  256    135  --    POTASSIUM 3.6 4.1  --    CHLORIDE 102 97  --    CO2 30  --   --    BUN 14 15  18  --    CR 0.89 0.85  --    ANIONGAP 2*  --   --    TRI 8.9 8.8  --    GLC 94 102*  --    ALBUMIN 3.7 4.4  --     PROTTOTAL 7.5 6.5  --    BILITOTAL 0.6 0.4  --    ALKPHOS 44 48  --    ALT 26 18  --    AST 12 13  --      Most Recent 3 CBC's:Recent Labs   Lab Test 07/13/21  1241 07/12/21  0000 06/16/20  0913   WBC 10.0 11.8* 6.3   HGB 16.2 16.2 15.6   MCV 94 91.6 91.8    256 274     Most Recent 3 BMP's:Recent Labs   Lab Test 07/13/21  1241 07/12/21  1115 06/16/20  0900    135 138   POTASSIUM 3.6 4.1 4.6   CHLORIDE 102 97 101   CO2 30  --   --    BUN 14 15  18 14  18   CR 0.89 0.85 0.77   ANIONGAP 2*  --   --    TRI 8.9 8.8 9.0   GLC 94 102* 108*     Most Recent 2 LFT's:Recent Labs   Lab Test 07/13/21  1241 07/12/21  1115   AST 12 13   ALT 26 18   ALKPHOS 44 48   BILITOTAL 0.6 0.4     No results found for this or any previous visit (from the past 24 hour(s)).

## 2021-07-13 NOTE — ED NOTES
"[facility]  ED Nurse Handoff Report    ED Chief complaint: Abdominal Pain      ED Diagnosis:   Final diagnoses:   Meckel's diverticulum       Code Status: Hospitalist to address    Allergies: No Known Allergies    Patient Story: Presents with 5 days bilateral lower abdominal pain with constipation and bloating  Focused Assessment:  A&Ox4. Abd exam: Positive tenderness LLQ and RLQ with rebound tenderness. Verbalizes need. Independent in cares. Tolerating fluids.     Treatments and/or interventions provided:  Saline lock, labs, IV zosyn, Benadryl 50mg IV    ADDENDUM: 1745 HOURS:  Pt called RN to room for water. He endorses he feels flushed in his face and feels \"under the weather like I have the flu\". Reports feel warm. Vital signs stable. Denies shortness of breath. Denies wheeze. Denies itching. Denies throat or tongue swelling. He is wondering if he is having a reactive to IV antibiotic. States 20 years ago had similar reaction after penicillin yet went on to complete penicillin. Has not listed Penicillin as an allergy in the past or current. Dr. Barrett notified of alexandra's report and symptoms. Liz, pharmacist also notified. Dr. Barrett's order to give benadryl    Patient's response to treatments and/or interventions: stable    To be done/followed up on inpatient unit:  admission orders, surgery planned for tomorrow    Does this patient have any cognitive concerns?: none    Activity level - Baseline/Home:  Independent  Activity Level - Current:   Independent    Patient's Preferred language: English   Needed?: No    Isolation: None  Infection: Not Applicable  Patient tested for COVID 19 prior to admission: YES  Bariatric?: No    Vital Signs:   Vitals:    07/13/21 1134 07/13/21 1345 07/13/21 1430 07/13/21 1500   BP: 129/88 124/83 123/82 133/88   Pulse: 72 63 65 62   Resp: 16 16  16   Temp: 96.4  F (35.8  C)      TempSrc: Temporal      SpO2: 95% 96% 94% 93%   Weight: 108.9 kg (240 lb)    " "  Height: 1.854 m (6' 1\")          Cardiac Rhythm:     Was the PSS-3 completed:   Yes  What interventions are required if any?               Family Comments: none  OBS brochure/video discussed/provided to patient/family: No              Name of person given brochure if not patient: n/a              Relationship to patient: n/a    For the majority of the shift this patient's behavior was Green.   Behavioral interventions performed were none.    ED NURSE PHONE NUMBER: *[phone]         "

## 2021-07-13 NOTE — PROGRESS NOTES
RECEIVING UNIT ED HANDOFF REVIEW    ED Nurse Handoff Report was reviewed by: Yaquelin Rivera RN on July 13, 2021 at 5:42 PM

## 2021-07-13 NOTE — ED PROVIDER NOTES
"  History   Chief Complaint:  Abdominal Pain       The history is provided by the patient.      Jose Carlos Escamilla is a 71 year old male with history of IBS, GERD, and hyeprtension who presents with abdominal pain. The patient reports that he he received CT results today and was advised to come into the ED for possible Meckel's diverticulum. He had a CT scan done due to right lower quadrant abdominal pain. He denies any blood in his stool, vomiting, or nausea.         Review of Systems   Gastrointestinal: Positive for abdominal pain. Negative for blood in stool, nausea and vomiting.   10 systems reviewed and negative except as above and in HPI.      Allergies:  Amoxicillin  Penicillin    Medications:  Gabapentin  Lisinopril  Chlorthalidone  Trazodone  Prilosec  Duloxetine    Past Medical History:    Anxiety  Chronis Rhinitis  Diverticulosis  GERD  Hiatal hernia  Hypertension  IBS  Osteoarthritis  Polymyalgia Rheumatica  Plantar fascitis  Obesity  Hypercholesteremia   Chronic low back pain    Past Surgical History:    Arthroplasty Knee  Discectomy cervical   Neck Surgery    Family History:    Diabetes, Mother  Hypertension, Mother  Cancer, Sister  Hypertension, Brother    Social History:  PCP: Baljit Salazar  Presents to the ED alone    Physical Exam     Patient Vitals for the past 24 hrs:   BP Temp Temp src Pulse Resp SpO2 Height Weight   07/13/21 1500 133/88 -- -- 62 16 93 % -- --   07/13/21 1430 123/82 -- -- 65 -- 94 % -- --   07/13/21 1345 124/83 -- -- 63 16 96 % -- --   07/13/21 1134 129/88 96.4  F (35.8  C) Temporal 72 16 95 % 1.854 m (6' 1\") 108.9 kg (240 lb)       Physical Exam  General: Resting on the gurney, appears uncomfortable  Head:  The scalp, face, and head appear normal  Mouth/Throat: Mucus membranes are moist  CV:  Regular rate    Normal S1 and S2  No pathological murmur   Resp:  Breath sounds clear and equal bilaterally    Non-labored, no retractions or accessory muscle use    No coarseness    No " wheezing   GI:  Abdomen is soft, no rigidity    Diffuse lower abdominal tenderness to palpation. Voluntary guarding. No rebound.   MS:  Normal motor assessment of all extremities.    Good capillary refill noted.  Skin:  No rash or lesions noted.  Neuro:   Speech is normal and fluent. No apparent deficit.  Psych: Awake. Alert.  Normal affect.      Appropriate interactions.        Emergency Department Course     Laboratory:  CBC: WBC 10, HGB 16.2,    CMP: Glucose 94, Anion Gap 2 (L), o/w WNL (Creatinine: 0.89)      Emergency Department Course:  Reviewed:  I reviewed the patient's nursing notes, vitals, past medical records, Care Everywhere.     Assessments:  1356 I performed an assessment and examination of the patient as noted above.       Findings and plan explained to the Patient who consents to admission. Discussed the patient with Dr. valdes as well as Dr. Ackerman, who will admit the patient to a medical bed for further monitoring, evaluation, and treatment.     Consults:   I spoke with Dr. Valdes regarding this patient.    Disposition:  The patient was admitted to the hospital under the care of Dr. Ackerman.       Impression & Plan   Medical Decision Making:  Jose Carlos Escamilla is a 71 year old male who presents with abdominal pain.  He was seen by his primary care provider for this yesterday and a CT scan was obtained.  The CT scan showed Meckel's diverticulum.  Dr. Valdes was contacted by the primary care provider and requested the patient be sent to the emergency department for evaluation here.  Laboratory evaluation was undertaken.  Imaging was not repeated.  Dr. Valdes came to the ED and saw the patient and requested he be admitted to the hospitalist service. Dr. Ackerman was contacted for admission with planned surgical intervention tomorrow morning.      Covid-19  Jose Carlos Escamilla was evaluated during a global COVID-19 pandemic, which necessitated consideration that the patient  might be at risk for infection with the SARS-CoV-2 virus that causes COVID-19.   Applicable protocols for evaluation were followed during the patient's care.   COVID-19 was considered as part of the patient's evaluation. The plan for testing is:  a test was obtained during this visit.       Diagnosis:    ICD-10-CM    1. Meckel's diverticulitis  Q43.0 Case Request: Diagnostic laparoscopy, laparoscopic assisted small bowel resection     Case Request: Diagnostic laparoscopy, laparoscopic assisted small bowel resection   2. Meckel's diverticulum  Q43.0          Scribe Disclosure:  KIRSTA, AUBREY EDWARD, am serving as a scribe at 1:56 PM on 7/13/2021 to document services personally performed by Laura Mason MD based on my observations and the provider's statements to me.        Laura Mason MD  07/13/21 4468

## 2021-07-13 NOTE — CONSULTS
General Surgery Consultation    Jose Carlos Escamilla MRN# [id number]   Age: 71 year old YOB: 1950     Date of Admission:  7/13/2021    Reason for consult: Abdominal pain       Requesting physician: Dr. Laura Mason                Assessment and Plan:   Assessment:   Jose Carlos Escamilla is a 71 year old male with PMHx significant for adrenal insufficiency on chronic steroids and chronic pain who presented to [facility] ED with 5 days of abdominal pain associated with nausea and bloating. CT abdomen/pelvis performed which shows non-perforated Meckel's diverticulum, imaging and findings consistent with Meckel's diverticulitis. Discussed options with patients, explained that recommended treatment is for resection of the affected area of small bowel and primary anastomosis. Explained this would be done laparoscopically and he would require admission for management of his symptoms and ongoing medical issues. Explained risks, including but not limited to risk of bleeding, infection, anastomotic leak, and risks associated with general anesthesia. Also explained he would remain inpatient until return of bowel function, timing of which is variable from patient to patient. He expressed understanding and emphasized his concern is pain control and management of his adrenal insufficiency. Patient amenable to proposed plan      Plan:   - Plan for laparoscopic Meckel's diverticulum resection on 7/14/21  - Admit to hospitalist service, appreciate assistance with cares of this patient  - NPO at midnight, IV fluids  - IV antibiotics  - Morning CBC and BMP  - Bowel regimen  - Surgery team will continue to follow             Chief Complaint:     Chief Complaint   Patient presents with     Abdominal Pain        History is obtained from the patient         History of Present Illness:   This patient is a 71 year old  male with a significant past medical history of adrenal insufficiency, GERD, HTN, IBS, who presents  with 5 days of abdominal pain. He states that pain started end of last week and he attributed this to his chronic pain, which migrates typically. Pain gradually worsened over the weekend prompting him to seek care. Pain feels improved slightly today. Associated with nausea, fevers, and bloating, states he is not passing gas but last bowel movement yesterday. Denies any prior abdominal surgeries. No chest pain, no shortness of breath, no numbness or weakness, no emesis, no hematochezia or hematemesis.          Past Medical History:    has a past medical history of ACP (advance care planning), Anxiety, Chronic rhinitis, Diverticulosis, Esophageal stenosis, Hiatal hernia, HTN (hypertension), Hypercholesterolemia, Hypertension, IBS (irritable bowel syndrome), and Post-traumatic osteoarthritis of both knees (11/21/2016).          Past Surgical History:     Past Surgical History:   Procedure Laterality Date     ARTHROPLASTY KNEE Left 12/2/2016    Procedure: ARTHROPLASTY KNEE;  Surgeon: Marisa Page MD;  Location: [facility] OR     DISCECTOMY CERVICAL MINIMALLY INVASIVE ONE LEVEL       NECK SURGERY  2001    [place]           Medications:   No current facility-administered medications on file prior to encounter.  chlorthalidone (HYGROTON) 25 MG tablet, TAKE 1 TABLET(25 MG) BY MOUTH DAILY  dexamethasone (DECADRON) 4 MG/ML injection, INJECT 1ML INTRAMUSCULARLY UTD WHEN UNABLE TO TAKE ORAL STEROIDS  diclofenac (VOLTAREN) 1 % GEL topical gel, as needed.  gabapentin (NEURONTIN) 300 MG capsule, TAKE 3 CAPSULES BY MOUTH THREE TIMES DAILY EVERY DAY  GABAPENTIN PO, Take 900 mg by mouth 3 times daily (Patient takes  X 900 mg morning, 5 x 300 mg bedtime= 2700mg dose) In the morning and at bedtime.  In adjustment dosage during July  HYDROcodone-acetaminophen (NORCO)  MG per tablet, Take  tablets by mouth 4 times daily as needed for pain  hydrocortisone (CORTEF) 5 MG tablet, 15 mg on waking and 5 mg at noon,double in  "sickness  lisinopril (ZESTRIL) 20 MG tablet, TAKE 1 TABLET(20 MG) BY MOUTH DAILY  morphine (MS CONTIN) 30 MG CR tablet, TAKE 1 TABLET BY MOUTH EVERY DAY AS NEEDED FOR CHRONIC PAIN (Patient not taking: Reported on 7/12/2021)  omeprazole (PRILOSEC) 20 MG DR capsule, Take 20 mg by mouth daily  senna-docusate (SENOKOT-S;PERICOLACE) 8.6-50 MG per tablet, Take 1-2 tablets by mouth 2 times daily  tadalafil (CIALIS) 20 MG tablet, Take 1 tablet (20 mg) by mouth daily as needed  testosterone (AXIRON) 30 MG/ACT topical solution, Place 30 mg onto the skin  traZODone (DESYREL) 50 MG tablet, TAKE 2 TABLETS BY MOUTH AT BEDTIME          Allergies:    No Known Allergies            Family History:   The patient has no family history of any bleeding, clotting or anesthesia problems.          Review of Systems:   Ten point Review of Systems is negative other than noted in the HPI          Physical Exam:   Gen:  This is a well developed, well nourished man in no apparent distress.  Blood pressure 133/88, pulse 62, temperature 96.4  F (35.8  C), temperature source Temporal, resp. rate 16, height 1.854 m (6' 1\"), weight 108.9 kg (240 lb), SpO2 93 %.  HEENT - Normocephalic, atraumatic  Neck - full ROM  Lungs - nonlabored breathing on room air.    Heart - Extremities warm and well perfused  Abdomen: Soft, mildly distended, painful to light and deep palpation in right lower quadrant. No generalized peritonitis, no guarding.  Extremities - warm without edema  Neurologic - nonfocal          Data:   WBC -   WBC Count   Date Value Ref Range Status   07/13/2021 10.0 4.0 - 11.0 10e3/uL Final     WBC x10/cmm   Date Value Ref Range Status   07/12/2021 11.8 (A) 3.8 - 11.0 x10/cmm Final     Comment:     *repeated/AS   ], HgB -   Hemoglobin   Date Value Ref Range Status   07/13/2021 16.2 13.3 - 17.7 g/dL Final   07/12/2021 16.2 13.4 - 17.5 g/dl Final   ]   Liver Function Studies -   Recent Labs   Lab Test 07/13/21  1241   PROTTOTAL 7.5   ALBUMIN 3.7 "   BILITOTAL 0.6   ALKPHOS 44   AST 12   ALT 26     Patient examined with and care plan discussed with Dr. Janneth Sosa MD   7/13/2021 at 3:29 PM  General Surgery - PGY4  [phone]

## 2021-07-13 NOTE — CONFIDENTIAL NOTE
"915AM Dr. Winters (on-call for [facility]) took [place] radiologist's call with CT abd/pelvis result done yesterday late afternoon. CT shows suspected Meckel's diverticulum. Dr. Winters reviewed with on-call surgeon, Dr. Parker) and decided patient should go through ER for eval. Dr. Parker will eval patient at ER.     940 AM Called patient with result and plan. He is feeling a bit better than yesterday at clinic visit but still quite a lot of abd pain. Hasn't checked temp but felt \"chilled\" during night. Denies N or V. Bowels - feels constipated. Last BM was 7/12 AM.    Last food intake: 3 pieces of toast at 8am today.   Patient will have daughter drive him to ER. Called [facility] ER and informed patient will be coming by car in next 1 hour.  "

## 2021-07-14 ENCOUNTER — ANESTHESIA EVENT (OUTPATIENT)
Dept: SURGERY | Facility: CLINIC | Age: 71
DRG: 330 | End: 2021-07-14
Payer: MEDICARE

## 2021-07-14 ENCOUNTER — APPOINTMENT (OUTPATIENT)
Dept: SURGERY | Facility: PHYSICIAN GROUP | Age: 71
End: 2021-07-14
Payer: MEDICARE

## 2021-07-14 ENCOUNTER — ANESTHESIA (OUTPATIENT)
Dept: SURGERY | Facility: CLINIC | Age: 71
DRG: 330 | End: 2021-07-14
Payer: MEDICARE

## 2021-07-14 LAB
ANION GAP SERPL CALCULATED.3IONS-SCNC: 3 MMOL/L (ref 3–14)
BUN SERPL-MCNC: 13 MG/DL (ref 7–30)
CALCIUM SERPL-MCNC: 8.7 MG/DL (ref 8.5–10.1)
CHLORIDE BLD-SCNC: 102 MMOL/L (ref 94–109)
CO2 SERPL-SCNC: 30 MMOL/L (ref 20–32)
CREAT SERPL-MCNC: 0.92 MG/DL (ref 0.66–1.25)
ERYTHROCYTE [DISTWIDTH] IN BLOOD BY AUTOMATED COUNT: 12.1 % (ref 10–15)
GFR SERPL CREATININE-BSD FRML MDRD: 85 ML/MIN/1.73M2
GLUCOSE BLD-MCNC: 101 MG/DL (ref 70–99)
HCT VFR BLD AUTO: 45.8 % (ref 40–53)
HGB BLD-MCNC: 15.4 G/DL (ref 13.3–17.7)
MCH RBC QN AUTO: 31.9 PG (ref 26.5–33)
MCHC RBC AUTO-ENTMCNC: 33.6 G/DL (ref 31.5–36.5)
MCV RBC AUTO: 95 FL (ref 78–100)
PLATELET # BLD AUTO: 239 10E3/UL (ref 150–450)
POTASSIUM BLD-SCNC: 3.6 MMOL/L (ref 3.4–5.3)
RBC # BLD AUTO: 4.83 10E6/UL (ref 4.4–5.9)
SODIUM SERPL-SCNC: 135 MMOL/L (ref 133–144)
WBC # BLD AUTO: 7.1 10E3/UL (ref 4–11)

## 2021-07-14 PROCEDURE — 36415 COLL VENOUS BLD VENIPUNCTURE: CPT | Performed by: STUDENT IN AN ORGANIZED HEALTH CARE EDUCATION/TRAINING PROGRAM

## 2021-07-14 PROCEDURE — 99233 SBSQ HOSP IP/OBS HIGH 50: CPT | Performed by: HOSPITALIST

## 2021-07-14 PROCEDURE — 47562 LAPAROSCOPIC CHOLECYSTECTOMY: CPT | Performed by: SURGERY

## 2021-07-14 PROCEDURE — 80048 BASIC METABOLIC PNL TOTAL CA: CPT | Performed by: STUDENT IN AN ORGANIZED HEALTH CARE EDUCATION/TRAINING PROGRAM

## 2021-07-14 PROCEDURE — 120N000001 HC R&B MED SURG/OB

## 2021-07-14 PROCEDURE — 250N000011 HC RX IP 250 OP 636: Performed by: ANESTHESIOLOGY

## 2021-07-14 PROCEDURE — 47562 LAPAROSCOPIC CHOLECYSTECTOMY: CPT | Mod: AS | Performed by: SURGERY

## 2021-07-14 PROCEDURE — 258N000003 HC RX IP 258 OP 636: Performed by: ANESTHESIOLOGY

## 2021-07-14 PROCEDURE — 250N000009 HC RX 250: Performed by: ANESTHESIOLOGY

## 2021-07-14 PROCEDURE — 370N000017 HC ANESTHESIA TECHNICAL FEE, PER MIN: Performed by: SURGERY

## 2021-07-14 PROCEDURE — 250N000013 HC RX MED GY IP 250 OP 250 PS 637: Performed by: PHYSICIAN ASSISTANT

## 2021-07-14 PROCEDURE — 250N000025 HC SEVOFLURANE, PER MIN: Performed by: SURGERY

## 2021-07-14 PROCEDURE — 250N000011 HC RX IP 250 OP 636: Performed by: PHYSICIAN ASSISTANT

## 2021-07-14 PROCEDURE — 250N000013 HC RX MED GY IP 250 OP 250 PS 637: Performed by: STUDENT IN AN ORGANIZED HEALTH CARE EDUCATION/TRAINING PROGRAM

## 2021-07-14 PROCEDURE — 250N000009 HC RX 250: Performed by: SURGERY

## 2021-07-14 PROCEDURE — 250N000013 HC RX MED GY IP 250 OP 250 PS 637: Performed by: INTERNAL MEDICINE

## 2021-07-14 PROCEDURE — 999N000141 HC STATISTIC PRE-PROCEDURE NURSING ASSESSMENT: Performed by: SURGERY

## 2021-07-14 PROCEDURE — 250N000013 HC RX MED GY IP 250 OP 250 PS 637: Performed by: ANESTHESIOLOGY

## 2021-07-14 PROCEDURE — 93005 ELECTROCARDIOGRAM TRACING: CPT

## 2021-07-14 PROCEDURE — 250N000011 HC RX IP 250 OP 636: Performed by: STUDENT IN AN ORGANIZED HEALTH CARE EDUCATION/TRAINING PROGRAM

## 2021-07-14 PROCEDURE — 88304 TISSUE EXAM BY PATHOLOGIST: CPT | Mod: TC | Performed by: SURGERY

## 2021-07-14 PROCEDURE — 360N000076 HC SURGERY LEVEL 3, PER MIN: Performed by: SURGERY

## 2021-07-14 PROCEDURE — 250N000011 HC RX IP 250 OP 636: Performed by: SURGERY

## 2021-07-14 PROCEDURE — 0DTH4ZZ RESECTION OF CECUM, PERCUTANEOUS ENDOSCOPIC APPROACH: ICD-10-PCS | Performed by: SURGERY

## 2021-07-14 PROCEDURE — 272N000001 HC OR GENERAL SUPPLY STERILE: Performed by: SURGERY

## 2021-07-14 PROCEDURE — 710N000009 HC RECOVERY PHASE 1, LEVEL 1, PER MIN: Performed by: SURGERY

## 2021-07-14 PROCEDURE — 258N000001 HC RX 258: Performed by: SURGERY

## 2021-07-14 PROCEDURE — 85018 HEMOGLOBIN: CPT | Performed by: STUDENT IN AN ORGANIZED HEALTH CARE EDUCATION/TRAINING PROGRAM

## 2021-07-14 RX ORDER — GLYCOPYRROLATE 0.2 MG/ML
INJECTION, SOLUTION INTRAMUSCULAR; INTRAVENOUS PRN
Status: DISCONTINUED | OUTPATIENT
Start: 2021-07-14 | End: 2021-07-14

## 2021-07-14 RX ORDER — SODIUM CHLORIDE, SODIUM LACTATE, POTASSIUM CHLORIDE, CALCIUM CHLORIDE 600; 310; 30; 20 MG/100ML; MG/100ML; MG/100ML; MG/100ML
INJECTION, SOLUTION INTRAVENOUS CONTINUOUS
Status: DISCONTINUED | OUTPATIENT
Start: 2021-07-14 | End: 2021-07-14 | Stop reason: HOSPADM

## 2021-07-14 RX ORDER — CEFAZOLIN SODIUM 2 G/100ML
2 INJECTION, SOLUTION INTRAVENOUS
Status: COMPLETED | OUTPATIENT
Start: 2021-07-14 | End: 2021-07-14

## 2021-07-14 RX ORDER — FENTANYL CITRATE 50 UG/ML
25-50 INJECTION, SOLUTION INTRAMUSCULAR; INTRAVENOUS EVERY 5 MIN PRN
Status: DISCONTINUED | OUTPATIENT
Start: 2021-07-14 | End: 2021-07-14 | Stop reason: HOSPADM

## 2021-07-14 RX ORDER — ONDANSETRON 2 MG/ML
4 INJECTION INTRAMUSCULAR; INTRAVENOUS EVERY 30 MIN PRN
Status: DISCONTINUED | OUTPATIENT
Start: 2021-07-14 | End: 2021-07-14 | Stop reason: HOSPADM

## 2021-07-14 RX ORDER — HYDROMORPHONE HCL IN WATER/PF 6 MG/30 ML
0.2 PATIENT CONTROLLED ANALGESIA SYRINGE INTRAVENOUS
Status: DISCONTINUED | OUTPATIENT
Start: 2021-07-14 | End: 2021-07-16 | Stop reason: HOSPADM

## 2021-07-14 RX ORDER — NEOSTIGMINE METHYLSULFATE 1 MG/ML
VIAL (ML) INJECTION PRN
Status: DISCONTINUED | OUTPATIENT
Start: 2021-07-14 | End: 2021-07-14

## 2021-07-14 RX ORDER — DEXAMETHASONE SODIUM PHOSPHATE 4 MG/ML
INJECTION, SOLUTION INTRA-ARTICULAR; INTRALESIONAL; INTRAMUSCULAR; INTRAVENOUS; SOFT TISSUE PRN
Status: DISCONTINUED | OUTPATIENT
Start: 2021-07-14 | End: 2021-07-14

## 2021-07-14 RX ORDER — LIDOCAINE HYDROCHLORIDE 20 MG/ML
INJECTION, SOLUTION INFILTRATION; PERINEURAL PRN
Status: DISCONTINUED | OUTPATIENT
Start: 2021-07-14 | End: 2021-07-14

## 2021-07-14 RX ORDER — CEFAZOLIN SODIUM 2 G/100ML
2 INJECTION, SOLUTION INTRAVENOUS SEE ADMIN INSTRUCTIONS
Status: DISCONTINUED | OUTPATIENT
Start: 2021-07-14 | End: 2021-07-14 | Stop reason: HOSPADM

## 2021-07-14 RX ORDER — PROPOFOL 10 MG/ML
INJECTION, EMULSION INTRAVENOUS PRN
Status: DISCONTINUED | OUTPATIENT
Start: 2021-07-14 | End: 2021-07-14

## 2021-07-14 RX ORDER — ONDANSETRON 4 MG/1
4 TABLET, ORALLY DISINTEGRATING ORAL EVERY 30 MIN PRN
Status: DISCONTINUED | OUTPATIENT
Start: 2021-07-14 | End: 2021-07-14 | Stop reason: HOSPADM

## 2021-07-14 RX ORDER — HYDROMORPHONE HCL IN WATER/PF 6 MG/30 ML
.2-.4 PATIENT CONTROLLED ANALGESIA SYRINGE INTRAVENOUS EVERY 5 MIN PRN
Status: DISCONTINUED | OUTPATIENT
Start: 2021-07-14 | End: 2021-07-14 | Stop reason: HOSPADM

## 2021-07-14 RX ORDER — ACETAMINOPHEN 500 MG
1000 TABLET ORAL ONCE
Status: COMPLETED | OUTPATIENT
Start: 2021-07-14 | End: 2021-07-14

## 2021-07-14 RX ORDER — BUPIVACAINE HYDROCHLORIDE AND EPINEPHRINE 2.5; 5 MG/ML; UG/ML
INJECTION, SOLUTION EPIDURAL; INFILTRATION; INTRACAUDAL; PERINEURAL
Status: DISCONTINUED
Start: 2021-07-14 | End: 2021-07-14 | Stop reason: HOSPADM

## 2021-07-14 RX ORDER — ONDANSETRON 2 MG/ML
INJECTION INTRAMUSCULAR; INTRAVENOUS PRN
Status: DISCONTINUED | OUTPATIENT
Start: 2021-07-14 | End: 2021-07-14

## 2021-07-14 RX ORDER — OXYCODONE HYDROCHLORIDE 5 MG/1
5-10 TABLET ORAL EVERY 4 HOURS PRN
Status: DISCONTINUED | OUTPATIENT
Start: 2021-07-14 | End: 2021-07-16 | Stop reason: HOSPADM

## 2021-07-14 RX ORDER — FENTANYL CITRATE 50 UG/ML
INJECTION, SOLUTION INTRAMUSCULAR; INTRAVENOUS PRN
Status: DISCONTINUED | OUTPATIENT
Start: 2021-07-14 | End: 2021-07-14

## 2021-07-14 RX ORDER — MAGNESIUM HYDROXIDE 1200 MG/15ML
LIQUID ORAL PRN
Status: DISCONTINUED | OUTPATIENT
Start: 2021-07-14 | End: 2021-07-14 | Stop reason: HOSPADM

## 2021-07-14 RX ADMIN — OXYCODONE HYDROCHLORIDE 10 MG: 5 TABLET ORAL at 12:50

## 2021-07-14 RX ADMIN — FENTANYL CITRATE 50 MCG: 50 INJECTION, SOLUTION INTRAMUSCULAR; INTRAVENOUS at 11:46

## 2021-07-14 RX ADMIN — HYDROCORTISONE SODIUM SUCCINATE 50 MG: 100 INJECTION, POWDER, FOR SOLUTION INTRAMUSCULAR; INTRAVENOUS at 10:09

## 2021-07-14 RX ADMIN — HYDROMORPHONE HYDROCHLORIDE 0.2 MG: 0.2 INJECTION, SOLUTION INTRAMUSCULAR; INTRAVENOUS; SUBCUTANEOUS at 18:37

## 2021-07-14 RX ADMIN — ROCURONIUM BROMIDE 40 MG: 10 INJECTION INTRAVENOUS at 10:09

## 2021-07-14 RX ADMIN — OXYCODONE HYDROCHLORIDE 10 MG: 5 TABLET ORAL at 21:10

## 2021-07-14 RX ADMIN — GABAPENTIN 1200 MG: 300 CAPSULE ORAL at 08:24

## 2021-07-14 RX ADMIN — OMEPRAZOLE 20 MG: 20 CAPSULE, DELAYED RELEASE ORAL at 06:28

## 2021-07-14 RX ADMIN — LIDOCAINE HYDROCHLORIDE 80 MG: 20 INJECTION, SOLUTION INFILTRATION; PERINEURAL at 10:09

## 2021-07-14 RX ADMIN — HYDROMORPHONE HYDROCHLORIDE 0.5 MG: 1 INJECTION, SOLUTION INTRAMUSCULAR; INTRAVENOUS; SUBCUTANEOUS at 10:59

## 2021-07-14 RX ADMIN — CEFAZOLIN SODIUM 2 G: 2 INJECTION, SOLUTION INTRAVENOUS at 10:07

## 2021-07-14 RX ADMIN — Medication 2 CAPSULE: at 12:51

## 2021-07-14 RX ADMIN — ROCURONIUM BROMIDE 10 MG: 10 INJECTION INTRAVENOUS at 10:29

## 2021-07-14 RX ADMIN — DEXAMETHASONE SODIUM PHOSPHATE 4 MG: 4 INJECTION, SOLUTION INTRA-ARTICULAR; INTRALESIONAL; INTRAMUSCULAR; INTRAVENOUS; SOFT TISSUE at 10:09

## 2021-07-14 RX ADMIN — OXYCODONE HYDROCHLORIDE 10 MG: 5 TABLET ORAL at 16:59

## 2021-07-14 RX ADMIN — PROPOFOL 200 MG: 10 INJECTION, EMULSION INTRAVENOUS at 10:09

## 2021-07-14 RX ADMIN — ACETAMINOPHEN 975 MG: 325 TABLET, FILM COATED ORAL at 15:52

## 2021-07-14 RX ADMIN — POLYETHYLENE GLYCOL 3350 17 G: 17 POWDER, FOR SOLUTION ORAL at 12:50

## 2021-07-14 RX ADMIN — METRONIDAZOLE 500 MG: 500 INJECTION, SOLUTION INTRAVENOUS at 09:18

## 2021-07-14 RX ADMIN — GABAPENTIN 1500 MG: 300 CAPSULE ORAL at 21:10

## 2021-07-14 RX ADMIN — FENTANYL CITRATE 50 MCG: 50 INJECTION, SOLUTION INTRAMUSCULAR; INTRAVENOUS at 11:34

## 2021-07-14 RX ADMIN — CIPROFLOXACIN 400 MG: 2 INJECTION, SOLUTION INTRAVENOUS at 08:31

## 2021-07-14 RX ADMIN — FENTANYL CITRATE 50 MCG: 50 INJECTION, SOLUTION INTRAMUSCULAR; INTRAVENOUS at 10:26

## 2021-07-14 RX ADMIN — FENTANYL CITRATE 50 MCG: 50 INJECTION, SOLUTION INTRAMUSCULAR; INTRAVENOUS at 10:09

## 2021-07-14 RX ADMIN — SODIUM CHLORIDE, POTASSIUM CHLORIDE, SODIUM LACTATE AND CALCIUM CHLORIDE: 600; 310; 30; 20 INJECTION, SOLUTION INTRAVENOUS at 10:04

## 2021-07-14 RX ADMIN — ACETAMINOPHEN 975 MG: 325 TABLET, FILM COATED ORAL at 21:09

## 2021-07-14 RX ADMIN — ACETAMINOPHEN 1000 MG: 500 TABLET, FILM COATED ORAL at 11:33

## 2021-07-14 RX ADMIN — GLYCOPYRROLATE 0.8 MG: 0.2 INJECTION, SOLUTION INTRAMUSCULAR; INTRAVENOUS at 10:59

## 2021-07-14 RX ADMIN — HYDROMORPHONE HYDROCHLORIDE 0.4 MG: 1 INJECTION, SOLUTION INTRAMUSCULAR; INTRAVENOUS; SUBCUTANEOUS at 12:09

## 2021-07-14 RX ADMIN — ONDANSETRON 4 MG: 2 INJECTION INTRAMUSCULAR; INTRAVENOUS at 10:53

## 2021-07-14 RX ADMIN — TRAZODONE HYDROCHLORIDE 100 MG: 100 TABLET ORAL at 21:08

## 2021-07-14 RX ADMIN — NEOSTIGMINE METHYLSULFATE 5 MG: 1 INJECTION, SOLUTION INTRAVENOUS at 10:59

## 2021-07-14 ASSESSMENT — ACTIVITIES OF DAILY LIVING (ADL)
ADLS_ACUITY_SCORE: 13
ADLS_ACUITY_SCORE: 11
ADLS_ACUITY_SCORE: 11
ADLS_ACUITY_SCORE: 13
ADLS_ACUITY_SCORE: 11

## 2021-07-14 ASSESSMENT — LIFESTYLE VARIABLES: TOBACCO_USE: 0

## 2021-07-14 ASSESSMENT — ENCOUNTER SYMPTOMS
DYSRHYTHMIAS: 0
SEIZURES: 0

## 2021-07-14 NOTE — PLAN OF CARE
DATE & TIME: 7/13/21 9889-5563  Cognitive Concerns/ Orientation : A&OX4   BEHAVIOR & AGGRESSION TOOL COLOR: green   ABNL VS/O2: VSS on RA  MOBILITY: IND  PAIN MANAGMENT: Denies pain- c/o slight abdominal discomfort last shift, but declined meds  DIET: NPO except Meds  BOWEL/BLADDER: continent-  last shiftgiven miralax, ducolax suppository, senna and pink lady enema given with some result.  ABNL LAB/BG: WDL  DRAIN/DEVICES: PIV infusing NS at 100mL/hr. on IV cipro and flagyl   TELEMETRY RHYTHM: n/a  SKIN: WDL  TESTS/PROCEDURES: laparoscopic Meckel's diverticulum resection 7/14  D/C DAY/GOALS/PLACE: pending improvement  OTHER IMPORTANT INFO: IV zosyn given in ED- pt had allergic reaction and was given IV benadryl. Abx changed to ciprofloxacin and metronidazole

## 2021-07-14 NOTE — PROGRESS NOTES
[facility]    Hospitalist Progress Note    Date of Admission:  7/13/2021    Assessment & Plan     Jose Carlos Escamilla is a 71 year old male admitted on 7/13/2021.      Meckel's diverticulitis  Acute appendicitis  S/p laparoscopic appendectomy, laparoscopic medical excision  CT abdomen consistent with diverticulitis.  Reports approximately 5 days of lower abdominal pain.  -admit to inpatient  -Underwent surgery as above on 7/14 with no complications.  -Clear liquid diet.  Advance as tolerated per surgery.  -Reaction to zosyn in ED.  Switch to Cipro, Flagyl.  Antibiotics discontinued per surgery.  -hold lisinopril and chlorthalidone rashard-operatively     Adrenal insufficiency  [hydrocortisone 15mg qAM and 5mg qNoon]  -Patient received hydrocortisone 50mg IV immediately prior to OR.  Prior to that he had also self-administered his home dose of 15 mg hydrocortisone early morning.  Patient insistent on sticking with his home regimen of hydrocortisone.  Refused to take any more hydrocortisone following surgery on that day.  -Plan to resume home dose hydrocortisone the next day.  Vitals stable so far.      Chronic pain  [gabapentin 1200mg daily, 1500mg at bedtime, morphine CR 30mg PRN daily, norco  QID PRN]   - continue gabapentin and morphine PRN  - oxycodone 10mg q4hr PRN  - dilaudid IV PRN  - schedule tylenol  - would defer post operative pain management to surgical team     HTN  -hold chlorthalidone and lisinopril, resume post-op pending BP     PMR  - noted           Diet: N clears, advance as tolerated per surgery  DVT Prophylaxis: Pneumatic Compression Devices  Code Status:   DNR/DNI, Discussed on admission       Disposition: Anticipate home tomorrow/whenever cleared by surgery.      Lanette Durán MD  Text Page (7am - 6pm, M-F)  [facility]  Securely message with the Vocera Web Console (learn more here)  Text page via [employer] Paging/Directory      Interval History    Stable overnight.  Seen in preop area.  Pain is present in abdomen but manageable.  No nausea or vomiting.  States that he self administered his home supply hydrocortisone 15 mg early this morning.  Is insistent on taking hydrocortisone morning dose as soon as he wakes up as if he does not do this apparently he feels terrible.  No fevers, chills or sweats.    -Data reviewed today: I reviewed all new labs and imaging results over the last 24 hours. I personally reviewed CT scan with result as noted above    Physical Exam   Temp: 97.8  F (36.6  C) Temp src: Oral BP: 137/84 Pulse: 58   Resp: 16 SpO2: 95 % O2 Device: None (Room air) Oxygen Delivery: 2 LPM  Vitals:    07/13/21 1134   Weight: 108.9 kg (240 lb)     Vital Signs with Ranges  Temp:  [97.7  F (36.5  C)-98.4  F (36.9  C)] 97.8  F (36.6  C)  Pulse:  [53-76] 58  Resp:  [9-18] 16  BP: (102-142)/() 137/84  SpO2:  [93 %-98 %] 95 %  No intake/output data recorded.    Constitutional: Alert, appears comfortable, in no acute distress  Respiratory: Non labored breathing, clear to auscultation bilaterally, no crackles or wheezes  Cardiovascular: Heart sounds regular rate and rhythm, no murmurs, no leg edema  GI: Abdomen is soft, non distended, non tender. Normal BS  Skin/Integumen: no rashes, no pressure sores  Neuro: alert, converses appropriately, moving all extremities, fluent speech, no facial asymmetry  Psych: mood and affect appropriate      Medications     sodium chloride 100 mL/hr at 07/13/21 2256       acetaminophen  975 mg Oral Q8H     gabapentin  1,200 mg Oral Daily     gabapentin  1,500 mg Oral At Bedtime     hydrocortisone  15 mg Oral Daily     hydrocortisone  5 mg Oral Daily     hydrocortisone sodium succinate PF  25 mg Intravenous Once     omeprazole  20 mg Oral Daily     polyethylene glycol  17 g Oral Daily     psyllium  2 capsule Oral Daily     senna-docusate  1-2 tablet Oral BID     sodium chloride (PF)  3 mL Intracatheter Q8H     testosterone   30 mg Transdermal Daily     traZODone  100 mg Oral At Bedtime       Data   Recent Labs   Lab 07/14/21  0937 07/13/21  1241 07/12/21  1115 07/12/21  0000   WBC 7.1 10.0  --  11.8*   HGB 15.4 16.2  --  16.2   MCV 95 94  --  91.6    263  --  256    134 135  --    POTASSIUM 3.6 3.6 4.1  --    CHLORIDE 102 102 97  --    CO2 30 30  --   --    BUN 13 14 15  18  --    CR 0.92 0.89 0.85  --    ANIONGAP 3 2*  --   --    TRI 8.7 8.9 8.8  --    * 94 102*  --    ALBUMIN  --  3.7 4.4  --    PROTTOTAL  --  7.5 6.5  --    BILITOTAL  --  0.6 0.4  --    ALKPHOS  --  44 48  --    ALT  --  26 18  --    AST  --  12 13  --        Imaging  No results found for this or any previous visit (from the past 24 hour(s)).

## 2021-07-14 NOTE — PROVIDER NOTIFICATION
MD Notification    Notified Person: MD    Notified Person Name: MATEO CHANDLER MD    Notification Date/Time:07/14/21 0444    Notification Interaction: Amcom    Purpose of Notification: 636-2 pt would like to get PTA hydrocortisone now states he will be miserable if he doesn't get it now.   Thanks  CARLYLE, RN *[phone]    Orders Received:    Comments:

## 2021-07-14 NOTE — OP NOTE
PREOPERATIVE DIAGNOSIS: Meckels diverticululitis    POSTOPERATIVE DIAGNOSIS: Meckel's diverticulitis, appendicitis      PROCEDURE:   1. Laparoscopic appendectomy.    2. Laparoscopic meckel's diverticulectomy    SURGEON: Kelsi Parker MD     ASSISTANT:  Rob Salazar PA-C  The physician s assistant was medically necessary for their expertise in camera management, suctioning and retraction.    ANESTHESIA: GET.     COMPLICATIONS: None.     BLOOD LOSS: Minimal.     FINDINGS: meckel's diverticulitis, no perforation with surrounding fibrinous exudate, narrow base and diverticulectomy performed, appendix evaluated and hyperemic and more firm than normal.     INDICATIONS:Jose Carlos Escamilla is a 71 year old  with two day history of abdominal pain. An abdominal CT was performed which showed concern for meckel's diverticulitis. I explained the risks, benefits, complications including but not limited to bleeding, infection, possible need to open, possible postop hematoma, seroma, bowel or bladder injury, all which require additional procedures. The patient did agree and did sign consent.       DETAILS OF PROCEDURE: The patient was brought to the operating room per anesthesia, placed in supine position, intubated without difficulty. They were given perioperative antibiotics.  The patient was prepped and draped in the usual fashion using ChloraPrep and the surgical timeout was then performed, verifying the correct surgeon, site, procedure and patient. All in the room were in agreement.    A 5mm 0 degree laparoscope was inserted and the visiport used to obtain entrance to the abdomen. Insufflation was connected and the abdomen insufflated. Initial evaluation of the abdomen revealed moderate inflammation in the midabdomen and revealed no trocar injuries. The abdomen was insufflated with pressure of 15, which the patient tolerated well, a 2nd 5mm port was placed suprapubically and a 12mm port placed infraumbilically under direct  visualization. We first identified the cecum and the appendix. The appendix appeared abnormal and was hyperemic and indurated. It was intussuscepted at the base into the cecum. I was able to reduce the intussusception.  Then ran the small bowel proximally and identified a meckel's diverticulum approximately two feet from the terminal ileum was an inflammatory collection. Using suction cautery, we swept the surrounding small bowel free from the meckels diverticulum. There was a narrow base on the meckel's and clear evidence of diverticulitis with surrounding fibrinous exudate present.     We then performed an appendectomy.   The ligasure device was used to divide the mesoappendix.  An Endo-MARGOTH blue load was then fired across the base of the appendix  without issues. The staple line was inspected and found to be hemostatic.  We then performed a meckel's diverticulectomy by stapling across the base of the meckel's using a blue load MARGOTH stapler. The base of the meckel's was soft. There was bleeding from the staple line which was controlled with two 10mm clips. The bowel was normal diameter after the resection with no narrowing.  The appendix and diverticulum were placed an EndoCatch bag and removed through the umbilical trocar. The area was explored. Staple lines were completely intact. There was no bleeding. The right lower quadrant was irrigated with saline and suctioned.The pelvis showed no acute findings. All trocars were taken out under direct visualization. The fascia was closed with an 0 vicryl sutures using the baljit brooklynn device in a figure of eight fashion.     Marcaine 0.5% injected to all the wounds. They were irrigated and closed with 4-0 Monocryl in subcuticular fashion.Steri strips and bandaids were applied. The patient tolerated the procedure well and was awoken from anesthesia and transferred to PACU in stable condition. All sponge, instrument, and needle counts were correct at the conclusion of the  procedure.      Kelsi Parker MD  [facility]  [phone]

## 2021-07-14 NOTE — ANESTHESIA PROCEDURE NOTES
Airway       Patient location during procedure: OR       Procedure Start/Stop Times: 7/14/2021 10:12 AM  Staff -        CRNA: Sada Miranda       Other Anesthesia Staff: Inessa Wolf       Performed By: SRNA  Consent for Airway        Urgency: elective  Indications and Patient Condition       Indications for airway management: rashard-procedural       Induction type:intravenous       Mask difficulty assessment: 1 - vent by mask    Final Airway Details       Final airway type: endotracheal airway       Successful airway: ETT - single  Endotracheal Airway Details        ETT size (mm): 8.0       Cuffed: yes       Successful intubation technique: video laryngoscopy       VL Blade Size: Glidescope 4       Grade View of Cords: 1       Adjucts: stylet       Position: Right       Measured from: gums/teeth       Secured at (cm): 23       Bite block used: None    Post intubation assessment        Placement verified by: capnometry, equal breath sounds and chest rise        Number of attempts at approach: 1       Secured with: pink tape       Ease of procedure: easy       Dentition: Intact and Unchanged

## 2021-07-14 NOTE — BRIEF OP NOTE
[facility]    Brief Operative Note    Pre-operative diagnosis: Meckel's diverticulitis [Q43.0]  Post-operative diagnosis Same as pre-operative diagnosis, appendicitis    Procedure: Procedure(s):  Diagnostic laparoscopy, laparoscopic appendectomy, laparoscopic meckel excision  Laparoscopic appendectomy  Surgeon: Surgeon(s) and Role:     * Kelsi Parker MD - Primary     * Rob Salazar PA-C - Assisting  Anesthesia: General   Estimated blood loss: 5cc  Drains: None  Specimens:   ID Type Source Tests Collected by Time Destination   1 : APPENDIX Tissue Appendix SURGICAL PATHOLOGY EXAM Kelsi Parker MD 7/14/2021 10:54 AM    2 : MECKEL DIVERTICULUS Tissue Other SURGICAL PATHOLOGY EXAM Kelsi Parker MD 7/14/2021 10:57 AM      Findings:   Injected appendix with base involuted within cecum. Meckels with narrow base ammenable to localized resection with endoscopic stapler.  Complications: None.  Implants: * No implants in log *    Rob Salazar PA-C  Office: [phone]  Pager: [phone]

## 2021-07-14 NOTE — PROGRESS NOTES
X-cover    Pt requesting hydrocortisone now as he will have side effects if misses  - hydrocortisone 25 mg IV x 1 now  - would still give hydrocortisone as planned 50 mg IV pre-procedure    Rashard Babin MD

## 2021-07-14 NOTE — ANESTHESIA POSTPROCEDURE EVALUATION
Patient: Jose Carlos Escamilla    Procedure(s):  Diagnostic laparoscopy, laparoscopic appendectomy, laparoscopic meckel excision  Laparoscopic appendectomy    Diagnosis:Meckel's diverticulitis [Q43.0]  Diagnosis Additional Information: No value filed.    Anesthesia Type:  General    Note:  Disposition: Inpatient   Postop Pain Control: Uneventful            Sign Out: Well controlled pain   PONV: No   Neuro/Psych: Uneventful            Sign Out: Acceptable/Baseline neuro status   Airway/Respiratory: Uneventful            Sign Out: Acceptable/Baseline resp. status   CV/Hemodynamics: Uneventful            Sign Out: Acceptable CV status   Other NRE: NONE   DID A NON-ROUTINE EVENT OCCUR? No           Last vitals:  Vitals:    07/14/21 1146 07/14/21 1150 07/14/21 1200   BP:  130/78 121/84   Pulse:  56 72   Resp: 11 9 16   Temp:      SpO2: 97% 97% 93%       Last vitals prior to Anesthesia Care Transfer:  CRNA VITALS  7/14/2021 1038 - 7/14/2021 1138      7/14/2021             SpO2:  98 %          Electronically Signed By: Ajay Vidal MD  July 14, 2021  12:02 PM

## 2021-07-14 NOTE — ANESTHESIA CARE TRANSFER NOTE
Patient: Jose Carlos Escamilla    Procedure(s):  Diagnostic laparoscopy, laparoscopic appendectomy, laparoscopic meckel excision  Laparoscopic appendectomy    Diagnosis: Meckel's diverticulitis [Q43.0]  Diagnosis Additional Information: No value filed.    Anesthesia Type:   General     Note:    Oropharynx: oropharynx clear of all foreign objects and spontaneously breathing  Level of Consciousness: awake  Oxygen Supplementation: face mask  Level of Supplemental Oxygen (L/min / FiO2): 6  Independent Airway: airway patency satisfactory and stable  Dentition: dentition unchanged  Vital Signs Stable: post-procedure vital signs reviewed and stable  Report to RN Given: handoff report given  Patient transferred to: PACU  Comments: Neuromuscular blockade reversed after TOF 4/4, spontaneous respirations, adequate tidal volumes, followed commands to voice, oropharynx suctioned with soft flexible catheter, extubated atraumatically, extubated with suction, airway patent after extubation.  Oxygen via facemask at 6 liters per minute to PACU. Oxygen tubing connected to wall O2 in PACU, SpO2, NiBP, and EKG monitors and alarms on and functioning, Neeraj Hugger warmer connected to patient gown, report on patient's clinical status given to PACU RN, RN questions answered.     Handoff Report: Identifed the Patient, Identified the Reponsible Provider, Reviewed the pertinent medical history, Discussed the surgical course, Reviewed Intra-OP anesthesia mangement and issues during anesthesia, Set expectations for post-procedure period and Allowed opportunity for questions and acknowledgement of understanding      Vitals: (Last set prior to Anesthesia Care Transfer)  CRNA VITALS  7/14/2021 1038 - 7/14/2021 1111      7/14/2021             SpO2:  98 %        Electronically Signed By: Sada Miranda  July 14, 2021  11:11 AM

## 2021-07-14 NOTE — PROVIDER NOTIFICATION
MD Notification    Notified Person: MD    Notified Person Name: YONI CHANDLER MD    Notification Date/Time: 07/14/21 0624 AM    Notification Interaction: AMCOM    Purpose of Notification: 636-2 pt would like to get omeprazole please  Thanks  CARLYLE *[phone]    Orders Received:    Comments:

## 2021-07-14 NOTE — UTILIZATION REVIEW
Admission Status; Secondary Review Determination       Under the authority of the Utilization Management Committee, the utilization review process indicated a secondary review on the above patient. The review outcome is based on review of the medical records, discussions with staff, and applying clinical experience noted on the date of the review.     (x) Inpatient Status Appropriate - This patient's medical care is consistent with medical management for inpatient care and reasonable inpatient medical practice.     RATIONALE FOR DETERMINATION   70 yo man with adrenal insufficiency, chronic pain, hypertension who presented with several days of abdominal pain, found to have leukocytosis and CT of the abdomen consistent with diverticulitis. Admitted 7/13/21 for pain control and IV antibiotics pending surgical intervention. Had surgery 7/14/21 including diagnostic laparoscopy, appendectomy, meckel excision for meckel's diverticulitis.  Needed IV hydrocortisone prior to surgery due to adrenal insufficiency and needs to be watched closely for hypotension. Getting continuous IV fluids and still not tolerating regular diet. Not discharging today.     At the time of admission with the information available to the attending physician more than 2 nights hospital complex care was anticipated, based on patient risk of adverse outcome if treated as outpatient and complex care required. Inpatient admission is appropriate based on the Medicare guidelines.     This document was produced using voice recognition software.    The information on this document is developed by the utilization review team in order for the business office to ensure compliance. This only denotes the appropriateness of proper admission status and does not reflect the quality of care rendered.   The definitions of Inpatient Status and Observation Status used in making the determination above are those provided in the CMS Coverage Manual, Chapter 1 and Chapter  6, section 70.4.     Sincerely,   Shira Walter MD  Utilization Review  Physician Advisor  [facility].

## 2021-07-14 NOTE — PLAN OF CARE
DATE & TIME: 7/13/21 2000-2330  Cognitive Concerns/ Orientation : A&OX4   BEHAVIOR & AGGRESSION TOOL COLOR: green   ABNL VS/O2: VSS on RA  MOBILITY: IND  PAIN MANAGMENT: denies pain- c/o slight abdominal discomfort, but declined meds  DIET: NPO except Meds  BOWEL/BLADDER: continent- given miralax, ducolax suppository, and senna. Not enough stool out, given pink lady enema- good results.  ABNL LAB/BG: WDL  DRAIN/DEVICES: PIV infusing NS at 100mL/hr. IV cipro and flagyl given  TELEMETRY RHYTHM: n/a  SKIN: WDL  TESTS/PROCEDURES: laparoscopic Meckel's diverticulum resection 7/14  D/C DAY/GOALS/PLACE: pending improvement  OTHER IMPORTANT INFO: IV zosyn given in ED- pt had allergic reaction and was given IV benadryl. Abx changed to ciprofloxacin and metronidazole

## 2021-07-14 NOTE — ANESTHESIA PREPROCEDURE EVALUATION
Anesthesia Pre-Procedure Evaluation    Patient: Jose Carlos Escamilla   MRN: [id number] : 1950        Preoperative Diagnosis: Meckel's diverticulitis [Q43.0]   Procedure : Procedure(s):  Diagnostic laparoscopy, laparoscopic assisted small bowel resection     Past Medical History:   Diagnosis Date     ACP (advance care planning)      Anxiety      Chronic rhinitis      Diverticulosis      Esophageal stenosis      Hiatal hernia      HTN (hypertension)      Hypercholesterolemia      Hypertension      IBS (irritable bowel syndrome)      Post-traumatic osteoarthritis of both knees 2016      Past Surgical History:   Procedure Laterality Date     ARTHROPLASTY KNEE Left 2016    Procedure: ARTHROPLASTY KNEE;  Surgeon: Marisa Page MD;  Location: [facility] OR     DISCECTOMY CERVICAL MINIMALLY INVASIVE ONE LEVEL       NECK SURGERY      Amasa      Allergies   Allergen Reactions     Zosyn [Piperacillin-Tazobactam In D5w] Palpitations and Rash      Social History     Tobacco Use     Smoking status: Former Smoker     Types: Cigarettes     Quit date: 1995     Years since quittin.0     Smokeless tobacco: Never Used   Substance Use Topics     Alcohol use: Yes     Alcohol/week: 0.0 - 3.3 standard drinks      Wt Readings from Last 1 Encounters:   21 108.9 kg (240 lb)        Anesthesia Evaluation   Pt has had prior anesthetic. Type: General.    No history of anesthetic complications       ROS/MED HX  ENT/Pulmonary:    (-) tobacco use, asthma and sleep apnea   Neurologic:     (+) no peripheral neuropathy  (-) no seizures and no CVA   Cardiovascular:     (+) hypertension----- (-) CAD and arrhythmias   METS/Exercise Tolerance:     Hematologic:       Musculoskeletal:       GI/Hepatic:     (+) GERD, hiatal hernia,     Renal/Genitourinary:    (-) renal disease   Endo:     (+) Chronic steroid usage (adrenal insufficiency) for Obesity,  (-) Type II DM and thyroid disease   Psychiatric/Substance Use:        Infectious Disease:    (-) Recent Fever   Malignancy:       Other:      (+) , H/O Chronic Pain,        Physical Exam    Airway  airway exam normal      Mallampati: II   TM distance: > 3 FB   Neck ROM: full   Mouth opening: > 3 cm    Respiratory Devices and Support         Dental  no notable dental history         Cardiovascular   cardiovascular exam normal          Pulmonary   pulmonary exam normal                OUTSIDE LABS:  CBC:   Lab Results   Component Value Date    WBC 10.0 07/13/2021    WBC 11.8 (A) 07/12/2021    HGB 16.2 07/13/2021    HGB 16.2 07/12/2021    HCT 48.1 07/13/2021    HCT 46.5 07/12/2021     07/13/2021     07/12/2021     BMP:   Lab Results   Component Value Date     07/13/2021     07/12/2021    POTASSIUM 3.6 07/13/2021    POTASSIUM 4.1 07/12/2021    CHLORIDE 102 07/13/2021    CHLORIDE 97 07/12/2021    CO2 30 07/13/2021    BUN 14 07/13/2021    BUN 15 07/12/2021    BUN 18 07/12/2021    CR 0.89 07/13/2021    CR 0.85 07/12/2021    GLC 94 07/13/2021     (H) 07/12/2021     COAGS: No results found for: PTT, INR, FIBR  POC: No results found for: BGM, HCG, HCGS  HEPATIC:   Lab Results   Component Value Date    ALBUMIN 3.7 07/13/2021    PROTTOTAL 7.5 07/13/2021    ALT 26 07/13/2021    AST 12 07/13/2021    ALKPHOS 44 07/13/2021    BILITOTAL 0.6 07/13/2021     OTHER:   Lab Results   Component Value Date    TRI 8.9 07/13/2021    T4 1.16 03/21/2019    CRP 51 (H) 07/12/2021    SED 0 07/12/2021       Anesthesia Plan    ASA Status:  2   NPO Status:  NPO Appropriate    Anesthesia Type: General.     - Airway: ETT   Induction: Intravenous.   Maintenance: Balanced.   Techniques and Equipment:     - Airway: Video-Laryngoscope         Consents    Anesthesia Plan(s) and associated risks, benefits, and realistic alternatives discussed. Questions answered and patient/representative(s) expressed understanding.     - Discussed with:  Patient         Postoperative Care    Pain management:  IV analgesics, Oral pain medications.   PONV prophylaxis: Ondansetron (or other 5HT-3), Dexamethasone or Solumedrol, Background Propofol Infusion     Comments:      50 mg hydrocortisone at induction for stress dose            Ajay Vidal MD

## 2021-07-14 NOTE — PROGRESS NOTES
Admission    Patient arrives to room 636-2 via cart from ED.  Care plan note: Pt A&Ox4, independently ambulating. Belongings remain with patient, daughter at bedside.    Inpatient nursing criteria listed below were met:    PCD's Documented: Yes  Skin issues/needs documented :NA  Isolation education started/completed NA  Patient allergies verified with patient: Yes  Verified completion of [place] Risk Assessment Tool:  Yes  Verified completion of Guardianship screening tool: Yes  Fall Prevention: Care plan updated, Education given and documented NA  Care Plan initiated: Yes  Home medications documented in belongings flowsheet: Yes  Patient belongings documented in belongings flowsheet: Yes  Reminder note (belongings/ medications) placed in discharge instructions:No  Admission profile/ required documentation complete: No  Bedside Report Letter given and explained to patient Yes  Visitor Designated? Yes  If patient is a 72 hour hold/Commitment are belongings removed from room and locked up? NA

## 2021-07-15 LAB
ANION GAP SERPL CALCULATED.3IONS-SCNC: 2 MMOL/L (ref 3–14)
BUN SERPL-MCNC: 10 MG/DL (ref 7–30)
CALCIUM SERPL-MCNC: 9.1 MG/DL (ref 8.5–10.1)
CHLORIDE BLD-SCNC: 105 MMOL/L (ref 94–109)
CO2 SERPL-SCNC: 29 MMOL/L (ref 20–32)
CREAT SERPL-MCNC: 0.84 MG/DL (ref 0.66–1.25)
ERYTHROCYTE [DISTWIDTH] IN BLOOD BY AUTOMATED COUNT: 11.9 % (ref 10–15)
GFR SERPL CREATININE-BSD FRML MDRD: 89 ML/MIN/1.73M2
GLUCOSE BLD-MCNC: 101 MG/DL (ref 70–99)
HCT VFR BLD AUTO: 43.6 % (ref 40–53)
HGB BLD-MCNC: 14.9 G/DL (ref 13.3–17.7)
MCH RBC QN AUTO: 31.8 PG (ref 26.5–33)
MCHC RBC AUTO-ENTMCNC: 34.2 G/DL (ref 31.5–36.5)
MCV RBC AUTO: 93 FL (ref 78–100)
PATH REPORT.COMMENTS IMP SPEC: NORMAL
PATH REPORT.COMMENTS IMP SPEC: NORMAL
PATH REPORT.FINAL DX SPEC: NORMAL
PATH REPORT.GROSS SPEC: NORMAL
PATH REPORT.MICROSCOPIC SPEC OTHER STN: NORMAL
PATH REPORT.RELEVANT HX SPEC: NORMAL
PHOTO IMAGE: NORMAL
PLATELET # BLD AUTO: 270 10E3/UL (ref 150–450)
POTASSIUM BLD-SCNC: 3.7 MMOL/L (ref 3.4–5.3)
RBC # BLD AUTO: 4.68 10E6/UL (ref 4.4–5.9)
SODIUM SERPL-SCNC: 136 MMOL/L (ref 133–144)
WBC # BLD AUTO: 9.3 10E3/UL (ref 4–11)

## 2021-07-15 PROCEDURE — 250N000013 HC RX MED GY IP 250 OP 250 PS 637: Performed by: HOSPITALIST

## 2021-07-15 PROCEDURE — 88304 TISSUE EXAM BY PATHOLOGIST: CPT | Mod: 26 | Performed by: PATHOLOGY

## 2021-07-15 PROCEDURE — 250N000011 HC RX IP 250 OP 636: Performed by: PHYSICIAN ASSISTANT

## 2021-07-15 PROCEDURE — 250N000013 HC RX MED GY IP 250 OP 250 PS 637: Performed by: PHYSICIAN ASSISTANT

## 2021-07-15 PROCEDURE — 85027 COMPLETE CBC AUTOMATED: CPT | Performed by: PHYSICIAN ASSISTANT

## 2021-07-15 PROCEDURE — 88305 TISSUE EXAM BY PATHOLOGIST: CPT | Mod: 26 | Performed by: PATHOLOGY

## 2021-07-15 PROCEDURE — 99233 SBSQ HOSP IP/OBS HIGH 50: CPT | Performed by: HOSPITALIST

## 2021-07-15 PROCEDURE — 80048 BASIC METABOLIC PNL TOTAL CA: CPT | Performed by: PHYSICIAN ASSISTANT

## 2021-07-15 PROCEDURE — 36415 COLL VENOUS BLD VENIPUNCTURE: CPT | Performed by: PHYSICIAN ASSISTANT

## 2021-07-15 PROCEDURE — 250N000013 HC RX MED GY IP 250 OP 250 PS 637: Performed by: SURGERY

## 2021-07-15 PROCEDURE — 120N000001 HC R&B MED SURG/OB

## 2021-07-15 RX ORDER — HYDROCORTISONE 10 MG/1
10 TABLET ORAL DAILY
Status: DISCONTINUED | OUTPATIENT
Start: 2021-07-16 | End: 2021-07-16 | Stop reason: HOSPADM

## 2021-07-15 RX ORDER — LISINOPRIL 20 MG/1
20 TABLET ORAL DAILY
Status: DISCONTINUED | OUTPATIENT
Start: 2021-07-15 | End: 2021-07-16 | Stop reason: HOSPADM

## 2021-07-15 RX ORDER — HYDROCORTISONE 5 MG/1
5 TABLET ORAL DAILY
Status: DISCONTINUED | OUTPATIENT
Start: 2021-07-16 | End: 2021-07-15

## 2021-07-15 RX ORDER — AMOXICILLIN 250 MG
1-2 CAPSULE ORAL 2 TIMES DAILY
Qty: 20 TABLET | Refills: 0 | Status: SHIPPED | OUTPATIENT
Start: 2021-07-15 | End: 2023-03-22

## 2021-07-15 RX ORDER — OXYCODONE HYDROCHLORIDE 5 MG/1
5 TABLET ORAL EVERY 6 HOURS PRN
Qty: 12 TABLET | Refills: 0 | Status: SHIPPED | OUTPATIENT
Start: 2021-07-15 | End: 2021-07-16

## 2021-07-15 RX ORDER — CHLORTHALIDONE 25 MG/1
25 TABLET ORAL DAILY
Status: DISCONTINUED | OUTPATIENT
Start: 2021-07-15 | End: 2021-07-16 | Stop reason: HOSPADM

## 2021-07-15 RX ORDER — HYDROCORTISONE 10 MG/1
30 TABLET ORAL DAILY
Status: DISCONTINUED | OUTPATIENT
Start: 2021-07-16 | End: 2021-07-16 | Stop reason: HOSPADM

## 2021-07-15 RX ORDER — POLYETHYLENE GLYCOL 3350 17 G/17G
17 POWDER, FOR SOLUTION ORAL 2 TIMES DAILY
Status: DISCONTINUED | OUTPATIENT
Start: 2021-07-15 | End: 2021-07-16 | Stop reason: HOSPADM

## 2021-07-15 RX ORDER — HYDROCORTISONE 10 MG/1
10 TABLET ORAL ONCE
Status: COMPLETED | OUTPATIENT
Start: 2021-07-15 | End: 2021-07-15

## 2021-07-15 RX ADMIN — BISACODYL 5 MG: 5 TABLET, COATED ORAL at 11:05

## 2021-07-15 RX ADMIN — POLYETHYLENE GLYCOL 3350 17 G: 17 POWDER, FOR SOLUTION ORAL at 08:39

## 2021-07-15 RX ADMIN — TRAZODONE HYDROCHLORIDE 100 MG: 100 TABLET ORAL at 21:48

## 2021-07-15 RX ADMIN — OXYCODONE HYDROCHLORIDE 10 MG: 5 TABLET ORAL at 18:03

## 2021-07-15 RX ADMIN — OXYCODONE HYDROCHLORIDE 10 MG: 5 TABLET ORAL at 01:19

## 2021-07-15 RX ADMIN — HYDROMORPHONE HYDROCHLORIDE 0.2 MG: 0.2 INJECTION, SOLUTION INTRAMUSCULAR; INTRAVENOUS; SUBCUTANEOUS at 14:57

## 2021-07-15 RX ADMIN — GABAPENTIN 1500 MG: 300 CAPSULE ORAL at 21:48

## 2021-07-15 RX ADMIN — ACETAMINOPHEN 975 MG: 325 TABLET, FILM COATED ORAL at 05:23

## 2021-07-15 RX ADMIN — HYDROCORTISONE 15 MG: 10 TABLET ORAL at 05:22

## 2021-07-15 RX ADMIN — TESTOSTERONE 30 MG: 30 SOLUTION TOPICAL at 08:49

## 2021-07-15 RX ADMIN — ACETAMINOPHEN 975 MG: 325 TABLET, FILM COATED ORAL at 21:48

## 2021-07-15 RX ADMIN — CHLORTHALIDONE 25 MG: 25 TABLET ORAL at 11:05

## 2021-07-15 RX ADMIN — POLYETHYLENE GLYCOL 3350 17 G: 17 POWDER, FOR SOLUTION ORAL at 21:49

## 2021-07-15 RX ADMIN — GABAPENTIN 1200 MG: 300 CAPSULE ORAL at 08:35

## 2021-07-15 RX ADMIN — Medication 2 CAPSULE: at 08:39

## 2021-07-15 RX ADMIN — LISINOPRIL 20 MG: 20 TABLET ORAL at 09:38

## 2021-07-15 RX ADMIN — HYDROCORTISONE 10 MG: 10 TABLET ORAL at 11:28

## 2021-07-15 RX ADMIN — HYDROMORPHONE HYDROCHLORIDE 0.2 MG: 0.2 INJECTION, SOLUTION INTRAMUSCULAR; INTRAVENOUS; SUBCUTANEOUS at 09:21

## 2021-07-15 RX ADMIN — OXYCODONE HYDROCHLORIDE 10 MG: 5 TABLET ORAL at 11:27

## 2021-07-15 RX ADMIN — OXYCODONE HYDROCHLORIDE 10 MG: 5 TABLET ORAL at 21:58

## 2021-07-15 RX ADMIN — HYDROMORPHONE HYDROCHLORIDE 0.2 MG: 0.2 INJECTION, SOLUTION INTRAMUSCULAR; INTRAVENOUS; SUBCUTANEOUS at 12:52

## 2021-07-15 RX ADMIN — ACETAMINOPHEN 975 MG: 325 TABLET, FILM COATED ORAL at 14:54

## 2021-07-15 RX ADMIN — SENNOSIDES AND DOCUSATE SODIUM 2 TABLET: 8.6; 5 TABLET ORAL at 08:39

## 2021-07-15 RX ADMIN — OMEPRAZOLE 20 MG: 20 CAPSULE, DELAYED RELEASE ORAL at 08:35

## 2021-07-15 RX ADMIN — SENNOSIDES AND DOCUSATE SODIUM 2 TABLET: 8.6; 5 TABLET ORAL at 21:48

## 2021-07-15 RX ADMIN — OXYCODONE HYDROCHLORIDE 10 MG: 5 TABLET ORAL at 06:03

## 2021-07-15 ASSESSMENT — ACTIVITIES OF DAILY LIVING (ADL)
ADLS_ACUITY_SCORE: 13

## 2021-07-15 NOTE — PLAN OF CARE
DATE & TIME: 7/14/21 9410-3193  Cognitive Concerns/ Orientation : A&OX4   BEHAVIOR & AGGRESSION TOOL COLOR: green   ABNL VS/O2: VSS on RA  MOBILITY: IND  PAIN MANAGMENT: C/o of mild abdomen pain from incision sites from surgery; 10 mg oxycodone given x1, PRN IV dilaudid available for breakthrough pain  DIET: Full liquid diet- tolerated well. Advanced to low fiber, but pt has not eaten that yet.  BOWEL/BLADDER: continent, no BM this shift- pt has not passed gas yet, but will tell nurse when he does.  ABNL LAB/BG: WDL  DRAIN/DEVICES: PIV infusing NS at 100mL/hr. on IV cipro and flagyl   TELEMETRY RHYTHM: n/a  SKIN: WDL except for 3 abdomen incision covered with bandages- CDI  TESTS/PROCEDURES: nothing this shift  D/C DAY/GOALS/PLACE: Likely today  OTHER IMPORTANT INFO:

## 2021-07-15 NOTE — PROGRESS NOTES
"Surgery    No complaints  Pain controlled  Awaiting bowel function   Tolerating diet, scrambled eggs this morning  Walking in halls  Denies CP, dyspnea    Gen:  Awake, Alert, NAD  Blood pressure (!) 160/100, pulse 62, temperature 98.2  F (36.8  C), temperature source Oral, resp. rate 20, height 1.854 m (6' 1\"), weight 108.9 kg (240 lb), SpO2 94 %.  Resp - Non-labored, clear to ascultation.    Cardiac - Regular rate & rhythm without murmur  Abdomen - soft, mildly tender, rounded. Incisions healing appropriately without erythema or drainage.  Extremities - no lower extremity edema or tenderness with palpation    A/P s/p laparoscopic appendectomy with resection of Meckels diverticulum on 7/14/21.    - clear liquid diet until ROBF  - encourage incentive spirometer use  - ambulate  - dispo: possibly home later today if + bowel function   - instructed  - surgery team will follow up in about 2 weeks with phone call    Rob Salazar PA-C  Office: [phone]  Pager: [phone]    "

## 2021-07-15 NOTE — DISCHARGE INSTRUCTIONS
[facility]  Discharge Instructions: Post-Operative Laparoscopic Appendectomy with resection of Meckels diverticulum    ACTIVITY    Expect to feel tired after your surgery.  This will gradually resolve.      Take frequent, short walks and increase your activity gradually.      Avoid strenuous physical activity or heavy lifting greater than 15-20 lbs. for 2-3 weeks.  You may climb stairs.    You may drive without restrictions when you are not using any prescription pain medication and feel comfortable in a car.    You may return to work/school when you are comfortable without any prescription pain medication.    WOUND CARE    You may remove your outer dressing or Band-Aids and shower 48 hours after the surgery.  Pat your incisions dry and leave them open to air.  Re-apply dressing (Band-Aids or gauze/tape) as needed for comfort or drainage.    You may have steri-strips (looks like white tape) on your incision.  You may peel off the steri-strips 2 weeks after your surgery if they have not peeled off on their own.     Do not soak your incisions in a tub or pool for 2 weeks.     Do not apply any lotions, creams, or ointments to your incisions.    A ridge under your incisions is normal and will gradually resolve.    DIET    Start with liquids, then gradually resume your regular diet as tolerated.  Avoid heavy, spicy, and greasy meals for 2-3 days.    Drink plenty of fluids to stay hydrated.    PAIN    Expect some tenderness and discomfort at the incision sites.  Use the prescribed pain medication at your discretion.  Expect gradual resolution of your pain over several days.    You may take ibuprofen with food (unless you have been told not to) or acetaminophen/Tylenol instead of or in addition to your prescribed pain medication.  If you are taking Norco or Percocet, do not take any additional acetaminophen/Tylenol.    Do not drink alcohol or drive while you are taking pain medications.    You  may apply ice to your incisions in 20 minute intervals as needed for the next 48 hours.  After that time, consider switching to heat if you prefer.    EXPECTATIONS    Pain medications can cause constipation.  Limit use when possible.  Take over the counter stool softener/stimulant, such as Colace or Senna, 1-2 times a day with plenty of water.  You may take a mild over the counter laxative, such as Miralax or a suppository, as needed.  You make discontinue these medications once you are having regular bowel movements and/or are no longer taking your narcotic pain medication.     You may have shoulder or upper back discomfort due to the gas used in surgery.  This is temporary and should resolve in 48-72 hours.  Short, frequent walks may help with this.    If you are unable to urinate, or feel as though you are not emptying your bladder adequately, we recommend you call our office and/or seek care at an ER or Urgent Care facility if after hours.    FOLLOW UP    Our office will contact you in approximately 2 weeks to check on your progress and answer any questions you may have.  If you are doing well, you will not need to return for a follow up appointment.  If any concerns are identified over the phone, we will help you make an appointment to see a provider.     If you have not received a phone call, have any questions or concerns, or would like to be seen, please call us at [phone] and ask to speak with our nurse.  We are located at [address] [place] [address].    CALL OUR OFFICE -[phone] IF YOU HAVE:     Chills or fever above 101 F.    Increased redness, warmth, or drainage at your incisions.    Significant bleeding.    Pain not relieved by your pain medication or rest.    Increasing pain after the first 48 hours.    Any other concerns or questions.

## 2021-07-15 NOTE — PROGRESS NOTES
[facility]    Hospitalist Progress Note    Date of Admission:  7/13/2021    Assessment & Plan     Jose Carlos Escamilla is a 71 year old male admitted on 7/13/2021.      Meckel's diverticulitis  Acute appendicitis  S/p laparoscopic appendectomy, laparoscopic medical excision  CT abdomen consistent with diverticulitis.  Reports approximately 5 days of lower abdominal pain.  -admit to inpatient  -Underwent surgery as above on 7/14 with no complications.  -Reaction to zosyn in ED.  Switch to Cipro, Flagyl.  Antibiotics discontinued per surgery.  -hold lisinopril and chlorthalidone rashard-operatively.  Resumed on 7/15.  -Diet had been advanced, patient developed abdominal distention and discomfort with this.  Has been made n.p.o. again.  Still awaiting return of bowel function.     Adrenal insufficiency  [hydrocortisone 15mg qAM and 5mg qNoon]  -Patient received hydrocortisone 50mg IV immediately prior to OR.  Prior to that he had also self-administered his home dose of 15 mg hydrocortisone early morning.  Patient insistent on sticking with his home regimen of hydrocortisone.  Refused to take any more hydrocortisone following surgery on that day.  -Patient currently on double PTA dose of hydrocortisone per his request.      Chronic pain  [gabapentin 1200mg daily, 1500mg at bedtime, morphine CR 30mg PRN daily, norco  QID PRN]   - continue gabapentin and morphine PRN  - oxycodone 10mg q4hr PRN  - dilaudid IV PRN  - schedule tylenol  - would defer post operative pain management to surgical team     HTN  -Resumed chlorthalidone and lisinopril.     PMR  - noted           Diet:  N.p.o. at present, awaiting return of bowel function  DVT Prophylaxis: Pneumatic Compression Devices  Code Status:   DNR/DNI, Discussed on admission       Disposition: Anticipate home tomorrow/whenever cleared by surgery following return of bowel function..      Lanette Durán MD  Text Page (7am - 6pm, M-F)  [facility]  Securely message with the Vocera Web Console (learn more here)  Text page via [employer] Paging/Directory      Interval History   Stable overnight.  Had eggs for breakfast.  Subsequently abdomen has been more distended and he is having discomfort.  Still not passing gas.  Awaiting return of bowel function.  Has been made n.p.o. again.  No nausea or vomiting.  Ambulating.  No fevers.  Is very particular about hydrocortisone dosing.  Requests double PTA dose to serve as stress dose steroids.    -Data reviewed today: I reviewed all new labs and imaging results over the last 24 hours. I personally reviewed CT scan with result as noted above    Physical Exam   Temp: 97.9  F (36.6  C) Temp src: Oral BP: 128/88 Pulse: 69   Resp: 20 SpO2: 95 % O2 Device: None (Room air)    Vitals:    07/13/21 1134   Weight: 108.9 kg (240 lb)     Vital Signs with Ranges  Temp:  [97.9  F (36.6  C)-98.6  F (37  C)] 97.9  F (36.6  C)  Pulse:  [62-76] 69  Resp:  [16-20] 20  BP: (120-160)/() 128/88  SpO2:  [92 %-95 %] 95 %  I/O last 3 completed shifts:  In: 240 [P.O.:240]  Out: -     Constitutional: Alert, appears comfortable, in no acute distress  Respiratory: Non labored breathing, clear to auscultation bilaterally, no crackles or wheezes  Cardiovascular: Heart sounds regular rate and rhythm, no murmurs, no leg edema  GI: Abdomen is soft, distended and mildly tender.  Hypoactive bowel sounds  Skin/Integumen: no rashes, no pressure sores  Neuro: alert, converses appropriately, moving all extremities, fluent speech, no facial asymmetry  Psych: mood and affect appropriate      Medications       acetaminophen  975 mg Oral Q8H     chlorthalidone  25 mg Oral Daily     gabapentin  1,200 mg Oral Daily     gabapentin  1,500 mg Oral At Bedtime     [START ON 7/16/2021] hydrocortisone  10 mg Oral Daily     [START ON 7/16/2021] hydrocortisone  30 mg Oral Daily     lisinopril  20 mg Oral Daily     omeprazole  20 mg Oral Daily      polyethylene glycol  17 g Oral BID     psyllium  2 capsule Oral Daily     senna-docusate  1-2 tablet Oral BID     sodium chloride (PF)  3 mL Intracatheter Q8H     testosterone  30 mg Transdermal Daily     traZODone  100 mg Oral At Bedtime       Data   Recent Labs   Lab 07/15/21  1107 07/14/21  0937 07/13/21  1241 07/12/21  1115   WBC 9.3 7.1 10.0  --    HGB 14.9 15.4 16.2  --    MCV 93 95 94  --     239 263  --     135 134 135   POTASSIUM 3.7 3.6 3.6 4.1   CHLORIDE 105 102 102 97   CO2 29 30 30  --    BUN 10 13 14 15  18   CR 0.84 0.92 0.89 0.85   ANIONGAP 2* 3 2*  --    TRI 9.1 8.7 8.9 8.8   * 101* 94 102*   ALBUMIN  --   --  3.7 4.4   PROTTOTAL  --   --  7.5 6.5   BILITOTAL  --   --  0.6 0.4   ALKPHOS  --   --  44 48   ALT  --   --  26 18   AST  --   --  12 13       Imaging  No results found for this or any previous visit (from the past 24 hour(s)).

## 2021-07-15 NOTE — PLAN OF CARE
SUMMARY: Appendecitis and Meckel's Diverticulum   DATE & TIME: 7/15/21 6818-8965  Cognitive Concerns/ Orientation : A&Ox4, anxious  BEHAVIOR & AGGRESSION TOOL COLOR: Green   ABNL VS/O2: VSS on RA ex HTN  MOBILITY: Independent  PAIN MANAGMENT: 9/10 abdominal pain from incision sites PRN 10 mg Oxycodone x2, PRN IV Dilaudid x1, Ice packs  DIET: Ate low fiber, resulted in significant pain/discomfort. Back to clear liquid diet.  BOWEL/BLADDER: Continent, no BM or gas this shift. PRN Ducolox x1.  ABNL LAB/BG: WDL  DRAIN/DEVICES: R PIV SL with int abx  SKIN: WDL except for 3 abdomen incision covered with bandages- CDI  TESTS/PROCEDURES: N/A  D/C DAY/GOALS/PLACE: Tomorrow, pending positive bowl function and adequate pain control  OTHER IMPORTANT INFO: Discharge meds in [facility]

## 2021-07-15 NOTE — PROVIDER NOTIFICATION
MD Notification    Notified Person: MD    Notified Person Name: Leeanna    Notification Date/Time: 7/15/21 6444    Notification Interaction: Patient requesting tomorrow's Hydrocortisone doses be doubled (30mg in the AM and 10mg at noon). Please advise.    Purpose of Notification:    Orders Received:    Comments:

## 2021-07-15 NOTE — PROGRESS NOTES
MD Notification    Notified Person: MD    Notified Person Name: Dr. Durán     Notification Date/Time: 7/15/2021 0915    Notification Interaction: web page    Purpose of Notification: BP this am 160/100. Would like to resume BP meds. Thanks.     Orders Received:    Comments:

## 2021-07-15 NOTE — PLAN OF CARE
DATE & TIME: 7/14/21 8995-5684  Cognitive Concerns/ Orientation : A&OX4   BEHAVIOR & AGGRESSION TOOL COLOR: green   ABNL VS/O2: VSS on RA  MOBILITY: IND  PAIN MANAGMENT: C/o of mild abdomen pain from incision sites from surgery; 10 mg oxycodone given x2, PRN IV dilaudid needed x1 for breakthrough pain  DIET: Full liquid diet- tolerated well. Advanced to low fiber, but pt has not eaten that yet.  BOWEL/BLADDER: continent, no BM this shift- pt has not passed gas yet, but will tell nurse when he does.  ABNL LAB/BG: WDL  DRAIN/DEVICES: PIV infusing NS at 100mL/hr. on IV cipro and flagyl   TELEMETRY RHYTHM: n/a  SKIN: WDL except for 3 abdomen incision covered with bandages- CDI  TESTS/PROCEDURES: nothing this shift  D/C DAY/GOALS/PLACE: Likely tomorrow   OTHER IMPORTANT INFO:

## 2021-07-16 VITALS
DIASTOLIC BLOOD PRESSURE: 98 MMHG | BODY MASS INDEX: 31.81 KG/M2 | TEMPERATURE: 97.9 F | HEART RATE: 63 BPM | SYSTOLIC BLOOD PRESSURE: 141 MMHG | OXYGEN SATURATION: 94 % | HEIGHT: 73 IN | RESPIRATION RATE: 16 BRPM | WEIGHT: 240 LBS

## 2021-07-16 PROCEDURE — 250N000013 HC RX MED GY IP 250 OP 250 PS 637: Performed by: PHYSICIAN ASSISTANT

## 2021-07-16 PROCEDURE — 250N000013 HC RX MED GY IP 250 OP 250 PS 637: Performed by: SURGERY

## 2021-07-16 PROCEDURE — 250N000013 HC RX MED GY IP 250 OP 250 PS 637: Performed by: HOSPITALIST

## 2021-07-16 PROCEDURE — 99239 HOSP IP/OBS DSCHRG MGMT >30: CPT | Performed by: HOSPITALIST

## 2021-07-16 RX ORDER — OXYCODONE HYDROCHLORIDE 5 MG/1
5 TABLET ORAL EVERY 6 HOURS PRN
Qty: 35 TABLET | Refills: 0 | Status: SHIPPED | OUTPATIENT
Start: 2021-07-16 | End: 2021-08-03

## 2021-07-16 RX ORDER — BISACODYL 10 MG
10 SUPPOSITORY, RECTAL RECTAL ONCE
Status: COMPLETED | OUTPATIENT
Start: 2021-07-16 | End: 2021-07-16

## 2021-07-16 RX ORDER — POLYETHYLENE GLYCOL 3350 17 G/17G
17 POWDER, FOR SOLUTION ORAL 2 TIMES DAILY
Qty: 510 G | Refills: 1 | Status: SHIPPED | OUTPATIENT
Start: 2021-07-16 | End: 2021-08-03

## 2021-07-16 RX ORDER — HYDROCODONE BITARTRATE AND ACETAMINOPHEN 10; 325 MG/1; MG/1
1 TABLET ORAL 4 TIMES DAILY PRN
Refills: 0 | Status: ON HOLD
Start: 2021-07-16 | End: 2022-02-26

## 2021-07-16 RX ADMIN — CHLORTHALIDONE 25 MG: 25 TABLET ORAL at 08:17

## 2021-07-16 RX ADMIN — TESTOSTERONE 30 MG: 30 SOLUTION TOPICAL at 08:20

## 2021-07-16 RX ADMIN — LISINOPRIL 20 MG: 20 TABLET ORAL at 08:17

## 2021-07-16 RX ADMIN — SENNOSIDES AND DOCUSATE SODIUM 2 TABLET: 8.6; 5 TABLET ORAL at 08:16

## 2021-07-16 RX ADMIN — HYDROCORTISONE 30 MG: 10 TABLET ORAL at 04:15

## 2021-07-16 RX ADMIN — ACETAMINOPHEN 975 MG: 325 TABLET, FILM COATED ORAL at 06:44

## 2021-07-16 RX ADMIN — OXYCODONE HYDROCHLORIDE 10 MG: 5 TABLET ORAL at 03:35

## 2021-07-16 RX ADMIN — BISACODYL 10 MG: 10 SUPPOSITORY RECTAL at 08:26

## 2021-07-16 RX ADMIN — OMEPRAZOLE 20 MG: 20 CAPSULE, DELAYED RELEASE ORAL at 08:15

## 2021-07-16 RX ADMIN — GABAPENTIN 1200 MG: 300 CAPSULE ORAL at 08:16

## 2021-07-16 RX ADMIN — OXYCODONE HYDROCHLORIDE 10 MG: 5 TABLET ORAL at 08:15

## 2021-07-16 RX ADMIN — POLYETHYLENE GLYCOL 3350 17 G: 17 POWDER, FOR SOLUTION ORAL at 08:21

## 2021-07-16 RX ADMIN — HYDROCORTISONE 10 MG: 10 TABLET ORAL at 11:24

## 2021-07-16 RX ADMIN — BISACODYL 10 MG: 5 TABLET, COATED ORAL at 04:15

## 2021-07-16 RX ADMIN — Medication 2 CAPSULE: at 08:26

## 2021-07-16 ASSESSMENT — ACTIVITIES OF DAILY LIVING (ADL)
ADLS_ACUITY_SCORE: 13

## 2021-07-16 NOTE — PROGRESS NOTES
Discharge    Patient discharged to home via personal transportation with all belongings.    Listed belongings gathered and returned to patient. yes  Belongings returned to patient from security/pharmacy yes  Care Plan and Patient education resolved: yes  Prescriptions if needed, hard copies sent with patient  yes  Home and hospital acquired medications returned to patient: yes  Medication Bin checked and emptied on discharge yes  Follow up appointment made for patient: n/a

## 2021-07-16 NOTE — PLAN OF CARE
SUMMARY: Appendecitis and Meckel's Diverticulum   DATE & TIME: 7/15/21 5832-5641  Cognitive Concerns/ Orientation : A&Ox4, agitated  BEHAVIOR & AGGRESSION TOOL COLOR: Yellow  ABNL VS/O2: VSS on RA  MOBILITY: Independent  PAIN MANAGMENT: 9/10 abdominal pain from incision sites PRN 10 mg Oxycodone x 1, Ice packs  DIET: Clear liquids  BOWEL/BLADDER: Continent, no BM or gas this shift.  ABNL LAB/BG: WDL  DRAIN/DEVICES: R PIV SL with int abx  SKIN: WDL except for 3 abdomen incision covered with bandages- CDI  TESTS/PROCEDURES: N/A  D/C DAY/GOALS/PLACE: Tomorrow, pending positive bowl function and adequate pain control  OTHER IMPORTANT INFO: Discharge meds in [facility] draw

## 2021-07-16 NOTE — CONSULTS
Care Management Initial Consult    General Information  Assessment completed with: Ezra         Primary Care Provider verified and updated as needed:     Readmission within the last 30 days:           Advance Care Planning: None           Communication Assessment  Patient's communication style: spoken language (English or Bilingual)    Hearing Difficulty or Deaf: no   Wear Glasses or Blind: yes    Cognitive  Cognitive/Neuro/Behavioral: WDL                      Living Environment:   People in home: alone     Current living Arrangements:  House      Able to return to prior arrangements:  yes       Family/Social Support:  Care provided by:  None  Provides care for:  None                Description of Support System: Daughter very supportive          Current Resources:   Patient receiving home care services:  NO     Community Resources:    Equipment currently used at home: none  Supplies currently used at home:  None    Employment/Financial:  Employment Status:     Retired     Financial Concerns:None             Lifestyle & Psychosocial Needs:  Social Determinants of Health     Tobacco Use: Medium Risk     Smoking Tobacco Use: Former Smoker     Smokeless Tobacco Use: Never Used   Alcohol Use: Unknown     Frequency of Alcohol Consumption: Not asked     Average Number of Drinks: 5 or 6     Frequency of Binge Drinking: Not asked   Financial Resource Strain:      Difficulty of Paying Living Expenses:    Food Insecurity:      Worried About Running Out of Food in the Last Year:      Ran Out of Food in the Last Year:    Transportation Needs:      Lack of Transportation (Medical):      Lack of Transportation (Non-Medical):    Physical Activity:      Days of Exercise per Week:      Minutes of Exercise per Session:    Stress:      Feeling of Stress :    Social Connections:      Frequency of Communication with Friends and Family:      Frequency of Social Gatherings with Friends and Family:      Attends Congregational Services:       Active Member of Clubs or Organizations:      Attends Club or Organization Meetings:      Marital Status:    Intimate Partner Violence:      Fear of Current or Ex-Partner:      Emotionally Abused:      Physically Abused:      Sexually Abused:    Depression: Not at risk     PHQ-2 Score: 2   Housing Stability:      Unable to Pay for Housing in the Last Year:      Number of Places Lived in the Last Year:      Unstable Housing in the Last Year:        Functional Status:  Prior to admission patient needed assistance: Independent             Mental Health Status:Pleasant and cooperative          Chemical Dependency Status:None                Values/Beliefs:  Spiritual, Cultural Beliefs, Gnosticist Practices, Values that affect care:       None          Care Management Discharge Note    Discharge Date: 07/16/2021       Discharge Disposition:Home      Discharge Services:  None    Discharge DME:  None    Discharge Transportation:      Private pay costs discussed: Not applicable    PAS Confirmation Code:    Patient/family educated on Medicare website which has current facility and service quality ratings:      Education Provided on the Discharge Plan:    Persons Notified of Discharge Plans: daughter  Patient/Family in Agreement with the Plan:  Yes    Additional Information:   Discussed plan of care and discharge plans with patient. Patient states he is presently independent,has a very supportive daughter who will assist if needed. Patient would prefer making his own appointments . Post surgical care discussed by bedside RN.         Rogerio Reagan RN  Inpatient Care Coordinator  [facility]  # [phone]

## 2021-07-16 NOTE — PROGRESS NOTES
"General Surgery Progress Note    Subjective: feeling better today. Had a large bm after suppository this morning. No nausea, minimal pain.     Objective: BP (!) 141/98 (BP Location: Left arm)   Pulse 63   Temp 97.9  F (36.6  C) (Oral)   Resp 16   Ht 1.854 m (6' 1\")   Wt 108.9 kg (240 lb)   SpO2 94%   BMI 31.66 kg/m    Gen: alert, no distress  CV: RRR  Pulm: nonlabored breathing  Abd: softer but still distended, incisions look good  Ext: WWP    Assessment/Plan:   Jose Carlos Escamilla  is a 71 year old male s/p laparoscopic appendectomy and meckel's diverticulectomy. Improving.   - ok for discharge later today  - continue miralax at discharge and senokot  - prn pain medications  - follow up with any concerns    Kelsi Parker MD  [facility]  [phone]              "

## 2021-07-16 NOTE — PLAN OF CARE
SUMMARY: Diverticulitis and Meckel's Diverticulum   DATE & TIME: 7/15/21-7/16/21 0828-4881  Cognitive Concerns/ Orientation : A&Ox4, calm and cooperative; anxious at times  BEHAVIOR & AGGRESSION TOOL COLOR: Green   ABNL VS/O2: VSS on RA, refused second set of vital signs  MOBILITY: Independent  PAIN MANAGMENT: 8/10 abdominal pain, ice packs applied and oxycodone given x1. Scheduled tylenol.   DIET: Clear liquid  BOWEL/BLADDER: Continent, no BM or gas this shift. C/o gas pain, PRN Dulcolax x1. Encouraged ambulation.   ABNL LAB/BG: NA  DRAIN/DEVICES: R PIV SL with int abx  SKIN: 3 abdominal incisions covered with bandages- CDI  TESTS/PROCEDURES: N/A  D/C DAY/GOALS/PLACE: Pending positive bowl function and adequate pain control  OTHER IMPORTANT INFO: Discharge meds in [facility]

## 2021-07-16 NOTE — DISCHARGE SUMMARY
Discharge Summary  Hospitalist    Date of Admission:  7/13/2021  Date of Discharge:  7/16/2021  Discharging Provider: Lanette Durán MD  Date of Service (when I saw the patient): 07/16/21    Discharge Diagnoses   Meckel's diverticulitis  Acute appendicitis  S/p laparoscopic appendectomy, laparoscopic medical excision    History of Present Illness   Please refer H & P for details.      Hospital Course     Jose Carlos Escamilla is a 71 year old male admitted on 7/13/2021.      Meckel's diverticulitis  Acute appendicitis  S/p laparoscopic appendectomy, laparoscopic medical excision  CT abdomen consistent with diverticulitis.  Reports approximately 5 days of lower abdominal pain.  -admit to inpatient  -Underwent surgery as above on 7/14 with no complications.  -Reaction to zosyn in ED.  Switch to Cipro, Flagyl.  Antibiotics discontinued per surgery.  -hold lisinopril and chlorthalidone rashard-operatively.  Resumed on 7/15.  -Diet had been advanced, patient developed abdominal distention and discomfort with this.  Made n.p.o. again.  -Had a BM on 7/16.  Diet was advanced and he tolerated this well.  Discharged home in stable condition.     Adrenal insufficiency  [hydrocortisone 15mg qAM and 5mg qNoon]  -Patient received hydrocortisone 50mg IV immediately prior to OR.  Prior to that he had also self-administered his home dose of 15 mg hydrocortisone early morning.  Patient insistent on sticking with his home regimen of hydrocortisone.  Refused to take any more hydrocortisone following surgery on that day.  -Patient currently on double PTA dose of hydrocortisone per his request.  -We will resume home dose of hydrocortisone from the day after discharge.      Chronic pain  [gabapentin 1200mg daily, 1500mg at bedtime, morphine CR 30mg PRN daily, norco  QID PRN]   - continue gabapentin and morphine PRN  - oxycodone 10mg q4hr PRN  - dilaudid IV PRN  - schedule tylenol  - would defer post operative pain management to surgical  team  -Surgery prescribe a short course of oxycodone at discharge.  Instructed him not to use oxycodone and hydrocodone together.  Instructed to use 1 or the other along with his PTA MS Contin.     HTN  -Resumed chlorthalidone and lisinopril.     PMR  - noted    Lanette Durán MD, MD      Pending Results   These results will be followed up by Hospitalist team.  Unresulted Labs Ordered in the Past 30 Days of this Admission     No orders found from 6/13/2021 to 7/14/2021.          Code Status   Full Code       Primary Care Physician   Baljit Salazar    Follow-ups Needed After Discharge   Follow-up Appointments     Follow-up and recommended labs and tests       Follow up with primary care provider, Baljit Salazar, within 7 days   for hospital follow- up.  No follow up labs or test are needed.             Physical Exam   Temp: 97.9  F (36.6  C) Temp src: Oral BP: (!) 141/98 Pulse: 63   Resp: 16 SpO2: 94 % O2 Device: None (Room air)    Vitals:    07/13/21 1134   Weight: 108.9 kg (240 lb)     Vital Signs with Ranges  Temp:  [97.8  F (36.6  C)-98.3  F (36.8  C)] 97.9  F (36.6  C)  Pulse:  [63-68] 63  Resp:  [16-18] 16  BP: (113-141)/(73-98) 141/98  SpO2:  [92 %-95 %] 94 %  No intake/output data recorded.    Constitutional: Alert, cooperative, no apparent distress  Respiratory: Non labored breathing, clear to auscultation bilaterally, no crackles or wheezing  Cardiovascular: Regular rate and rhythm, no murmurs, no edema  GI: Normal bowel sounds, soft, non-distended, non-tender  Skin: No obvious rash  Neuro: Alert, engages in appropriate conversation, fluent speech, moving all extremities, no facial asymmetry  Psych: Calm and pleasant, no obvious anxiety/ depression      Discharge Disposition   Discharged to home  Condition at discharge: Stable    Consultations This Hospital Stay   CARE MANAGEMENT / SOCIAL WORK IP CONSULT    Time Spent on this Encounter   ILanette MD, personally saw the patient today and  spent greater than 30 minutes discharging this patient.    Discharge Orders      Reason for your hospital stay    You were hospitalized with Meckel's diverticulitis.     Follow-up and recommended labs and tests     Follow up with primary care provider, Baljit Salazar, within 7 days for hospital follow- up.  No follow up labs or test are needed.     Activity    Your activity upon discharge: activity as tolerated     When to contact your care team    Call your primary doctor if you have any of the following: worsening , pain, fever, short of breath, nausea, vomiting     Discharge Instructions    Surgery has prescribed oxycodone for post op pain. Don't use oxycodone and hydrocodone together on same day.     Diet    Follow this diet upon discharge: Orders Placed This Encounter      Low Fiber Diet     Discharge Medications   Discharge Medication List as of 7/16/2021 11:39 AM      START taking these medications    Details   oxyCODONE (ROXICODONE) 5 MG tablet Take 1 tablet (5 mg) by mouth every 6 hours as needed for pain, Disp-12 tablet, R-0, E-Prescribe         CONTINUE these medications which have CHANGED    Details   HYDROcodone-acetaminophen (NORCO)  MG per tablet Take 1 tablet by mouth 4 times daily as needed for pain Hold if taking oxycodone instead, R-0, No Print Out      senna-docusate (SENOKOT-S/PERICOLACE) 8.6-50 MG tablet Take 1-2 tablets by mouth 2 times daily, Disp-20 tablet, R-0, E-Prescribe         CONTINUE these medications which have NOT CHANGED    Details   chlorthalidone (HYGROTON) 25 MG tablet TAKE 1 TABLET(25 MG) BY MOUTH DAILY, Disp-90 tablet, R-1, E-Prescribe      dexamethasone (DECADRON) 4 MG/ML injection INJECT 1ML INTRAMUSCULARLY UTD WHEN UNABLE TO TAKE ORAL STEROIDS, R-3, Historical      diclofenac (VOLTAREN) 1 % GEL topical gel as needed.Historical      !! gabapentin (NEURONTIN) 300 MG capsule Take 1,200 mg by mouth daily, Historical      !! gabapentin (NEURONTIN) 300 MG capsule Take  1,500 mg by mouth At Bedtime, Historical      hydrocortisone (CORTEF) 5 MG tablet 15 mg on waking and 5 mg at noon,double in sickness, Historical      lisinopril (ZESTRIL) 20 MG tablet TAKE 1 TABLET(20 MG) BY MOUTH DAILY, Disp-90 tablet, R-0, E-Prescribe      omeprazole (PRILOSEC) 20 MG DR capsule Take 20 mg by mouth daily, Historical      psyllium (METAMUCIL/KONSYL) capsule Take 2 capsules by mouth daily, Historical      tadalafil (CIALIS) 20 MG tablet Take 1 tablet (20 mg) by mouth daily as needed, Disp-10 tablet,R-11, E-Prescribe      testosterone (AXIRON) 30 MG/ACT topical solution Place 30 mg onto the skin daily , Historical      traZODone (DESYREL) 50 MG tablet TAKE 2 TABLETS BY MOUTH AT BEDTIME, Disp-180 tablet, R-3, E-Prescribe      morphine (MS CONTIN) 30 MG CR tablet TAKE 1 TABLET BY MOUTH EVERY DAY AS NEEDED FOR CHRONIC PAIN, Historical       !! - Potential duplicate medications found. Please discuss with provider.        Allergies   Allergies   Allergen Reactions     Zosyn [Piperacillin-Tazobactam In D5w] Palpitations and Rash     Data   Most Recent 3 CBC's:  Recent Labs   Lab Test 07/15/21  1107 07/14/21  0937 07/13/21  1241   WBC 9.3 7.1 10.0   HGB 14.9 15.4 16.2   MCV 93 95 94    239 263      Most Recent 3 BMP's:  Recent Labs   Lab Test 07/15/21  1107 07/14/21  0937 07/13/21  1241    135 134   POTASSIUM 3.7 3.6 3.6   CHLORIDE 105 102 102   CO2 29 30 30   BUN 10 13 14   CR 0.84 0.92 0.89   ANIONGAP 2* 3 2*   TRI 9.1 8.7 8.9   * 101* 94     Most Recent 2 LFT's:  Recent Labs   Lab Test 07/13/21  1241 07/12/21  1115   AST 12 13   ALT 26 18   ALKPHOS 44 48   BILITOTAL 0.6 0.4     Most Recent INR's and Anticoagulation Dosing History:  Anticoagulation Dose History    There is no flowsheet data to display.       Most Recent 3 Troponin's:No lab results found.  Most Recent Cholesterol Panel:  Recent Labs   Lab Test 06/16/20  0900   CHOL 226*   *   HDL 61   TRIG 84     Most Recent 6  Bacteria Isolates From Any Culture (See EPIC Reports for Culture Details):No lab results found.  Most Recent TSH, T4 and A1c Labs:  Recent Labs   Lab Test 03/21/19  1700   T4 1.16       Results for orders placed or performed in visit on 04/16/21   MR External Imaging Spine    Narrative    Images were obtained from an external facility.  Click PACS Images hyperlink to view images.  Textual results have been scanned into the media tab.

## 2021-07-20 ENCOUNTER — TRANSFERRED RECORDS (OUTPATIENT)
Dept: FAMILY MEDICINE | Facility: CLINIC | Age: 71
End: 2021-07-20

## 2021-07-22 LAB
ATRIAL RATE - MUSE: 59 BPM
DIASTOLIC BLOOD PRESSURE - MUSE: NORMAL MMHG
INTERPRETATION ECG - MUSE: NORMAL
P AXIS - MUSE: 42 DEGREES
PR INTERVAL - MUSE: 170 MS
QRS DURATION - MUSE: 106 MS
QT - MUSE: 432 MS
QTC - MUSE: 427 MS
R AXIS - MUSE: 58 DEGREES
SYSTOLIC BLOOD PRESSURE - MUSE: NORMAL MMHG
T AXIS - MUSE: 46 DEGREES
VENTRICULAR RATE- MUSE: 59 BPM

## 2021-08-03 ENCOUNTER — OFFICE VISIT (OUTPATIENT)
Dept: FAMILY MEDICINE | Facility: CLINIC | Age: 71
End: 2021-08-03

## 2021-08-03 VITALS
OXYGEN SATURATION: 94 % | WEIGHT: 248 LBS | BODY MASS INDEX: 32.72 KG/M2 | SYSTOLIC BLOOD PRESSURE: 137 MMHG | HEART RATE: 88 BPM | DIASTOLIC BLOOD PRESSURE: 82 MMHG

## 2021-08-03 DIAGNOSIS — E27.40 ADRENAL INSUFFICIENCY (H): ICD-10-CM

## 2021-08-03 DIAGNOSIS — N52.9 ERECTILE DYSFUNCTION, UNSPECIFIED ERECTILE DYSFUNCTION TYPE: ICD-10-CM

## 2021-08-03 DIAGNOSIS — M79.7 FIBROMYALGIA: ICD-10-CM

## 2021-08-03 DIAGNOSIS — F11.20 NARCOTIC DEPENDENCE (H): ICD-10-CM

## 2021-08-03 DIAGNOSIS — I10 ESSENTIAL HYPERTENSION: ICD-10-CM

## 2021-08-03 DIAGNOSIS — G89.4 CHRONIC PAIN SYNDROME: Primary | ICD-10-CM

## 2021-08-03 PROCEDURE — 99214 OFFICE O/P EST MOD 30 MIN: CPT | Performed by: FAMILY MEDICINE

## 2021-08-03 PROCEDURE — 93050 ART PRESSURE WAVEFORM ANALYS: CPT | Performed by: FAMILY MEDICINE

## 2021-08-03 NOTE — PROGRESS NOTES
Pola Branch is a 71 year old patient who presents to clinic for evaluation.  He has a history of chronic pain syndrome that is not optimally controlled.  He was originally evaluated at [facility] and found to have adrenal insufficiency and treatment of this has improved some of his symptoms.  His remaining chronic pain has been attributed primarily to fibromyalgia.  He is followed at  Pain Clinic and on hydrocodone-APAP.  He is also on gabapentin.  He is frustrated by the lack of progress and daily pain.  He has trouble functioning without his pain medication but also feels as though he is worsening.  He was seen by rheumatology and the focus was primarily on his back per his report and he is bothered most by his legs.  I had also referred him to neurology and he was seen at [facility] where it was felt much of his symptoms may be related to opioid-induced hyperalgesia.  He is open to this possibility but understandably anxious about weaning off opioids without other interventions in place.  He is interested in a new assessment.      He was on TCAs in the past but unsure if they helped.    He was prescribed Duloxetine in the past but at the time was quite expensive and did not take it.      Review of Systems   Constitutional, HEENT, cardiovascular, pulmonary, GI, , musculoskeletal, neuro, skin, endocrine and psych systems are negative, except as otherwise noted.      Objective    /82   Pulse 88   Wt 112.5 kg (248 lb)   SpO2 94%   BMI 32.72 kg/m      General: Well appearing, NAD  Psych: normal mood and affect        Assessment & Plan     Chronic pain syndrome  Not controlled.  I feel he would benefit from a multidisciplinary evaluation and team and agree with Dr Trejo.  Referral placed.  He is open to making significant changes  - Pain Management Referral; Future    Essential hypertension  At goal  - CT ART PRESS WAVEFORM ANALYS CENTRAL ART PRESSURE    Narcotic dependence (H)  - Pain Management  Referral; Future    Fibromyalgia  - Pain Management Referral; Future    Adrenal insufficiency (H)  Stable      Beny Winters MD  [facility]

## 2021-08-05 RX ORDER — TADALAFIL 20 MG/1
TABLET ORAL
Qty: 10 TABLET | Refills: 0 | Status: SHIPPED | OUTPATIENT
Start: 2021-08-05 | End: 2021-09-26

## 2021-08-17 ENCOUNTER — TRANSFERRED RECORDS (OUTPATIENT)
Dept: FAMILY MEDICINE | Facility: CLINIC | Age: 71
End: 2021-08-17

## 2021-08-17 DIAGNOSIS — I10 BENIGN ESSENTIAL HYPERTENSION: ICD-10-CM

## 2021-08-17 RX ORDER — LISINOPRIL 20 MG/1
20 TABLET ORAL DAILY
Qty: 90 TABLET | Refills: 3 | Status: SHIPPED | OUTPATIENT
Start: 2021-08-17 | End: 2022-08-22

## 2021-08-17 NOTE — TELEPHONE ENCOUNTER
Lisinopril. Last addressed 8/3/21.     BP Readings from Last 3 Encounters:   08/03/21 137/82   07/16/21 (!) 141/98   07/12/21 120/69         Essential hypertension  At goal

## 2021-09-09 ASSESSMENT — ENCOUNTER SYMPTOMS
MUSCLE WEAKNESS: 1
JOINT SWELLING: 0
MUSCLE CRAMPS: 1
LOSS OF CONSCIOUSNESS: 0
SEIZURES: 0
WEIGHT GAIN: 1
TINGLING: 1
SPEECH CHANGE: 0
DOUBLE VISION: 0
EYE PAIN: 0
PARALYSIS: 0
WEIGHT LOSS: 0
MEMORY LOSS: 0
DISTURBANCES IN COORDINATION: 1
MYALGIAS: 1
NIGHT SWEATS: 1
HALLUCINATIONS: 0
NECK PAIN: 0
EYE REDNESS: 0
NUMBNESS: 1
INCREASED ENERGY: 1
DIZZINESS: 1
ALTERED TEMPERATURE REGULATION: 1
FEVER: 0
BACK PAIN: 0
CHILLS: 0
STIFFNESS: 1
EYE IRRITATION: 0
POLYPHAGIA: 0
FATIGUE: 1
EYE WATERING: 0
WEAKNESS: 1
HEADACHES: 1
POLYDIPSIA: 0
ARTHRALGIAS: 0
TREMORS: 1
DECREASED APPETITE: 0

## 2021-09-09 ASSESSMENT — PATIENT HEALTH QUESTIONNAIRE - PHQ9
SUM OF ALL RESPONSES TO PHQ QUESTIONS 1-9: 8
10. IF YOU CHECKED OFF ANY PROBLEMS, HOW DIFFICULT HAVE THESE PROBLEMS MADE IT FOR YOU TO DO YOUR WORK, TAKE CARE OF THINGS AT HOME, OR GET ALONG WITH OTHER PEOPLE: VERY DIFFICULT
SUM OF ALL RESPONSES TO PHQ QUESTIONS 1-9: 8

## 2021-09-10 ASSESSMENT — PATIENT HEALTH QUESTIONNAIRE - PHQ9: SUM OF ALL RESPONSES TO PHQ QUESTIONS 1-9: 8

## 2021-09-11 ENCOUNTER — HEALTH MAINTENANCE LETTER (OUTPATIENT)
Age: 71
End: 2021-09-11

## 2021-09-13 ENCOUNTER — OFFICE VISIT (OUTPATIENT)
Dept: FAMILY MEDICINE | Facility: CLINIC | Age: 71
End: 2021-09-13

## 2021-09-13 ENCOUNTER — OFFICE VISIT (OUTPATIENT)
Dept: ANESTHESIOLOGY | Facility: CLINIC | Age: 71
End: 2021-09-13
Payer: MEDICARE

## 2021-09-13 VITALS
HEART RATE: 75 BPM | HEIGHT: 73 IN | WEIGHT: 247 LBS | SYSTOLIC BLOOD PRESSURE: 126 MMHG | DIASTOLIC BLOOD PRESSURE: 80 MMHG | OXYGEN SATURATION: 95 % | BODY MASS INDEX: 32.74 KG/M2

## 2021-09-13 VITALS — OXYGEN SATURATION: 96 % | SYSTOLIC BLOOD PRESSURE: 128 MMHG | HEART RATE: 89 BPM | DIASTOLIC BLOOD PRESSURE: 90 MMHG

## 2021-09-13 DIAGNOSIS — E66.09 CLASS 1 OBESITY DUE TO EXCESS CALORIES WITH SERIOUS COMORBIDITY AND BODY MASS INDEX (BMI) OF 32.0 TO 32.9 IN ADULT: ICD-10-CM

## 2021-09-13 DIAGNOSIS — I10 BENIGN ESSENTIAL HYPERTENSION: ICD-10-CM

## 2021-09-13 DIAGNOSIS — M79.7 FIBROMYALGIA: ICD-10-CM

## 2021-09-13 DIAGNOSIS — F11.20 NARCOTIC DEPENDENCE (H): ICD-10-CM

## 2021-09-13 DIAGNOSIS — G89.4 CHRONIC PAIN SYNDROME: ICD-10-CM

## 2021-09-13 DIAGNOSIS — Z12.11 SCREEN FOR COLON CANCER: ICD-10-CM

## 2021-09-13 DIAGNOSIS — E78.00 HYPERCHOLESTEROLEMIA: ICD-10-CM

## 2021-09-13 DIAGNOSIS — E66.811 CLASS 1 OBESITY DUE TO EXCESS CALORIES WITH SERIOUS COMORBIDITY AND BODY MASS INDEX (BMI) OF 32.0 TO 32.9 IN ADULT: ICD-10-CM

## 2021-09-13 DIAGNOSIS — Z00.00 ENCOUNTER FOR MEDICARE ANNUAL WELLNESS EXAM: Primary | ICD-10-CM

## 2021-09-13 DIAGNOSIS — E27.40 ADRENAL INSUFFICIENCY (H): ICD-10-CM

## 2021-09-13 DIAGNOSIS — L57.0 ACTINIC KERATOSIS: ICD-10-CM

## 2021-09-13 PROBLEM — M35.3 PMR (POLYMYALGIA RHEUMATICA) (H): Status: RESOLVED | Noted: 2019-03-21 | Resolved: 2021-09-13

## 2021-09-13 PROBLEM — Q43.0 MECKEL'S DIVERTICULITIS: Status: RESOLVED | Noted: 2021-07-13 | Resolved: 2021-09-13

## 2021-09-13 PROCEDURE — 17000 DESTRUCT PREMALG LESION: CPT | Mod: 59 | Performed by: FAMILY MEDICINE

## 2021-09-13 PROCEDURE — 99203 OFFICE O/P NEW LOW 30 MIN: CPT | Mod: 24 | Performed by: ANESTHESIOLOGY

## 2021-09-13 PROCEDURE — 99214 OFFICE O/P EST MOD 30 MIN: CPT | Mod: 25 | Performed by: FAMILY MEDICINE

## 2021-09-13 PROCEDURE — G0439 PPPS, SUBSEQ VISIT: HCPCS | Performed by: FAMILY MEDICINE

## 2021-09-13 PROCEDURE — 36415 COLL VENOUS BLD VENIPUNCTURE: CPT | Performed by: FAMILY MEDICINE

## 2021-09-13 RX ORDER — PROPRANOLOL HCL 60 MG
1 CAPSULE, EXTENDED RELEASE 24HR ORAL EVERY 24 HOURS
COMMUNITY
End: 2021-09-13

## 2021-09-13 RX ORDER — BUTALBITAL AND ACETAMINOPHEN 50; 300 MG/1; MG/1
1-2 CAPSULE ORAL EVERY 4 HOURS
COMMUNITY
End: 2021-09-13

## 2021-09-13 ASSESSMENT — ENCOUNTER SYMPTOMS
DOUBLE VISION: 0
SPEECH CHANGE: 0
HALLUCINATIONS: 0
EYE PAIN: 0
FEVER: 0
SEIZURES: 0
TREMORS: 1
WEAKNESS: 1
TINGLING: 1
MYALGIAS: 1
BACK PAIN: 0
NECK PAIN: 0
DIZZINESS: 1
MEMORY LOSS: 0
POLYDIPSIA: 0
WEIGHT LOSS: 0
EYE REDNESS: 0
HEADACHES: 1
LOSS OF CONSCIOUSNESS: 0
CHILLS: 0

## 2021-09-13 ASSESSMENT — PAIN SCALES - GENERAL: PAINLEVEL: EXTREME PAIN (8)

## 2021-09-13 ASSESSMENT — MIFFLIN-ST. JEOR: SCORE: 1935.29

## 2021-09-13 NOTE — PATIENT INSTRUCTIONS
Referrals:    Physical Therapy Referral placed- [facility]  If you have not heard from the scheduling office within 2 business days, please call [phone] for all locations    Pain Psychology Referral placed. Please call [phone] to schedule your appointment if you haven't heard from them in 2-3 business days.        Recommended Follow up:      Follow up as needed.       Please call [phone], option #1 to schedule your clinic appointment if you don't already have an appointment scheduled.        To speak with a nurse, schedule/reschedule/cancel a clinic appointment, or request a medication refill call: [phone], option #1.    You can also reach us by uberVU: [email or web address]

## 2021-09-13 NOTE — PATIENT INSTRUCTIONS
Patient Education   Personalized Prevention Plan  You are due for the preventive services outlined below.  Your care team is available to assist you in scheduling these services.  If you have already completed any of these items, please share that information with your care team to update in your medical record.  Health Maintenance Due   Topic Date Due     Hepatitis B Vaccine (2 of 3 - Hep B Twinrix risk 3-dose series) 04/19/2019     Flu Vaccine (1) 09/01/2021       Thank you for coming in today! If you receive a survey via My Chart, mail or phone please let us know if there was anything you especially appreciated today or if there is any way we can improve our clinic. We value your input.     Likewise, we are working hard to attend to our digital reputation.    Please consider reviewing our clinic on [employer] and/or [employer] via the following links or QR codes:    [email or web address]                                          We truly appreciate you taking the time to do this.    General Information:    Today you had your appointment with Beny Winters MD  My hours are:    Monday 8 AM - 5 PM  Tuesday: 8 AM - 5 PM  Wednesday: 8 AM - 5 PM  Fridays: 8 AM - 5 PM    I am not in the office Thursdays. Therefore, non urgent calls received on Thursday will be addressed when I am back in the office on Friday.    If lab work was done today as part of your evaluation you will generally be contacted via My Chart, mail, or phone with the results within 1-5 days. If there is an alarming result we will contact you by phone. Lab results come back at varying times, I generally wait until all labs are resulted before making comments on results. Please note labs are automatically released to My Chart once available.    If you need refills please contact your pharmacy. They will send a refill request to me to review. Please allow 3 business days  for us to process all refill requests.    Please call or send a medical message with any questions or concerns.    Thank you for choosing [facility].  We truly appreciate you trusting us with your medical care.    Sincerely,    Beny Winters MD

## 2021-09-13 NOTE — NURSING NOTE
Patient presents with:  Consult: MAX COHEN, RM 14,pt reports 8/10 painin his total body.      Extreme Pain (8)     Pain Medications     Opioid Combinations Refills Start End     HYDROcodone-acetaminophen (NORCO)  MG per tablet    0 7/16/2021     Sig - Route: Take 1 tablet by mouth 4 times daily as needed for pain Hold if taking oxycodone instead - Oral    Class: No Print Out    Earliest Fill Date: 7/16/2021          What medications are you using for pain?   Hydrocodone,     (New patients only) Have you been seen by another pain clinic/ provider? [facility] [facility], Andie perez CNP    Pt is looking to possibly establish care.     Pt is looking for a plan to manage his pain.    Jorge Huang, EMT

## 2021-09-13 NOTE — LETTER
9/13/2021       RE: Jose Carlos Escamilla  [address]  [facility] [address]     Dear Colleague,    Thank you for referring your patient, Jose Carlos Escamilla, to the [facility] FOR COMPREHENSIVE PAIN MANAGEMENT [place] at [facility]. Please see a copy of my visit note below.      Pain Clinic New Patient Consult Note:    Referring Provider: Self   Primary care provider: Baljit Salazar.    Jose Carlos Escamilla is a 71 year old y.o. old male who presents to the pain clinic with generalized body pain.    HPI:  Patient reports that his symptoms started in 2015 when he returned from a vacation in [place].  Patient states that he started to have leg pain and weakness.  He had extensive work-up at the [facility] which was negative.  Following which symptoms slowly got better.  He did have a period time where he reports he was back to normal.  He then states his symptoms progressively got worse and are now much worse than before.  He then reestablished care with the [facility] and [facility].  Further work-up resulted in him seeing an endocrinologist.  Endo diagnosed him with testosterone deficiency and adrenal insufficiency.  He was started on testosterone replacement therapy and steroid supplementation.  Patient states that improved his symptoms of malaise significantly improved however his pain symptoms continued.  Patient reports he was diagnosed with fibromyalgia.  [place] started him on oxycodone and gabapentin.  He has also tried Lyrica but it did not provide much better relief of his pain and gabapentin was more expensive so he switched back to gabapentin.  Patient takes 2-3 oxycodone tablets a day.  Patient has tried duloxetine and amitriptyline but did not provide any relief of his pain.    Today patient reports his pain is generalized.  He reports his pain is mostly in large muscles.  If he lies down or sits too long the pain gets worse.  He  reports he is only able to sleep for about 6 hours and he becomes so stiff he has to get out of bed and walk around.  Pain is on average 7/10 and peaks to 10/10.  Patient states that when he wakes up in the morning he has a burning pain throughout his entire body.  It is so bad he can barely take it.  Patient denies back pain denies and radicular symptoms.  Patient denies any one joint or muscle group hurts worse than in the else.  Patient denies symptoms of anhedonia, allodynia, hyperalgesia.  Patient reports that his mood is generally good denies suicidal ideations.      Patient Supplied Answers To the  Pain Questionnaire  [facility] -  Patient Entered Questionnaire/Answers 9/9/2021   What number best describes your pain right now:  0 = No pain  to  10 = Worst pain imaginable 3   How would you describe the pain? burning, cramping, sharp, numbness, dull, aching, throbbing   Which of the following worsen your pain? standing, walking, exercise   Which of the following improve or reduce your pain?  lying down, medication, relaxation   What number best describes your average pain for the past week:  0 = No pain  to  10 = Worst pain imaginable 6   What number best describes your LOWEST pain in past 24 hours:  0 = No pain  to  10 = Worst pain imaginable 2   What number best describes your WORST pain in past 24 hours:  0 = No pain  to  10 = Worst pain imaginable 9   When is your pain worst? PM   What non-medicine treatments have you already had for your pain? physical therapy   Have you tried treating your pain with medication?  Yes   Are you currently taking medications for your pain? Yes           Pain treatments:    Herbal medicines: [place]   Physical therapy: has apt next week   Chiropractor: denies   Pain physician: [place] pain gives Hydrocodone   Surgery: TKA appendectomy diskectomy c-spine    Biofeedback: denies   Acupuncture: denies     Tests/Imaging reviewed with the patient:    MRI THORACIC SPINE WITHOUT  CONTRAST 1/20/2021 2:47 PM   IMPRESSION:  Multilevel degenerative disc disease and facet  arthropathy of the thoracic spine, as described. No high-grade spinal  canal or neural foraminal stenosis.    MRI LUMBAR SPINE WITHOUT CONTRAST 1/20/2021 2:51 PM   IMPRESSION:  1. Multilevel degenerative disc disease and facet arthropathy of the  lumbar spine, as described.  2. No significant spinal canal stenosis.  3. Varying degrees of mild/mild to moderate multilevel neural  foraminal narrowing, most pronounced on the left at L5-S1.  4. Incompletely evaluated partially visualized apparent round lesion  along the left renal sinus, possibly representing a nonspecific left  renal mass. Recommend renal ultrasound for further evaluation.      Significant Medical History:   Past Medical History:   Diagnosis Date     ACP (advance care planning)      Anxiety      Chronic rhinitis      Diverticulosis      Esophageal stenosis      Hiatal hernia      HTN (hypertension)      Hypercholesterolemia      Hypertension      IBS (irritable bowel syndrome)      Meckel's diverticulitis 7/13/2021    Added automatically from request for surgery [id number]     PMR (polymyalgia rheumatica) (H) 3/21/2019     Post-traumatic osteoarthritis of both knees 11/21/2016          Past Surgical History:  Past Surgical History:   Procedure Laterality Date     ARTHROPLASTY KNEE Left 12/2/2016    Procedure: ARTHROPLASTY KNEE;  Surgeon: Marisa Page MD;  Location:  OR     DISCECTOMY CERVICAL MINIMALLY INVASIVE ONE LEVEL       LAPAROSCOPIC APPENDECTOMY N/A 7/14/2021    Procedure: Laparoscopic appendectomy;  Surgeon: Kelsi Parker MD;  Location:  OR     LAPAROSCOPY DIAGNOSTIC (GENERAL) N/A 7/14/2021    Procedure: Diagnostic laparoscopy, laparoscopic appendectomy, laparoscopic meckel excision;  Surgeon: Kelsi Parker MD;  Location:  OR     NECK SURGERY  2001    [place]          Family History:  Family History   Problem Relation Age of Onset      Diabetes Mother      Hypertension Mother      Parkinsonism Father      Lung Cancer Sister      Alcoholism Sister      Rheumatoid Arthritis Brother      Hypertension Brother      Hyperlipidemia Brother      Liver Cancer Brother      Hemochromatosis Brother      Hypertension Brother      No Known Problems Daughter      No Known Problems Son           Social History:  Social History     Socioeconomic History     Marital status: Single     Spouse name: Not on file     Number of children: Not on file     Years of education: Not on file     Highest education level: Not on file   Occupational History     Not on file   Tobacco Use     Smoking status: Former Smoker     Types: Cigarettes     Quit date: 1995     Years since quittin.2     Smokeless tobacco: Never Used   Substance and Sexual Activity     Alcohol use: Yes     Alcohol/week: 0.0 - 3.3 standard drinks     Drug use: No     Sexual activity: Yes   Other Topics Concern     Parent/sibling w/ CABG, MI or angioplasty before 65F 55M? Not Asked   Social History Narrative    Living: , 2 kids    Work: , half retired    Exercise: as active as possible with pain    EtOH: 1 drink/day    Tobacco: former, quit     Hobbies: fishing, sailing, art         Social Determinants of Health     Financial Resource Strain:      Difficulty of Paying Living Expenses:    Food Insecurity:      Worried About Running Out of Food in the Last Year:      Ran Out of Food in the Last Year:    Transportation Needs:      Lack of Transportation (Medical):      Lack of Transportation (Non-Medical):    Physical Activity:      Days of Exercise per Week:      Minutes of Exercise per Session:    Stress:      Feeling of Stress :    Social Connections:      Frequency of Communication with Friends and Family:      Frequency of Social Gatherings with Friends and Family:      Attends Spiritism Services:      Active Member of Clubs or Organizations:      Attends Club or Organization  Meetings:      Marital Status:    Intimate Partner Violence:      Fear of Current or Ex-Partner:      Emotionally Abused:      Physically Abused:      Sexually Abused:      Social History     Social History Narrative    Living: , 2 kids    Work: , half retired    Exercise: as active as possible with pain    EtOH: 1 drink/day    Tobacco: former, quit 1990s    Hobbies: fishing, sailing, art              Allergies:  Allergies   Allergen Reactions     Zosyn [Piperacillin-Tazobactam In D5w] Palpitations and Rash       Current Medications:   Current Outpatient Medications   Medication Sig Dispense Refill     chlorthalidone (HYGROTON) 25 MG tablet TAKE 1 TABLET(25 MG) BY MOUTH DAILY 90 tablet 1     dexamethasone (DECADRON) 4 MG/ML injection INJECT 1ML INTRAMUSCULARLY UTD WHEN UNABLE TO TAKE ORAL STEROIDS  3     diclofenac (VOLTAREN) 1 % GEL topical gel as needed.       gabapentin (NEURONTIN) 300 MG capsule Take 1,200 mg by mouth daily       gabapentin (NEURONTIN) 300 MG capsule Take 1,500 mg by mouth At Bedtime        HYDROcodone-acetaminophen (NORCO)  MG per tablet Take 1 tablet by mouth 4 times daily as needed for pain Hold if taking oxycodone instead  0     hydrocortisone (CORTEF) 5 MG tablet 15 mg on waking and 5 mg at noon,double in sickness       lisinopril (ZESTRIL) 20 MG tablet Take 1 tablet (20 mg) by mouth daily 90 tablet 3     omeprazole (PRILOSEC) 20 MG DR capsule Take 20 mg by mouth daily       senna-docusate (SENOKOT-S/PERICOLACE) 8.6-50 MG tablet Take 1-2 tablets by mouth 2 times daily 20 tablet 0     tadalafil (CIALIS) 20 MG tablet TAKE ONE TABLET BY MOUTH DAILY AS NEEDED  10 tablet 0     testosterone (AXIRON) 30 MG/ACT topical solution Place 30 mg onto the skin daily        traZODone (DESYREL) 50 MG tablet TAKE 2 TABLETS BY MOUTH AT BEDTIME 180 tablet 3          Current Pain Medications:  Medications related to Pain Management (From now, onward)    None           Past Pain  Medications:    Hydrocodone, gabapentin, lyrica    Blood thinner:    denies    Work History:    Current work status: 50%      Psychosocial History:     History of treatment for behavioral disorder: denies  History of suicidal ideation or suicidal attempt: denies     Review of Systems:  Review of Systems   Constitutional: Negative for chills, fever and weight loss.   Eyes: Negative for double vision, pain and redness.   Musculoskeletal: Positive for myalgias. Negative for back pain and neck pain.   Neurological: Positive for dizziness, tingling, tremors, weakness and headaches. Negative for speech change, seizures and loss of consciousness.   Endo/Heme/Allergies: Negative for polydipsia.   Psychiatric/Behavioral: Negative for hallucinations and memory loss.   All other systems reviewed and are negative.        Physical Exam:     Vitals:    09/13/21 1152   BP: (!) 128/90   Pulse: 89   SpO2: 96%       General Appearance: No distress, seated comfortably  Mood: Euthymic  HE ENT: Non constricted pupils  Respiratory: Non labored breathing  CVS: Regular rate and rhythm  GI: Soft, non distended, no TTP  Skin: No rashes over exposed skin  MS: Full range of motion in all limbs and back  Neuro: Sensation normal in all dermatomes reflexes 2+ in all limbs  Gait: antalgic, ambulates without assistance    Pain specific exam:    Straight leg test negative bilaterally    Laboratory results:  Recent Labs   Lab Test 07/15/21  1107 07/14/21  0937    135   POTASSIUM 3.7 3.6   CHLORIDE 105 102   CO2 29 30   ANIONGAP 2* 3   * 101*   BUN 10 13   CR 0.84 0.92   TRI 9.1 8.7       CBC RESULTS:   Recent Labs   Lab Test 07/15/21  1107   WBC 9.3   RBC 4.68   HGB 14.9   HCT 43.6   MCV 93   MCH 31.8   MCHC 34.2   RDW 11.9            Imaging:       ASSESSMENT AND PLAN:     Encounter Diagnosis:  Chronic Pain Syndrome     Jose Carlos Escamilla is a 71 year old y.o. old male who presents to the pain clinic with chronic full body  pain. We discussed our clinic specializes in interventions and do very little medication management. We discussed that he is better suited getting medication from PCP and other pain doctor, patient agreed.    I have summarized the patient s past medical history, discussed their clinical findings and the potential differential diagnosis with the patient. Significant past medical history pertinent to the patient s current condition includes full body pain and opioid use.  The clinical findings reveal normal physical exam. The differential diagnosis discussed with the patient are listed above. I have discussed anatomy and possible sources of the pain using models and/or pictures (diagrams). I have discussed multi- disciplinary pain management options withthe patient as pertaining to their case as detailed above. The pain management options we discussed included, but were not limited to the recommendations below.  I also discussed with patient the risks, benefits and alternatives to each pain management option.  All of the patient s questions and concerns were answered to the best of my ability.    RECOMMENDATIONS:     1. Medications: Can continue with current regimen from PCP and other pain doctors. Dosing, side effects, risks/benefits/alternatives were discussed with the patient in detail.    2. Procedure: We are not scheduling patient for a procedure. Risks/benefits/alternatives were discussed.     I also discussed with the patient that the possible risks involved with interventional treatment included, but are not limited to, no pain control, worsened pain, stroke,seizure, spinal headache, allergic reactions, introduction of infection or bleeding which may lead to emergent spine surgery, nerve damage, paralysis oreven death.    3. Physical therapy: PT has an apt next week. The patient will also discuss spine care and posture. Pool therapy and stretches can be considered if available.    4. Refer to pain psych for  CBT    Follow up: PRN    Answers for HPI/ROS submitted by the patient on 9/9/2021  If you checked off any problems, how difficult have these problems made it for you to do your work, take care of things at home, or get along with other people?: Very difficult  PHQ9 TOTAL SCORE: 8  General Symptoms: Yes  Skin Symptoms: No  HENT Symptoms: No  EYE SYMPTOMS: Yes  HEART SYMPTOMS: No  LUNG SYMPTOMS: No  INTESTINAL SYMPTOMS: No  URINARY SYMPTOMS: No  REPRODUCTIVE SYMPTOMS: No  SKELETAL SYMPTOMS: Yes  BLOOD SYMPTOMS: No  NERVOUS SYSTEM SYMPTOMS: Yes  MENTAL HEALTH SYMPTOMS: No  Loss of appetite: No  Weight gain: Yes  Fatigue: Yes  Night sweats: Yes  Increased stress: Yes  Excessive hunger: No  Feeling hot or cold when others believe the temperature is normal: Yes  Loss of height: No  Post-operative complications: No  Surgical site pain: No  Change in or Loss of Energy: Yes  Hyperactivity: No  Confusion: No  Vision loss: No  Dry eyes: No  Watery eyes: No  Eye bulging: No  Flashing of lights: No  Spots: No  Floaters: Yes  Crossed eyes: No  Tunnel Vision: No  Yellowing of eyes: No  Eye irritation: No  Swollen joints: No  Joint pain: No  Bone pain: Yes  Muscle cramps: Yes  Muscle weakness: Yes  Joint stiffness: Yes  Bone fracture: No  Trouble with coordination: Yes  Difficulty walking: Yes  Paralysis: No  Numbness: Yes    ATTENDING ATTESTATION  I saw the patient along with the pain medicine fellow Dr. Mason Keys. Please see his note above for full details. I was involved in gathering history, physical examination and development of the plan of care.         Again, thank you for allowing me to participate in the care of your patient.      Sincerely,    Ana Arreaga MD

## 2021-09-13 NOTE — PROGRESS NOTES
Pain Clinic New Patient Consult Note:    Referring Provider: Self   Primary care provider: Baljit Salazar.    Jose Carlos Escamilla is a 71 year old y.o. old male who presents to the pain clinic with generalized body pain.    HPI:  Patient reports that his symptoms started in 2015 when he returned from a vacation in [place].  Patient states that he started to have leg pain and weakness.  He had extensive work-up at the [facility] which was negative.  Following which symptoms slowly got better.  He did have a period time where he reports he was back to normal.  He then states his symptoms progressively got worse and are now much worse than before.  He then reestablished care with the [facility] and [facility].  Further work-up resulted in him seeing an endocrinologist.  Endo diagnosed him with testosterone deficiency and adrenal insufficiency.  He was started on testosterone replacement therapy and steroid supplementation.  Patient states that improved his symptoms of malaise significantly improved however his pain symptoms continued.  Patient reports he was diagnosed with fibromyalgia.  [place] started him on oxycodone and gabapentin.  He has also tried Lyrica but it did not provide much better relief of his pain and gabapentin was more expensive so he switched back to gabapentin.  Patient takes 2-3 oxycodone tablets a day.  Patient has tried duloxetine and amitriptyline but did not provide any relief of his pain.    Today patient reports his pain is generalized.  He reports his pain is mostly in large muscles.  If he lies down or sits too long the pain gets worse.  He reports he is only able to sleep for about 6 hours and he becomes so stiff he has to get out of bed and walk around.  Pain is on average 7/10 and peaks to 10/10.  Patient states that when he wakes up in the morning he has a burning pain throughout his entire body.  It is so bad he can barely take it.  Patient denies back pain denies and  radicular symptoms.  Patient denies any one joint or muscle group hurts worse than in the else.  Patient denies symptoms of anhedonia, allodynia, hyperalgesia.  Patient reports that his mood is generally good denies suicidal ideations.      Patient Supplied Answers To the [facility] Pain Questionnaire  [facility] -  Patient Entered Questionnaire/Answers 9/9/2021   What number best describes your pain right now:  0 = No pain  to  10 = Worst pain imaginable 3   How would you describe the pain? burning, cramping, sharp, numbness, dull, aching, throbbing   Which of the following worsen your pain? standing, walking, exercise   Which of the following improve or reduce your pain?  lying down, medication, relaxation   What number best describes your average pain for the past week:  0 = No pain  to  10 = Worst pain imaginable 6   What number best describes your LOWEST pain in past 24 hours:  0 = No pain  to  10 = Worst pain imaginable 2   What number best describes your WORST pain in past 24 hours:  0 = No pain  to  10 = Worst pain imaginable 9   When is your pain worst? PM   What non-medicine treatments have you already had for your pain? physical therapy   Have you tried treating your pain with medication?  Yes   Are you currently taking medications for your pain? Yes           Pain treatments:    Herbal medicines: [place]   Physical therapy: has apt next week   Chiropractor: barbi   Pain physician: [facility] gives Hydrocodone   Surgery: TKA appendectomy diskectomy c-spine    Biofeedback: denies   Acupuncture: denies     Tests/Imaging reviewed with the patient:    MRI THORACIC SPINE WITHOUT CONTRAST 1/20/2021 2:47 PM   IMPRESSION:  Multilevel degenerative disc disease and facet  arthropathy of the thoracic spine, as described. No high-grade spinal  canal or neural foraminal stenosis.    MRI LUMBAR SPINE WITHOUT CONTRAST 1/20/2021 2:51 PM   IMPRESSION:  1. Multilevel degenerative disc disease and facet arthropathy of  the  lumbar spine, as described.  2. No significant spinal canal stenosis.  3. Varying degrees of mild/mild to moderate multilevel neural  foraminal narrowing, most pronounced on the left at L5-S1.  4. Incompletely evaluated partially visualized apparent round lesion  along the left renal sinus, possibly representing a nonspecific left  renal mass. Recommend renal ultrasound for further evaluation.      Significant Medical History:   Past Medical History:   Diagnosis Date     ACP (advance care planning)      Anxiety      Chronic rhinitis      Diverticulosis      Esophageal stenosis      Hiatal hernia      HTN (hypertension)      Hypercholesterolemia      Hypertension      IBS (irritable bowel syndrome)      Meckel's diverticulitis 7/13/2021    Added automatically from request for surgery [id number]     PMR (polymyalgia rheumatica) (H) 3/21/2019     Post-traumatic osteoarthritis of both knees 11/21/2016          Past Surgical History:  Past Surgical History:   Procedure Laterality Date     ARTHROPLASTY KNEE Left 12/2/2016    Procedure: ARTHROPLASTY KNEE;  Surgeon: Marisa Page MD;  Location: [facility] OR     DISCECTOMY CERVICAL MINIMALLY INVASIVE ONE LEVEL       LAPAROSCOPIC APPENDECTOMY N/A 7/14/2021    Procedure: Laparoscopic appendectomy;  Surgeon: Kelsi Parker MD;  Location: [facility] OR     LAPAROSCOPY DIAGNOSTIC (GENERAL) N/A 7/14/2021    Procedure: Diagnostic laparoscopy, laparoscopic appendectomy, laparoscopic meckel excision;  Surgeon: Kelsi Parker MD;  Location:  OR     NECK SURGERY  2001    [place]          Family History:  Family History   Problem Relation Age of Onset     Diabetes Mother      Hypertension Mother      Parkinsonism Father      Lung Cancer Sister      Alcoholism Sister      Rheumatoid Arthritis Brother      Hypertension Brother      Hyperlipidemia Brother      Liver Cancer Brother      Hemochromatosis Brother      Hypertension Brother      No Known Problems Daughter      No Known Problems  Son           Social History:  Social History     Socioeconomic History     Marital status: Single     Spouse name: Not on file     Number of children: Not on file     Years of education: Not on file     Highest education level: Not on file   Occupational History     Not on file   Tobacco Use     Smoking status: Former Smoker     Types: Cigarettes     Quit date: 1995     Years since quittin.2     Smokeless tobacco: Never Used   Substance and Sexual Activity     Alcohol use: Yes     Alcohol/week: 0.0 - 3.3 standard drinks     Drug use: No     Sexual activity: Yes   Other Topics Concern     Parent/sibling w/ CABG, MI or angioplasty before 65F 55M? Not Asked   Social History Narrative    Living: , 2 kids    Work: , half retired    Exercise: as active as possible with pain    EtOH: 1 drink/day    Tobacco: former, quit     Hobbies: fishing, sailing, art         Social Determinants of Health     Financial Resource Strain:      Difficulty of Paying Living Expenses:    Food Insecurity:      Worried About Running Out of Food in the Last Year:      Ran Out of Food in the Last Year:    Transportation Needs:      Lack of Transportation (Medical):      Lack of Transportation (Non-Medical):    Physical Activity:      Days of Exercise per Week:      Minutes of Exercise per Session:    Stress:      Feeling of Stress :    Social Connections:      Frequency of Communication with Friends and Family:      Frequency of Social Gatherings with Friends and Family:      Attends Voodoo Services:      Active Member of Clubs or Organizations:      Attends Club or Organization Meetings:      Marital Status:    Intimate Partner Violence:      Fear of Current or Ex-Partner:      Emotionally Abused:      Physically Abused:      Sexually Abused:      Social History     Social History Narrative    Living: , 2 kids    Work: , half retired    Exercise: as active as possible with pain    EtOH: 1  drink/day    Tobacco: former, quit 1990s    Hobbies: fishing, sailing, art              Allergies:  Allergies   Allergen Reactions     Zosyn [Piperacillin-Tazobactam In D5w] Palpitations and Rash       Current Medications:   Current Outpatient Medications   Medication Sig Dispense Refill     chlorthalidone (HYGROTON) 25 MG tablet TAKE 1 TABLET(25 MG) BY MOUTH DAILY 90 tablet 1     dexamethasone (DECADRON) 4 MG/ML injection INJECT 1ML INTRAMUSCULARLY UTD WHEN UNABLE TO TAKE ORAL STEROIDS  3     diclofenac (VOLTAREN) 1 % GEL topical gel as needed.       gabapentin (NEURONTIN) 300 MG capsule Take 1,200 mg by mouth daily       gabapentin (NEURONTIN) 300 MG capsule Take 1,500 mg by mouth At Bedtime        HYDROcodone-acetaminophen (NORCO)  MG per tablet Take 1 tablet by mouth 4 times daily as needed for pain Hold if taking oxycodone instead  0     hydrocortisone (CORTEF) 5 MG tablet 15 mg on waking and 5 mg at noon,double in sickness       lisinopril (ZESTRIL) 20 MG tablet Take 1 tablet (20 mg) by mouth daily 90 tablet 3     omeprazole (PRILOSEC) 20 MG DR capsule Take 20 mg by mouth daily       senna-docusate (SENOKOT-S/PERICOLACE) 8.6-50 MG tablet Take 1-2 tablets by mouth 2 times daily 20 tablet 0     tadalafil (CIALIS) 20 MG tablet TAKE ONE TABLET BY MOUTH DAILY AS NEEDED  10 tablet 0     testosterone (AXIRON) 30 MG/ACT topical solution Place 30 mg onto the skin daily        traZODone (DESYREL) 50 MG tablet TAKE 2 TABLETS BY MOUTH AT BEDTIME 180 tablet 3          Current Pain Medications:  Medications related to Pain Management (From now, onward)    None           Past Pain Medications:    Hydrocodone, gabapentin, lyrica    Blood thinner:    denies    Work History:    Current work status: 50%      Psychosocial History:     History of treatment for behavioral disorder: denies  History of suicidal ideation or suicidal attempt: denies     Review of Systems:  Review of Systems   Constitutional: Negative for  chills, fever and weight loss.   Eyes: Negative for double vision, pain and redness.   Musculoskeletal: Positive for myalgias. Negative for back pain and neck pain.   Neurological: Positive for dizziness, tingling, tremors, weakness and headaches. Negative for speech change, seizures and loss of consciousness.   Endo/Heme/Allergies: Negative for polydipsia.   Psychiatric/Behavioral: Negative for hallucinations and memory loss.   All other systems reviewed and are negative.        Physical Exam:     Vitals:    09/13/21 1152   BP: (!) 128/90   Pulse: 89   SpO2: 96%       General Appearance: No distress, seated comfortably  Mood: Euthymic  HE ENT: Non constricted pupils  Respiratory: Non labored breathing  CVS: Regular rate and rhythm  GI: Soft, non distended, no TTP  Skin: No rashes over exposed skin  MS: Full range of motion in all limbs and back  Neuro: Sensation normal in all dermatomes reflexes 2+ in all limbs  Gait: antalgic, ambulates without assistance    Pain specific exam:    Straight leg test negative bilaterally    Laboratory results:  Recent Labs   Lab Test 07/15/21  1107 07/14/21  0937    135   POTASSIUM 3.7 3.6   CHLORIDE 105 102   CO2 29 30   ANIONGAP 2* 3   * 101*   BUN 10 13   CR 0.84 0.92   TRI 9.1 8.7       CBC RESULTS:   Recent Labs   Lab Test 07/15/21  1107   WBC 9.3   RBC 4.68   HGB 14.9   HCT 43.6   MCV 93   MCH 31.8   MCHC 34.2   RDW 11.9            Imaging:       ASSESSMENT AND PLAN:     Encounter Diagnosis:  Chronic Pain Syndrome     Jose Carlos Escamilla is a 71 year old y.o. old male who presents to the pain clinic with chronic full body pain. We discussed our clinic specializes in interventions and do very little medication management. We discussed that he is better suited getting medication from PCP and other pain doctor, patient agreed.    I have summarized the patient s past medical history, discussed their clinical findings and the potential differential diagnosis with  the patient. Significant past medical history pertinent to the patient s current condition includes full body pain and opioid use.  The clinical findings reveal normal physical exam. The differential diagnosis discussed with the patient are listed above. I have discussed anatomy and possible sources of the pain using models and/or pictures (diagrams). I have discussed multi- disciplinary pain management options withthe patient as pertaining to their case as detailed above. The pain management options we discussed included, but were not limited to the recommendations below.  I also discussed with patient the risks, benefits and alternatives to each pain management option.  All of the patient s questions and concerns were answered to the best of my ability.    RECOMMENDATIONS:     1. Medications: Can continue with current regimen from PCP and other pain doctors. Dosing, side effects, risks/benefits/alternatives were discussed with the patient in detail.    2. Procedure: We are not scheduling patient for a procedure. Risks/benefits/alternatives were discussed.     I also discussed with the patient that the possible risks involved with interventional treatment included, but are not limited to, no pain control, worsened pain, stroke,seizure, spinal headache, allergic reactions, introduction of infection or bleeding which may lead to emergent spine surgery, nerve damage, paralysis oreven death.    3. Physical therapy: PT has an apt next week. The patient will also discuss spine care and posture. Pool therapy and stretches can be considered if available.    4. Refer to pain psych for CBT    Follow up: PRN    Answers for HPI/ROS submitted by the patient on 9/9/2021  If you checked off any problems, how difficult have these problems made it for you to do your work, take care of things at home, or get along with other people?: Very difficult  PHQ9 TOTAL SCORE: 8  General Symptoms: Yes  Skin Symptoms: No  HENT Symptoms:  No  EYE SYMPTOMS: Yes  HEART SYMPTOMS: No  LUNG SYMPTOMS: No  INTESTINAL SYMPTOMS: No  URINARY SYMPTOMS: No  REPRODUCTIVE SYMPTOMS: No  SKELETAL SYMPTOMS: Yes  BLOOD SYMPTOMS: No  NERVOUS SYSTEM SYMPTOMS: Yes  MENTAL HEALTH SYMPTOMS: No  Loss of appetite: No  Weight gain: Yes  Fatigue: Yes  Night sweats: Yes  Increased stress: Yes  Excessive hunger: No  Feeling hot or cold when others believe the temperature is normal: Yes  Loss of height: No  Post-operative complications: No  Surgical site pain: No  Change in or Loss of Energy: Yes  Hyperactivity: No  Confusion: No  Vision loss: No  Dry eyes: No  Watery eyes: No  Eye bulging: No  Flashing of lights: No  Spots: No  Floaters: Yes  Crossed eyes: No  Tunnel Vision: No  Yellowing of eyes: No  Eye irritation: No  Swollen joints: No  Joint pain: No  Bone pain: Yes  Muscle cramps: Yes  Muscle weakness: Yes  Joint stiffness: Yes  Bone fracture: No  Trouble with coordination: Yes  Difficulty walking: Yes  Paralysis: No  Numbness: Yes    ATTENDING ATTESTATION  I saw the patient along with the pain medicine fellow Dr. Mason Keys. Please see his note above for full details. I was involved in gathering history, physical examination and development of the plan of care.

## 2021-09-13 NOTE — PROGRESS NOTES
"  SUBJECTIVE:   Jose Carlos Escamilla is a 71 year old male who presents for Preventive Visit.    Patient has been advised of split billing requirements and indicates understanding: Yes  Are you in the first 12 months of your Medicare Part B coverage?  No    Chronic pain: has appointment today with comprehensive pain management.  See my previous note for details.  He is on chronic gabapentin and Hydrocodone.  Has been on opiates about 4 years.      HTN: at goal on Lisinopril and chlorthalidone.  No side effects.    Adrenal insufficiency: on hydrocortisone, followed at [facility].  Due for follow up labs.    Low testosterone: on Axiron, followed at [facility]    HLD: not on meds    Physical Health:    In general, how would you rate your overall physical health? fair    Outside of work, how many days during the week do you exercise? 2-3 days/week    Outside of work, approximately how many minutes a day do you exercise?30-45 minutes    If you drink alcohol do you typically have >3 drinks per day or >7 drinks per week? No    Do you usually eat at least 4 servings of fruit and vegetables a day, include whole grains & fiber and avoid regularly eating high fat or \"junk\" foods? NO    Do you have any problems taking medications regularly?  No    Do you have any side effects from medications? none    Needs assistance for the following daily activities: no assistance needed    Which of the following safety concerns are present in your home?  none identified     Hearing impairment: missed 4000Hz bilateral    In the past 6 months, have you been bothered by leaking of urine? no    Mental Health:    In general, how would you rate your overall mental or emotional health? good  PHQ-2 Score:      Do you feel safe in your environment? Yes    Have you ever done Advance Care Planning? (For example, a Health Directive, POLST, or a discussion with a medical provider or your loved ones about your wishes): Yes, patient states has an Advance Care Planning " document and will bring a copy to the clinic.    Fall risk:  Fallen 2 or more times in the past year?: No  Any fall with injury in the past year?: No    Cognitive Screenin) Repeat 3 items (Leader, Season, Table)    2) Clock draw: NORMAL  3) 3 item recall: Recalls 3 objects  Results: 3 items recalled: COGNITIVE IMPAIRMENT LESS LIKELY    Mini-CogTM Copyright MARIE Roland. Licensed by the author for use in [facility]; reprinted with permission (diana@[facility]). All rights reserved.      Do you have sleep apnea, excessive snoring or daytime drowsiness?: no      Reviewed and updated as needed this visit by clinical staff  Tobacco  Allergies  Meds     Fam Hx          Reviewed and updated as needed this visit by Provider        [facility] Hx         Social History     Tobacco Use     Smoking status: Former Smoker     Types: Cigarettes     Quit date: 1995     Years since quittin.2     Smokeless tobacco: Never Used   Substance Use Topics     Alcohol use: Yes     Alcohol/week: 0.0 - 3.3 standard drinks                           Current providers sharing in care for this patient include:   Patient Care Team:  Baljit Salazar MD as PCP - General (Family Practice)  Beny Winters MD as Assigned PCP  Tolu Brewer OD as MD (Optometry)  Tolu Brewer OD as Assigned Surgical Provider  Elliott Bowman MBBS as Assigned Rheumatology Provider  Ana Arreaga MD as Anesthesiologist (Pain Medicine)    The following health maintenance items are reviewed in Epic and correct as of today:  Health Maintenance   Topic Date Due     HEPATITIS B IMMUNIZATION (2 of 3 - Hep B Twinrix risk 3-dose series) 2019     INFLUENZA VACCINE (1) 2021     FALL RISK ASSESSMENT  2022     MEDICARE ANNUAL WELLNESS VISIT  2022     LIPID  [id number]     ADVANCE CARE PLANNING  2026     DTAP/TDAP/TD IMMUNIZATION (3 - Td or Tdap) 2029     HEPATITIS C SCREENING  Completed     PHQ-2  Completed  "    Pneumococcal Vaccine: 65+ Years  Completed     ZOSTER IMMUNIZATION  Completed     AORTIC ANEURYSM SCREENING (SYSTEM ASSIGNED)  Completed     COVID-19 Vaccine  Completed     IPV IMMUNIZATION  Aged Out     MENINGITIS IMMUNIZATION  Aged Out     COLORECTAL CANCER SCREENING  Discontinued     Lab work is in process      ROS:  Constitutional, HEENT, cardiovascular, pulmonary, GI, , musculoskeletal, neuro, skin, endocrine and psych systems are negative, except as otherwise noted.    OBJECTIVE:   /80   Pulse 75   Ht 1.848 m (6' 0.75\")   Wt 112 kg (247 lb)   SpO2 95%   BMI 32.81 kg/m   Estimated body mass index is 32.81 kg/m  as calculated from the following:    Height as of this encounter: 1.848 m (6' 0.75\").    Weight as of this encounter: 112 kg (247 lb).  EXAM:   GENERAL: healthy, alert and no distress  EYES: Eyes grossly normal to inspection, PERRL and conjunctivae and sclerae normal  HENT: ear canals and TM's normal, nose and mouth without ulcers or lesions  NECK: no adenopathy, no asymmetry, masses, or scars and thyroid normal to palpation  RESP: lungs clear to auscultation - no rales, rhonchi or wheezes  CV: regular rate and rhythm, normal S1 S2, no S3 or S4, no murmur, click or rub, no peripheral edema and peripheral pulses strong  ABDOMEN: soft, nontender, no hepatosplenomegaly, no masses and bowel sounds normal  RECTAL: normal sphincter tone, no rectal masses, prostate normal size, smooth, nontender without nodules or masses  MS: no gross musculoskeletal defects noted, no edema  SKIN: small crusted papule right temporal area  NEURO: Normal strength and tone, mentation intact and speech normal  PSYCH: mentation appears normal, affect normal/bright    Diagnostic Test Results:  Labs reviewed in Epic    PROCEDURE:    After the potential risks were discussed, verbal consent was obtained.  Approximately 15 seconds of freeze time was applied to the lesion using open spray technique with a 1 mm margin " "using 3 freeze-thaw cycles.  There were no immediate complications.  The patient tolerated the procedure well.  Frequent use of vaseline was recommended to promote good healing.  Signs of infection or complications were discussed and understood.      ASSESSMENT / PLAN:   Encounter for Medicare annual wellness exam  - discussed preventative guidelines, healthy diet, exercise and weight management    Adrenal insufficiency (H)  On Hydrocortisone, followed at [facility], due for follow up labs    Benign essential hypertension  At goal on current meds  --cont lisinopril and CTD    Chronic pain syndrome  Not optimally controlled.  Has appointment today and looking forward to more definitive comprehensive plan to improve his quality of life.      Hypercholesterolemia  Recheck today  - Lipid Panel ([facility])    Narcotic dependence (H)  See above    Screen for colon cancer  - COLOGUARD([employer])    Actinic keratosis  Cryo as above      Patient has been advised of split billing requirements and indicates understanding: Yes    COUNSELING:  Reviewed preventive health counseling, as reflected in patient instructions    Estimated body mass index is 32.81 kg/m  as calculated from the following:    Height as of this encounter: 1.848 m (6' 0.75\").    Weight as of this encounter: 112 kg (247 lb).    Weight management plan: Discussed healthy diet and exercise guidelines    He reports that he quit smoking about 26 years ago. His smoking use included cigarettes. He has never used smokeless tobacco.    Appropriate preventive services were discussed with this patient, including applicable screening as appropriate for cardiovascular disease, diabetes, osteopenia/osteoporosis, and glaucoma.  As appropriate for age/gender, discussed screening for colorectal cancer, prostate cancer, breast cancer, and cervical cancer. Checklist reviewing preventive services available has been given to the patient.    Reviewed patients plan of care and provided " an AVS. The Basic Care Plan (routine screening as documented in Health Maintenance) for Jose Carlos meets the Care Plan requirement. This Care Plan has been established and reviewed with the Patient.    Counseling Resources:  ATP IV Guidelines  Pooled Cohorts Equation Calculator  Breast Cancer Risk Calculator  BRCA-Related Cancer Risk Assessment: FHS-7 Tool  FRAX Risk Assessment  ICSI Preventive Guidelines  Dietary Guidelines for Americans, 2010  USDA's MyPlate  ASA Prophylaxis  Lung CA Screening    Beny Winters MD  [facility]

## 2021-09-14 LAB
CHOLEST SERPL-MCNC: 229 MG/DL (ref 100–199)
HDLC SERPL-MCNC: 56 MG/DL
LDL/HDL RATIO: 2.8 RATIO (ref 0–3.6)
LDLC SERPL CALC-MCNC: 154 MG/DL (ref 0–99)
TRIGL SERPL-MCNC: 107 MG/DL (ref 0–149)
VLDLC SERPL CALC-MCNC: 19 MG/DL (ref 5–40)

## 2021-09-15 ENCOUNTER — TRANSFERRED RECORDS (OUTPATIENT)
Dept: FAMILY MEDICINE | Facility: CLINIC | Age: 71
End: 2021-09-15

## 2021-09-21 ENCOUNTER — OFFICE VISIT (OUTPATIENT)
Dept: FAMILY MEDICINE | Facility: CLINIC | Age: 71
End: 2021-09-21

## 2021-09-21 VITALS
RESPIRATION RATE: 16 BRPM | SYSTOLIC BLOOD PRESSURE: 128 MMHG | OXYGEN SATURATION: 95 % | WEIGHT: 249 LBS | BODY MASS INDEX: 33 KG/M2 | HEART RATE: 77 BPM | HEIGHT: 73 IN | DIASTOLIC BLOOD PRESSURE: 82 MMHG

## 2021-09-21 DIAGNOSIS — G89.4 CHRONIC PAIN SYNDROME: Primary | ICD-10-CM

## 2021-09-21 PROCEDURE — 99214 OFFICE O/P EST MOD 30 MIN: CPT | Mod: 24 | Performed by: FAMILY MEDICINE

## 2021-09-21 RX ORDER — TIZANIDINE 2 MG/1
TABLET ORAL
Qty: 90 TABLET | Refills: 0 | Status: SHIPPED | OUTPATIENT
Start: 2021-09-21 | End: 2021-11-02

## 2021-09-21 ASSESSMENT — MIFFLIN-ST. JEOR: SCORE: 1944.37

## 2021-09-21 NOTE — PROGRESS NOTES
"  Pola Branch is a 71 year old patient who presents to clinic for follow up.  From my previous visit:    \"He has a history of chronic pain syndrome that is not optimally controlled.  He was originally evaluated at [facility] and found to have adrenal insufficiency and treatment of this has improved some of his symptoms.  His remaining chronic pain has been attributed primarily to fibromyalgia.  He is followed at  Pain Clinic and on hydrocodone-APAP.  He is also on gabapentin.  He is frustrated by the lack of progress and daily pain.  He has trouble functioning without his pain medication but also feels as though he is worsening.  He was seen by rheumatology and the focus was primarily on his back per his report and he is bothered most by his legs.  I had also referred him to neurology and he was seen at [facility] where it was felt much of his symptoms may be related to opioid-induced hyperalgesia.  He is open to this possibility but understandably anxious about weaning off opioids without other interventions in place.\"    He was seen by Dr Arreaga at [facility] as I felt a multidisciplinary approach could be helpful.  He reports no additional interventions were recommended other than pain psychologist but no appointments available until next year.  He was recommended to try [facility] in [place] and is requesting a referral.      He is also interested in trial of muscle relaxant at bedtime as his pain has been flaring overnight and he is not sleeping well.  His gabapentin dose is stable.  His hydrocodone dose is stable.  No other concerns.    Review of Systems   Constitutional, HEENT, cardiovascular, pulmonary, GI, , musculoskeletal, neuro, skin, endocrine and psych systems are negative, except as otherwise noted.      Objective    /82   Pulse 77   Resp 16   Ht 1.848 m (6' 0.75\")   Wt 112.9 kg (249 lb)   SpO2 95%   BMI 33.08 kg/m      General: Well " appearing, NAD  Psych: normal mood and affect      Assessment & Plan     Chronic pain syndrome  Discussed in detail.  Hopefully the [facility] pain center will be able to offer a more multidisciplinary approach and based on the handout it appears they will.  He is open to all interventions that could benefit him.  Trial of tizanidine in meantime.  Potential side effects discussed in detail.  - Pain Management Referral; Future  - tiZANidine (ZANAFLEX) 2 MG tablet; Take 1-2 tabs PO at bedtime PRN    Beny Winters MD  [facility]

## 2021-09-25 DIAGNOSIS — N52.9 ERECTILE DYSFUNCTION, UNSPECIFIED ERECTILE DYSFUNCTION TYPE: ICD-10-CM

## 2021-09-26 RX ORDER — TADALAFIL 20 MG/1
TABLET ORAL
Qty: 10 TABLET | Refills: 0 | Status: SHIPPED | OUTPATIENT
Start: 2021-09-26 | End: 2021-11-14

## 2021-09-29 ENCOUNTER — TRANSFERRED RECORDS (OUTPATIENT)
Dept: FAMILY MEDICINE | Facility: CLINIC | Age: 71
End: 2021-09-29

## 2021-09-29 LAB — COLOGUARD-ABSTRACT: NEGATIVE

## 2021-09-30 ENCOUNTER — TRANSFERRED RECORDS (OUTPATIENT)
Dept: FAMILY MEDICINE | Facility: CLINIC | Age: 71
End: 2021-09-30

## 2021-10-19 ENCOUNTER — TRANSFERRED RECORDS (OUTPATIENT)
Dept: FAMILY MEDICINE | Facility: CLINIC | Age: 71
End: 2021-10-19

## 2021-11-02 DIAGNOSIS — G89.4 CHRONIC PAIN SYNDROME: ICD-10-CM

## 2021-11-02 RX ORDER — TIZANIDINE 2 MG/1
TABLET ORAL
Qty: 90 TABLET | Refills: 0 | Status: SHIPPED | OUTPATIENT
Start: 2021-11-02 | End: 2022-05-27

## 2021-11-05 DIAGNOSIS — E27.40 ADRENAL INSUFFICIENCY (H): Primary | ICD-10-CM

## 2021-11-05 DIAGNOSIS — Z23 FLU VACCINE NEED: ICD-10-CM

## 2021-11-05 PROCEDURE — 90694 VACC AIIV4 NO PRSRV 0.5ML IM: CPT | Performed by: FAMILY MEDICINE

## 2021-11-05 PROCEDURE — G0008 ADMIN INFLUENZA VIRUS VAC: HCPCS | Performed by: FAMILY MEDICINE

## 2021-11-05 PROCEDURE — 36415 COLL VENOUS BLD VENIPUNCTURE: CPT | Performed by: FAMILY MEDICINE

## 2021-11-13 DIAGNOSIS — N52.9 ERECTILE DYSFUNCTION, UNSPECIFIED ERECTILE DYSFUNCTION TYPE: ICD-10-CM

## 2021-11-14 RX ORDER — TADALAFIL 20 MG/1
TABLET ORAL
Qty: 10 TABLET | Refills: 0 | Status: SHIPPED | OUTPATIENT
Start: 2021-11-14 | End: 2021-12-23

## 2021-11-17 ENCOUNTER — TRANSFERRED RECORDS (OUTPATIENT)
Dept: FAMILY MEDICINE | Facility: CLINIC | Age: 71
End: 2021-11-17

## 2021-11-18 ENCOUNTER — TRANSFERRED RECORDS (OUTPATIENT)
Dept: FAMILY MEDICINE | Facility: CLINIC | Age: 71
End: 2021-11-18

## 2021-12-02 ENCOUNTER — TELEPHONE (OUTPATIENT)
Dept: OPHTHALMOLOGY | Facility: CLINIC | Age: 71
End: 2021-12-02
Payer: MEDICARE

## 2021-12-02 NOTE — TELEPHONE ENCOUNTER
[facility] Call Center    Phone Message    May a detailed message be left on voicemail: yes     Reason for Call: Other: Needs more information on Pt eye glass prescription.  Pt stated he needs the Pupillary Distance in order to get his prescription, per Pt.  Please call Pt to discuss.    Thank you.    Action Taken: Message routed to:  [facility] EYE    Travel Screening: Not Applicable

## 2021-12-02 NOTE — TELEPHONE ENCOUNTER
Called and spoke to Jose Carlos.     He just needs the (PD) Pupillary Distance added to his current eyeglass rx. I can email the new eyeglass rx once you are done.     Thank You,     Winsome

## 2021-12-02 NOTE — TELEPHONE ENCOUNTER
I could not see Dr. Brewer's notes from March appt with Jose Carlos.     Per Dr. Brewer this was not done at his last appt .. I called [facility] and informed Jose Carlos that the PD was not done and he could make a tech appt or go to a vision store near him.     Winsome Quiroz Communication Facilitator on 12/2/2021 at 4:23 PM

## 2021-12-03 ENCOUNTER — TELEPHONE (OUTPATIENT)
Dept: OPHTHALMOLOGY | Facility: CLINIC | Age: 71
End: 2021-12-03
Payer: MEDICARE

## 2021-12-03 NOTE — TELEPHONE ENCOUNTER
Made a Tech appt for Jose Carlos for 12/3 @120 pm for PD so he can order glasses online.     Winsome Quiroz Communication Facilitator on 12/3/2021 at 1:20 PM

## 2021-12-03 NOTE — TELEPHONE ENCOUNTER
[facility] Call Center    Phone Message    May a detailed message be left on voicemail: yes     Reason for Call: Patient would like to schedule a TECH appointment for (PD) Pupillary Distance so he can order eye glasses.    Action Taken: Message routed to:  [facility]PHTHALMOLOGY [facility]ULT [facility] [[id number]]    Travel Screening: Not Applicable

## 2021-12-06 ENCOUNTER — TRANSFERRED RECORDS (OUTPATIENT)
Dept: FAMILY MEDICINE | Facility: CLINIC | Age: 71
End: 2021-12-06

## 2021-12-20 ENCOUNTER — TRANSFERRED RECORDS (OUTPATIENT)
Dept: FAMILY MEDICINE | Facility: CLINIC | Age: 71
End: 2021-12-20

## 2021-12-23 DIAGNOSIS — N52.9 ERECTILE DYSFUNCTION, UNSPECIFIED ERECTILE DYSFUNCTION TYPE: ICD-10-CM

## 2021-12-23 RX ORDER — TADALAFIL 20 MG/1
TABLET ORAL
Qty: 10 TABLET | Refills: 0 | Status: SHIPPED | OUTPATIENT
Start: 2021-12-23 | End: 2022-04-10

## 2021-12-30 DIAGNOSIS — I10 ESSENTIAL HYPERTENSION: ICD-10-CM

## 2021-12-30 RX ORDER — CHLORTHALIDONE 25 MG/1
25 TABLET ORAL DAILY
Qty: 90 TABLET | Refills: 1 | Status: SHIPPED | OUTPATIENT
Start: 2021-12-30 | End: 2022-06-14

## 2021-12-30 NOTE — TELEPHONE ENCOUNTER
Chlorthalidone. Last addressed 9/13/21.     BP Readings from Last 3 Encounters:   09/21/21 128/82   09/13/21 (!) 128/90   09/13/21 126/80         HTN: at goal on Lisinopril and chlorthalidone.  No side effects.

## 2022-01-17 ENCOUNTER — TRANSFERRED RECORDS (OUTPATIENT)
Dept: FAMILY MEDICINE | Facility: CLINIC | Age: 72
End: 2022-01-17

## 2022-01-17 ENCOUNTER — MEDICAL CORRESPONDENCE (OUTPATIENT)
Dept: HEALTH INFORMATION MANAGEMENT | Facility: CLINIC | Age: 72
End: 2022-01-17
Payer: MEDICARE

## 2022-01-18 ENCOUNTER — TRANSFERRED RECORDS (OUTPATIENT)
Dept: FAMILY MEDICINE | Facility: CLINIC | Age: 72
End: 2022-01-18

## 2022-01-19 NOTE — PLAN OF CARE
Jose Carlos will qualify for inpatient stay per [facility] (4pts)- his plan is to go home with family support if able to do so safely. Backup plan [facility] short stay. He will schedule OP therapy at [facility].         Your Elective TKA Patient:    Patient Name:  Jose Carlos Escamilla    : 1950    MRN: [id number]    Surgeon: Dr Page    DOS/Procedure:  2022/TKA right    Hospital: [facility]    Insurance - Medicare [facility]    PCP- Beny Winters MD -[facility]         Assessment Findings: Will update with additional concerns from the H&P as indicated  Medical              Age 71              HTN (1pt)              Chronic pain -[facility] manages opioids - 4 years (6 tablets of norco max)-bio feedback (3pts)              After last surgery - horrible pain after surgery- 3 episodes of ED visit to get pain managed              Adrenal insufficiency & hypogonadism- followed [facility] - manages hydrocortisone dosage              BMI= 32.17              Functional              Currently Ambulation - no assistive devices currently (can walk 3-4 miles a day)              Met score - 4 - no sob with activity (fatigue with chronic pain)              Falls - 1 fall on the ice         Living Situation              Stairs to enter home -   2 steps to enter - 1 flight to bedroom, but planning on move bed downstairs              Bathroom setup - walk-in shower, no grab, higher profile toilet              Post Op Support/Resources              Support - He kids will be able to stay for 4 days              Transportation - Asking one of his kids to home or brother    Discharge Disposition              Home with family support and OP - [facility] (If pain is well managed and he is safe to discharge home)              Backup plan [facility] short stay - writer sent referral         Please let me know if you have any questions or concerns--thanks!         Thank You!    Apoorva Marquis RN    Trauma Coordinator          [facility]    [place]    [address]   [place] [address]    T: [phone]    C. [phone]    F: [phone]    E: [email or web address]

## 2022-02-07 DIAGNOSIS — Z11.59 ENCOUNTER FOR SCREENING FOR OTHER VIRAL DISEASES: Primary | ICD-10-CM

## 2022-02-14 ENCOUNTER — TRANSFERRED RECORDS (OUTPATIENT)
Dept: FAMILY MEDICINE | Facility: CLINIC | Age: 72
End: 2022-02-14

## 2022-02-15 NOTE — H&P (VIEW-ONLY)
[facility] [address] [place] [address]  Phone: [phone]  Fax: [phone]  Primary Provider: Gisel Salazar  Pre-op Performing Provider: GISEL SALAZAR      PREOPERATIVE EVALUATION:  Today's date: 2/18/2022    Jose Carlos Escamilla is a 72 year old male who presents for a preoperative evaluation.    Surgical Information:  Surgery/Procedure: RIGHT KNEE ARTHROPLASTY  Surgery Location: [facility]   Surgeon: Marisa Page MD  Surgery Date: 2/25/22  Time of Surgery: 0920am  Where patient plans to recover: At home with family  Fax number for surgical facility: Note does not need to be faxed, will be available electronically in Epic.    Type of Anesthesia Anticipated: General  Preoperative Questionnaire:   No - Have you ever had a heart attack or stroke?  No - Have you ever had surgery on your heart or blood vessels, such as a stent, coronary (heart) bypass, or surgery on an artery in the head, neck, heart, or legs?  No - Do you have chest pain when you are physically active?  No - Do you have a history of heart failure?  No - Do you currently have a cold, bronchitis, or symptoms of other respiratory (head and chest) infections?  No - Do you have a cough, shortness of breath, or wheezing?  No - Do you or anyone in your family have a history of blood clots?  No - Do you or anyone in your family have a serious bleeding problem, such as long-lasting bleeding after surgeries or cuts?  YES - HAVE YOU EVERY HAD ANEMIA OR BEEN TOLD TO TAKE IRON PILLS? As a teenage  No - Have you had any abnormal blood loss such as black, tarry or bloody stools, or abnormal vaginal bleeding?  No - Have you ever had a blood transfusion?  Yes - Are you willing to have a blood transfusion if it is medically needed before, during, or after your surgery?  No - Have you or anyone in your family ever had problems with anesthesia (sedation for surgery)?  No - Do you have sleep apnea, excessive snoring, or daytime  drowsiness?   No - Do you have any artifical heart valves or other implanted medical devices, such as a pacemaker, defibrillator, or continuous glucose monitor?  YES - DO YOU HAVE ANY ARTIFICIAL HEART VALVES OR OTHER IMPLANTED MEDICAL DEVICES, SUCH AS A PACEMAKER, DEFIBRILLATOR, OR CONTINUOUS GLUCOSE MONITOR? WHAT TYPE OF DEVICE? Left knee WHAT IS THE NAME OF THE CLINIC THAT MANAGES YOUR DEVICE? ortho  No - Are you allergic to latex?  No - Is there any chance that you may be pregnant?          Assessment & Plan     The proposed surgical procedure is considered INTERMEDIATE risk.    Preop general physical exam for Primary osteoarthritis of right knee  End stage OA of the right knee, previous good outcome with left TKA    - CBC with Diff/Plt ([facility])  - EKG 12-lead complete w/read - Clinics  - VENOUS COLLECTION    Adrenal insufficiency (H)  Followed by [facility] ednocrinology, will need to receive 100 mg Hydrocortisone on call to OR, then every 8 hours tapering to maintenance dose depending on clinical situation over the following three days.    Chronic pain syndrome  and  Narcotic dependence (H)  Sees pain clinic and has been stable for years with Physcial therapy, Gabapentin, Topical Voltaren, amitriptyline and narcotics for difficult to catagorize chronic pain syndrome that may be in the fibromyalgia family.    Hypercholesterolemia  Diet controlled    Gastroesophageal reflux disease with esophagitis without hemorrhage  Currently under control    Diverticulosis  Known issue    Esophageal stenosis  No recent problems          Risks and Recommendations:  The patient has the following additional risks and recommendations for perioperative complications:   - Consult Hospitalist / IM to assist with post-op medical management   - Recurrent use of steroids and ongoing Testosterone      Medication Instructions:  Patient is to take all scheduled medications on the day of surgery EXCEPT for modifications listed below:   -  "ACE/ARB: May be continued on the day of surgery.    - Diuretics: HOLD on the day of surgery.   - Opioids: Continue without modification.{ - pregabalin, gabapentin: Continue without modification.   - Intraoperative stress dose steroids may be indicated due to chronic steroid use in the last 3 months (e.g. > 3 weeks of prednisone 20 mg or daily prednisone 5 mg).  Endocrine clinic from [facility] recommends:100 mg Hydrocortisone on call to OR, then every 8 hours tapering to maintenance dose depending on clinical situation over the following three days.    RECOMMENDATION:  APPROVAL GIVEN to proceed with proposed procedure, without further diagnostic evaluation.    Review of prior external note(s) from - CareEverywhere information from [place] reviewed  CareEverywhere information from [facility] reviewed      Subjective     HPI related to upcoming procedure: OA in the right knee is now to point where is ready for TKA.   His case is complicated by significant adrenal insufficiency.      Health Care Directive:  Patient does not have a Health Care Directive or Living Will: As is reasonable care for most folks, In the short term, he wants usual aggressive medical care.   No desire for long term prolongation of life through artificial means if no hope to bring back to a reasonable status.      Preoperative Review of :   reviewed - controlled substances reflected in medication list.   Under care of pain clinic with ongoing PT therapy from [facility] and chronic narcotic management.      Status of Chronic Conditions:  See problem list for active medical problems.  Problems all longstanding and stable, except as noted/documented.  See ROS for pertinent symptoms related to these conditions.    The Adrenal Insufficiency is significant, on both steroids and testosterone.  Checking with the [facility] for appropriate dosing, when \"sick\" he is to double his usual dose.  Likely will need a stress dose prior to surgery.  He " will also bring in his testosterone meds to surgery as he has experience with the hospital not being able to get his usual dosage.    Review of Systems  Constitutional, neuro, ENT, endocrine, pulmonary, cardiac, gastrointestinal, genitourinary, musculoskeletal, integument and psychiatric systems are negative, except as otherwise noted.    Patient Active Problem List    Diagnosis Date Noted     Primary osteoarthritis of right knee 02/18/2022     Priority: Medium     Adrenal insufficiency (H) 07/16/2018     Priority: Medium     Plantar fasciitis 03/07/2018     Priority: Medium     Total knee replacement status 12/02/2016     Priority: Medium     Benign essential hypertension 11/21/2016     Priority: Medium     Narcotic dependence (H) 09/08/2016     Priority: Medium     1/19/2017 Patient is now seeing [facility].  They are witting his prescriptions for pain medication now.       Chronic pain syndrome 06/20/2016     Priority: Medium     Pain is in the legs and seemed to start after starting a statin. Huge w/u at [facility]. Done, gabapentin helps They are referring him to the [facility] ER as nothing is making sense for pain.       History of rheumatic fever 02/16/2016     Priority: Medium     Esophageal stenosis 06/11/2015     Priority: Medium     Obesity 06/11/2015     Priority: Medium     Esophageal reflux 04/10/2014     Priority: Medium     Family history of hemochromatosis 03/06/2013     Priority: Medium     ACP (advance care planning)      Priority: Medium     Advance Care Planning 2/9/2017: Receipt of ACP document:  Received: invalid HCD document dated 11-28-16.  Document previously scanned on 12-5-16.  Validation form completed indicating invalid document. Copy sent to client with information and facilitation resources. Validation form sent to be scanned as notation of invalid document received. Request made to delete invalid document.  Confirmed/documented designated decision maker(s).  Added by Trena  Yg SANTOYO Advance Care Planning Liaison with [employer]             Hypercholesterolemia      Priority: Medium      Past Medical History:   Diagnosis Date     ACP (advance care planning)      Chronic rhinitis      Diverticulosis      Esophageal stenosis      Hypercholesterolemia      IBS (irritable bowel syndrome)      Meckel's diverticulitis 7/13/2021    Added automatically from request for surgery [id number]     PMR (polymyalgia rheumatica) (H) 3/21/2019     Past Surgical History:   Procedure Laterality Date     ARTHROPLASTY KNEE Left 12/2/2016    Procedure: ARTHROPLASTY KNEE;  Surgeon: Mraisa Page MD;  Location: [facility] OR     DISCECTOMY CERVICAL MINIMALLY INVASIVE ONE LEVEL       LAPAROSCOPIC APPENDECTOMY N/A 7/14/2021    Procedure: Laparoscopic appendectomy;  Surgeon: Kelsi Parker MD;  Location: [facility] OR     LAPAROSCOPY DIAGNOSTIC (GENERAL) N/A 7/14/2021    Procedure: Diagnostic laparoscopy, laparoscopic appendectomy, laparoscopic meckel excision;  Surgeon: Kelsi Parker MD;  Location:  OR     NECK SURGERY  2001    [place]     Current Outpatient Medications   Medication Sig Dispense Refill     amitriptyline (ELAVIL) 25 MG tablet Take 25 mg by mouth At Bedtime       chlorthalidone (HYGROTON) 25 MG tablet Take 1 tablet (25 mg) by mouth daily 90 tablet 1     dexamethasone (DECADRON) 4 MG/ML injection INJECT 1ML INTRAMUSCULARLY UTD WHEN UNABLE TO TAKE ORAL STEROIDS  3     diclofenac (VOLTAREN) 1 % GEL topical gel as needed.       gabapentin (NEURONTIN) 300 MG capsule Take 2,700 mg by mouth daily 4 Tabs in AM and 5 tabs in PM       HYDROcodone-acetaminophen (NORCO)  MG per tablet Take 1 tablet by mouth 4 times daily as needed for pain Hold if taking oxycodone instead  0     hydrocortisone (CORTEF) 5 MG tablet 15 mg on waking and 5 mg at noon,double in sickness       lisinopril (ZESTRIL) 20 MG tablet Take 1 tablet (20 mg) by mouth daily 90 tablet 3     omeprazole (PRILOSEC) 20 MG DR capsule Take  "20 mg by mouth daily       senna-docusate (SENOKOT-S/PERICOLACE) 8.6-50 MG tablet Take 1-2 tablets by mouth 2 times daily (Patient taking differently: Take 1 tablet by mouth every morning ) 20 tablet 0     tadalafil (CIALIS) 20 MG tablet TAKE ONE TABLET BY MOUTH DAILY AS NEEDED 10 tablet 0     testosterone (AXIRON) 30 MG/ACT topical solution Place 30 mg onto the skin daily 2 pumps daily       traZODone (DESYREL) 50 MG tablet TAKE 2 TABLETS BY MOUTH AT BEDTIME 180 tablet 3     gabapentin (NEURONTIN) 300 MG capsule Take 1,500 mg by mouth At Bedtime        tiZANidine (ZANAFLEX) 2 MG tablet TAKE 1 TO 2 TABLETS BY MOUTH AT BEDTIME AS NEEDED 90 tablet 0       Allergies   Allergen Reactions     Penicillins Rash and Anaphylaxis     Other reaction(s): Breathing Difficulty     Zosyn [Piperacillin-Tazobactam In D5w] Palpitations and Rash        Social History     Tobacco Use     Smoking status: Former Smoker     Types: Cigarettes     Quit date: 1995     Years since quittin.6     Smokeless tobacco: Never Used   Substance Use Topics     Alcohol use: Yes     Alcohol/week: 0.0 - 3.3 standard drinks     Family History   Problem Relation Age of Onset     Diabetes Mother      Hypertension Mother      Parkinsonism Father      Lung Cancer Sister      Alcoholism Sister      Rheumatoid Arthritis Brother      Hypertension Brother      Hyperlipidemia Brother      Liver Cancer Brother      Hemochromatosis Brother      Hypertension Brother      No Known Problems Daughter      No Known Problems Son      History   Drug Use No         Objective     /86   Pulse 76   Temp 98.7  F (37.1  C) (Oral)   Resp 16   Ht 1.867 m (6' 1.5\")   Wt 110 kg (242 lb 9.6 oz)   SpO2 95%   BMI 31.57 kg/m      Physical Exam    GENERAL APPEARANCE: healthy, alert and no distress     EYES: EOMI,  PERRL     HENT: ear canals and TM's normal and nose and mouth without ulcers or lesions     NECK: no adenopathy, no asymmetry, masses, or scars and thyroid " normal to palpation     RESP: lungs clear to auscultation - no rales, rhonchi or wheezes     CV: regular rates and rhythm, normal S1 S2, no S3 or S4 and no murmur, click or rub     ABDOMEN:  soft, nontender, no HSM or masses and bowel sounds normal     MS: decreased range of motion in the right knee     SKIN: no suspicious lesions or rashes     NEURO: Normal strength and tone, sensory exam grossly normal, mentation intact and speech normal     PSYCH: mentation appears normal. and affect normal/bright     LYMPHATICS: No cervical adenopathy    Recent Labs   Lab Test 07/15/21  1107 07/14/21  0937   HGB 14.9 15.4    239    135   POTASSIUM 3.7 3.6   CR 0.84 0.92        Diagnostics:  Labs pending at this time.  Results will be reviewed when available.   EKG: appears normal, NSR, normal axis, normal intervals, no acute ST/T changes c/w ischemia, no LVH by voltage criteria, unchanged from previous tracings    Revised Cardiac Risk Index (RCRI):  The patient has the following serious cardiovascular risks for perioperative complications:   - No serious cardiac risks = 0 points     RCRI Interpretation: 0 points: Class I (very low risk - 0.4% complication rate)           Signed Electronically by: Baljit Salazar MD  Copy of this evaluation report is provided to requesting physician.

## 2022-02-15 NOTE — PROGRESS NOTES
[facility] [address] [place] [address]  Phone: [phone]  Fax: [phone]  Primary Provider: Gisel Salazar  Pre-op Performing Provider: GISEL SALAZAR      PREOPERATIVE EVALUATION:  Today's date: 2/18/2022    Jose Carlos Escamilla is a 72 year old male who presents for a preoperative evaluation.    Surgical Information:  Surgery/Procedure: RIGHT KNEE ARTHROPLASTY  Surgery Location: [facility]   Surgeon: Marisa Page MD  Surgery Date: 2/25/22  Time of Surgery: 0920am  Where patient plans to recover: At home with family  Fax number for surgical facility: Note does not need to be faxed, will be available electronically in Epic.    Type of Anesthesia Anticipated: General  Preoperative Questionnaire:   No - Have you ever had a heart attack or stroke?  No - Have you ever had surgery on your heart or blood vessels, such as a stent, coronary (heart) bypass, or surgery on an artery in the head, neck, heart, or legs?  No - Do you have chest pain when you are physically active?  No - Do you have a history of heart failure?  No - Do you currently have a cold, bronchitis, or symptoms of other respiratory (head and chest) infections?  No - Do you have a cough, shortness of breath, or wheezing?  No - Do you or anyone in your family have a history of blood clots?  No - Do you or anyone in your family have a serious bleeding problem, such as long-lasting bleeding after surgeries or cuts?  YES - HAVE YOU EVERY HAD ANEMIA OR BEEN TOLD TO TAKE IRON PILLS? As a teenage  No - Have you had any abnormal blood loss such as black, tarry or bloody stools, or abnormal vaginal bleeding?  No - Have you ever had a blood transfusion?  Yes - Are you willing to have a blood transfusion if it is medically needed before, during, or after your surgery?  No - Have you or anyone in your family ever had problems with anesthesia (sedation for surgery)?  No - Do you have sleep apnea, excessive snoring, or daytime  drowsiness?   No - Do you have any artifical heart valves or other implanted medical devices, such as a pacemaker, defibrillator, or continuous glucose monitor?  YES - DO YOU HAVE ANY ARTIFICIAL HEART VALVES OR OTHER IMPLANTED MEDICAL DEVICES, SUCH AS A PACEMAKER, DEFIBRILLATOR, OR CONTINUOUS GLUCOSE MONITOR? WHAT TYPE OF DEVICE? Left knee WHAT IS THE NAME OF THE CLINIC THAT MANAGES YOUR DEVICE? ortho  No - Are you allergic to latex?  No - Is there any chance that you may be pregnant?          Assessment & Plan     The proposed surgical procedure is considered INTERMEDIATE risk.    Preop general physical exam for Primary osteoarthritis of right knee  End stage OA of the right knee, previous good outcome with left TKA    - CBC with Diff/Plt ([facility])  - EKG 12-lead complete w/read - Clinics  - VENOUS COLLECTION    Adrenal insufficiency (H)  Followed by [facility] ednocrinology, will need to receive 100 mg Hydrocortisone on call to OR, then every 8 hours tapering to maintenance dose depending on clinical situation over the following three days.    Chronic pain syndrome  and  Narcotic dependence (H)  Sees pain clinic and has been stable for years with Physcial therapy, Gabapentin, Topical Voltaren, amitriptyline and narcotics for difficult to catagorize chronic pain syndrome that may be in the fibromyalgia family.    Hypercholesterolemia  Diet controlled    Gastroesophageal reflux disease with esophagitis without hemorrhage  Currently under control    Diverticulosis  Known issue    Esophageal stenosis  No recent problems          Risks and Recommendations:  The patient has the following additional risks and recommendations for perioperative complications:   - Consult Hospitalist / IM to assist with post-op medical management   - Recurrent use of steroids and ongoing Testosterone      Medication Instructions:  Patient is to take all scheduled medications on the day of surgery EXCEPT for modifications listed below:   -  "ACE/ARB: May be continued on the day of surgery.    - Diuretics: HOLD on the day of surgery.   - Opioids: Continue without modification.{ - pregabalin, gabapentin: Continue without modification.   - Intraoperative stress dose steroids may be indicated due to chronic steroid use in the last 3 months (e.g. > 3 weeks of prednisone 20 mg or daily prednisone 5 mg).  Endocrine clinic from [facility] recommends:100 mg Hydrocortisone on call to OR, then every 8 hours tapering to maintenance dose depending on clinical situation over the following three days.    RECOMMENDATION:  APPROVAL GIVEN to proceed with proposed procedure, without further diagnostic evaluation.    Review of prior external note(s) from - CareEverywhere information from [place] reviewed  CareEverywhere information from [facility] reviewed      Subjective     HPI related to upcoming procedure: OA in the right knee is now to point where is ready for TKA.   His case is complicated by significant adrenal insufficiency.      Health Care Directive:  Patient does not have a Health Care Directive or Living Will: As is reasonable care for most folks, In the short term, he wants usual aggressive medical care.   No desire for long term prolongation of life through artificial means if no hope to bring back to a reasonable status.      Preoperative Review of :   reviewed - controlled substances reflected in medication list.   Under care of pain clinic with ongoing PT therapy from [facility] and chronic narcotic management.      Status of Chronic Conditions:  See problem list for active medical problems.  Problems all longstanding and stable, except as noted/documented.  See ROS for pertinent symptoms related to these conditions.    The Adrenal Insufficiency is significant, on both steroids and testosterone.  Checking with the [facility] for appropriate dosing, when \"sick\" he is to double his usual dose.  Likely will need a stress dose prior to surgery.  He " will also bring in his testosterone meds to surgery as he has experience with the hospital not being able to get his usual dosage.    Review of Systems  Constitutional, neuro, ENT, endocrine, pulmonary, cardiac, gastrointestinal, genitourinary, musculoskeletal, integument and psychiatric systems are negative, except as otherwise noted.    Patient Active Problem List    Diagnosis Date Noted     Primary osteoarthritis of right knee 02/18/2022     Priority: Medium     Adrenal insufficiency (H) 07/16/2018     Priority: Medium     Plantar fasciitis 03/07/2018     Priority: Medium     Total knee replacement status 12/02/2016     Priority: Medium     Benign essential hypertension 11/21/2016     Priority: Medium     Narcotic dependence (H) 09/08/2016     Priority: Medium     1/19/2017 Patient is now seeing [facility].  They are witting his prescriptions for pain medication now.       Chronic pain syndrome 06/20/2016     Priority: Medium     Pain is in the legs and seemed to start after starting a statin. Huge w/u at [facility]. Done, gabapentin helps They are referring him to the [facility] as nothing is making sense for pain.       History of rheumatic fever 02/16/2016     Priority: Medium     Esophageal stenosis 06/11/2015     Priority: Medium     Obesity 06/11/2015     Priority: Medium     Esophageal reflux 04/10/2014     Priority: Medium     Family history of hemochromatosis 03/06/2013     Priority: Medium     ACP (advance care planning)      Priority: Medium     Advance Care Planning 2/9/2017: Receipt of ACP document:  Received: invalid HCD document dated 11-28-16.  Document previously scanned on 12-5-16.  Validation form completed indicating invalid document. Copy sent to client with information and facilitation resources. Validation form sent to be scanned as notation of invalid document received. Request made to delete invalid document.  Confirmed/documented designated decision maker(s).  Added by Trena  Yg SANTOYO Advance Care Planning Liaison with [employer]             Hypercholesterolemia      Priority: Medium      Past Medical History:   Diagnosis Date     ACP (advance care planning)      Chronic rhinitis      Diverticulosis      Esophageal stenosis      Hypercholesterolemia      IBS (irritable bowel syndrome)      Meckel's diverticulitis 7/13/2021    Added automatically from request for surgery [id number]     PMR (polymyalgia rheumatica) (H) 3/21/2019     Past Surgical History:   Procedure Laterality Date     ARTHROPLASTY KNEE Left 12/2/2016    Procedure: ARTHROPLASTY KNEE;  Surgeon: Marisa Page MD;  Location: [facility] OR     DISCECTOMY CERVICAL MINIMALLY INVASIVE ONE LEVEL       LAPAROSCOPIC APPENDECTOMY N/A 7/14/2021    Procedure: Laparoscopic appendectomy;  Surgeon: Kelsi Parker MD;  Location: [facility] OR     LAPAROSCOPY DIAGNOSTIC (GENERAL) N/A 7/14/2021    Procedure: Diagnostic laparoscopy, laparoscopic appendectomy, laparoscopic meckel excision;  Surgeon: Kelsi Parker MD;  Location:  OR     NECK SURGERY  2001    [place]     Current Outpatient Medications   Medication Sig Dispense Refill     amitriptyline (ELAVIL) 25 MG tablet Take 25 mg by mouth At Bedtime       chlorthalidone (HYGROTON) 25 MG tablet Take 1 tablet (25 mg) by mouth daily 90 tablet 1     dexamethasone (DECADRON) 4 MG/ML injection INJECT 1ML INTRAMUSCULARLY UTD WHEN UNABLE TO TAKE ORAL STEROIDS  3     diclofenac (VOLTAREN) 1 % GEL topical gel as needed.       gabapentin (NEURONTIN) 300 MG capsule Take 2,700 mg by mouth daily 4 Tabs in AM and 5 tabs in PM       HYDROcodone-acetaminophen (NORCO)  MG per tablet Take 1 tablet by mouth 4 times daily as needed for pain Hold if taking oxycodone instead  0     hydrocortisone (CORTEF) 5 MG tablet 15 mg on waking and 5 mg at noon,double in sickness       lisinopril (ZESTRIL) 20 MG tablet Take 1 tablet (20 mg) by mouth daily 90 tablet 3     omeprazole (PRILOSEC) 20 MG DR capsule Take  "20 mg by mouth daily       senna-docusate (SENOKOT-S/PERICOLACE) 8.6-50 MG tablet Take 1-2 tablets by mouth 2 times daily (Patient taking differently: Take 1 tablet by mouth every morning ) 20 tablet 0     tadalafil (CIALIS) 20 MG tablet TAKE ONE TABLET BY MOUTH DAILY AS NEEDED 10 tablet 0     testosterone (AXIRON) 30 MG/ACT topical solution Place 30 mg onto the skin daily 2 pumps daily       traZODone (DESYREL) 50 MG tablet TAKE 2 TABLETS BY MOUTH AT BEDTIME 180 tablet 3     gabapentin (NEURONTIN) 300 MG capsule Take 1,500 mg by mouth At Bedtime        tiZANidine (ZANAFLEX) 2 MG tablet TAKE 1 TO 2 TABLETS BY MOUTH AT BEDTIME AS NEEDED 90 tablet 0       Allergies   Allergen Reactions     Penicillins Rash and Anaphylaxis     Other reaction(s): Breathing Difficulty     Zosyn [Piperacillin-Tazobactam In D5w] Palpitations and Rash        Social History     Tobacco Use     Smoking status: Former Smoker     Types: Cigarettes     Quit date: 1995     Years since quittin.6     Smokeless tobacco: Never Used   Substance Use Topics     Alcohol use: Yes     Alcohol/week: 0.0 - 3.3 standard drinks     Family History   Problem Relation Age of Onset     Diabetes Mother      Hypertension Mother      Parkinsonism Father      Lung Cancer Sister      Alcoholism Sister      Rheumatoid Arthritis Brother      Hypertension Brother      Hyperlipidemia Brother      Liver Cancer Brother      Hemochromatosis Brother      Hypertension Brother      No Known Problems Daughter      No Known Problems Son      History   Drug Use No         Objective     /86   Pulse 76   Temp 98.7  F (37.1  C) (Oral)   Resp 16   Ht 1.867 m (6' 1.5\")   Wt 110 kg (242 lb 9.6 oz)   SpO2 95%   BMI 31.57 kg/m      Physical Exam    GENERAL APPEARANCE: healthy, alert and no distress     EYES: EOMI,  PERRL     HENT: ear canals and TM's normal and nose and mouth without ulcers or lesions     NECK: no adenopathy, no asymmetry, masses, or scars and thyroid " normal to palpation     RESP: lungs clear to auscultation - no rales, rhonchi or wheezes     CV: regular rates and rhythm, normal S1 S2, no S3 or S4 and no murmur, click or rub     ABDOMEN:  soft, nontender, no HSM or masses and bowel sounds normal     MS: decreased range of motion in the right knee     SKIN: no suspicious lesions or rashes     NEURO: Normal strength and tone, sensory exam grossly normal, mentation intact and speech normal     PSYCH: mentation appears normal. and affect normal/bright     LYMPHATICS: No cervical adenopathy    Recent Labs   Lab Test 07/15/21  1107 07/14/21  0937   HGB 14.9 15.4    239    135   POTASSIUM 3.7 3.6   CR 0.84 0.92        Diagnostics:  Labs pending at this time.  Results will be reviewed when available.   EKG: appears normal, NSR, normal axis, normal intervals, no acute ST/T changes c/w ischemia, no LVH by voltage criteria, unchanged from previous tracings    Revised Cardiac Risk Index (RCRI):  The patient has the following serious cardiovascular risks for perioperative complications:   - No serious cardiac risks = 0 points     RCRI Interpretation: 0 points: Class I (very low risk - 0.4% complication rate)           Signed Electronically by: Blajit Salazar MD  Copy of this evaluation report is provided to requesting physician.

## 2022-02-15 NOTE — PATIENT INSTRUCTIONS
Preparing for Your Surgery  Getting started  A nurse will call you to review your health history and instructions. They will give you an arrival time based on your scheduled surgery time. Please be ready to share:    Your doctor's clinic name and phone number    Your medical, surgical and anesthesia history    A list of allergies and sensitivities    A list of medicines, including herbal treatments and over-the-counter drugs    Whether the patient has a legal guardian (ask how to send us the papers in advance)  Please tell us if you're pregnant--or if there's any chance you might be pregnant. Some surgeries may injure a fetus (unborn baby), so they require a pregnancy test. Surgeries that are safe for a fetus don't always need a test, and you can choose whether to have one.   If you have a child who's having surgery, please ask for a copy of Preparing for Your Child's Surgery.    Preparing for surgery    Within 30 days of surgery: Have a pre-op exam (sometimes called an H&P, or History and Physical). This can be done at a clinic or pre-operative center.  ? If you're having a , you may not need this exam. Talk to your care team.    At your pre-op exam, talk to your care team about all medicines you take. If you need to stop any medicines before surgery, ask when to start taking them again.  ? We do this for your safety. Many medicines can make you bleed too much during surgery. Some change how well surgery (anesthesia) drugs work.    Call your insurance company to let them know you're having surgery. (If you don't have insurance, call [phone].)    Call your clinic if there's any change in your health. This includes signs of a cold or flu (sore throat, runny nose, cough, rash, fever). It also includes a scrape or scratch near the surgery site.    If you have questions on the day of surgery, call your hospital or surgery center.  COVID testing  You may need to be tested for COVID-19 before having  surgery. If so, your surgical team will give you instructions for scheduling this test, separate from your preoperative history and physical.  Eating and drinking guidelines  For your safety: Unless your surgeon tells you otherwise, follow the guidelines below.    Eat and drink as usual until 8 hours before surgery. After that, no food or milk.    Drink clear liquids until 2 hours before surgery. These are liquids you can see through, like water, Gatorade and Propel Water. You may also have black coffee and tea (no cream or milk).    Nothing by mouth within 2 hours of surgery. This includes gum, candy and breath mints.    If you drink alcohol: Stop drinking it the night before surgery.    If your care team tells you to take medicine on the morning of surgery, it's okay to take it with a sip of water.  Preventing infection    Shower or bathe the night before and morning of your surgery. Follow the instructions your clinic gave you. (If no instructions, use regular soap.)    Don't shave or clip hair near your surgery site. We'll remove the hair if needed.    Don't smoke or vape the morning of surgery. You may chew nicotine gum up to 2 hours before surgery. A nicotine patch is okay.  ? Note: Some surgeries require you to completely quit smoking and nicotine. Check with your surgeon.    Your care team will make every effort to keep you safe from infection. We will:  ? Clean our hands often with soap and water (or an alcohol-based hand rub).  ? Clean the skin at your surgery site with a special soap that kills germs.  ? Give you a special gown to keep you warm. (Cold raises the risk of infection.)  ? Wear special hair covers, masks, gowns and gloves during surgery.  ? Give antibiotic medicine, if prescribed. Not all surgeries need antibiotics.  What to bring on the day of surgery    Photo ID and insurance card    Copy of your health care directive, if you have one    Glasses and hearing aides (bring cases)  ? You can't  wear contacts during surgery    Inhaler and eye drops, if you use them (tell us about these when you arrive)    CPAP machine or breathing device, if you use them    A few personal items, if spending the night    If you have . . .  ? A pacemaker, ICD (cardiac defibrillator) or other implant: Bring the ID card.  ? An implanted stimulator: Bring the remote control.  ? A legal guardian: Bring a copy of the certified (court-stamped) guardianship papers.  Please remove any jewelry, including body piercings. Leave jewelry and other valuables at home.  If you're going home the day of surgery    You must have a responsible adult drive you home. They should stay with you overnight as well.    If you don't have someone to stay with you, and you aren't safe to go home alone, we may keep you overnight. Insurance often won't pay for this.  After surgery  If it's hard to control your pain or you need more pain medicine, please call your surgeon's office.  Questions?   If you have any questions for your care team, list them here: _________________________________________________________________________________________________________________________________________________________________________ ____________________________________ ____________________________________ ____________________________________  For informational purposes only. Not to replace the advice of your health care provider. Copyright   2003, 2019 [facility]. All rights reserved. Clinically reviewed by Komal Rebolledo MD. [employer] [id number] - REV 07/21.

## 2022-02-18 ENCOUNTER — OFFICE VISIT (OUTPATIENT)
Dept: FAMILY MEDICINE | Facility: CLINIC | Age: 72
End: 2022-02-18

## 2022-02-18 VITALS
HEIGHT: 74 IN | TEMPERATURE: 98.7 F | BODY MASS INDEX: 31.13 KG/M2 | SYSTOLIC BLOOD PRESSURE: 122 MMHG | OXYGEN SATURATION: 95 % | WEIGHT: 242.6 LBS | RESPIRATION RATE: 16 BRPM | DIASTOLIC BLOOD PRESSURE: 86 MMHG | HEART RATE: 76 BPM

## 2022-02-18 DIAGNOSIS — M17.11 PRIMARY OSTEOARTHRITIS OF RIGHT KNEE: ICD-10-CM

## 2022-02-18 DIAGNOSIS — G89.4 CHRONIC PAIN SYNDROME: ICD-10-CM

## 2022-02-18 DIAGNOSIS — K57.90 DIVERTICULOSIS: ICD-10-CM

## 2022-02-18 DIAGNOSIS — Z01.818 PREOP GENERAL PHYSICAL EXAM: Primary | ICD-10-CM

## 2022-02-18 DIAGNOSIS — K22.2 ESOPHAGEAL STENOSIS: ICD-10-CM

## 2022-02-18 DIAGNOSIS — E27.40 ADRENAL INSUFFICIENCY (H): ICD-10-CM

## 2022-02-18 DIAGNOSIS — E78.00 HYPERCHOLESTEROLEMIA: ICD-10-CM

## 2022-02-18 DIAGNOSIS — K21.00 GASTROESOPHAGEAL REFLUX DISEASE WITH ESOPHAGITIS WITHOUT HEMORRHAGE: ICD-10-CM

## 2022-02-18 DIAGNOSIS — F11.20 NARCOTIC DEPENDENCE (H): ICD-10-CM

## 2022-02-18 PROBLEM — E27.49 SECONDARY HYPOCORTISOLISM (H): Status: RESOLVED | Noted: 2018-05-31 | Resolved: 2022-02-18

## 2022-02-18 LAB
% GRANULOCYTES: 71.2 % (ref 42.2–75.2)
HCT VFR BLD AUTO: 46.2 % (ref 39–51)
HEMOGLOBIN: 15.3 G/DL (ref 13.4–17.5)
LYMPHOCYTES NFR BLD AUTO: 22.5 % (ref 20.5–51.1)
MCH RBC QN AUTO: 31.4 PG (ref 27–31)
MCHC RBC AUTO-ENTMCNC: 33.1 G/DL (ref 33–37)
MCV RBC AUTO: 94.9 FL (ref 80–100)
MONOCYTES NFR BLD AUTO: 6.3 % (ref 1.7–9.3)
PLATELET # BLD AUTO: 343 K/UL (ref 140–450)
RBC # BLD AUTO: 4.87 X10/CMM (ref 4.2–5.9)
WBC # BLD AUTO: 7.7 X10/CMM (ref 3.8–11)

## 2022-02-18 PROCEDURE — 93000 ELECTROCARDIOGRAM COMPLETE: CPT | Performed by: FAMILY MEDICINE

## 2022-02-18 PROCEDURE — 99215 OFFICE O/P EST HI 40 MIN: CPT | Performed by: FAMILY MEDICINE

## 2022-02-18 PROCEDURE — 85025 COMPLETE CBC W/AUTO DIFF WBC: CPT | Performed by: FAMILY MEDICINE

## 2022-02-18 PROCEDURE — 36415 COLL VENOUS BLD VENIPUNCTURE: CPT | Performed by: FAMILY MEDICINE

## 2022-02-22 ENCOUNTER — TRANSFERRED RECORDS (OUTPATIENT)
Dept: FAMILY MEDICINE | Facility: CLINIC | Age: 72
End: 2022-02-22

## 2022-02-22 ENCOUNTER — LAB (OUTPATIENT)
Dept: LAB | Facility: CLINIC | Age: 72
End: 2022-02-22
Attending: ORTHOPAEDIC SURGERY

## 2022-02-22 DIAGNOSIS — Z11.59 ENCOUNTER FOR SCREENING FOR OTHER VIRAL DISEASES: ICD-10-CM

## 2022-02-22 LAB — SARS-COV-2 RNA RESP QL NAA+PROBE: NEGATIVE

## 2022-02-22 PROCEDURE — U0003 INFECTIOUS AGENT DETECTION BY NUCLEIC ACID (DNA OR RNA); SEVERE ACUTE RESPIRATORY SYNDROME CORONAVIRUS 2 (SARS-COV-2) (CORONAVIRUS DISEASE [COVID-19]), AMPLIFIED PROBE TECHNIQUE, MAKING USE OF HIGH THROUGHPUT TECHNOLOGIES AS DESCRIBED BY CMS-2020-01-R: HCPCS | Performed by: ORTHOPAEDIC SURGERY

## 2022-02-24 NOTE — PROGRESS NOTES
Pre-op Total Joint Patient Screening    1. Do you have a ride available to come to the hospital the day after your surgery by 8am with anticipated discharge of 11am? Y  2. What is the name of this person? Alejandro (son)  3. Do you have a  set up after surgery? Y  4. Will your  be the same person that gives you a ride home after surgery? Y  5. Have you received the Joint Replacement Guidebook? Y  6. Do you have any questions about your guidebook?  N  7. Have you activated your [employer] account? N-Pt tried numerous times to activate the account, his email is wrong  8. Have you signed up for MY Chart access? Y

## 2022-02-25 ENCOUNTER — ANESTHESIA (OUTPATIENT)
Dept: SURGERY | Facility: CLINIC | Age: 72
End: 2022-02-25
Payer: MEDICARE

## 2022-02-25 ENCOUNTER — ANESTHESIA EVENT (OUTPATIENT)
Dept: SURGERY | Facility: CLINIC | Age: 72
End: 2022-02-25
Payer: MEDICARE

## 2022-02-25 ENCOUNTER — APPOINTMENT (OUTPATIENT)
Dept: PHYSICAL THERAPY | Facility: CLINIC | Age: 72
End: 2022-02-25
Attending: ORTHOPAEDIC SURGERY
Payer: MEDICARE

## 2022-02-25 ENCOUNTER — HOSPITAL ENCOUNTER (OUTPATIENT)
Facility: CLINIC | Age: 72
Discharge: HOME | End: 2022-02-26
Attending: ORTHOPAEDIC SURGERY | Admitting: ORTHOPAEDIC SURGERY
Payer: MEDICARE

## 2022-02-25 DIAGNOSIS — Z96.651 STATUS POST TOTAL RIGHT KNEE REPLACEMENT: Primary | ICD-10-CM

## 2022-02-25 LAB
CREAT SERPL-MCNC: 0.81 MG/DL (ref 0.66–1.25)
GFR SERPL CREATININE-BSD FRML MDRD: >90 ML/MIN/1.73M2
POTASSIUM BLD-SCNC: 3.7 MMOL/L (ref 3.4–5.3)

## 2022-02-25 PROCEDURE — 370N000017 HC ANESTHESIA TECHNICAL FEE, PER MIN: Performed by: ORTHOPAEDIC SURGERY

## 2022-02-25 PROCEDURE — 250N000011 HC RX IP 250 OP 636: Performed by: PHYSICIAN ASSISTANT

## 2022-02-25 PROCEDURE — 278N000051 HC OR IMPLANT GENERAL: Performed by: ORTHOPAEDIC SURGERY

## 2022-02-25 PROCEDURE — 82565 ASSAY OF CREATININE: CPT | Performed by: ANESTHESIOLOGY

## 2022-02-25 PROCEDURE — 97161 PT EVAL LOW COMPLEX 20 MIN: CPT | Mod: GP | Performed by: PHYSICAL THERAPIST

## 2022-02-25 PROCEDURE — C1776 JOINT DEVICE (IMPLANTABLE): HCPCS | Performed by: ORTHOPAEDIC SURGERY

## 2022-02-25 PROCEDURE — 250N000013 HC RX MED GY IP 250 OP 250 PS 637: Performed by: ORTHOPAEDIC SURGERY

## 2022-02-25 PROCEDURE — 250N000011 HC RX IP 250 OP 636: Performed by: ANESTHESIOLOGY

## 2022-02-25 PROCEDURE — 97530 THERAPEUTIC ACTIVITIES: CPT | Mod: GP | Performed by: PHYSICAL THERAPIST

## 2022-02-25 PROCEDURE — 250N000011 HC RX IP 250 OP 636: Performed by: NURSE ANESTHETIST, CERTIFIED REGISTERED

## 2022-02-25 PROCEDURE — 999N000141 HC STATISTIC PRE-PROCEDURE NURSING ASSESSMENT: Performed by: ORTHOPAEDIC SURGERY

## 2022-02-25 PROCEDURE — 250N000011 HC RX IP 250 OP 636: Performed by: ORTHOPAEDIC SURGERY

## 2022-02-25 PROCEDURE — 250N000009 HC RX 250: Performed by: ORTHOPAEDIC SURGERY

## 2022-02-25 PROCEDURE — 710N000009 HC RECOVERY PHASE 1, LEVEL 1, PER MIN: Performed by: ORTHOPAEDIC SURGERY

## 2022-02-25 PROCEDURE — 258N000003 HC RX IP 258 OP 636: Performed by: ANESTHESIOLOGY

## 2022-02-25 PROCEDURE — 258N000003 HC RX IP 258 OP 636: Performed by: PHYSICIAN ASSISTANT

## 2022-02-25 PROCEDURE — 250N000013 HC RX MED GY IP 250 OP 250 PS 637: Performed by: PHYSICIAN ASSISTANT

## 2022-02-25 PROCEDURE — 97110 THERAPEUTIC EXERCISES: CPT | Mod: GP | Performed by: PHYSICAL THERAPIST

## 2022-02-25 PROCEDURE — 272N000001 HC OR GENERAL SUPPLY STERILE: Performed by: ORTHOPAEDIC SURGERY

## 2022-02-25 PROCEDURE — 36415 COLL VENOUS BLD VENIPUNCTURE: CPT | Performed by: ANESTHESIOLOGY

## 2022-02-25 PROCEDURE — 84132 ASSAY OF SERUM POTASSIUM: CPT | Performed by: ANESTHESIOLOGY

## 2022-02-25 PROCEDURE — 99221 1ST HOSP IP/OBS SF/LOW 40: CPT | Performed by: PHYSICIAN ASSISTANT

## 2022-02-25 PROCEDURE — 360N000077 HC SURGERY LEVEL 4, PER MIN: Performed by: ORTHOPAEDIC SURGERY

## 2022-02-25 PROCEDURE — 258N000003 HC RX IP 258 OP 636: Performed by: NURSE ANESTHETIST, CERTIFIED REGISTERED

## 2022-02-25 PROCEDURE — 250N000009 HC RX 250: Performed by: NURSE ANESTHETIST, CERTIFIED REGISTERED

## 2022-02-25 DEVICE — ATTUNE KNEE SYSTEM TIBIAL BASE ROTATING PLATFORM SIZE 6 CEMENTED
Type: IMPLANTABLE DEVICE | Site: KNEE | Status: FUNCTIONAL
Brand: ATTUNE

## 2022-02-25 DEVICE — ATTUNE PATELLA MEDIALIZED DOME 41MM CEMENTED AOX
Type: IMPLANTABLE DEVICE | Site: KNEE | Status: FUNCTIONAL
Brand: ATTUNE

## 2022-02-25 DEVICE — BONE CEMENT SIMPLEX FULL DOSE 6191-1-001: Type: IMPLANTABLE DEVICE | Site: KNEE | Status: FUNCTIONAL

## 2022-02-25 DEVICE — ATTUNE KNEE SYSTEM FEMORAL POSTERIOR STABILIZED SIZE 6 RIGHT CEMENTED
Type: IMPLANTABLE DEVICE | Site: KNEE | Status: FUNCTIONAL
Brand: ATTUNE

## 2022-02-25 DEVICE — ATTUNE KNEE SYSTEM TIBIAL INSERT ROTATING PLATFORM POSTERIOR STABILIZED 6 7MM AOX
Type: IMPLANTABLE DEVICE | Site: KNEE | Status: FUNCTIONAL
Brand: ATTUNE

## 2022-02-25 RX ORDER — VANCOMYCIN HYDROCHLORIDE 1 G/200ML
INJECTION, SOLUTION INTRAVENOUS PRN
Status: DISCONTINUED | OUTPATIENT
Start: 2022-02-25 | End: 2022-02-25

## 2022-02-25 RX ORDER — FENTANYL CITRATE 0.05 MG/ML
50 INJECTION, SOLUTION INTRAMUSCULAR; INTRAVENOUS EVERY 5 MIN PRN
Status: DISCONTINUED | OUTPATIENT
Start: 2022-02-25 | End: 2022-02-25 | Stop reason: HOSPADM

## 2022-02-25 RX ORDER — OXYCODONE HYDROCHLORIDE 5 MG/1
5 TABLET ORAL EVERY 4 HOURS PRN
Status: DISCONTINUED | OUTPATIENT
Start: 2022-02-25 | End: 2022-02-25 | Stop reason: HOSPADM

## 2022-02-25 RX ORDER — ACETAMINOPHEN 325 MG/1
650 TABLET ORAL EVERY 4 HOURS PRN
Status: DISCONTINUED | OUTPATIENT
Start: 2022-02-28 | End: 2022-02-26 | Stop reason: HOSPADM

## 2022-02-25 RX ORDER — GABAPENTIN 300 MG/1
1200 CAPSULE ORAL EVERY MORNING
Status: DISCONTINUED | OUTPATIENT
Start: 2022-02-26 | End: 2022-02-26 | Stop reason: HOSPADM

## 2022-02-25 RX ORDER — PANTOPRAZOLE SODIUM 20 MG/1
40 TABLET, DELAYED RELEASE ORAL DAILY
Status: DISCONTINUED | OUTPATIENT
Start: 2022-02-26 | End: 2022-02-26 | Stop reason: HOSPADM

## 2022-02-25 RX ORDER — SODIUM CHLORIDE, SODIUM LACTATE, POTASSIUM CHLORIDE, CALCIUM CHLORIDE 600; 310; 30; 20 MG/100ML; MG/100ML; MG/100ML; MG/100ML
INJECTION, SOLUTION INTRAVENOUS CONTINUOUS PRN
Status: DISCONTINUED | OUTPATIENT
Start: 2022-02-25 | End: 2022-02-25

## 2022-02-25 RX ORDER — LABETALOL HYDROCHLORIDE 5 MG/ML
10 INJECTION, SOLUTION INTRAVENOUS
Status: DISCONTINUED | OUTPATIENT
Start: 2022-02-25 | End: 2022-02-25 | Stop reason: HOSPADM

## 2022-02-25 RX ORDER — HYDROMORPHONE HCL IN WATER/PF 6 MG/30 ML
0.4 PATIENT CONTROLLED ANALGESIA SYRINGE INTRAVENOUS
Status: DISCONTINUED | OUTPATIENT
Start: 2022-02-25 | End: 2022-02-26 | Stop reason: HOSPADM

## 2022-02-25 RX ORDER — ACETAMINOPHEN 325 MG/1
975 TABLET ORAL EVERY 8 HOURS
Status: DISCONTINUED | OUTPATIENT
Start: 2022-02-25 | End: 2022-02-26 | Stop reason: HOSPADM

## 2022-02-25 RX ORDER — CHLORTHALIDONE 25 MG/1
25 TABLET ORAL DAILY
Status: DISCONTINUED | OUTPATIENT
Start: 2022-02-25 | End: 2022-02-25

## 2022-02-25 RX ORDER — BISACODYL 10 MG
10 SUPPOSITORY, RECTAL RECTAL DAILY PRN
Status: DISCONTINUED | OUTPATIENT
Start: 2022-02-25 | End: 2022-02-26 | Stop reason: HOSPADM

## 2022-02-25 RX ORDER — NALOXONE HYDROCHLORIDE 0.4 MG/ML
0.4 INJECTION, SOLUTION INTRAMUSCULAR; INTRAVENOUS; SUBCUTANEOUS
Status: DISCONTINUED | OUTPATIENT
Start: 2022-02-25 | End: 2022-02-26 | Stop reason: HOSPADM

## 2022-02-25 RX ORDER — SODIUM CHLORIDE, SODIUM LACTATE, POTASSIUM CHLORIDE, CALCIUM CHLORIDE 600; 310; 30; 20 MG/100ML; MG/100ML; MG/100ML; MG/100ML
INJECTION, SOLUTION INTRAVENOUS CONTINUOUS
Status: DISCONTINUED | OUTPATIENT
Start: 2022-02-25 | End: 2022-02-25 | Stop reason: HOSPADM

## 2022-02-25 RX ORDER — POLYETHYLENE GLYCOL 3350 17 G/17G
17 POWDER, FOR SOLUTION ORAL DAILY
Status: DISCONTINUED | OUTPATIENT
Start: 2022-02-26 | End: 2022-02-26 | Stop reason: HOSPADM

## 2022-02-25 RX ORDER — HYDROMORPHONE HCL IN WATER/PF 6 MG/30 ML
0.2 PATIENT CONTROLLED ANALGESIA SYRINGE INTRAVENOUS
Status: DISCONTINUED | OUTPATIENT
Start: 2022-02-25 | End: 2022-02-26 | Stop reason: HOSPADM

## 2022-02-25 RX ORDER — CLINDAMYCIN PHOSPHATE 900 MG/50ML
900 INJECTION, SOLUTION INTRAVENOUS SEE ADMIN INSTRUCTIONS
Status: DISCONTINUED | OUTPATIENT
Start: 2022-02-25 | End: 2022-02-25 | Stop reason: HOSPADM

## 2022-02-25 RX ORDER — HYDRALAZINE HYDROCHLORIDE 20 MG/ML
2.5-5 INJECTION INTRAMUSCULAR; INTRAVENOUS EVERY 10 MIN PRN
Status: DISCONTINUED | OUTPATIENT
Start: 2022-02-25 | End: 2022-02-25 | Stop reason: HOSPADM

## 2022-02-25 RX ORDER — LIDOCAINE 40 MG/G
CREAM TOPICAL
Status: DISCONTINUED | OUTPATIENT
Start: 2022-02-25 | End: 2022-02-26 | Stop reason: HOSPADM

## 2022-02-25 RX ORDER — OXYCODONE HYDROCHLORIDE 5 MG/1
5 TABLET ORAL EVERY 4 HOURS PRN
Status: DISCONTINUED | OUTPATIENT
Start: 2022-02-25 | End: 2022-02-26

## 2022-02-25 RX ORDER — ACETAMINOPHEN 325 MG/1
975 TABLET ORAL ONCE
Status: DISCONTINUED | OUTPATIENT
Start: 2022-02-25 | End: 2022-02-25 | Stop reason: HOSPADM

## 2022-02-25 RX ORDER — ONDANSETRON 4 MG/1
4 TABLET, ORALLY DISINTEGRATING ORAL EVERY 6 HOURS PRN
Status: DISCONTINUED | OUTPATIENT
Start: 2022-02-25 | End: 2022-02-26 | Stop reason: HOSPADM

## 2022-02-25 RX ORDER — OXYCODONE HYDROCHLORIDE 5 MG/1
10 TABLET ORAL EVERY 4 HOURS PRN
Status: DISCONTINUED | OUTPATIENT
Start: 2022-02-25 | End: 2022-02-26

## 2022-02-25 RX ORDER — PROCHLORPERAZINE MALEATE 5 MG
5 TABLET ORAL EVERY 6 HOURS PRN
Status: DISCONTINUED | OUTPATIENT
Start: 2022-02-25 | End: 2022-02-26 | Stop reason: HOSPADM

## 2022-02-25 RX ORDER — TADALAFIL 20 MG/1
20 TABLET ORAL DAILY
Status: DISCONTINUED | OUTPATIENT
Start: 2022-02-25 | End: 2022-02-25

## 2022-02-25 RX ORDER — TRAZODONE HYDROCHLORIDE 50 MG/1
50 TABLET, FILM COATED ORAL AT BEDTIME
Status: DISCONTINUED | OUTPATIENT
Start: 2022-02-25 | End: 2022-02-26 | Stop reason: HOSPADM

## 2022-02-25 RX ORDER — EPHEDRINE SULFATE 50 MG/ML
INJECTION, SOLUTION INTRAMUSCULAR; INTRAVENOUS; SUBCUTANEOUS PRN
Status: DISCONTINUED | OUTPATIENT
Start: 2022-02-25 | End: 2022-02-25

## 2022-02-25 RX ORDER — NALOXONE HYDROCHLORIDE 0.4 MG/ML
0.2 INJECTION, SOLUTION INTRAMUSCULAR; INTRAVENOUS; SUBCUTANEOUS
Status: DISCONTINUED | OUTPATIENT
Start: 2022-02-25 | End: 2022-02-26 | Stop reason: HOSPADM

## 2022-02-25 RX ORDER — TIZANIDINE 2 MG/1
2 TABLET ORAL AT BEDTIME
Status: DISCONTINUED | OUTPATIENT
Start: 2022-02-25 | End: 2022-02-26 | Stop reason: HOSPADM

## 2022-02-25 RX ORDER — ONDANSETRON 4 MG/1
4 TABLET, ORALLY DISINTEGRATING ORAL EVERY 30 MIN PRN
Status: DISCONTINUED | OUTPATIENT
Start: 2022-02-25 | End: 2022-02-25 | Stop reason: HOSPADM

## 2022-02-25 RX ORDER — TRANEXAMIC ACID 650 MG/1
1950 TABLET ORAL ONCE
Status: COMPLETED | OUTPATIENT
Start: 2022-02-25 | End: 2022-02-25

## 2022-02-25 RX ORDER — AMOXICILLIN 250 MG
1 CAPSULE ORAL EVERY MORNING
Status: DISCONTINUED | OUTPATIENT
Start: 2022-02-26 | End: 2022-02-25

## 2022-02-25 RX ORDER — AMOXICILLIN 250 MG
1 CAPSULE ORAL 2 TIMES DAILY
Status: DISCONTINUED | OUTPATIENT
Start: 2022-02-25 | End: 2022-02-26 | Stop reason: HOSPADM

## 2022-02-25 RX ORDER — ONDANSETRON 2 MG/ML
INJECTION INTRAMUSCULAR; INTRAVENOUS PRN
Status: DISCONTINUED | OUTPATIENT
Start: 2022-02-25 | End: 2022-02-25

## 2022-02-25 RX ORDER — HYDROXYZINE HYDROCHLORIDE 10 MG/1
10 TABLET, FILM COATED ORAL EVERY 6 HOURS PRN
Status: DISCONTINUED | OUTPATIENT
Start: 2022-02-25 | End: 2022-02-26 | Stop reason: HOSPADM

## 2022-02-25 RX ORDER — LIDOCAINE 40 MG/G
CREAM TOPICAL
Status: DISCONTINUED | OUTPATIENT
Start: 2022-02-25 | End: 2022-02-25 | Stop reason: HOSPADM

## 2022-02-25 RX ORDER — CLINDAMYCIN PHOSPHATE 900 MG/50ML
900 INJECTION, SOLUTION INTRAVENOUS
Status: DISCONTINUED | OUTPATIENT
Start: 2022-02-25 | End: 2022-02-25 | Stop reason: HOSPADM

## 2022-02-25 RX ORDER — ONDANSETRON 4 MG/1
4 TABLET, ORALLY DISINTEGRATING ORAL EVERY 8 HOURS PRN
Qty: 30 TABLET | Refills: 0 | Status: SHIPPED | OUTPATIENT
Start: 2022-02-25 | End: 2023-03-22

## 2022-02-25 RX ORDER — FENTANYL CITRATE 0.05 MG/ML
50 INJECTION, SOLUTION INTRAMUSCULAR; INTRAVENOUS
Status: COMPLETED | OUTPATIENT
Start: 2022-02-25 | End: 2022-02-25

## 2022-02-25 RX ORDER — LISINOPRIL 20 MG/1
20 TABLET ORAL DAILY
Status: DISCONTINUED | OUTPATIENT
Start: 2022-02-25 | End: 2022-02-26 | Stop reason: HOSPADM

## 2022-02-25 RX ORDER — TESTOSTERONE 30 MG/1.5ML
30 SOLUTION TOPICAL DAILY
Status: DISCONTINUED | OUTPATIENT
Start: 2022-02-26 | End: 2022-02-26 | Stop reason: HOSPADM

## 2022-02-25 RX ORDER — ONDANSETRON 2 MG/ML
4 INJECTION INTRAMUSCULAR; INTRAVENOUS EVERY 6 HOURS PRN
Status: DISCONTINUED | OUTPATIENT
Start: 2022-02-25 | End: 2022-02-26 | Stop reason: HOSPADM

## 2022-02-25 RX ORDER — HYDROXYZINE HYDROCHLORIDE 10 MG/1
10 TABLET, FILM COATED ORAL EVERY 6 HOURS PRN
Qty: 30 TABLET | Refills: 0 | Status: SHIPPED | OUTPATIENT
Start: 2022-02-25 | End: 2023-03-23

## 2022-02-25 RX ORDER — PROPOFOL 10 MG/ML
INJECTION, EMULSION INTRAVENOUS CONTINUOUS PRN
Status: DISCONTINUED | OUTPATIENT
Start: 2022-02-25 | End: 2022-02-25

## 2022-02-25 RX ORDER — DIMENHYDRINATE 50 MG/ML
25 INJECTION, SOLUTION INTRAMUSCULAR; INTRAVENOUS
Status: DISCONTINUED | OUTPATIENT
Start: 2022-02-25 | End: 2022-02-25 | Stop reason: HOSPADM

## 2022-02-25 RX ORDER — SODIUM CHLORIDE, SODIUM LACTATE, POTASSIUM CHLORIDE, CALCIUM CHLORIDE 600; 310; 30; 20 MG/100ML; MG/100ML; MG/100ML; MG/100ML
INJECTION, SOLUTION INTRAVENOUS CONTINUOUS
Status: DISCONTINUED | OUTPATIENT
Start: 2022-02-25 | End: 2022-02-26

## 2022-02-25 RX ORDER — CLINDAMYCIN PHOSPHATE 900 MG/50ML
INJECTION, SOLUTION INTRAVENOUS PRN
Status: DISCONTINUED | OUTPATIENT
Start: 2022-02-25 | End: 2022-02-25

## 2022-02-25 RX ORDER — AMOXICILLIN 250 MG
1-2 CAPSULE ORAL 2 TIMES DAILY
Qty: 30 TABLET | Refills: 0 | Status: SHIPPED | OUTPATIENT
Start: 2022-02-25

## 2022-02-25 RX ORDER — HYDROMORPHONE HCL IN WATER/PF 6 MG/30 ML
0.2 PATIENT CONTROLLED ANALGESIA SYRINGE INTRAVENOUS EVERY 5 MIN PRN
Status: DISCONTINUED | OUTPATIENT
Start: 2022-02-25 | End: 2022-02-25 | Stop reason: HOSPADM

## 2022-02-25 RX ORDER — OXYCODONE HYDROCHLORIDE 5 MG/1
5-10 TABLET ORAL
Qty: 30 TABLET | Refills: 0 | Status: SHIPPED | OUTPATIENT
Start: 2022-02-25 | End: 2022-02-26

## 2022-02-25 RX ORDER — ONDANSETRON 2 MG/ML
4 INJECTION INTRAMUSCULAR; INTRAVENOUS EVERY 30 MIN PRN
Status: DISCONTINUED | OUTPATIENT
Start: 2022-02-25 | End: 2022-02-25 | Stop reason: HOSPADM

## 2022-02-25 RX ADMIN — PHENYLEPHRINE HYDROCHLORIDE 100 MCG: 10 INJECTION INTRAVENOUS at 10:19

## 2022-02-25 RX ADMIN — PHENYLEPHRINE HYDROCHLORIDE 100 MCG: 10 INJECTION INTRAVENOUS at 09:47

## 2022-02-25 RX ADMIN — PHENYLEPHRINE HYDROCHLORIDE 100 MCG: 10 INJECTION INTRAVENOUS at 09:54

## 2022-02-25 RX ADMIN — HYDROXYZINE HYDROCHLORIDE 10 MG: 10 TABLET ORAL at 21:43

## 2022-02-25 RX ADMIN — SODIUM CHLORIDE, POTASSIUM CHLORIDE, SODIUM LACTATE AND CALCIUM CHLORIDE: 600; 310; 30; 20 INJECTION, SOLUTION INTRAVENOUS at 10:46

## 2022-02-25 RX ADMIN — MIDAZOLAM 2 MG: 1 INJECTION INTRAMUSCULAR; INTRAVENOUS at 08:31

## 2022-02-25 RX ADMIN — OXYCODONE HYDROCHLORIDE 10 MG: 5 TABLET ORAL at 17:48

## 2022-02-25 RX ADMIN — TRANEXAMIC ACID 1950 MG: 650 TABLET ORAL at 07:59

## 2022-02-25 RX ADMIN — PROPOFOL 100 MCG/KG/MIN: 10 INJECTION, EMULSION INTRAVENOUS at 09:44

## 2022-02-25 RX ADMIN — CLINDAMYCIN PHOSPHATE 900 MG: 900 INJECTION, SOLUTION INTRAVENOUS at 08:38

## 2022-02-25 RX ADMIN — PHENYLEPHRINE HYDROCHLORIDE 100 MCG: 10 INJECTION INTRAVENOUS at 10:04

## 2022-02-25 RX ADMIN — FENTANYL CITRATE 50 MCG: 50 INJECTION, SOLUTION INTRAMUSCULAR; INTRAVENOUS at 13:36

## 2022-02-25 RX ADMIN — ASPIRIN 325 MG: 325 TABLET, COATED ORAL at 17:18

## 2022-02-25 RX ADMIN — ACETAMINOPHEN 975 MG: 325 TABLET, FILM COATED ORAL at 17:18

## 2022-02-25 RX ADMIN — PHENYLEPHRINE HYDROCHLORIDE 0.4 MCG/KG/MIN: 10 INJECTION INTRAVENOUS at 10:15

## 2022-02-25 RX ADMIN — SODIUM CHLORIDE, POTASSIUM CHLORIDE, SODIUM LACTATE AND CALCIUM CHLORIDE: 600; 310; 30; 20 INJECTION, SOLUTION INTRAVENOUS at 08:38

## 2022-02-25 RX ADMIN — GABAPENTIN 1500 MG: 400 CAPSULE ORAL at 21:43

## 2022-02-25 RX ADMIN — VANCOMYCIN HYDROCHLORIDE 1 G: 1 INJECTION, SOLUTION INTRAVENOUS at 10:18

## 2022-02-25 RX ADMIN — Medication 5 MG: at 10:18

## 2022-02-25 RX ADMIN — SODIUM CHLORIDE, POTASSIUM CHLORIDE, SODIUM LACTATE AND CALCIUM CHLORIDE: 600; 310; 30; 20 INJECTION, SOLUTION INTRAVENOUS at 17:49

## 2022-02-25 RX ADMIN — ONDANSETRON 4 MG: 2 INJECTION INTRAMUSCULAR; INTRAVENOUS at 11:58

## 2022-02-25 RX ADMIN — HYDROCORTISONE SODIUM SUCCINATE 50 MG: 100 INJECTION, POWDER, FOR SOLUTION INTRAMUSCULAR; INTRAVENOUS at 19:05

## 2022-02-25 RX ADMIN — VANCOMYCIN HYDROCHLORIDE 1500 MG: 5 INJECTION, POWDER, LYOPHILIZED, FOR SOLUTION INTRAVENOUS at 22:23

## 2022-02-25 RX ADMIN — SENNOSIDES AND DOCUSATE SODIUM 1 TABLET: 50; 8.6 TABLET ORAL at 21:44

## 2022-02-25 RX ADMIN — TRAZODONE HYDROCHLORIDE 50 MG: 50 TABLET ORAL at 21:44

## 2022-02-25 RX ADMIN — FENTANYL CITRATE 25 MCG: 0.05 INJECTION, SOLUTION INTRAMUSCULAR; INTRAVENOUS at 09:46

## 2022-02-25 RX ADMIN — HYDROCORTISONE SODIUM SUCCINATE 100 MG: 100 INJECTION, POWDER, FOR SOLUTION INTRAMUSCULAR; INTRAVENOUS at 09:48

## 2022-02-25 RX ADMIN — CLINDAMYCIN PHOSPHATE 900 MG: 900 INJECTION, SOLUTION INTRAVENOUS at 09:44

## 2022-02-25 RX ADMIN — PHENYLEPHRINE HYDROCHLORIDE 100 MCG: 10 INJECTION INTRAVENOUS at 10:08

## 2022-02-25 RX ADMIN — Medication 5 MG: at 10:04

## 2022-02-25 RX ADMIN — SODIUM CHLORIDE, POTASSIUM CHLORIDE, SODIUM LACTATE AND CALCIUM CHLORIDE: 600; 310; 30; 20 INJECTION, SOLUTION INTRAVENOUS at 09:44

## 2022-02-25 RX ADMIN — Medication 5 MG: at 09:55

## 2022-02-25 RX ADMIN — OXYCODONE HYDROCHLORIDE 10 MG: 5 TABLET ORAL at 21:43

## 2022-02-25 ASSESSMENT — LIFESTYLE VARIABLES: TOBACCO_USE: 1

## 2022-02-25 NOTE — PROGRESS NOTES
02/25/22 1600   Quick Adds   Type of Visit Initial PT Evaluation   Living Environment   People in Home alone   Current Living Arrangements house   Home Accessibility stairs to enter home;stairs within home   Number of Stairs, Main Entrance 2   Stair Railings, Main Entrance none   Number of Stairs, Within Home, Primary two   Stair Railings, Within Home, Primary none   Transportation Anticipated family or friend will provide   Living Environment Comments Pt lives alone in a house, 2 stairs to enter and 2 inside. His adult son will be living w/ him for a week after discharge. Son is able to provide assist. Pt was driving prior to surgery   Self-Care   Usual Activity Tolerance good   Current Activity Tolerance fair   Equipment Currently Used at Home none   Fall history within last six months no   Activity/Exercise/Self-Care Comment Pt IND at baseline, does not use an AD. Pt owns a cane, FWW, and crutches   General Information   Onset of Illness/Injury or Date of Surgery 02/25/22   Referring Physician Marisa Page MD   Patient/Family Therapy Goals Statement (PT) Return home   Pertinent History of Current Problem (include personal factors and/or comorbidities that impact the POC) Pt is a 73 y/o male POD #0 following R TKA   Existing Precautions/Restrictions fall   Weight-Bearing Status - RLE weight-bearing as tolerated   Cognition   Affect/Mental Status (Cognition) WNL   Orientation Status (Cognition) oriented x 3   Pain Assessment   Patient Currently in Pain   (1/10 in R knee)   Integumentary/Edema   Integumentary/Edema Comments Incision not observed, covered by bandage   Posture    Posture Protracted shoulders   Range of Motion (ROM)   Range of Motion ROM deficits secondary to surgical procedure   ROM Comment R knee AROM 12-54 degrees. L LE WFL   Strength (Manual Muscle Testing)   Strength (Manual Muscle Testing) Able to perform R SLR;Able to perform L SLR   Bed Mobility   Comment, (Bed Mobility) Supine to sit  SBA   Transfers   Comment, (Transfers) Sit to stand CGA w/ FWW   Gait/Stairs (Locomotion)   Comment, (Gait/Stairs) NT   Balance   Balance Comments Good seated balance, poor standing balance   Sensory Examination   Sensory Perception Comments Pt reports numbness in R LE   Clinical Impression   Criteria for Skilled Therapeutic Intervention Yes, treatment indicated   PT Diagnosis (PT) Impaired functional mobility and gait   Influenced by the following impairments Pain, numbness, decreased strength, decreased ROM   Functional limitations due to impairments Limited functional mobility requiring AD and assist   Clinical Presentation (PT Evaluation Complexity) Stable/Uncomplicated   Clinical Presentation Rationale Based on PMH, current status, and social support   Clinical Decision Making (Complexity) low complexity   Planned Therapy Interventions (PT) balance training;bed mobility training;gait training;home exercise program;stair training;strengthening;transfer training   Risk & Benefits of therapy have been explained patient   PT Discharge Planning   PT Rationale for DC Rec Pt is currently below baseline. Pt requiring SBA for bed mobility and CGA for transfers. Pt is currently limited by numbness in R LE. Anticipate pt will require CGA for gait and stairs. Recommend pt use FWW for all mobility and have assist.   PT Brief overview of current status Bed mob SBA, transfers CGA   Plan of Care Review   Plan of Care Reviewed With patient   Total Evaluation Time   Total Evaluation Time (Minutes) 10   Physical Therapy Goals   PT Frequency 2x/day   PT Predicated Duration/Target Date for Goal Attainment 02/26/22   PT Goals Bed Mobility;Transfers;Gait;Stairs   PT: Bed Mobility Modified independent;Supine to/from sit   PT: Transfers Supervision/stand-by assist;Sit to/from stand;Assistive device   PT: Gait Supervision/stand-by assist;Standard walker;100 feet   PT: Stairs Supervision/stand-by assist;2 stairs

## 2022-02-25 NOTE — ANESTHESIA PROCEDURE NOTES
Adductor canal (targeting saphenous nerve) Procedure Note    Pre-Procedure   Staff -        Anesthesiologist:  Last Epperson MD       Performed By: anesthesiologist       Location: pre-op       Pre-Anesthestic Checklist: patient identified, IV checked, site marked, risks and benefits discussed, informed consent, monitors and equipment checked, pre-op evaluation, at physician/surgeon's request and post-op pain management  Timeout:       Correct Patient: Yes        Correct Procedure: Yes        Correct Site: Yes        Correct Position: Yes        Correct Laterality: Yes        Site Marked: Yes  Procedure Documentation  Procedure: Adductor canal (targeting saphenous nerve)       Patient Position: supine       Skin prep: Chloraprep       Local skin infiltrated with 1 mL of 1% lidocaine.        Needle Type: insulated       Needle Gauge: 21.        Needle Length (millimeters): 90        Ultrasound guided       1. Ultrasound was used to identify targeted nerve, plexus, vascular marker, or fascial plane and place a needle adjacent to it in real-time.       2. Ultrasound was used to visualize the spread of anesthetic in close proximity to the above referenced structure.       3. A permanent image is entered into the patient's record.       4. The visualized anatomic structures appeared normal.       5. There were no apparent abnormal pathologic findings.    Assessment/Narrative         The placement was negative for: blood aspirated, painful injection and site bleeding       Paresthesias: No.     Bolus given via needle..        Secured via.        Insertion/Infusion Method: Single Shot       Complications: none     Comments:  Bolus via needle, 15 mL of 0.5% ropivacaine with 1:400,000 epinephrine.    Under ultrasound guidance, a 21 gauge needle was inserted and placed in close proximity to the saphenous nerve. Ultrasound was also used to visualize the spread of anesthetic in close proximity to the nerve being  blocked. The nerve appeared anatomically normal, and there were no apparent abnormal pathological findings. Patient tolerated well, was mildly sedated but communicative throughout the procedure. A permanent ultrasound image was saved in the patient's record.    The surgeon has given a verbal order transferring care of this patient to me for the performance of regional analgesia block for post op pain control. It is requested of me because I am uniquely trained and qualified to perform this block and the surgeon is neither trained nor qualified to perform this procedure.

## 2022-02-25 NOTE — OP NOTE
Procedure Date: 02/25/2022    PREOPERATIVE DIAGNOSIS:  Osteoarthritis of the right knee.    POSTOPERATIVE DIAGNOSIS:  Osteoarthritis of the right knee.    PROCEDURE PERFORMED:  Right total knee arthroplasty using the DePuy Attune knee system posterior stabilized rotating platform, size 6 femur, 6 tibia, 7 mm tibial polyethylene and 41 mm patellar button.    INDICATION FOR PROCEDURE:  The patient is a 72-year-old male who has had a long history of bilateral osteoarthritis of the knee.  He previously had a left total knee arthroplasty done and did very well.  I discussed treatment options with him.  I explained to him the risks, benefits, and potential complications of total knee arthroplasty.  The discussion included, but was not specific to infection, vascular, neurologic complications, DVT, septic and aseptic loosening, arthrofibrosis, fracture, and the possible need for further revision surgery.  After this discussion, the patient wanted to proceed with surgery.    ANESTHETIC USED:  Spinal.    SURGEON:  Marisa Page MD    ASSISTANT:  Levy Mullins PA-C    DESCRIPTION OF PROCEDURE:  The patient was taken to the operating room and placed on the operating table in the supine position.  After adequate induction of spinal anesthetic, a bump was placed beneath the right hip.  A pneumatic tourniquet was applied to right thigh.  The patient was given a gram of Ancef for infection prophylaxis, given 1 hour prior to incision.  We then performed sterile prep and drape of the right knee and right lower extremity.  After sterile prep and drape, the limb was exsanguinated and tourniquet was elevated to 300 mmHg.  I then made a midline incision exposing the extensor mechanism and proceeded with a medial parapatellar arthrotomy in a quad sparing approach.  I then identified my entry point for the intramedullary guide, femur set at 5 degrees of valgus and made my distal and femoral cut.  We then sized the femur appropriately and  applied the jig for 3 degrees of external rotation, femoral component.  We then made our anterior and posterior cuts along with anterior and posterior chamfers and then directed our attention to the tibia.  Used an extramedullary guide to establish a neutral cut the tibia based off the deficient medial side.  In order to correct the varus deformity, a release was done off the medial side of the tibia.  We then checked our flexion and extension gaps and found them to be equal.  We then finished preparation of the femur by making a box cut to finish preparation of the tibia.  We then removed the posterior osteophytes and the loose bodies.  We then injected the posterior capsule with a mixture of ropivacaine and Toradol.  We then sized the patella, made our patellar cuts and finished preparation of the patella.  While the cement was being mixed, we began preparing the cement surfaces using pulsatile jet lavage antibiotic saline.  Once the cement was the appropriate consistency, we cemented first the tibial component, followed by the femoral component.  Once these components were fully seated, excess cement was removed using the [place] elevator.  We then placed the knee in full extension with trial polyethylene in place and allowed the cement to harden.  At this point, we cemented the patella, again removing the excess cement using the [place] elevator.  While the cement was hardening, we soaked the knee in a dilute solution of Betadine for 3 minutes and irrigated with pulse jet lavage.  We used approximately 3 liters of antibiotic saline and pulse jet lavage.  Once the cement had hardened, we took the knee through a range of motion.  Patella tracked ideally and we had good soft tissue balance in both flexion and extension.  We then removed the trial polyethylene, inspected the joint for loose bone cement and removed what was found.  We then irrigated again using pulse jet lavage and put in the actual rotating platform,  reduced the knee and took the knee through a range of motion.  The patella tracked ideally.  We then placed the knee in approximately 30 degrees of flexion, irrigated with pulse lavage and closed the arthrotomy using 0 Ethibond sutures.  This was oversewn using 0 Stratafix.  The deep subQ was closed using 0 Vicryl, superficial subQ was closed with 2-0 Vicryl.  The skin was closed with a running 3-0 Monocryl subcuticular stitch followed by Prineo dressing.  A sterile was dressing applied followed by a knee immobilizer.      The patient tolerated the procedure.  There were no intraoperative complications.  The patient went to [facility] in stable condition.    The plan will be for the patient get 24 hours of clindamycin for infection prophylaxis and 30 days of aspirin for DVT prophylaxis.    Marisa Page MD        D: 2022   T: 2022   MT: CASSI    Name:     JOSEPH CURIEL  MRN:      [id number]        Account:        [id number]   :      1950           Procedure Date: 2022     Document: [id number]

## 2022-02-25 NOTE — ANESTHESIA CARE TRANSFER NOTE
Patient: Jose Carlos Escamilla    Procedure: Procedure(s):  RIGHT KNEE ARTHROPLASTY       Diagnosis: Osteoarthritis of right knee [M17.11]  Diagnosis Additional Information: No value filed.    Anesthesia Type:   Spinal     Note:    Oropharynx: oropharynx clear of all foreign objects and spontaneously breathing  Level of Consciousness: awake  Oxygen Supplementation: face mask  Level of Supplemental Oxygen (L/min / FiO2): 6  Independent Airway: airway patency satisfactory and stable  Dentition: dentition unchanged  Vital Signs Stable: post-procedure vital signs reviewed and stable  Report to RN Given: handoff report given  Patient transferred to: PACU    Handoff Report: Identifed the Patient, Identified the Reponsible Provider, Reviewed the pertinent medical history, Discussed the surgical course, Reviewed Intra-OP anesthesia mangement and issues during anesthesia, Set expectations for post-procedure period and Allowed opportunity for questions and acknowledgement of understanding      Vitals:  Vitals Value Taken Time   /68 02/25/22 1214   Temp     Pulse 99 02/25/22 1217   Resp 19 02/25/22 1217   SpO2 96 % 02/25/22 1217   Vitals shown include unvalidated device data.    Electronically Signed By: NIAM Martinez CRNA  February 25, 2022  12:18 PM

## 2022-02-25 NOTE — CONSULTS
[facility]  Consult Note - Hospitalist Service  Date of Admission:  2/25/2022  Consult Requested by: Efrem Mullins PA-C  Reason for Consult: management of adrenal insufficiency    Assessment & Plan   Jose Carlos Escamilla is a 72 year old male with a past medical history of fibromyalgia and chronic pain, hypertension, GERD, adrenal insufficiency, and OA of right knee who underwent previously scheduled right TKA on 2/25/22. Hospitalist was consulted postoperatively for assistance with management of adrenal insufficiency.    OA of right knee s/p TKA 2/25/22  Surgery was performed using general anesthesia, EBL less than 10.   - Postoperative medical management deferred to Orthopedics, including pain control & DVT prophylaxis  - PT/OT    Adrenal insufficiency  PTA maintained on hydrocortisone 15mg qAM, 5mg qPM. Notes from preop H&P indicate recommendations of stress-dose steroids q8h initially and slowly tapering to home dose. Patient received 100mg in OR, is hesitant to receive further large amount of hydrocortisone. After discussion has agreed to the following postop regimen:  - Hydrocortisone 50mg q12h x2 more doses  - Starting 2/27 will take double PTA dose of PO hydrocortisone (30mg qAM, 10mg qPM) x3 days prior to resuming chronic dose     Hypertension  - Holding PTA lisinopril, chlorthalidone pending blood pressure trends given adrenal insufficiency, to be resumed in AM per rounding hospitalist    GERD  - PTA PPI     The patient's care was discussed with the Attending Physician, Dr. Mcdowell, Bedside Nurse and Patient.    Luis Angel Doyle PA-C  [facility]  Securely message with the Vocera Web Console (learn more here)  Text page via [employer] Paging[employer] ([employer])y       Hospitalist Service    ______________________________________________________________________    History is obtained from the patient    History of Present Illness   Jose Carlos Escamilla is a 72 year old male with a  past medical history of fibromyalgia and chronic pain, hypertension, GERD, adrenal insufficiency, and OA of right knee who underwent previously scheduled right TKA on 2/25/22. Surgery was performed using general anesthesia, EBL less than 10. Pt tolerated well without immediate complication. Hospitalist was consulted postoperatively for assistance with management of adrenal insufficiency.     Pt is presently evaluated in hospital room. He currently reports he feels well, has minimal pain, is tolerating clear liquids. Wants to discuss his plan for hydrocortisone as he's received mixed messages from staff. We reviewed the documentation from preop H&P and [facility], which indicate a q8h schedule of hydrocortisone to start following surgery with plan to taper down. Patient is hesitant to receive a large amount and would prefer to decrease the total amount of steroid he receives. He reports being told a different regimen by phone from his providers at [place], would prefer to decrease to a double dose of his home regimen as soon as possible after surgery. He is agreeable to receiving additional hydrocortisone in IV form at 50mg dosing, is also agreeable to two additional doses spaced out every 12 hours with the plan to transition to oral form on Sunday. He understands if his blood pressure is lower than expected, he may require additional IV dosing of hydrocortisone. He otherwise reports having a mild URI with nasal congestion, no cough, fever, sore throat. Declines interventions for these mild symptoms this evening.    Review of Systems   The 10 point Review of Systems is negative other than noted in the HPI or here.     Past Medical History    I have reviewed this patient's medical history and updated it with pertinent information if needed.   Past Medical History:   Diagnosis Date     ACP (advance care planning)      Adrenal insufficiency (H)      Chronic pain syndrome      Chronic rhinitis      Diverticulosis       Esophageal stenosis      Hypercholesterolemia      IBS (irritable bowel syndrome)      Meckel's diverticulitis 2021    Added automatically from request for surgery [id number]     Narcotic dependence (H)        Past Surgical History   I have reviewed this patient's surgical history and updated it with pertinent information if needed.  Past Surgical History:   Procedure Laterality Date     ARTHROPLASTY KNEE Left 2016    Procedure: ARTHROPLASTY KNEE;  Surgeon: Marisa Page MD;  Location: [facility] OR     ARTHROPLASTY KNEE Right 2022    Procedure: RIGHT KNEE ARTHROPLASTY;  Surgeon: Marisa Page MD;  Location:  OR     DISCECTOMY CERVICAL MINIMALLY INVASIVE ONE LEVEL       LAPAROSCOPIC APPENDECTOMY N/A 2021    Procedure: Laparoscopic appendectomy;  Surgeon: Kelsi Parker MD;  Location:  OR     LAPAROSCOPY DIAGNOSTIC (GENERAL) N/A 2021    Procedure: Diagnostic laparoscopy, laparoscopic appendectomy, laparoscopic meckel excision;  Surgeon: Kelsi Parker MD;  Location:  OR     NECK SURGERY      [place]       Social History   I have reviewed this patient's social history and updated it with pertinent information if needed.  Social History     Tobacco Use     Smoking status: Former Smoker     Types: Cigarettes     Quit date: 1995     Years since quittin.6     Smokeless tobacco: Never Used   Vaping Use     Vaping Use: Never used   Substance Use Topics     Alcohol use: Yes     Alcohol/week: 0.0 - 3.3 standard drinks     Comment: 1 drink per day     Drug use: Yes     Types: Marijuana     Comment: Medical, Rarely used       Family History   I have reviewed this patient's family history and updated it with pertinent information if needed.  Family History   Problem Relation Age of Onset     Diabetes Mother      Hypertension Mother      Parkinsonism Father      Lung Cancer Sister      Alcoholism Sister      Rheumatoid Arthritis Brother      Hypertension Brother       Hyperlipidemia Brother      Liver Cancer Brother      Hemochromatosis Brother      Hypertension Brother      No Known Problems Daughter      No Known Problems Son        Medications   Medications Prior to Admission   Medication Sig Dispense Refill Last Dose     amitriptyline (ELAVIL) 25 MG tablet Take 25 mg by mouth At Bedtime   Past Week at Unknown time     chlorthalidone (HYGROTON) 25 MG tablet Take 1 tablet (25 mg) by mouth daily 90 tablet 1 2/25/2022 at Unknown time     diclofenac (VOLTAREN) 1 % GEL topical gel as needed.   Past Month at Unknown time     gabapentin (NEURONTIN) 300 MG capsule Take 2,700 mg by mouth daily 4 Tabs in AM and 5 tabs in PM   2/25/2022 at Unknown time     HYDROcodone-acetaminophen (NORCO)  MG per tablet Take 1 tablet by mouth 4 times daily as needed for pain Hold if taking oxycodone instead  0 2/25/2022 at 0430     hydrocortisone (CORTEF) 5 MG tablet 15 mg on waking and 5 mg at noon,double in sickness   2/25/2022 at 0430     lisinopril (ZESTRIL) 20 MG tablet Take 1 tablet (20 mg) by mouth daily 90 tablet 3 2/25/2022 at Unknown time     omeprazole (PRILOSEC) 20 MG DR capsule Take 20 mg by mouth daily   2/25/2022 at Unknown time     senna-docusate (SENOKOT-S/PERICOLACE) 8.6-50 MG tablet Take 1-2 tablets by mouth 2 times daily (Patient taking differently: Take 1 tablet by mouth every morning ) 20 tablet 0 2/25/2022 at Unknown time     tadalafil (CIALIS) 20 MG tablet TAKE ONE TABLET BY MOUTH DAILY AS NEEDED 10 tablet 0 Past Month at Unknown time     testosterone (AXIRON) 30 MG/ACT topical solution Place 30 mg onto the skin daily 2 pumps daily   2/25/2022 at Unknown time     traZODone (DESYREL) 50 MG tablet TAKE 2 TABLETS BY MOUTH AT BEDTIME 180 tablet 3 2/24/2022 at Unknown time     dexamethasone (DECADRON) 4 MG/ML injection INJECT 1ML INTRAMUSCULARLY UTD WHEN UNABLE TO TAKE ORAL STEROIDS  3      tiZANidine (ZANAFLEX) 2 MG tablet TAKE 1 TO 2 TABLETS BY MOUTH AT BEDTIME AS NEEDED 90  tablet 0        Allergies   Allergies   Allergen Reactions     Penicillins Rash and Anaphylaxis     Other reaction(s): Breathing Difficulty     Zosyn [Piperacillin-Tazobactam In D5w] Palpitations and Rash       Physical Exam   Vital Signs: Temp: 97.6  F (36.4  C) Temp src: Axillary BP: (!) 155/93 Pulse: 84   Resp: 15 SpO2: 94 % O2 Device: None (Room air) Oxygen Delivery: 3 LPM  Weight: 242 lbs 3.2 oz    GEN: well-developed, well-nourished, appears comfortable  HEENT: NCAT, EOM intact bilaterally, sclera clear, conjunctiva normal, nose & mouth patent, mucous membranes moist  CHEST: lungs CTA bilaterally, no increased work of breathing, no wheeze, crackles, rhonchi  HEART: RRR, S1 & S2, no murmur  ABD: soft, nontender, nondistended, no guarding or rigidity, +BS in all 4 quadrants  MSK: AROM bilateral UE; RLE in knee immobilizer, performs ROM at toes/ankle; pedal & radial pulses 2+ bilaterally  NEURO: awake, alert, oriented to name, place, and time. CN II-XII grossly intact. Sensation grossly intact to light touch.   SKIN: warm & dry without rash, no pedal edema    Data   Results for orders placed or performed during the hospital encounter of 02/25/22 (from the past 24 hour(s))   Creatinine   Result Value Ref Range    Creatinine 0.81 0.66 - 1.25 mg/dL    GFR Estimate >90 >60 mL/min/1.73m2   Potassium   Result Value Ref Range    Potassium 3.7 3.4 - 5.3 mmol/L

## 2022-02-25 NOTE — BRIEF OP NOTE
[facility]    Brief Operative Note    Pre-operative diagnosis: Osteoarthritis of right knee [M17.11]  Post-operative diagnosis Same as pre-operative diagnosis    Procedure: Procedure(s):  RIGHT KNEE ARTHROPLASTY  Surgeon: Surgeon(s) and Role:     * Marisa Page MD - Primary     * Efrem Mullins PA-C - Assisting  Anesthesia: General   Estimated Blood Loss: Less than 10 ml    Drains: None  Specimens: * No specimens in log *  Findings:   None.  Complications: None.  Implants:   Implant Name Type Inv. Item Serial No.  Lot No. LRB No. Used Action   BONE CEMENT SIMPLEX FULL DOSE 6191-1-001 - [id number] Cement, Bone BONE CEMENT SIMPLEX FULL DOSE 6191-1-001  [employer] [id number] Right 1 Implanted   IMP COMP FEM JJ ATTUNE POST STAB RT JACINTO SZ6 [id number] - [id number] Total Joint Component/Insert IMP COMP FEM JJ ATTUNE POST STAB RT JACINTO SZ6 [id number]  [employer] [id number] Right 1 Implanted   IMP PATELLA JJ ATTUNE DOME 41MM [id number] - [id number] Total Joint Component/Insert IMP PATELLA JJ ATTUNE DOME 41MM [id number]  [employer] [id number] Right 1 Implanted   IMP TIB BASEPLATE JJ ATTUNE POST RP SZ 6 [id number] - [id number] Total Joint Component/Insert IMP TIB BASEPLATE JJ ATTUNE POST RP SZ 6 [id number]  [employer] [id number] Right 1 Implanted   IMP INSERT JJ ATTUNE PS RP SZ6 7MM [id number] - [id number] Total Joint Component/Insert IMP INSERT JJ ATTUNE PS RP SZ6 7MM [id number]  [employer]- [id number] Right 1 Implanted

## 2022-02-25 NOTE — ANESTHESIA PROCEDURE NOTES
Intrathecal Procedure Note    Pre-Procedure   Staff -        Anesthesiologist:  Last Epperson MD       Performed By: anesthesiologist       Location: OR       Pre-Anesthestic Checklist: patient identified, IV checked, site marked, risks and benefits discussed, informed consent, monitors and equipment checked, pre-op evaluation and at physician/surgeon's request  Timeout:       Correct Patient: Yes        Correct Procedure: Yes        Correct Site: Yes        Correct Position: Yes   Procedure Documentation  Procedure: intrathecal       Patient Position: sitting       Skin prep: Betadine       Insertion Site: L3-4. (midline approach).       Spinal Needle Type: Maria A-Ashish       Introducer used       Introducer: 20 G      # of attempts: 1 and  # of redirects:     Assessment/Narrative         Paresthesias: No.       CSF fluid: clear.      Opening pressure was cmH2O while  Sitting.       Comments:  Patient sitting on edge of OR bed, lower back cleaned and prepped in sterile fashion with betadine. 1% lido used to numb area. Introducer placed, spinal needle through introducer. Appropriate flow of CSF and confirmed with aspiration via syringe. Spinal dose given, 15 mg 0.75% bupivacaine. No complications.            no

## 2022-02-25 NOTE — PROGRESS NOTES
If patient needs TCU [facility] has a bed. Will need to know if patient wants shared or private. SW will continue to follow.    MAX Manjarrez  Inpatient   [facility]

## 2022-02-25 NOTE — ANESTHESIA PREPROCEDURE EVALUATION
Anesthesia Pre-Procedure Evaluation    Patient: Jose Carlos Escamilla   MRN: [id number] : 1950        Procedure : Procedure(s):  RIGHT KNEE ARTHROPLASTY          Past Medical History:   Diagnosis Date     ACP (advance care planning)      Adrenal insufficiency (H)      Chronic pain syndrome      Chronic rhinitis      Diverticulosis      Esophageal stenosis      Hypercholesterolemia      IBS (irritable bowel syndrome)      Meckel's diverticulitis 2021    Added automatically from request for surgery [id number]     Narcotic dependence (H)      PMR (polymyalgia rheumatica) (H) 3/21/2019      Past Surgical History:   Procedure Laterality Date     ARTHROPLASTY KNEE Left 2016    Procedure: ARTHROPLASTY KNEE;  Surgeon: Marisa Page MD;  Location:  OR     DISCECTOMY CERVICAL MINIMALLY INVASIVE ONE LEVEL       LAPAROSCOPIC APPENDECTOMY N/A 2021    Procedure: Laparoscopic appendectomy;  Surgeon: Kelsi Parker MD;  Location:  OR     LAPAROSCOPY DIAGNOSTIC (GENERAL) N/A 2021    Procedure: Diagnostic laparoscopy, laparoscopic appendectomy, laparoscopic meckel excision;  Surgeon: Kelsi Parker MD;  Location:  OR     NECK SURGERY      [place]      Allergies   Allergen Reactions     Penicillins Rash and Anaphylaxis     Other reaction(s): Breathing Difficulty     Zosyn [Piperacillin-Tazobactam In D5w] Palpitations and Rash      Social History     Tobacco Use     Smoking status: Former Smoker     Types: Cigarettes     Quit date: 1995     Years since quittin.6     Smokeless tobacco: Never Used   Substance Use Topics     Alcohol use: Yes     Alcohol/week: 0.0 - 3.3 standard drinks      Wt Readings from Last 1 Encounters:   22 110 kg (242 lb 9.6 oz)        Anesthesia Evaluation   Pt has had prior anesthetic.     No history of anesthetic complications       ROS/MED HX  ENT/Pulmonary:     (+) allergic rhinitis, tobacco use (Quit in ), Past use,     Neurologic:     (+) peripheral  neuropathy,     Cardiovascular:     (+) Dyslipidemia hypertension-----Previous cardiac testing   Echo: Date: Results:    Stress Test: Date: Results:    ECG Reviewed: Date: 2/18/22 Results:  NSR  Cath: Date: Results:      METS/Exercise Tolerance:     Hematologic:       Musculoskeletal: Comment: PMR      GI/Hepatic:     (+) GERD, esophageal disease,     Renal/Genitourinary:       Endo: Comment: Adrenal insufficiency    (+) Chronic steroid usage for Arthritis. Obesity (Class 1),     Psychiatric/Substance Use:     (+) H/O chronic opiod use  (60 MME/day; Norco 10's).     Infectious Disease:       Malignancy:       Other:      (+) , H/O Chronic Pain,           OUTSIDE LABS:  CBC:   Lab Results   Component Value Date    WBC 7.7 02/18/2022    WBC 9.3 07/15/2021    HGB 15.3 02/18/2022    HGB 14.9 07/15/2021    HCT 46.2 02/18/2022    HCT 43.6 07/15/2021     02/18/2022     07/15/2021     BMP:   Lab Results   Component Value Date     07/15/2021     07/14/2021    POTASSIUM 3.7 07/15/2021    POTASSIUM 3.6 07/14/2021    CHLORIDE 105 07/15/2021    CHLORIDE 102 07/14/2021    CO2 29 07/15/2021    CO2 30 07/14/2021    BUN 10 07/15/2021    BUN 13 07/14/2021    CR 0.84 07/15/2021    CR 0.92 07/14/2021     (H) 07/15/2021     (H) 07/14/2021     COAGS: No results found for: PTT, INR, FIBR  POC: No results found for: BGM, HCG, HCGS  HEPATIC:   Lab Results   Component Value Date    ALBUMIN 3.7 07/13/2021    PROTTOTAL 7.5 07/13/2021    ALT 26 07/13/2021    AST 12 07/13/2021    ALKPHOS 44 07/13/2021    BILITOTAL 0.6 07/13/2021     OTHER:   Lab Results   Component Value Date    TRI 9.1 07/15/2021    T4 1.16 03/21/2019    CRP 51 (H) 07/12/2021    SED 0 07/12/2021       Anesthesia Plan    ASA Status:  3   NPO Status:  NPO Appropriate    Anesthesia Type: Spinal.              Consents    Anesthesia Plan(s) and associated risks, benefits, and realistic alternatives discussed. Questions answered and  patient/representative(s) expressed understanding.    - Discussed:     - Discussed with:  Patient         Postoperative Care    Pain management: IV analgesics, Neuraxial analgesia, Multi-modal analgesia, Peripheral nerve block (Single Shot).   PONV prophylaxis: Ondansetron (or other 5HT-3)     Comments:    Other Comments: Steroid-dependent, plan for 100mg hydrocortisone            Last Epperson MD

## 2022-02-25 NOTE — ANESTHESIA POSTPROCEDURE EVALUATION
Patient: Jose Carlos Escamilla    Procedure: Procedure(s):  RIGHT KNEE ARTHROPLASTY       Anesthesia Type:  Spinal    Note:  Disposition: Admission   Postop Pain Control: Uneventful            Sign Out: Well controlled pain   PONV: No   Neuro/Psych: Uneventful            Sign Out: Acceptable/Baseline neuro status   Airway/Respiratory: Uneventful            Sign Out: Acceptable/Baseline resp. status   CV/Hemodynamics: Uneventful            Sign Out: Acceptable CV status; No obvious hypovolemia; No obvious fluid overload   Other NRE: NONE   DID A NON-ROUTINE EVENT OCCUR? No           Last vitals:  Vitals Value Taken Time   /97 02/25/22 1400   Temp 36.5  C (97.7  F) 02/25/22 1250   Pulse 81 02/25/22 1405   Resp 24 02/25/22 1405   SpO2 95 % 02/25/22 1405   Vitals shown include unvalidated device data.    Electronically Signed By: KASH GARRISON MD  February 25, 2022  2:36 PM

## 2022-02-26 ENCOUNTER — APPOINTMENT (OUTPATIENT)
Dept: OCCUPATIONAL THERAPY | Facility: CLINIC | Age: 72
End: 2022-02-26
Attending: ORTHOPAEDIC SURGERY
Payer: MEDICARE

## 2022-02-26 ENCOUNTER — APPOINTMENT (OUTPATIENT)
Dept: PHYSICAL THERAPY | Facility: CLINIC | Age: 72
End: 2022-02-26
Attending: ORTHOPAEDIC SURGERY
Payer: MEDICARE

## 2022-02-26 VITALS
HEIGHT: 73 IN | BODY MASS INDEX: 32.1 KG/M2 | RESPIRATION RATE: 17 BRPM | HEART RATE: 73 BPM | SYSTOLIC BLOOD PRESSURE: 146 MMHG | WEIGHT: 242.2 LBS | DIASTOLIC BLOOD PRESSURE: 85 MMHG | TEMPERATURE: 100.6 F | OXYGEN SATURATION: 95 %

## 2022-02-26 LAB
FASTING STATUS PATIENT QL REPORTED: NO
GLUCOSE BLD-MCNC: 121 MG/DL (ref 70–99)
HGB BLD-MCNC: 12.7 G/DL (ref 13.3–17.7)

## 2022-02-26 PROCEDURE — 97530 THERAPEUTIC ACTIVITIES: CPT | Mod: GP | Performed by: PHYSICAL THERAPIST

## 2022-02-26 PROCEDURE — 36415 COLL VENOUS BLD VENIPUNCTURE: CPT | Performed by: ORTHOPAEDIC SURGERY

## 2022-02-26 PROCEDURE — 250N000013 HC RX MED GY IP 250 OP 250 PS 637: Performed by: INTERNAL MEDICINE

## 2022-02-26 PROCEDURE — 97116 GAIT TRAINING THERAPY: CPT | Mod: GP | Performed by: PHYSICAL THERAPIST

## 2022-02-26 PROCEDURE — 250N000013 HC RX MED GY IP 250 OP 250 PS 637: Performed by: PHYSICIAN ASSISTANT

## 2022-02-26 PROCEDURE — 85018 HEMOGLOBIN: CPT | Performed by: PHYSICIAN ASSISTANT

## 2022-02-26 PROCEDURE — 97110 THERAPEUTIC EXERCISES: CPT | Mod: GP | Performed by: PHYSICAL THERAPIST

## 2022-02-26 PROCEDURE — 250N000013 HC RX MED GY IP 250 OP 250 PS 637: Performed by: ORTHOPAEDIC SURGERY

## 2022-02-26 PROCEDURE — 97165 OT EVAL LOW COMPLEX 30 MIN: CPT | Mod: GO

## 2022-02-26 PROCEDURE — 99232 SBSQ HOSP IP/OBS MODERATE 35: CPT | Performed by: INTERNAL MEDICINE

## 2022-02-26 PROCEDURE — 82947 ASSAY GLUCOSE BLOOD QUANT: CPT | Performed by: ORTHOPAEDIC SURGERY

## 2022-02-26 PROCEDURE — 250N000011 HC RX IP 250 OP 636: Performed by: PHYSICIAN ASSISTANT

## 2022-02-26 PROCEDURE — 250N000011 HC RX IP 250 OP 636: Performed by: ORTHOPAEDIC SURGERY

## 2022-02-26 PROCEDURE — 97535 SELF CARE MNGMENT TRAINING: CPT | Mod: GO

## 2022-02-26 RX ORDER — OXYCODONE HYDROCHLORIDE 5 MG/1
5 TABLET ORAL
Status: DISCONTINUED | OUTPATIENT
Start: 2022-02-26 | End: 2022-02-26 | Stop reason: CLARIF

## 2022-02-26 RX ORDER — OXYCODONE HYDROCHLORIDE 5 MG/1
10 TABLET ORAL
Status: DISCONTINUED | OUTPATIENT
Start: 2022-02-26 | End: 2022-02-26

## 2022-02-26 RX ORDER — OXYCODONE HYDROCHLORIDE 5 MG/1
5 TABLET ORAL EVERY 4 HOURS PRN
Status: DISCONTINUED | OUTPATIENT
Start: 2022-02-26 | End: 2022-02-26

## 2022-02-26 RX ORDER — HYDROMORPHONE HYDROCHLORIDE 2 MG/1
2-4 TABLET ORAL
Status: DISCONTINUED | OUTPATIENT
Start: 2022-02-26 | End: 2022-02-26 | Stop reason: HOSPADM

## 2022-02-26 RX ORDER — OXYCODONE HYDROCHLORIDE 5 MG/1
10 TABLET ORAL
Status: DISCONTINUED | OUTPATIENT
Start: 2022-02-26 | End: 2022-02-26 | Stop reason: CLARIF

## 2022-02-26 RX ORDER — HYDROMORPHONE HYDROCHLORIDE 2 MG/1
2-4 TABLET ORAL
Qty: 30 TABLET | Refills: 0 | Status: SHIPPED | OUTPATIENT
Start: 2022-02-26 | End: 2022-05-27

## 2022-02-26 RX ADMIN — OXYCODONE HYDROCHLORIDE 10 MG: 5 TABLET ORAL at 01:35

## 2022-02-26 RX ADMIN — SENNOSIDES AND DOCUSATE SODIUM 1 TABLET: 50; 8.6 TABLET ORAL at 08:41

## 2022-02-26 RX ADMIN — OXYCODONE HYDROCHLORIDE 10 MG: 5 TABLET ORAL at 09:55

## 2022-02-26 RX ADMIN — HYDROMORPHONE HYDROCHLORIDE 4 MG: 2 TABLET ORAL at 15:03

## 2022-02-26 RX ADMIN — HYDROMORPHONE HYDROCHLORIDE 0.4 MG: 0.2 INJECTION, SOLUTION INTRAMUSCULAR; INTRAVENOUS; SUBCUTANEOUS at 08:41

## 2022-02-26 RX ADMIN — PANTOPRAZOLE SODIUM 40 MG: 20 TABLET, DELAYED RELEASE ORAL at 08:41

## 2022-02-26 RX ADMIN — ASPIRIN 325 MG: 325 TABLET, COATED ORAL at 08:41

## 2022-02-26 RX ADMIN — POLYETHYLENE GLYCOL 3350 17 G: 17 POWDER, FOR SOLUTION ORAL at 08:41

## 2022-02-26 RX ADMIN — ACETAMINOPHEN 975 MG: 325 TABLET, FILM COATED ORAL at 01:35

## 2022-02-26 RX ADMIN — HYDROCORTISONE SODIUM SUCCINATE 50 MG: 100 INJECTION, POWDER, FOR SOLUTION INTRAMUSCULAR; INTRAVENOUS at 05:52

## 2022-02-26 RX ADMIN — GABAPENTIN 1200 MG: 300 CAPSULE ORAL at 08:41

## 2022-02-26 RX ADMIN — HYDROCORTISONE 15 MG: 10 TABLET ORAL at 14:14

## 2022-02-26 RX ADMIN — HYDROMORPHONE HYDROCHLORIDE 4 MG: 2 TABLET ORAL at 12:04

## 2022-02-26 RX ADMIN — HYDROXYZINE HYDROCHLORIDE 10 MG: 10 TABLET ORAL at 09:55

## 2022-02-26 RX ADMIN — OXYCODONE HYDROCHLORIDE 10 MG: 5 TABLET ORAL at 05:55

## 2022-02-26 RX ADMIN — ACETAMINOPHEN 975 MG: 325 TABLET, FILM COATED ORAL at 09:54

## 2022-02-26 RX ADMIN — HYDROMORPHONE HYDROCHLORIDE 0.2 MG: 0.2 INJECTION, SOLUTION INTRAMUSCULAR; INTRAVENOUS; SUBCUTANEOUS at 04:43

## 2022-02-26 RX ADMIN — HYDROXYZINE HYDROCHLORIDE 10 MG: 10 TABLET ORAL at 04:01

## 2022-02-26 RX ADMIN — TESTOSTERONE 30 MG: 30 SOLUTION TOPICAL at 08:44

## 2022-02-26 NOTE — PROVIDER NOTIFICATION
Spoke to Ortho PA again.  She stated we will change the prn oxycodone to PRN PO Dilaudid 2mg to 4mg every 3 hours.  And she gave the ok to give to him between 11am and noon.

## 2022-02-26 NOTE — PLAN OF CARE
Occupational Therapy Discharge Summary    Reason for therapy discharge:    All goals and outcomes met, no further needs identified.    Progress towards therapy goal(s). See goals on Care Plan in [facility] electronic health record for goal details.  Goals met    Therapy recommendation(s):    Continued therapy is recommended.  Rationale/Recommendations:  Plan for pt to continue w/ OP PT upon discharge, no additional OT needs at this time will have supervision for 2 weeks after discharge to assist with socks until pts pain is better controlled as needed .  Continue home exercise program.

## 2022-02-26 NOTE — PROGRESS NOTES
"   02/26/22 0800   Quick Adds   Type of Visit Initial Occupational Therapy Evaluation   Living Environment   People in Home alone   Current Living Arrangements house   Home Accessibility stairs to enter home;stairs within home   Number of Stairs, Main Entrance 2   Stair Railings, Main Entrance none   Number of Stairs, Within Home, Primary two   Stair Railings, Within Home, Primary none   Transportation Anticipated family or friend will provide   Living Environment Comments Pt lives alone in a house, 2 stairs to enter and 2 inside. His adult son will be living w/ him for a week after discharge. Son is able to provide assist. Pt was driving prior to surgery   Self-Care   Usual Activity Tolerance good   Current Activity Tolerance fair   Equipment Currently Used at Home none   Fall history within last six months no   Activity/Exercise/Self-Care Comment Pt IND at baseline, does not use an AD. Pt owns a cane, FWW, and crutches. Walk in shower, no grab bars in bathroom or shower. Pt will have somone staying with him for 2 weeks upon discharge   Instrumental Activities of Daily Living (IADL)   IADL Comments Pt does all IADL tasks IND at baseline, has chronic pain but manages his own medications, drives and completes all home mgmt tasks w/o difficulty. Pt is very active with fishing and traveling, just went to [facility] in january and has an upcoming trip to [place].    General Information   Onset of Illness/Injury or Date of Surgery 02/25/22   Referring Physician Efrem Mullins PA-C   Patient/Family Therapy Goal Statement (OT) \"to get this pain under control\"    Additional Occupational Profile Info/Pertinent History of Current Problem Per chart: \"Jose Carlos Escamilla is a 72 year old male with a past medical history of fibromyalgia and chronic pain, hypertension, GERD, adrenal insufficiency, and OA of right knee who underwent previously scheduled right TKA on 2/25/22. Hospitalist was consulted postoperatively for " "assistance with management of adrenal insufficiency.\" OT consulted for ADL deficits and discharge planning.    Existing Precautions/Restrictions fall  (0-flexion as shantelle in R knee )   Limitations/Impairments sensory  (chronic pain)   Right Lower Extremity (Weight-bearing Status) weight-bearing as tolerated (WBAT)   Cognitive Status Examination   Orientation Status orientation to person, place and time   Affect/Mental Status (Cognitive) anxious   Cognitive Status Comments WFL - Pt has hx of addisons Disease which increases flare ups of pain w/ anxiousness, during session pt had to take a 1-2 minutes to take deep breaths and relax prior to continuing task    Visual Perception   Impact of Vision Impairment on Function (Vision) glasses   Sensory   Sensory Comments Pt was having significant numbness in LE yesterday and had an 'episode' which pt states he felt like he 'didnt' have a lower half of his body', reports this has resolved at this time.    Pain Assessment   Patient Currently in Pain Yes, see Vital Sign flowsheet  (see RN documentation, pain medication administered (8/10))   Integumentary/Edema   Integumentary/Edema Comments Pt reported brief numbness in RLE after sitting EOB, therapist adjusted ACE bandage wrapping to losen d/t numbness in toes.    Posture   Posture forward head position   Range of Motion Comprehensive   Comment, General Range of Motion RLE limited by surigical pain, difficulty w/ full extension of R knee (pillow placed under R heel once in bedside chair).    Strength Comprehensive (MMT)   Comment, General Manual Muscle Testing (MMT) Assessment UE strength intact and WFL   Coordination   Coordination Comments WFL, no concerns   Bed Mobility   Comment (Bed Mobility) SBA, pt supine (bed in flat position) to sit EOB w/o assistance.    Transfers   Transfer Comments CGA-SBA; variable level of contact assistance provided d/t pts pain tolerance and previous numbness, pt able to complete STS from various " heights throughout session   Clinical Impression   Criteria for Skilled Therapeutic Interventions Met (OT) Yes, treatment indicated   OT Diagnosis impaired ADL/IADL limited by pain   Influenced by the following impairments chronic and acute pain    OT Problem List-Impairments impacting ADL problems related to;activity tolerance impaired;balance;range of motion (ROM);sensation;strength;pain;post-surgical precautions   Assessment of Occupational Performance 3-5 Performance Deficits   Identified Performance Deficits LB dsg, bathing, functional mobility, home mgmt, driving    Planned Therapy Interventions (OT) ADL retraining;ROM;strengthening;transfer training;home program guidelines;risk factor education   Clinical Decision Making Complexity (OT) low complexity   Risk & Benefits of therapy have been explained care plan/treatment goals reviewed;evaluation/treatment results reviewed;risks/benefits reviewed;current/potential barriers reviewed;participants included;participants voiced agreement with care plan;patient   OT Discharge Planning   OT Discharge Recommendation (DC Rec)   (defer to ortho)   OT Rationale for DC Rec Pt is expected to be Set up A- Min A for dsg, Min A required for donning/doffing socks, pt able to don underwear and socks. Pt will have son and/or dtr to stay with pt for first 2 weeks. Recommend SBA for initial shower transfer into walk in shower, SBA for mobility, and defer to PT for stair recommendations.    OT Brief overview of current status Min A for socks, CGA STS trs, SBA amb w/ FWW   Total Evaluation Time (Minutes)   Total Evaluation Time (Minutes) 8   OT Goals   Therapy Frequency (OT) One time eval and treatment   OT Predicated Duration/Target Date for Goal Attainment 02/26/22   OT Goals Hygiene/Grooming;Lower Body Dressing;Toilet Transfer/Toileting   OT: Hygiene/Grooming independent;Goal Met   OT: Lower Body Dressing Minimal assist;Supervision/stand-by assist;Goal Met   OT: Toilet  Transfer/Toileting Supervision/stand-by assist;Modified independent;Goal Met

## 2022-02-26 NOTE — PLAN OF CARE
A&OX4.  Voided adequately.  Taking PO dilaudid for pain.  Discharge packet and medications reviewed with the patient.  Patient discharging at 3:25pm

## 2022-02-26 NOTE — PROVIDER NOTIFICATION
Paged hospitalist to inform that the Pt is arguing about needing smaller dose of hydrocortisone now, so he can get back to his normal dosing and wants to talk to you about it agian.     Spoke to hospiatlist and he said he will put order in now.

## 2022-02-26 NOTE — PROVIDER NOTIFICATION
Spoke to ortho PA to inform that the patient stated oxycodone would be more effective if taken sooner.  She gave the ok to give every 3 hours while in hospital.

## 2022-02-26 NOTE — PROGRESS NOTES
"[facility]    Medicine Progress Note - Hospitalist Service       Date of Admission: 2022      PROGRESS NOTE    SUBJECTIVE  Pt new to me. Chart rev in detail.   Pt having multiple complaints.   1. Wants more pain meds.   2. Feels like his adrenal insufficiency is acting up, When asked further about his symptoms, it is all about his pain. No other concerns. Stable BP/ HR.     Remaining ROS: Neg    OBJECTIVE  Vital Signs with Ranges  Temp:  [97  F (36.1  C)-100.2  F (37.9  C)] 100.2  F (37.9  C)  Pulse:  [71-90] 72  Resp:  [9-26] 17  BP: (116-155)/(68-97) 147/82  SpO2:  [93 %-98 %] 98 %  I/O last 3 completed shifts:  In: 1800 [P.O.:500; I.V.:1300]  Out: 780 [Urine:775; Blood:5]  BP (!) 147/82 (BP Location: Right arm)   Pulse 72   Temp 100.2  F (37.9  C) (Oral)   Resp 17   Ht 1.854 m (6' 1\")   Wt 109.9 kg (242 lb 3.2 oz)   SpO2 98%   BMI 31.95 kg/m    Wt Readings from Last 2 Encounters:   22 109.9 kg (242 lb 3.2 oz)   22 110 kg (242 lb 9.6 oz)   Temp (24hrs), Av.3  F (36.8  C), Min:97  F (36.1  C), Max:100.2  F (37.9  C)  Body mass index is 31.95 kg/m .    PHYSICAL EXAM:  GA: Pt is alert and oriented x3, NAD  HEENT: AT/NC, EOMI, PERRLA, Sclera non-icteric, MMM   NECK: supple, No LAD, no thyromegaly  RS: CTAB, good air movement, decreased BS at bases.   CVS: RRR, Nml S1/S2, no m/g/r, pulses 2+ B symmetrical, no edema,   GI: ND/NT, soft, BS + throughout, no masses or HSM  EXTR: Moves all extremities, no clubbing, cyanosis, or edema    PROCEDURES:  R TKA     ASSESSMENT: Jose Carlos Escamilla is a 72 year old male with h/o fibromyalgia and chronic pain, hypertension, GERD, adrenal insufficiency, and OA of right knee who underwent previously scheduled right TKA on 22.     PLAN:  1. S/p R TKA: per ortho. Defer to ortho about 1. Pain  Control completely, 2. DVT ppx, 3. Wound cares, 4. pulmonnary toilet, 5. Bowel regimen.   2. Adrenal insuff: Baseline Hydrocortisone 15 - " 5. Got high dose yesterday. Got extra dose of hydrocortisone 50 mg earlier today. Will restart hoome regimen.   3. HTN: Hold anti HTN while admitted, restart at dc.   4. GERD: cont PPI  5. Code status: Full Code.   6. Dispo: clinically stable to dc. Defer to ortho completely about discharge. No clinical need for medicine to follow, will sign off.       Disposition Plan   Clinically stable to dc home.      The patient's care was discussed with the Bedside Nurse.    Total Time: Spent  min taking care of patient with more than 50% of time spent coordinating care     Zaire Quinn MD  Hospitalist Service  [facility]  Securely message with the [employer] Web Console (learn more here)  Text page via [employer] Paging/Directory

## 2022-02-26 NOTE — PROGRESS NOTES
"Jose Carlos Escamilla  2022  POD # 1 sp tka    Admit Date:  2022      Doing well.  Normal healing wound.  No immediate surgical complications identified.  No excessive bleeding  Pain well-controlled.  Tolerating physical therapy and rehabilitation well.  Objective:  Blood pressure 125/75, pulse 75, temperature 100.2  F (37.9  C), temperature source Oral, resp. rate 16, height 1.854 m (6' 1\"), weight 109.9 kg (242 lb 3.2 oz), SpO2 95 %.    Temperatures:  Current - Temp: 100.2  F (37.9  C); Max - Temp  Av.1  F (36.7  C)  Min: 97  F (36.1  C)  Max: 100.2  F (37.9  C)  Pulse range: Pulse  Av.7  Min: 71  Max: 101  Blood pressure range: Systolic (24hrs), Av , Min:108 , Max:155   ; Diastolic (24hrs), Av, Min:61, Max:97    Exam:  CMS: intact  alert, stable, wound ok  Calf nontender b le.       Labs:  Recent Labs   Lab Test 22  0808 07/15/21  1107 21  0937   POTASSIUM 3.7 3.7 3.6     Recent Labs   Lab Test 22  0000 07/15/21  1107 21  0937   HGB 15.3 14.9 15.4     No results for input(s): INR in the last 82523 hours.  Recent Labs   Lab Test 22  0000 07/15/21  1107 21  0937    270 239       PLAN: Weight bearing as tolerated  Continue physical therapy  Pain control measures  Additional problems to be followed by medicine physician  Plan dischargeto home today.       "

## 2022-02-26 NOTE — PROVIDER NOTIFICATION
Paged on call ortho to inform that patient is still in 9/10 pain.  She said she will not change anything at the moment but will call Dr. Page to inform him.  Plan is to call her back if we don't get call back from Kaylee.

## 2022-02-26 NOTE — PLAN OF CARE
A&OX4.  Up with assist of 1 and walker to stand at edge of bed.  Voided X1 in urinal.  Tolerated regular diet.  PRN oxycodone started for pain.  Progressing per plan of care.

## 2022-02-26 NOTE — PLAN OF CARE
Patient vital signs are at baseline: Yes  Patient able to ambulate as they were prior to admission or with assist devices provided by therapies during their stay:  Yes  Patient MUST void prior to discharge:  Yes  Patient able to tolerate oral intake:  Yes  Pain has adequate pain control using Oral analgesics:  Scheduled tylenol and PRN oxy and atarax.    Alert & oriented x4. VSS on RA. LS clear. CMS intact. Dressing CDI. Denies N/V. Up with A-1 using gait belt and walker BR.Voiding adequately. IV infusing.

## 2022-02-26 NOTE — PLAN OF CARE
Physical Therapy Discharge Summary    Reason for therapy discharge:    Discharged to home with outpatient therapy.    Progress towards therapy goal(s). See goals on Care Plan in [facility] electronic health record for goal details.  Goals not met.  Barriers to achieving goals:   discharge from facility.    Therapy recommendation(s):    Continued therapy is recommended.  Rationale/Recommendations:  Outpatient PT per ortho/MD/PA protocol.

## 2022-02-26 NOTE — PROVIDER NOTIFICATION
Patient was concerned about adrenal insufficiency occurring now stating he felt different slight nausea and tingly skin.  Hospitalist was in the room when he stated this.  Per hospitalist there is no concern at this time and to notify him if vital signs change specifically Systolic Blood pressure of 90 or below.

## 2022-03-14 ENCOUNTER — TRANSFERRED RECORDS (OUTPATIENT)
Dept: FAMILY MEDICINE | Facility: CLINIC | Age: 72
End: 2022-03-14

## 2022-03-18 DIAGNOSIS — M35.3 PMR (POLYMYALGIA RHEUMATICA) (H): ICD-10-CM

## 2022-03-18 RX ORDER — TRAZODONE HYDROCHLORIDE 50 MG/1
TABLET, FILM COATED ORAL
Qty: 180 TABLET | Refills: 3 | Status: SHIPPED | OUTPATIENT
Start: 2022-03-18 | End: 2023-02-05

## 2022-04-10 DIAGNOSIS — N52.9 ERECTILE DYSFUNCTION, UNSPECIFIED ERECTILE DYSFUNCTION TYPE: ICD-10-CM

## 2022-04-10 RX ORDER — TADALAFIL 20 MG/1
TABLET ORAL
Qty: 10 TABLET | Refills: 0 | Status: SHIPPED | OUTPATIENT
Start: 2022-04-10 | End: 2022-06-20

## 2022-04-22 ENCOUNTER — TRANSFERRED RECORDS (OUTPATIENT)
Dept: FAMILY MEDICINE | Facility: CLINIC | Age: 72
End: 2022-04-22

## 2022-05-23 ENCOUNTER — TRANSFERRED RECORDS (OUTPATIENT)
Dept: FAMILY MEDICINE | Facility: CLINIC | Age: 72
End: 2022-05-23

## 2022-05-27 ENCOUNTER — OFFICE VISIT (OUTPATIENT)
Dept: FAMILY MEDICINE | Facility: CLINIC | Age: 72
End: 2022-05-27

## 2022-05-27 VITALS
HEART RATE: 87 BPM | BODY MASS INDEX: 32.06 KG/M2 | SYSTOLIC BLOOD PRESSURE: 120 MMHG | OXYGEN SATURATION: 94 % | DIASTOLIC BLOOD PRESSURE: 80 MMHG | WEIGHT: 243 LBS

## 2022-05-27 DIAGNOSIS — G89.29 CHRONIC NONINTRACTABLE HEADACHE, UNSPECIFIED HEADACHE TYPE: ICD-10-CM

## 2022-05-27 DIAGNOSIS — R51.9 CHRONIC NONINTRACTABLE HEADACHE, UNSPECIFIED HEADACHE TYPE: ICD-10-CM

## 2022-05-27 DIAGNOSIS — L98.9 SKIN LESION: Primary | ICD-10-CM

## 2022-05-27 DIAGNOSIS — G89.4 CHRONIC PAIN SYNDROME: ICD-10-CM

## 2022-05-27 PROCEDURE — 99214 OFFICE O/P EST MOD 30 MIN: CPT | Performed by: FAMILY MEDICINE

## 2022-05-27 RX ORDER — HYDROCODONE BITARTRATE AND ACETAMINOPHEN 10; 325 MG/1; MG/1
TABLET ORAL
COMMUNITY
Start: 2022-05-24

## 2022-05-27 RX ORDER — HYDROCORTISONE 5 MG/1
20 TABLET ORAL DAILY
COMMUNITY
Start: 2021-12-02

## 2022-05-27 NOTE — PROGRESS NOTES
Pola Branch is a 72 year old patient who presents to clinic for evaluation.  Has noted a small sore area on left mid nose for a few months.  Has not resolved.  Does not feel much or see much there.  No bleeding    Also with more persistent tension-type headache for a couple months.  He does take norco for his chronic pain but has increased ibuprofen use for this headache.  They wax and wane.  No neuro changes.  No aura.  He has not been taking amitriptyline lately for his pain.  No vision changes.  No other concerns.        Review of Systems   Constitutional, HEENT, cardiovascular, pulmonary, gi and gu systems are negative, except as otherwise noted.      Objective    /80   Pulse 87   Wt 110.2 kg (243 lb)   SpO2 94%   BMI 32.06 kg/m      General: Well appearing, NAD  Skin: nose with area of slight increased erythema and slight ulceration/depression in area of concern.  Psych: normal mood and affect    Skin lesion  Not overwhelming in appearance but given duration of symptoms recommend shave bx    Chronic nonintractable headache, unspecified headache type  Possible MOH, recommend taper off ibuprofen and restart amitriptyline, reassess at follow up, sooner if needed    Chronic pain syndrome  - amitriptyline (ELAVIL) 25 MG tablet; Take 1 tablet (25 mg) by mouth At Bedtime

## 2022-06-14 DIAGNOSIS — I10 ESSENTIAL HYPERTENSION: ICD-10-CM

## 2022-06-14 RX ORDER — CHLORTHALIDONE 25 MG/1
TABLET ORAL
Qty: 90 TABLET | Refills: 1 | Status: SHIPPED | OUTPATIENT
Start: 2022-06-14 | End: 2022-12-12

## 2022-06-14 NOTE — TELEPHONE ENCOUNTER
Chlorthalidone   LOV 9/13/21  Benign essential hypertension  At goal on current meds  --cont lisinopril and CTD  BP Readings from Last 3 Encounters:   05/27/22 120/80   02/26/22 (!) 146/85   02/18/22 122/86

## 2022-06-20 ENCOUNTER — TRANSFERRED RECORDS (OUTPATIENT)
Dept: FAMILY MEDICINE | Facility: CLINIC | Age: 72
End: 2022-06-20

## 2022-06-20 DIAGNOSIS — N52.9 ERECTILE DYSFUNCTION, UNSPECIFIED ERECTILE DYSFUNCTION TYPE: ICD-10-CM

## 2022-06-20 RX ORDER — TADALAFIL 20 MG/1
TABLET ORAL
Qty: 10 TABLET | Refills: 3 | Status: SHIPPED | OUTPATIENT
Start: 2022-06-20 | End: 2022-09-30

## 2022-06-20 NOTE — TELEPHONE ENCOUNTER
Tadalafil (Cialis). Last addressed 6/15/2020. LOV 5/27/22.    Erectile dysfunction, unspecified erectile dysfunction type  Discussed erectile dysfunction in detail including a review of the physiology that produces erections.  Reviewed typical causes of impotence such as medication side effect, diabetes, neuropathies, drugs/alcohol, underlying mood disorder, relationship issues, neurogenic causes, hypogonadism.  Discussed typical medical evaluation including physical exam findings, labs (thyroid, testosterone, BMP, etc.)  Treatment will include modifying any causative features if able, stopping nay substance use, treating low testosterone levels, and consideration of medications (Viagra/Levitra/Cialis).

## 2022-07-20 ENCOUNTER — TRANSFERRED RECORDS (OUTPATIENT)
Dept: FAMILY MEDICINE | Facility: CLINIC | Age: 72
End: 2022-07-20

## 2022-08-22 ENCOUNTER — TRANSFERRED RECORDS (OUTPATIENT)
Dept: FAMILY MEDICINE | Facility: CLINIC | Age: 72
End: 2022-08-22

## 2022-08-22 DIAGNOSIS — I10 BENIGN ESSENTIAL HYPERTENSION: ICD-10-CM

## 2022-08-22 RX ORDER — LISINOPRIL 20 MG/1
TABLET ORAL
Qty: 90 TABLET | Refills: 3 | Status: SHIPPED | OUTPATIENT
Start: 2022-08-22 | End: 2023-08-04

## 2022-09-21 ENCOUNTER — TRANSFERRED RECORDS (OUTPATIENT)
Dept: FAMILY MEDICINE | Facility: CLINIC | Age: 72
End: 2022-09-21

## 2022-09-30 DIAGNOSIS — N52.9 ERECTILE DYSFUNCTION, UNSPECIFIED ERECTILE DYSFUNCTION TYPE: ICD-10-CM

## 2022-09-30 RX ORDER — TADALAFIL 20 MG/1
TABLET ORAL
Qty: 10 TABLET | Refills: 0 | Status: SHIPPED | OUTPATIENT
Start: 2022-09-30 | End: 2022-10-16

## 2022-10-13 DIAGNOSIS — N52.9 ERECTILE DYSFUNCTION, UNSPECIFIED ERECTILE DYSFUNCTION TYPE: ICD-10-CM

## 2022-10-16 RX ORDER — TADALAFIL 20 MG/1
20 TABLET ORAL DAILY PRN
Qty: 10 TABLET | Refills: 0 | Status: SHIPPED | OUTPATIENT
Start: 2022-10-16 | End: 2022-10-24

## 2022-10-20 ENCOUNTER — TRANSFERRED RECORDS (OUTPATIENT)
Dept: FAMILY MEDICINE | Facility: CLINIC | Age: 72
End: 2022-10-20

## 2022-10-24 DIAGNOSIS — N52.9 ERECTILE DYSFUNCTION, UNSPECIFIED ERECTILE DYSFUNCTION TYPE: ICD-10-CM

## 2022-10-24 RX ORDER — TADALAFIL 20 MG/1
TABLET ORAL
Qty: 10 TABLET | Refills: 0 | Status: SHIPPED | OUTPATIENT
Start: 2022-10-24 | End: 2022-12-21

## 2022-10-27 ENCOUNTER — TRANSFERRED RECORDS (OUTPATIENT)
Dept: FAMILY MEDICINE | Facility: CLINIC | Age: 72
End: 2022-10-27

## 2022-10-30 ENCOUNTER — HEALTH MAINTENANCE LETTER (OUTPATIENT)
Age: 72
End: 2022-10-30

## 2022-11-09 ENCOUNTER — OFFICE VISIT (OUTPATIENT)
Dept: OPHTHALMOLOGY | Facility: CLINIC | Age: 72
End: 2022-11-09
Attending: OPHTHALMOLOGY
Payer: MEDICARE

## 2022-11-09 DIAGNOSIS — H52.13 MYOPIA OF BOTH EYES: ICD-10-CM

## 2022-11-09 DIAGNOSIS — H25.13 NUCLEAR SCLEROSIS OF BOTH EYES: ICD-10-CM

## 2022-11-09 DIAGNOSIS — H04.123 DRY EYE SYNDROME OF BOTH EYES: Primary | ICD-10-CM

## 2022-11-09 PROCEDURE — G0463 HOSPITAL OUTPT CLINIC VISIT: HCPCS

## 2022-11-09 PROCEDURE — 92004 COMPRE OPH EXAM NEW PT 1/>: CPT | Performed by: OPHTHALMOLOGY

## 2022-11-09 PROCEDURE — 92015 DETERMINE REFRACTIVE STATE: CPT | Mod: GY

## 2022-11-09 ASSESSMENT — VISUAL ACUITY
OD_CC: 20/20
METHOD: SNELLEN - LINEAR
CORRECTION_TYPE: GLASSES
OS_CC: 20/25

## 2022-11-09 ASSESSMENT — SLIT LAMP EXAM - LIDS
COMMENTS: NORMAL
COMMENTS: NORMAL

## 2022-11-09 ASSESSMENT — REFRACTION_MANIFEST
OS_CYLINDER: +1.25
OS_SPHERE: -0.75
OS_ADD: +3.00
OD_AXIS: 100
OD_ADD: +3.00
OD_SPHERE: PLANO
OS_AXIS: 095
OD_CYLINDER: +0.50

## 2022-11-09 ASSESSMENT — CONF VISUAL FIELD
OD_INFERIOR_TEMPORAL_RESTRICTION: 0
OD_SUPERIOR_TEMPORAL_RESTRICTION: 0
OS_SUPERIOR_NASAL_RESTRICTION: 0
OD_NORMAL: 1
OS_INFERIOR_NASAL_RESTRICTION: 0
OD_INFERIOR_NASAL_RESTRICTION: 0
OD_SUPERIOR_NASAL_RESTRICTION: 0
OS_SUPERIOR_TEMPORAL_RESTRICTION: 0
OS_INFERIOR_TEMPORAL_RESTRICTION: 0
OS_NORMAL: 1
METHOD: COUNTING FINGERS

## 2022-11-09 ASSESSMENT — REFRACTION_WEARINGRX
OD_ADD: +2.75
OS_CYLINDER: +1.25
OD_SPHERE: PLANO
OD_AXIS: 105
OD_CYLINDER: +0.50
OS_SPHERE: -0.75
OS_AXIS: 065
SPECS_TYPE: PAL
OS_ADD: +2.75

## 2022-11-09 ASSESSMENT — TONOMETRY
OS_IOP_MMHG: 16
IOP_METHOD: ICARE
OD_IOP_MMHG: 19

## 2022-11-09 ASSESSMENT — CUP TO DISC RATIO
OS_RATIO: 0.25
OD_RATIO: 0.25

## 2022-11-09 ASSESSMENT — EXTERNAL EXAM - LEFT EYE: OS_EXAM: NORMAL

## 2022-11-09 ASSESSMENT — EXTERNAL EXAM - RIGHT EYE: OD_EXAM: NORMAL

## 2022-11-09 NOTE — NURSING NOTE
Chief Complaints and History of Present Illnesses   Patient presents with     COMPREHENSIVE EYE EXAM     Chief Complaint(s) and History of Present Illness(es)     COMPREHENSIVE EYE EXAM            Associated symptoms: dryness, tearing and headache.  Negative for eye pain, redness, flashes and floaters    Treatments tried: warm compresses    Pain scale: 0/10          Comments    Pt here today for annual routine eye exam.  DEB was here 4/1/2021 - no issues at last exam.  Pt states vision is stable - eyes are sticky in the morning.  Has gunky sensation in both eyes.    Ocular meds = none    Kody MAX, ERIC 9:35 AM 11/09/2022

## 2022-11-09 NOTE — PROGRESS NOTES
Chief complaint   Routine eyeexam  Referring Provider:  Referring Provider: Referred Self    HPI    Jose Carlos Escamilla 72 year old male     Chief Complaint(s) and History of Present Illness(es)     COMPREHENSIVE EYE EXAM    Associated symptoms include dryness, tearing and headache.  Negative for eye pain, redness, flashes and floaters.  Treatments tried include warm compresses.  Pain was noted as 0/10.           Comments    Pt here today for annual routine eye exam.  DEB was here 4/1/2021 - no issues at last exam.  Pt states vision is stable - eyes are sticky in the morning.  Has gunky sensation in both eyes.    Ocular meds = none    Kody MAX, ERIC 9:35 AM 11/09/2022                          Past ocular history   Prior eye surgery/laser/Trauma: No  CTL wearer:No  Glasses : prog  Family Hx of eye disease: No    PMH     Past Medical History:   Diagnosis Date     ACP (advance care planning)      Adrenal insufficiency (H)      Chronic pain syndrome      Chronic rhinitis      Diverticulosis      Esophageal stenosis      Hypercholesterolemia      IBS (irritable bowel syndrome)      Meckel's diverticulitis 7/13/2021    Added automatically from request for surgery [id number]     Narcotic dependence (H)        PSH     Past Surgical History:   Procedure Laterality Date     ARTHROPLASTY KNEE Left 12/2/2016    Procedure: ARTHROPLASTY KNEE;  Surgeon: Marisa Page MD;  Location:  OR     ARTHROPLASTY KNEE Right 2/25/2022    Procedure: RIGHT KNEE ARTHROPLASTY;  Surgeon: Marisa Page MD;  Location:  OR     DISCECTOMY CERVICAL MINIMALLY INVASIVE ONE LEVEL       LAPAROSCOPIC APPENDECTOMY N/A 7/14/2021    Procedure: Laparoscopic appendectomy;  Surgeon: Kelsi Parker MD;  Location:  OR     LAPAROSCOPY DIAGNOSTIC (GENERAL) N/A 7/14/2021    Procedure: Diagnostic laparoscopy, laparoscopic appendectomy, laparoscopic meckel excision;  Surgeon: Kelsi Parker MD;  Location:  OR     NECK SURGERY  2001     [facility]     Current Outpatient Medications   Medication     amitriptyline (ELAVIL) 25 MG tablet     aspirin (ASA) 325 MG EC tablet     chlorthalidone (HYGROTON) 25 MG tablet     dexamethasone (DECADRON) 4 MG/ML injection     diclofenac (VOLTAREN) 1 % GEL topical gel     gabapentin (NEURONTIN) 300 MG capsule     HYDROcodone-acetaminophen (NORCO)  MG per tablet     hydrocortisone (CORTEF) 5 MG tablet     hydrocortisone (CORTEF) 5 MG tablet     hydrOXYzine (ATARAX) 10 MG tablet     lisinopril (ZESTRIL) 20 MG tablet     omeprazole (PRILOSEC) 20 MG DR capsule     ondansetron (ZOFRAN-ODT) 4 MG ODT tab     senna-docusate (SENOKOT-S/PERICOLACE) 8.6-50 MG tablet     senna-docusate (SENOKOT-S/PERICOLACE) 8.6-50 MG tablet     tadalafil (CIALIS) 20 MG tablet     testosterone (AXIRON) 30 MG/ACT topical solution     traZODone (DESYREL) 50 MG tablet     No current facility-administered medications for this visit.       Drops Currently Taking   None    Assessment/Plan     # Nuclear sclerosis, each eye  # Myopia with astigmatism  - Doing well  - Not visually significant and No symptoms from NS  Plan  - Monitor cataract  - Dispense MRx   - RTC 1 year    # MICKI, each eye  - Rec ATs 4x/day    Follow up:  Return in about 1 year (around 11/9/2023) for Annual Visit. VTMrxD    Attending Physician Attestation:  Complete documentation of historical and exam elements from today's encounter can be found in the full encounter summary report (not reduplicated in this progress note).  I personally obtained the chief complaint(s) and history of present illness.  I confirmed and edited as necessary the review of systems, past medical/surgical history, family history, social history, and examination findings as documented by others; and I examined the patient myself.  I personally reviewed the relevant tests, images, and reports as documented above.  I formulated and edited as necessary the assessment and plan and discussed the  findings and management plan with the patient and family. - Jaimee Gunderson MD

## 2022-11-17 ENCOUNTER — TRANSFERRED RECORDS (OUTPATIENT)
Dept: FAMILY MEDICINE | Facility: CLINIC | Age: 72
End: 2022-11-17

## 2022-12-11 DIAGNOSIS — I10 ESSENTIAL HYPERTENSION: ICD-10-CM

## 2022-12-12 RX ORDER — CHLORTHALIDONE 25 MG/1
TABLET ORAL
Qty: 90 TABLET | Refills: 1 | Status: SHIPPED | OUTPATIENT
Start: 2022-12-12 | End: 2023-07-20

## 2022-12-12 NOTE — TELEPHONE ENCOUNTER
Chlorthalidone   LOV 5/27/22  Last addressed 8/3/21  Essential hypertension  At goal  Last labs 2/26/22  BP Readings from Last 3 Encounters:   05/27/22 120/80   02/26/22 (!) 146/85   02/18/22 122/86

## 2022-12-15 ENCOUNTER — TRANSFERRED RECORDS (OUTPATIENT)
Dept: FAMILY MEDICINE | Facility: CLINIC | Age: 72
End: 2022-12-15

## 2022-12-21 DIAGNOSIS — N52.9 ERECTILE DYSFUNCTION, UNSPECIFIED ERECTILE DYSFUNCTION TYPE: ICD-10-CM

## 2022-12-21 RX ORDER — TADALAFIL 20 MG/1
TABLET ORAL
Qty: 10 TABLET | Refills: 0 | Status: SHIPPED | OUTPATIENT
Start: 2022-12-21 | End: 2023-02-14

## 2023-02-05 DIAGNOSIS — M35.3 PMR (POLYMYALGIA RHEUMATICA) (H): ICD-10-CM

## 2023-02-05 RX ORDER — TRAZODONE HYDROCHLORIDE 50 MG/1
TABLET, FILM COATED ORAL
Qty: 180 TABLET | Refills: 3 | Status: SHIPPED | OUTPATIENT
Start: 2023-02-05 | End: 2023-10-31

## 2023-02-10 DIAGNOSIS — N52.9 ERECTILE DYSFUNCTION, UNSPECIFIED ERECTILE DYSFUNCTION TYPE: ICD-10-CM

## 2023-02-14 RX ORDER — TADALAFIL 20 MG/1
TABLET ORAL
Qty: 10 TABLET | Refills: 0 | Status: SHIPPED | OUTPATIENT
Start: 2023-02-14 | End: 2023-03-06

## 2023-02-22 ENCOUNTER — TRANSFERRED RECORDS (OUTPATIENT)
Dept: FAMILY MEDICINE | Facility: CLINIC | Age: 73
End: 2023-02-22

## 2023-03-06 DIAGNOSIS — N52.9 ERECTILE DYSFUNCTION, UNSPECIFIED ERECTILE DYSFUNCTION TYPE: ICD-10-CM

## 2023-03-07 RX ORDER — TADALAFIL 20 MG/1
20 TABLET ORAL DAILY PRN
Qty: 30 TABLET | Refills: 1 | Status: SHIPPED | OUTPATIENT
Start: 2023-03-07 | End: 2023-07-05

## 2023-03-22 ENCOUNTER — TRANSFERRED RECORDS (OUTPATIENT)
Dept: FAMILY MEDICINE | Facility: CLINIC | Age: 73
End: 2023-03-22

## 2023-03-23 ENCOUNTER — OFFICE VISIT (OUTPATIENT)
Dept: FAMILY MEDICINE | Facility: CLINIC | Age: 73
End: 2023-03-23

## 2023-03-23 VITALS
BODY MASS INDEX: 33.32 KG/M2 | HEIGHT: 72 IN | SYSTOLIC BLOOD PRESSURE: 116 MMHG | WEIGHT: 246 LBS | HEART RATE: 82 BPM | OXYGEN SATURATION: 96 % | DIASTOLIC BLOOD PRESSURE: 82 MMHG

## 2023-03-23 DIAGNOSIS — F41.9 ANXIETY: Primary | ICD-10-CM

## 2023-03-23 DIAGNOSIS — E27.40 ADRENAL INSUFFICIENCY (H): ICD-10-CM

## 2023-03-23 DIAGNOSIS — F11.20 NARCOTIC DEPENDENCE (H): ICD-10-CM

## 2023-03-23 PROCEDURE — 99214 OFFICE O/P EST MOD 30 MIN: CPT | Performed by: NURSE PRACTITIONER

## 2023-03-23 RX ORDER — BUSPIRONE HYDROCHLORIDE 10 MG/1
10 TABLET ORAL 2 TIMES DAILY
Qty: 60 TABLET | Refills: 1 | Status: SHIPPED | OUTPATIENT
Start: 2023-03-23 | End: 2023-05-01

## 2023-03-23 RX ORDER — AMITRIPTYLINE HYDROCHLORIDE 50 MG/1
1 TABLET ORAL AT BEDTIME
COMMUNITY
Start: 2022-10-31 | End: 2023-05-01

## 2023-03-23 ASSESSMENT — ANXIETY QUESTIONNAIRES
6. BECOMING EASILY ANNOYED OR IRRITABLE: MORE THAN HALF THE DAYS
GAD7 TOTAL SCORE: 13
IF YOU CHECKED OFF ANY PROBLEMS ON THIS QUESTIONNAIRE, HOW DIFFICULT HAVE THESE PROBLEMS MADE IT FOR YOU TO DO YOUR WORK, TAKE CARE OF THINGS AT HOME, OR GET ALONG WITH OTHER PEOPLE: VERY DIFFICULT
5. BEING SO RESTLESS THAT IT IS HARD TO SIT STILL: SEVERAL DAYS
4. TROUBLE RELAXING: MORE THAN HALF THE DAYS
2. NOT BEING ABLE TO STOP OR CONTROL WORRYING: NEARLY EVERY DAY
7. FEELING AFRAID AS IF SOMETHING AWFUL MIGHT HAPPEN: SEVERAL DAYS
1. FEELING NERVOUS, ANXIOUS, OR ON EDGE: NEARLY EVERY DAY
GAD7 TOTAL SCORE: 13
3. WORRYING TOO MUCH ABOUT DIFFERENT THINGS: SEVERAL DAYS

## 2023-03-23 ASSESSMENT — PATIENT HEALTH QUESTIONNAIRE - PHQ9: SUM OF ALL RESPONSES TO PHQ QUESTIONS 1-9: 6

## 2023-03-23 NOTE — PROGRESS NOTES
"Problem(s) Oriented visit        SUBJECTIVE:                                                    Jose Carlos Escamilla is a 73 year old male who presents to clinic today for the following health issues :    Under a lot of stress. His daughter has bipolar disorder and moved in with patient recently. She was living in [place] before. Her labile moods are hard on him. He has started seeing a therapist, which has been helpful. Has not had psychiatrist in the past and psychologist recommended he discuss medication with primary. Remembers he tried Wellbutrin years ago for depression but didn't like it. Does not feel as though he's depressed. Anxiety and stress are main issues. Has [place]'s disease, which he states makes it harder for him to manage stress. Takes Gabapentin for nerve pain and amitriptyline prn at bedtime. These medications are prescribed by pain clinic as well as the [facility].  MELISSA-7 SCORE 3/2/2017 7/16/2018 3/23/2023   Total Score 4 2 13     PHQ 4/19/2021 9/9/2021 3/23/2023   PHQ-9 Total Score 9 8 6   Q9: Thoughts of better off dead/self-harm past 2 weeks Not at all Not at all Not at all     Problem list, Medication list, Allergies, and Medical/Social/Surgical histories reviewed in [employer] and updated as appropriate.   Additional history: as documented    ROS:  5 point ROS completed and negative except noted above, including Gen, CV, Resp, Neuro, Psych    OBJECTIVE:                                                    /82   Pulse 82   Ht 1.835 m (6' 0.25\")   Wt 111.6 kg (246 lb)   SpO2 96%   BMI 33.13 kg/m    Body mass index is 33.13 kg/m .   GENERAL: healthy, alert and no distress  RESP: calm regular breathing, no cough  CV: normal rate  NEURO: mentation intact, speech normal  PSYCH: anxious mood     ASSESSMENT/PLAN:                                                      Jose Carlos was seen today for anxiety.    Diagnoses and all orders for this visit:    Anxiety  -     busPIRone (BUSPAR) 10 MG tablet; Take 1 tablet " (10 mg) by mouth 2 times daily  Recommend continuing therapy. New prescription medication - Buspar 10 mg BID. Reviewed side effects of medications, alarm signs and symptoms, and when to seek further care.    Narcotic dependence (H)  Actively managed by pain specialty     Adrenal insufficiency (H)  Actively managed by [facility] endocrinology      See Patient Instructions  Patient Instructions   Start Buspar 1 pill 2 times daily for anxiety      NIMA Ruffin CNP  [facility]  Family Practice  [facility]  [phone]    For any issues my office # is [phone]

## 2023-04-19 ENCOUNTER — TRANSFERRED RECORDS (OUTPATIENT)
Dept: FAMILY MEDICINE | Facility: CLINIC | Age: 73
End: 2023-04-19

## 2023-04-26 ASSESSMENT — ENCOUNTER SYMPTOMS
HEMATOCHEZIA: 0
JOINT SWELLING: 0
SORE THROAT: 0
NERVOUS/ANXIOUS: 0
ABDOMINAL PAIN: 0
COUGH: 0
WEAKNESS: 1
DIZZINESS: 0
FEVER: 0
HEADACHES: 1
NAUSEA: 0
PARESTHESIAS: 0
HEARTBURN: 0
PALPITATIONS: 0
DYSURIA: 0
SHORTNESS OF BREATH: 0
HEMATURIA: 0
FREQUENCY: 0
ARTHRALGIAS: 0
MYALGIAS: 1
DIARRHEA: 0
CHILLS: 0
EYE PAIN: 0
CONSTIPATION: 0

## 2023-04-26 ASSESSMENT — ACTIVITIES OF DAILY LIVING (ADL): CURRENT_FUNCTION: NO ASSISTANCE NEEDED

## 2023-04-28 ASSESSMENT — ENCOUNTER SYMPTOMS
FREQUENCY: 0
COUGH: 0
DIZZINESS: 0
HEMATURIA: 0
DIARRHEA: 0
WEAKNESS: 1
CONSTIPATION: 0
NAUSEA: 0
ARTHRALGIAS: 0
FEVER: 0
ABDOMINAL PAIN: 0
DYSURIA: 0
HEMATOCHEZIA: 0
NERVOUS/ANXIOUS: 0
SORE THROAT: 0
PARESTHESIAS: 0
CHILLS: 0
MYALGIAS: 1
SHORTNESS OF BREATH: 0
EYE PAIN: 0
HEARTBURN: 0
HEADACHES: 1
PALPITATIONS: 0
JOINT SWELLING: 0

## 2023-04-28 ASSESSMENT — ACTIVITIES OF DAILY LIVING (ADL): CURRENT_FUNCTION: NO ASSISTANCE NEEDED

## 2023-04-28 NOTE — PROGRESS NOTES
"SUBJECTIVE:   Jose Carlos is a 73 year old who presents for Preventive Visit.      Patient has been advised of split billing requirements and indicates understanding: Yes  Are you in the first 12 months of your Medicare coverage?  No    Chronic pain: follows with [facility].  See my previous note for details.  He is on chronic gabapentin and Hydrocodone.  Pain is not controlled without hydrocodone.  Pain is fairly stable but has noticed increased pain overnight.  Can be severe.       HTN: at goal on Lisinopril and chlorthalidone.  No side effects.     Adrenal insufficiency: secondary, on hydrocortisone, followed at [place].      Low testosterone: on Axiron, followed at [place]     HLD: not on meds, would like to restart.      Healthy Habits:     In general, how would you rate your overall health?  Fair    Frequency of exercise:  2-3 days/week    Duration of exercise:  15-30 minutes    Do you usually eat at least 4 servings of fruit and vegetables a day, include whole grains    & fiber and avoid regularly eating high fat or \"junk\" foods?  Yes    Ability to successfully perform activities of daily living:  No assistance needed    Home Safety:  No safety concerns identified    Hearing Impairment:  No hearing concerns    In the past 6 months, have you been bothered by leaking of urine?  No    In general, how would you rate your overall mental or emotional health?  Fair      PHQ-2 Total Score: 0    Additional concerns today:  Yes    Hearing Screening:  Right Ear  4000Hz: pass  2000Hz: pass  1000Hz: pass  500Hz: pass    Left Ear  4000Hz: pass  2000Hz: pass  1000Hz: pass  500Hz: pass    Have you ever done Advance Care Planning? (For example, a Health Directive, POLST, or a discussion with a medical provider or your loved ones about your wishes): No, advance care planning information given to patient to review.  Patient plans to discuss their wishes with loved ones or provider.       Fall risk  Fallen 2 or more times in the past year?: " No  Any fall with injury in the past year?: No    Cognitive Screening   1) Repeat 3 items (Leader, Season, Table)    2) Clock draw: NORMAL  3) 3 item recall: Recalls 3 objects  Results: 3 items recalled: COGNITIVE IMPAIRMENT LESS LIKELY  Mini-CogTM Copyright MARIE Roland. Licensed by the author for use in [facility]; reprinted with permission (diaan@[facility]). All rights reserved.      Do you have sleep apnea, excessive snoring or daytime drowsiness?: no    Reviewed and updated as needed this visit by clinical staff   Tobacco  Allergies  Meds  Problems  Med Hx  Surg Hx  Fam Hx          Reviewed and updated as needed this visit by Provider   Tobacco  Allergies  Meds  Problems  Med Hx  Surg Hx  Fam Hx         Social History     Tobacco Use     Smoking status: Former     Types: Cigarettes     Quit date: 1995     Years since quittin.8     Smokeless tobacco: Never   Vaping Use     Vaping status: Never Used   Substance Use Topics     Alcohol use: Yes     Alcohol/week: 0.0 - 3.3 standard drinks of alcohol     Comment: 1 drink per day             2023     7:59 AM   Alcohol Use   Prescreen: >3 drinks/day or >7 drinks/week? No     Do you have a current opioid prescription? Yes   How severe is your pain on a scale from 1-10? 4/10            View : No data to display.              Low Risk (0-3)  Moderate Risk (4-7)  High Risk (>8)  Do you use any other controlled substances or medications that are not prescribed by a provider? None          -------------------------------------    Current providers sharing in care for this patient include:   Patient Care Team:  Beny Winters MD as PCP - General (Family Medicine)  Beny Winters MD as Assigned PCP  Tolu Brewer OD as MD (Optometry)  Ana Arreaga MD as Anesthesiologist (Pain Medicine)  Jaimee Gunderson MD as Physician (Ophthalmology)  Jaimee Gunderson MD as Assigned Surgical Provider    The following health  "maintenance items are reviewed in Epic and correct as of today:  Health Maintenance   Topic Date Due     MEDICARE ANNUAL WELLNESS VISIT  05/01/2024     FALL RISK ASSESSMENT  05/01/2024     LIPID  09/13/2026     ADVANCE CARE PLANNING  05/01/2028     DTAP/TDAP/TD IMMUNIZATION (5 - Td or Tdap) 03/22/2029     HEPATITIS C SCREENING  Completed     PHQ-2 (once per calendar year)  Completed     INFLUENZA VACCINE  Completed     Pneumococcal Vaccine: 65+ Years  Completed     ZOSTER IMMUNIZATION  Completed     AORTIC ANEURYSM SCREENING (SYSTEM ASSIGNED)  Completed     COVID-19 Vaccine  Completed     IPV IMMUNIZATION  Aged Out     MENINGITIS IMMUNIZATION  Aged Out     COLORECTAL CANCER SCREENING  Discontinued     Lab work is in process          Review of Systems   Constitutional: Negative for chills and fever.   HENT: Negative for congestion, ear pain, hearing loss and sore throat.    Eyes: Negative for pain and visual disturbance.   Respiratory: Negative for cough and shortness of breath.    Cardiovascular: Negative for chest pain, palpitations and peripheral edema.   Gastrointestinal: Negative for abdominal pain, constipation, diarrhea, heartburn, hematochezia and nausea.   Genitourinary: Positive for impotence. Negative for dysuria, frequency, genital sores, hematuria, penile discharge and urgency.   Musculoskeletal: Positive for myalgias. Negative for arthralgias and joint swelling.   Skin: Negative for rash.   Neurological: Positive for weakness and headaches. Negative for dizziness and paresthesias.   Psychiatric/Behavioral: Negative for mood changes. The patient is not nervous/anxious.      Constitutional, HEENT, cardiovascular, pulmonary, GI, , musculoskeletal, neuro, skin, endocrine and psych systems are negative, except as otherwise noted.    OBJECTIVE:   /77   Pulse 66   Ht 1.854 m (6' 1\")   Wt 112.3 kg (247 lb 9.6 oz)   SpO2 94%   BMI 32.67 kg/m   Estimated body mass index is 32.67 kg/m  as calculated " "from the following:    Height as of this encounter: 1.854 m (6' 1\").    Weight as of this encounter: 112.3 kg (247 lb 9.6 oz).  Physical Exam  GENERAL: healthy, alert and no distress  EYES: Eyes grossly normal to inspection, PERRL and conjunctivae and sclerae normal  HENT: ear canals and TM's normal, nose and mouth without ulcers or lesions  NECK: no adenopathy, no asymmetry, masses, or scars and thyroid normal to palpation  RESP: lungs clear to auscultation - no rales, rhonchi or wheezes  CV: regular rate and rhythm, normal S1 S2, no S3 or S4, no murmur, click or rub, no peripheral edema and peripheral pulses strong  ABDOMEN: soft, nontender, no hepatosplenomegaly, no masses and bowel sounds normal  MS: no gross musculoskeletal defects noted, no edema  SKIN: no suspicious lesions or rashes  NEURO: Normal strength and tone, mentation intact and speech normal  PSYCH: mentation appears normal, affect normal/bright    Diagnostic Test Results:  Labs reviewed in Epic  No results found for this or any previous visit (from the past 24 hour(s)).    ASSESSMENT / PLAN:   Encounter for Medicare annual wellness exam  - discussed preventative guidelines, healthy diet, exercise and weight management  - VENOUS COLLECTION    Secondary adrenal insufficiency (H)  Followed by endocrinology, cont hydrocortisone  - VENOUS COLLECTION    Narcotic dependence (H)  Stable, no increased usage.  Has been unable to come off.  Cont PT, all other efforts to reduce pain  - VENOUS COLLECTION    Chronic pain syndrome  Cont gabapentin, hydrocodone and pain clinic follow up  Retrial PT  Trial of nortriptyline.  Proper use and potential benefits, harms and side effects discussed in detail.  - nortriptyline (PAMELOR) 10 MG capsule; Take 1 capsule (10 mg) by mouth At Bedtime for 7 days, THEN 2 capsules (20 mg) At Bedtime for 7 days, THEN 3 capsules (30 mg) At Bedtime for 76 days.  - VENOUS COLLECTION    Hypercholesterolemia  Restart statin  - Lipid " "Profile ([facility])  - VENOUS COLLECTION  - rosuvastatin (CRESTOR) 10 MG tablet; Take 1 tablet (10 mg) by mouth daily    Essential hypertension  stable/controlled. Cont current medication(s) and treatment  - Basic Metabolic Panel (8) ([facility])  - VENOUS COLLECTION    Class 1 obesity due to excess calories with serious comorbidity and body mass index (BMI) of 32.0 to 32.9 in adult  Interested in trial of wegovy.  Will also focus on diet and exercise.  Proper use and potential benefits, harms and side effects discussed in detail.  - Semaglutide-Weight Management (WEGOVY) 0.25 MG/0.5ML pen; Inject 0.25 mg Subcutaneous once a week  - VENOUS COLLECTION      Patient has been advised of split billing requirements and indicates understanding: Yes      COUNSELING:  Reviewed preventive health counseling, as reflected in patient instructions       Regular exercise       Healthy diet/nutrition       Colon cancer screening       Prostate cancer screening      BMI:   Estimated body mass index is 32.67 kg/m  as calculated from the following:    Height as of this encounter: 1.854 m (6' 1\").    Weight as of this encounter: 112.3 kg (247 lb 9.6 oz).   Weight management plan: Discussed healthy diet and exercise guidelines      He reports that he quit smoking about 27 years ago. His smoking use included cigarettes. He has never used smokeless tobacco.      Appropriate preventive services were discussed with this patient, including applicable screening as appropriate for cardiovascular disease, diabetes, osteopenia/osteoporosis, and glaucoma.  As appropriate for age/gender, discussed screening for colorectal cancer, prostate cancer, breast cancer, and cervical cancer. Checklist reviewing preventive services available has been given to the patient.    Reviewed patients plan of care and provided an AVS. The Basic Care Plan (routine screening as documented in Health Maintenance) for Jose Carlos meets the Care Plan requirement. This Care Plan has been " established and reviewed with the Patient.      Beny Winters MD  [facility]    Identified Health Risks:    I have reviewed Opioid Use Disorder and Substance Use Disorder risk factors and made any needed referrals.

## 2023-05-01 ENCOUNTER — OFFICE VISIT (OUTPATIENT)
Dept: FAMILY MEDICINE | Facility: CLINIC | Age: 73
End: 2023-05-01

## 2023-05-01 VITALS
OXYGEN SATURATION: 94 % | HEIGHT: 73 IN | HEART RATE: 66 BPM | BODY MASS INDEX: 32.82 KG/M2 | DIASTOLIC BLOOD PRESSURE: 77 MMHG | SYSTOLIC BLOOD PRESSURE: 120 MMHG | WEIGHT: 247.6 LBS

## 2023-05-01 DIAGNOSIS — E66.811 CLASS 1 OBESITY DUE TO EXCESS CALORIES WITH SERIOUS COMORBIDITY AND BODY MASS INDEX (BMI) OF 32.0 TO 32.9 IN ADULT: ICD-10-CM

## 2023-05-01 DIAGNOSIS — E27.49 SECONDARY ADRENAL INSUFFICIENCY (H): ICD-10-CM

## 2023-05-01 DIAGNOSIS — R73.01 IFG (IMPAIRED FASTING GLUCOSE): ICD-10-CM

## 2023-05-01 DIAGNOSIS — E78.00 HYPERCHOLESTEROLEMIA: ICD-10-CM

## 2023-05-01 DIAGNOSIS — Z00.00 ENCOUNTER FOR MEDICARE ANNUAL WELLNESS EXAM: Primary | ICD-10-CM

## 2023-05-01 DIAGNOSIS — E66.09 CLASS 1 OBESITY DUE TO EXCESS CALORIES WITH SERIOUS COMORBIDITY AND BODY MASS INDEX (BMI) OF 32.0 TO 32.9 IN ADULT: ICD-10-CM

## 2023-05-01 DIAGNOSIS — F11.20 NARCOTIC DEPENDENCE (H): ICD-10-CM

## 2023-05-01 DIAGNOSIS — I10 ESSENTIAL HYPERTENSION: ICD-10-CM

## 2023-05-01 DIAGNOSIS — G89.4 CHRONIC PAIN SYNDROME: ICD-10-CM

## 2023-05-01 LAB
CHOLESTEROL: 251 MG/DL (ref 100–199)
FASTING?: YES
HDL (RMG): 59 MG/DL (ref 40–?)
LDL CALCULATED (RMG): 172 MG/DL (ref 0–130)
TRIGLYCERIDES (RMG): 100 MG/DL (ref 0–149)

## 2023-05-01 PROCEDURE — 80061 LIPID PANEL: CPT | Mod: QW | Performed by: FAMILY MEDICINE

## 2023-05-01 PROCEDURE — 36415 COLL VENOUS BLD VENIPUNCTURE: CPT | Performed by: FAMILY MEDICINE

## 2023-05-01 PROCEDURE — G0439 PPPS, SUBSEQ VISIT: HCPCS | Performed by: FAMILY MEDICINE

## 2023-05-01 PROCEDURE — 99214 OFFICE O/P EST MOD 30 MIN: CPT | Mod: 25 | Performed by: FAMILY MEDICINE

## 2023-05-01 RX ORDER — NORTRIPTYLINE HCL 10 MG
CAPSULE ORAL
Qty: 249 CAPSULE | Refills: 0 | Status: SHIPPED | OUTPATIENT
Start: 2023-05-01 | End: 2023-10-02

## 2023-05-01 RX ORDER — ROSUVASTATIN CALCIUM 10 MG/1
10 TABLET, COATED ORAL DAILY
Qty: 90 TABLET | Refills: 3 | Status: SHIPPED | OUTPATIENT
Start: 2023-05-01 | End: 2024-04-16

## 2023-05-02 ENCOUNTER — TELEPHONE (OUTPATIENT)
Dept: FAMILY MEDICINE | Facility: CLINIC | Age: 73
End: 2023-05-02

## 2023-05-02 LAB
BUN SERPL-MCNC: 14 MG/DL (ref 8–27)
BUN/CREATININE RATIO: 17 (ref 10–24)
CALCIUM SERPL-MCNC: 9.3 MG/DL (ref 8.6–10.2)
CHLORIDE SERPLBLD-SCNC: 100 MMOL/L (ref 96–106)
CREAT SERPL-MCNC: 0.83 MG/DL (ref 0.76–1.27)
EGFR: 94 ML/MIN/1.73
GLUCOSE SERPL-MCNC: 101 MG/DL (ref 70–99)
POTASSIUM SERPL-SCNC: 4.3 MMOL/L (ref 3.5–5.2)
SODIUM SERPL-SCNC: 139 MMOL/L (ref 134–144)
TOTAL CO2: 26 MMOL/L (ref 20–29)

## 2023-05-02 NOTE — TELEPHONE ENCOUNTER
Received faxed notice of denial of Medicare Part D Prescription Drug Coverage of Wegovy. This medication is in and excluded group of drugs.   Called patient and informed. Discussed coverage of weight loss meds with Medicare D may change in the future.   Ashanti Green RN

## 2023-05-24 DIAGNOSIS — E66.811 CLASS 1 OBESITY DUE TO EXCESS CALORIES WITH SERIOUS COMORBIDITY AND BODY MASS INDEX (BMI) OF 32.0 TO 32.9 IN ADULT: ICD-10-CM

## 2023-05-24 DIAGNOSIS — E66.09 CLASS 1 OBESITY DUE TO EXCESS CALORIES WITH SERIOUS COMORBIDITY AND BODY MASS INDEX (BMI) OF 32.0 TO 32.9 IN ADULT: ICD-10-CM

## 2023-05-31 ENCOUNTER — TRANSFERRED RECORDS (OUTPATIENT)
Dept: FAMILY MEDICINE | Facility: CLINIC | Age: 73
End: 2023-05-31

## 2023-06-07 ENCOUNTER — OFFICE VISIT (OUTPATIENT)
Dept: FAMILY MEDICINE | Facility: CLINIC | Age: 73
End: 2023-06-07

## 2023-06-07 VITALS
SYSTOLIC BLOOD PRESSURE: 125 MMHG | DIASTOLIC BLOOD PRESSURE: 88 MMHG | HEART RATE: 68 BPM | WEIGHT: 236 LBS | OXYGEN SATURATION: 96 % | BODY MASS INDEX: 31.14 KG/M2

## 2023-06-07 DIAGNOSIS — E78.00 HYPERCHOLESTEROLEMIA: ICD-10-CM

## 2023-06-07 DIAGNOSIS — E66.09 CLASS 1 OBESITY DUE TO EXCESS CALORIES WITH SERIOUS COMORBIDITY AND BODY MASS INDEX (BMI) OF 31.0 TO 31.9 IN ADULT: Primary | ICD-10-CM

## 2023-06-07 DIAGNOSIS — E66.811 CLASS 1 OBESITY DUE TO EXCESS CALORIES WITH SERIOUS COMORBIDITY AND BODY MASS INDEX (BMI) OF 31.0 TO 31.9 IN ADULT: Primary | ICD-10-CM

## 2023-06-07 DIAGNOSIS — G89.4 CHRONIC PAIN SYNDROME: ICD-10-CM

## 2023-06-07 PROCEDURE — 99214 OFFICE O/P EST MOD 30 MIN: CPT | Performed by: FAMILY MEDICINE

## 2023-06-07 NOTE — PROGRESS NOTES
Pola Branch is a 73 year old patient who presents to clinic for follow up.    Obesity: has done well on wegovy but having trouble locating medication.  Weight is down 11 pounds.  No significant side effects.      Chronic pain: follows with [facility].  See my previous note for details.  He is on chronic gabapentin and Hydrocodone.  Pain is not controlled without hydrocodone.  Pain is fairly stable but has noticed increased pain overnight.  Can be severe.  He has been inconsistent with nortriptyline started last visit    HLD: tolerating statin    HTN: at goal on Lisinopril and chlorthalidone.  No side effects.     Adrenal insufficiency: secondary, on hydrocortisone, followed at [place].      Low testosterone: on Axiron, followed at [place]    Review of Systems   Constitutional, HEENT, cardiovascular, pulmonary, GI, , musculoskeletal, neuro, skin, endocrine and psych systems are negative, except as otherwise noted.      Objective    /88   Pulse 68   Wt 107 kg (236 lb)   SpO2 96%   BMI 31.14 kg/m      General: Well appearing, NAD  Psych: normal mood and affect        No results found for any visits on 06/07/23.    Class 1 obesity due to excess calories with serious comorbidity and body mass index (BMI) of 31.0 to 31.9 in adult  Increase dose and follow up in 4-6 weeks.  Cont exercise and diet  - Semaglutide-Weight Management (WEGOVY) 0.5 MG/0.5ML pen; Inject 0.5 mg Subcutaneous once a week    Chronic pain syndrome  Restart nortriptyline and follow up in 4-6 weeks.      Hypercholesterolemia  Cont statin.  Recheck lipids at follow up      Follow up in 4-6 weeks

## 2023-06-26 ENCOUNTER — TRANSFERRED RECORDS (OUTPATIENT)
Dept: FAMILY MEDICINE | Facility: CLINIC | Age: 73
End: 2023-06-26

## 2023-07-05 DIAGNOSIS — N52.9 ERECTILE DYSFUNCTION, UNSPECIFIED ERECTILE DYSFUNCTION TYPE: ICD-10-CM

## 2023-07-05 RX ORDER — TADALAFIL 20 MG/1
TABLET ORAL
Qty: 30 TABLET | Refills: 3 | Status: SHIPPED | OUTPATIENT
Start: 2023-07-05

## 2023-07-17 ENCOUNTER — OFFICE VISIT (OUTPATIENT)
Dept: FAMILY MEDICINE | Facility: CLINIC | Age: 73
End: 2023-07-17

## 2023-07-17 VITALS
WEIGHT: 232.2 LBS | DIASTOLIC BLOOD PRESSURE: 66 MMHG | HEART RATE: 82 BPM | SYSTOLIC BLOOD PRESSURE: 109 MMHG | BODY MASS INDEX: 30.64 KG/M2 | OXYGEN SATURATION: 96 %

## 2023-07-17 DIAGNOSIS — R73.01 IFG (IMPAIRED FASTING GLUCOSE): ICD-10-CM

## 2023-07-17 DIAGNOSIS — E66.811 CLASS 1 OBESITY DUE TO EXCESS CALORIES WITH SERIOUS COMORBIDITY AND BODY MASS INDEX (BMI) OF 31.0 TO 31.9 IN ADULT: Primary | ICD-10-CM

## 2023-07-17 DIAGNOSIS — R21 RASH: ICD-10-CM

## 2023-07-17 DIAGNOSIS — E78.00 HYPERCHOLESTEROLEMIA: ICD-10-CM

## 2023-07-17 DIAGNOSIS — E66.09 CLASS 1 OBESITY DUE TO EXCESS CALORIES WITH SERIOUS COMORBIDITY AND BODY MASS INDEX (BMI) OF 31.0 TO 31.9 IN ADULT: Primary | ICD-10-CM

## 2023-07-17 LAB
CHOLESTEROL: 152 MG/DL (ref 100–199)
FASTING?: NO
HDL (RMG): 53 MG/DL (ref 40–?)
LDL CALCULATED (RMG): 76 MG/DL (ref 0–130)
TRIGLYCERIDES (RMG): 115 MG/DL (ref 0–149)

## 2023-07-17 PROCEDURE — 99214 OFFICE O/P EST MOD 30 MIN: CPT | Performed by: FAMILY MEDICINE

## 2023-07-17 PROCEDURE — 36415 COLL VENOUS BLD VENIPUNCTURE: CPT | Performed by: FAMILY MEDICINE

## 2023-07-17 PROCEDURE — 80061 LIPID PANEL: CPT | Mod: QW | Performed by: FAMILY MEDICINE

## 2023-07-17 RX ORDER — TRIAMCINOLONE ACETONIDE 1 MG/G
OINTMENT TOPICAL
Qty: 80 G | Refills: 0 | Status: SHIPPED | OUTPATIENT
Start: 2023-07-17 | End: 2023-12-19

## 2023-07-17 NOTE — PROGRESS NOTES
Pola Branch is a 73 year old patient who presents to clinic for follow up    Obesity: has done well on wegovy but having trouble locating medication and has been off for a while.  Weight is down 15 pounds.  No significant side effects.       Chronic pain: follows with [facility].  See my previous note for details.  He is on chronic gabapentin and Hydrocodone.  Pain is not controlled without hydrocodone.  Pain is fairly stable but has noticed increased pain overnight.  Can be severe.  Taking nortriptyline 10 mg, forgot to titrate up.     HLD: tolerating statin     HTN: at goal on Lisinopril and chlorthalidone.  No side effects.     Adrenal insufficiency: secondary, on hydrocortisone, followed at [place].      Low testosterone: on Axiron, followed at [place]    Review of Systems   Constitutional, HEENT, cardiovascular, pulmonary, GI, , musculoskeletal, neuro, skin, endocrine and psych systems are negative, except as otherwise noted.      Objective    /66   Pulse 82   Wt 105.3 kg (232 lb 3.2 oz)   SpO2 96%   BMI 30.64 kg/m      General: Well appearing, NAD  Psych: normal mood and affect        Results for orders placed or performed in visit on 07/17/23 (from the past 24 hour(s))   Lipid Profile ([facility])   Result Value Ref Range    Cholesterol 152 100 - 199 mg/dL    HDL 53 40 mg/dL    Triglycerides 115 0 - 149 mg/dL    LDL CALCULATED ([facility]) 76 0 - 130 mg/dL    Patient Fasting? No        Class 1 obesity due to excess calories with serious comorbidity and body mass index (BMI) of 31.0 to 31.9 in adult  Trial of higher dose wegovy to see if available, follow up in 4 weeks  - Semaglutide-Weight Management (WEGOVY) 1 MG/0.5ML pen; Inject 1 mg Subcutaneous once a week  - VENOUS COLLECTION    Hypercholesterolemia  At goal  - Lipid Profile ([facility])  - VENOUS COLLECTION    IFG (impaired fasting glucose)  - Hemoglobin A1C ([facility])  - VENOUS COLLECTION    Rash  - triamcinolone (KENALOG) 0.1 % external ointment; Apply sparingly  to affected areas twice daily for up to 2 weeks.  If not improved please follow up.  - VENOUS COLLECTION    Follow up in 4-5 weeks

## 2023-07-18 DIAGNOSIS — I10 ESSENTIAL HYPERTENSION: ICD-10-CM

## 2023-07-18 LAB — HBA1C MFR BLD: 5.5 % (ref 4.8–5.6)

## 2023-07-20 NOTE — TELEPHONE ENCOUNTER
Chlorthalidone. LOV 7/17/23.    HTN: at goal on Lisinopril and chlorthalidone.  No side effects.  BP Readings from Last 3 Encounters:   07/17/23 109/66   06/07/23 125/88   05/01/23 120/77

## 2023-07-21 RX ORDER — CHLORTHALIDONE 25 MG/1
TABLET ORAL
Qty: 90 TABLET | Refills: 3 | Status: SHIPPED | OUTPATIENT
Start: 2023-07-21 | End: 2024-01-26

## 2023-07-24 ENCOUNTER — TRANSFERRED RECORDS (OUTPATIENT)
Dept: FAMILY MEDICINE | Facility: CLINIC | Age: 73
End: 2023-07-24

## 2023-08-02 DIAGNOSIS — I10 BENIGN ESSENTIAL HYPERTENSION: ICD-10-CM

## 2023-08-03 NOTE — TELEPHONE ENCOUNTER
Lisinopril  LOV 5/1/23  Essential hypertension  stable/controlled. Cont current medication(s) and treatment  BP Readings from Last 3 Encounters:   07/17/23 109/66   06/07/23 125/88   05/01/23 120/77     Component      Latest Ref Rng 5/1/2023   Glucose      70 - 99 mg/dL 101 (H)    Urea Nitrogen      8 - 27 mg/dL 14    Creatinine      0.76 - 1.27 mg/dL 0.83    eGFR       >59 mL/min/1.73 94    BUN/Creatinine Ratio      10 - 24  17    Sodium      134 - 144 mmol/L 139    Potassium      3.5 - 5.2 mmol/L 4.3    Chloride      96 - 106 mmol/L 100    Total CO2      20 - 29 mmol/L 26    Calcium      8.6 - 10.2 mg/dL 9.3    Cholesterol      100 - 199 mg/dL 251 !    HDL Cholesterol      40 mg/dL 59    Triglycerides      0 - 149 mg/dL 100    LDL Cholesterol Calculated      0 - 130 mg/dL 172 !    Patient Fasting? Yes       Legend:  (H) High  ! Abnormal  Next appointment 8/28/23 With Singh

## 2023-08-03 NOTE — TELEPHONE ENCOUNTER
Lisinopril  LOV 7/17/23  Last addressed 5/1/23  Essential hypertension  stable/controlled. Cont current medication(s) and treatment  BP Readings from Last 3 Encounters:   07/17/23 109/66   06/07/23 125/88   05/01/23 120/77     Next appointment 8/28/23  Component      Latest Ref Rng 5/1/2023  10:11 AM   Glucose      70 - 99 mg/dL 101 (H)    Urea Nitrogen      8 - 27 mg/dL 14    Creatinine      0.76 - 1.27 mg/dL 0.83    eGFR       >59 mL/min/1.73 94    BUN/Creatinine Ratio      10 - 24  17    Sodium      134 - 144 mmol/L 139    Potassium      3.5 - 5.2 mmol/L 4.3    Chloride      96 - 106 mmol/L 100    Total CO2      20 - 29 mmol/L 26    Calcium      8.6 - 10.2 mg/dL 9.3       Legend:  (H) High

## 2023-08-04 RX ORDER — LISINOPRIL 20 MG/1
TABLET ORAL
Qty: 90 TABLET | Refills: 3 | Status: SHIPPED | OUTPATIENT
Start: 2023-08-04 | End: 2024-06-14

## 2023-08-04 RX ORDER — LISINOPRIL 20 MG/1
TABLET ORAL
Qty: 90 TABLET | Refills: 3 | Status: SHIPPED | OUTPATIENT
Start: 2023-08-04 | End: 2023-08-28

## 2023-08-14 DIAGNOSIS — E66.811 CLASS 1 OBESITY DUE TO EXCESS CALORIES WITH SERIOUS COMORBIDITY AND BODY MASS INDEX (BMI) OF 31.0 TO 31.9 IN ADULT: ICD-10-CM

## 2023-08-14 DIAGNOSIS — E66.09 CLASS 1 OBESITY DUE TO EXCESS CALORIES WITH SERIOUS COMORBIDITY AND BODY MASS INDEX (BMI) OF 31.0 TO 31.9 IN ADULT: ICD-10-CM

## 2023-08-14 NOTE — TELEPHONE ENCOUNTER
Patient calls requesting rx for next dose of Wegovy be sent to [facility] on [address]. He is due for injection 8/18 and would like to move up to 1.7mg/week. Patient has follow up appt 8/28/23 with Dr. Winters.   Last related visit: 7/17/23 with Dr. Winters - dose was increased to 1mg/week then.   Wt Readings from Last 4 Encounters:   07/17/23 105.3 kg (232 lb 3.2 oz)   06/07/23 107 kg (236 lb)   05/01/23 112.3 kg (247 lb 9.6 oz)   03/23/23 111.6 kg (246 lb)      Plan: routed request to Dr. Winters for review.  Ashanti Green RN

## 2023-08-21 ENCOUNTER — TRANSFERRED RECORDS (OUTPATIENT)
Dept: FAMILY MEDICINE | Facility: CLINIC | Age: 73
End: 2023-08-21

## 2023-08-26 ENCOUNTER — OFFICE VISIT (OUTPATIENT)
Dept: URGENT CARE | Facility: URGENT CARE | Age: 73
End: 2023-08-26
Payer: MEDICARE

## 2023-08-26 VITALS
DIASTOLIC BLOOD PRESSURE: 79 MMHG | OXYGEN SATURATION: 96 % | TEMPERATURE: 97.6 F | HEART RATE: 80 BPM | SYSTOLIC BLOOD PRESSURE: 115 MMHG

## 2023-08-26 DIAGNOSIS — L23.9 ALLERGIC CONTACT DERMATITIS, UNSPECIFIED TRIGGER: Primary | ICD-10-CM

## 2023-08-26 PROCEDURE — 99213 OFFICE O/P EST LOW 20 MIN: CPT | Performed by: INTERNAL MEDICINE

## 2023-08-26 RX ORDER — CLOBETASOL PROPIONATE 0.5 MG/G
CREAM TOPICAL
Qty: 30 G | Refills: 0 | Status: SHIPPED | OUTPATIENT
Start: 2023-08-26 | End: 2023-12-19

## 2023-08-26 NOTE — PATIENT INSTRUCTIONS
Stronger topical steroid for the dermatitis.  Use this in place of the triamcinolone.    Good to talk with your PCP about the humming, tingling, vibrating sensation that you are getting.  I would tend to approach this the same way that we approach neuropathy.

## 2023-08-26 NOTE — PROGRESS NOTES
"  Assessment & Plan     Allergic contact dermatitis, unspecified trigger  - clobetasol (TEMOVATE) 0.05 % external cream; Apply sparingly to itchy rash once daily as needed    Colin Conn MD  [facility] [place]    Pola Branch is a 73 year old, presenting for the following health issues:  Urgent Care and Derm Problem (X's 1 week , pt is concerned it's poison ivy )    HPI   Concern with an itchy rash. States that he might have gotten into poison ivy recently.  The lesions are on the arms, legs. He tried some triamcinolone 0.1% ointment that hasn't seemed to help.  Also describing a sense like \"my body is vibrating\" or feels like a \"hum inside the body\" which is a fleeting on/off sensation which will last several minutes then stop and then recur.  This sensation seems to be more in the pelvic region.  Doesn't tend to notice this when he's active; more noticeable at rest.     Review of Systems   Constitutional, HEENT, cardiovascular, pulmonary, gi and gu systems are negative, except as otherwise noted.      Objective    /79   Pulse 80   Temp 97.6  F (36.4  C) (Temporal)   SpO2 96%   There is no height or weight on file to calculate BMI.  Physical Exam   GENERAL APPEARANCE: healthy, alert, and no distress  SKIN: multiple discrete erythematous excoriated papulovesicular lesions are scattered over the flexural surface of both upper arms as well as the anterior thighs                "

## 2023-08-28 ENCOUNTER — OFFICE VISIT (OUTPATIENT)
Dept: FAMILY MEDICINE | Facility: CLINIC | Age: 73
End: 2023-08-28

## 2023-08-28 VITALS
SYSTOLIC BLOOD PRESSURE: 117 MMHG | OXYGEN SATURATION: 97 % | BODY MASS INDEX: 30.16 KG/M2 | HEART RATE: 80 BPM | WEIGHT: 228.6 LBS | DIASTOLIC BLOOD PRESSURE: 71 MMHG

## 2023-08-28 DIAGNOSIS — E66.09 CLASS 1 OBESITY DUE TO EXCESS CALORIES WITH SERIOUS COMORBIDITY AND BODY MASS INDEX (BMI) OF 30.0 TO 30.9 IN ADULT: ICD-10-CM

## 2023-08-28 DIAGNOSIS — L23.9 ALLERGIC CONTACT DERMATITIS, UNSPECIFIED TRIGGER: Primary | ICD-10-CM

## 2023-08-28 DIAGNOSIS — E66.811 CLASS 1 OBESITY DUE TO EXCESS CALORIES WITH SERIOUS COMORBIDITY AND BODY MASS INDEX (BMI) OF 30.0 TO 30.9 IN ADULT: ICD-10-CM

## 2023-08-28 DIAGNOSIS — I10 BENIGN ESSENTIAL HYPERTENSION: ICD-10-CM

## 2023-08-28 PROCEDURE — 99214 OFFICE O/P EST MOD 30 MIN: CPT | Performed by: FAMILY MEDICINE

## 2023-08-28 RX ORDER — BETAMETHASONE DIPROPIONATE 0.5 MG/G
CREAM TOPICAL 2 TIMES DAILY
Qty: 50 G | Refills: 0 | Status: SHIPPED | OUTPATIENT
Start: 2023-08-28 | End: 2023-12-19

## 2023-08-28 NOTE — PROGRESS NOTES
Pola Branch is a 73 year old patient who presents to clinic for follow up.    Obesity: has done well on wegovy.  Continues to lose weight.  No side effects.      Wt Readings from Last 4 Encounters:   08/28/23 103.7 kg (228 lb 9.6 oz)   07/17/23 105.3 kg (232 lb 3.2 oz)   06/07/23 107 kg (236 lb)   05/01/23 112.3 kg (247 lb 9.6 oz)       Chronic pain: follows with [facility].  See my previous note for details.  He is on chronic gabapentin and Hydrocodone.  Pain is not controlled without hydrocodone.  Pain is fairly stable but has noticed increased pain overnight.  Can be severe.  Taking nortriptyline 10 mg, forgot to titrate up.     HLD: tolerating statin     HTN: at goal on Lisinopril and chlorthalidone.  No side effects.     Adrenal insufficiency: secondary, on hydrocortisone, followed at [place].      Low testosterone: on Axiron, followed at [place]        Review of Systems   Constitutional, HEENT, cardiovascular, pulmonary, GI, , musculoskeletal, neuro, skin, endocrine and psych systems are negative, except as otherwise noted.      Objective    /71   Pulse 80   Wt 103.7 kg (228 lb 9.6 oz)   SpO2 97%   BMI 30.16 kg/m      General: Well appearing, NAD  Skin: scattered pink papules and vesicles on forearms and legs  Psych: normal mood and affect        No results found for this or any previous visit (from the past 24 hour(s)).    Allergic contact dermatitis, unspecified trigger  Trial of stronger topical steroid, follow up if not improved.  Proper use and potential benefits, harms and side effects discussed in detail.  - augmented betamethasone dipropionate (DIPROLENE AF) 0.05 % external cream; Apply topically 2 times daily To affected areas for up to 2 weeks    Benign essential hypertension  stable/controlled. Cont current medication(s) and treatment    Class 1 obesity due to excess calories with serious comorbidity and body mass index (BMI) of 30.0 to 30.9 in adult  Follow up in 4 weeks via mychart to  discuss if dose increase is needed    Follow up in 3 months

## 2023-09-18 ENCOUNTER — TRANSFERRED RECORDS (OUTPATIENT)
Dept: FAMILY MEDICINE | Facility: CLINIC | Age: 73
End: 2023-09-18

## 2023-09-27 DIAGNOSIS — E66.811 CLASS 1 OBESITY DUE TO EXCESS CALORIES WITH SERIOUS COMORBIDITY AND BODY MASS INDEX (BMI) OF 31.0 TO 31.9 IN ADULT: ICD-10-CM

## 2023-09-27 DIAGNOSIS — E66.09 CLASS 1 OBESITY DUE TO EXCESS CALORIES WITH SERIOUS COMORBIDITY AND BODY MASS INDEX (BMI) OF 31.0 TO 31.9 IN ADULT: ICD-10-CM

## 2023-09-28 RX ORDER — SEMAGLUTIDE 1.7 MG/.75ML
INJECTION, SOLUTION SUBCUTANEOUS
Qty: 3 ML | Refills: 0 | Status: SHIPPED | OUTPATIENT
Start: 2023-09-28 | End: 2023-10-26

## 2023-10-02 DIAGNOSIS — G89.4 CHRONIC PAIN SYNDROME: ICD-10-CM

## 2023-10-02 RX ORDER — NORTRIPTYLINE HCL 10 MG
CAPSULE ORAL
Qty: 249 CAPSULE | Refills: 0 | Status: SHIPPED | OUTPATIENT
Start: 2023-10-02 | End: 2023-12-19

## 2023-10-02 NOTE — TELEPHONE ENCOUNTER
Spoke with pt, he stated he is only taking 2 caps a day (doesn't feel the need to ogo up to 3 caps)     Med: Nortriptyline     LOV (related): 6/7/23      Due for F/U around: 4-6 weeks (7/17/23)    Next Appt: None

## 2023-10-17 ENCOUNTER — TRANSFERRED RECORDS (OUTPATIENT)
Dept: FAMILY MEDICINE | Facility: CLINIC | Age: 73
End: 2023-10-17

## 2023-10-26 DIAGNOSIS — E66.811 CLASS 1 OBESITY DUE TO EXCESS CALORIES WITH SERIOUS COMORBIDITY AND BODY MASS INDEX (BMI) OF 31.0 TO 31.9 IN ADULT: ICD-10-CM

## 2023-10-26 DIAGNOSIS — E66.09 CLASS 1 OBESITY DUE TO EXCESS CALORIES WITH SERIOUS COMORBIDITY AND BODY MASS INDEX (BMI) OF 31.0 TO 31.9 IN ADULT: ICD-10-CM

## 2023-10-26 NOTE — CONFIDENTIAL NOTE
Med: KOMAL HERNADEZ (related): 8/28/23      Due for F/U around: 11/2023 FOR [facility]WM    Next Appt: NONE        Wt Readings from Last 2 Encounters:   08/28/23 103.7 kg (228 lb 9.6 oz)   07/17/23 105.3 kg (232 lb 3.2 oz)       BMI Readings from Last 2 Encounters:   08/28/23 30.16 kg/m    07/17/23 30.64 kg/m

## 2023-10-27 RX ORDER — SEMAGLUTIDE 1.7 MG/.75ML
INJECTION, SOLUTION SUBCUTANEOUS
Qty: 3 ML | Refills: 0 | Status: SHIPPED | OUTPATIENT
Start: 2023-10-27 | End: 2023-11-28

## 2023-10-29 NOTE — LETTER
2021       RE: Jose Carlos Escamlila  [address]  [facility] [address]     Dear Colleague,    Thank you for referring your patient, Jose Carlos Escamilla, to the [facility] PHYSICAL [facility]ICINE [facility] REHABILIT[facility] CLINIC [place] at [facility]. Please see a copy of my visit note below.           PM&R Clinic Note     Patient Name: Jose Carlos Escamilla : 1950 Medical Record: [id number]     Requesting Physician/clinician: No att. providers found         History of Present Illness:     Jose Carlos Escamilla is a 71 year old male that per records and reporting patient had 20 fall, slipped on ice and hit back of head, no LOC. Felt woozy, felt headache and confused.  Went to PCP and was told had a concussion, then rested through out new year started to feel  Well.  Courtney recently went for MRI and has had headache since .  He describes the headache as frontal and whole head, a dull ache.  Sometimes bothersome, other times just hanging in there.  Ibuprofen helps some as and hydrocodone.         Symptoms  CONCUSSION SYMPTOMS ASSESSMENT 2021   Headache or Pressure In Head 3 - moderate   Upset Stomach or Throwing Up 0 - none   Problems with Balance 2 - mild to moderate   Feeling Dizzy 2 - mild to moderate   Sensitivity to Light 2 - mild to moderate   Sensitivity to Noise 3 - moderate   Mood Changes 2 - mild to moderate   Feeling sluggish, hazy, or foggy 4 - moderate to severe   Trouble Concentrating, Lack of Focus 4 - moderate to severe   Motion Sickness 1 - mild   Vision Changes 2 - mild to moderate   Memory Problems 3 - moderate   Feeling Confused 3 - moderate   Neck Pain 0 - none   Trouble Sleeping 6 - excruciating   Total Number of Symptoms 13   Symptom Severity Score 37       Sleep has been off for last 5 years.  Dizzy nad balance has been off for years, not worse.  Headaches are new.  No issues with bowel or bladder.   He has history of chronic central  pain that he has been dealing with for years now as well as  adrenal insufficiency.              Past Medical and Surgical History:     Past Medical History:   Diagnosis Date     ACP (advance care planning)      Chronic rhinitis      Diverticulosis      Esophageal stenosis      Hiatal hernia      HTN (hypertension)      Hypercholesterolemia      IBS (irritable bowel syndrome)      Post-traumatic osteoarthritis of both knees 2016     Past Surgical History:   Procedure Laterality Date     ARTHROPLASTY KNEE Left 2016    Procedure: ARTHROPLASTY KNEE;  Surgeon: Marisa Page MD;  Location: [facility] OR     DISCECTOMY CERVICAL MINIMALLY INVASIVE ONE LEVEL              Social History:     Social History     Tobacco Use     Smoking status: Former Smoker     Types: Cigarettes     Quit date: 1995     Years since quittin.5     Smokeless tobacco: Never Used   Substance Use Topics     Alcohol use: Yes     Alcohol/week: 0.0 - 3.3 standard drinks     Drinks per session: 5 or 6     Living situation: house  Family support:  Daughter close by   Vocational History:  archeatic    Recreational drug use:  None          Functional history:     Jose Carlos Escamilla is independent with all aspects of life.    ADLs: I   Assistive devices: none   iADLs (medication management and finances): I  Hand dominance: L  Driving: yes            Family History:     Family History   Problem Relation Age of Onset     Diabetes Mother             Medications:     Current Outpatient Medications   Medication Sig Dispense Refill     chlorthalidone (HYGROTON) 25 MG tablet TAKE 1 TABLET(25 MG) BY MOUTH DAILY 90 tablet 1     diclofenac (VOLTAREN) 1 % GEL topical gel as needed.       DULoxetine (CYMBALTA) 30 MG capsule Take 2 capsules by mouth every 24 hours        GABAPENTIN PO Take 900 mg by mouth 3 times daily (Patient takes  X 900 mg morning, 5 x 300 mg bedtime= 2700mg dose) In the morning and at bedtime.  In adjustment dosage during July        "HYDROcodone-acetaminophen (NORCO)  MG per tablet Take  tablets by mouth 4 times daily as needed for pain  0     hydrocortisone (CORTEF) 5 MG tablet 15 mg on waking and 5 mg at noon,double in sickness       lisinopril (ZESTRIL) 20 MG tablet Take 1 tablet (20 mg) by mouth daily 90 tablet 0     omeprazole (PRILOSEC) 20 MG DR capsule Take 20 mg by mouth daily       senna-docusate (SENOKOT-S;PERICOLACE) 8.6-50 MG per tablet Take 1-2 tablets by mouth 2 times daily 20 tablet 0     tadalafil (CIALIS) 20 MG tablet Take 1 tablet (20 mg) by mouth daily as needed 10 tablet 11     testosterone (AXIRON) 30 MG/ACT topical solution Place 30 mg onto the skin       traZODone (DESYREL) 50 MG tablet TAKE 2 TABLETS BY MOUTH AT BEDTIME 180 tablet 3     dexamethasone (DECADRON) 4 MG/ML injection INJECT 1ML INTRAMUSCULARLY UTD WHEN UNABLE TO TAKE ORAL STEROIDS  3     morphine (MS CONTIN) 15 MG CR tablet Take 15 mg by mouth every 12 hours              Allergies:     Allergies   Allergen Reactions     Amoxicillin      Amoxicillin [A-Cillin]      In his 30's     Penicillin G      Penicillins Rash     Other reaction(s): Breathing Difficulty              ROS:        ROS: 10 point ROS neg other than the symptoms noted above in the HPI.             Physical Examiniation:     VITAL SIGNS: BP (!) 143/93   Pulse 101   Ht 1.854 m (6' 1\")   Wt 113.4 kg (250 lb)   SpO2 95%   BMI 32.98 kg/m    BMI: Estimated body mass index is 32.98 kg/m  as calculated from the following:    Height as of this encounter: 1.854 m (6' 1\").    Weight as of this encounter: 113.4 kg (250 lb).    Gen: NAD, pleasant and cooperative   HEENT: NCAT, EOMI, no nystagmus, EMILIA, there is some reproducible headache and eye strain with VOMS. No taut or tender cervical paraspinal muscles, no facial asymmetry   Cardio: 2+ radial pulse, well perfused  Pulm: non-labored breathing in room air, symmetrical chest rise  Abd: benign  Ext: WWP, no edema in BLE, no tenderness in " calves  Neuro/MSK: 5/5 in c5-t1 and l2-s1 myotomes, SILT, negative rodriguez's b/l, CN 2-12 intact, negative romberg, negative single leg stance, negative fukada. AAOx3.  GAIT: WNFL             Laboratory/Imaging:     [facility] Generic [id number] - 06/15/2018  7:39 PM CDT  EXAM: MR BRAIN WITHOUT AND WITH IV CONTRAST      IMPRESSION: MRI brain without and with intravenous gadolinium (sella protocol)  06/15/2018. Indication pituitary deficiency. Comparison 06/24/2016.    The pituitary has normal morphologic, signal, and enhancement characteristics.  No suprasellar mass is present. The pituitary infundibulum appears intact. The  hypothalamus has a normal appearance. The brain continues to have normal  morphologic and FLAIR signal characteristics.           Assessment/Plan:     Jose Carlos was seen today for head injury.    Diagnoses and all orders for this visit:    Acute post-traumatic headache, not intractable  -     propranolol (INDERAL) 60 MG tablet; Take 1 tablet (60 mg) by mouth daily  -     butalbital-acetaminophen-caffeine (ESGIC) -40 MG tablet; Take 1 tablet by mouth daily as needed for headaches or migraine    Concussion without loss of consciousness, initial encounter  -     CONCUSSION  REFERRAL    1. Patient education: In depth discussion and education was provided about the assessment and implications of each of the below recommendations for management. Patient indicated readiness to learn, all questions were answered and understanding of material presented was confirmed.    2. Work-up: none     3. Therapy/equipment/braces:  Start therapy program    4. Medications:  Start inderal with breakthrough firocet    5. Interventions: discussed progressive return to activity.  Will call if headache does not improve or worsens.      6. Referral / follow up with other providers:  Routine PCP.      7. Follow up: 3 months for progress.     Karsten Escobar, DO  Physical Medicine &  Rehabilitation    I spent a total of 69 minutes face-to-face with Jose Carlos Escamilla during today's office visit. Over 50% of this time was spent counseling the patient and/or coordinating care. See note for details.     69 minutes spent on the date of the encounter doing chart review, history and exam, documentation and further activities as noted above             No

## 2023-10-31 DIAGNOSIS — M35.3 PMR (POLYMYALGIA RHEUMATICA) (H): ICD-10-CM

## 2023-10-31 RX ORDER — TRAZODONE HYDROCHLORIDE 50 MG/1
TABLET, FILM COATED ORAL
Qty: 180 TABLET | Refills: 0 | Status: SHIPPED | OUTPATIENT
Start: 2023-10-31 | End: 2024-03-18

## 2023-10-31 NOTE — CONFIDENTIAL NOTE
Med: TRAZODONE    LOV (related): 5/1/23 - CPX      Due for F/U around: 12/2023 FOR [facility]WM    Next Appt: NONE

## 2023-11-28 DIAGNOSIS — E66.09 CLASS 1 OBESITY DUE TO EXCESS CALORIES WITH SERIOUS COMORBIDITY AND BODY MASS INDEX (BMI) OF 31.0 TO 31.9 IN ADULT: ICD-10-CM

## 2023-11-28 DIAGNOSIS — E66.811 CLASS 1 OBESITY DUE TO EXCESS CALORIES WITH SERIOUS COMORBIDITY AND BODY MASS INDEX (BMI) OF 31.0 TO 31.9 IN ADULT: ICD-10-CM

## 2023-11-28 RX ORDER — SEMAGLUTIDE 1.7 MG/.75ML
INJECTION, SOLUTION SUBCUTANEOUS
Qty: 3 ML | Refills: 0 | Status: SHIPPED | OUTPATIENT
Start: 2023-11-28 | End: 2023-12-20

## 2023-11-28 NOTE — TELEPHONE ENCOUNTER
[facility]--    Pharmacy--[facility]         LOV (related): 8/28/23      Due for F/U around: Dec  2023    Next Appt: 12/20/23        Wt Readings from Last 2 Encounters:   08/28/23 103.7 kg (228 lb 9.6 oz)   07/17/23 105.3 kg (232 lb 3.2 oz)       BMI Readings from Last 2 Encounters:   08/28/23 30.16 kg/m    07/17/23 30.64 kg/m

## 2023-12-13 ENCOUNTER — TRANSFERRED RECORDS (OUTPATIENT)
Dept: FAMILY MEDICINE | Facility: CLINIC | Age: 73
End: 2023-12-13

## 2023-12-20 ENCOUNTER — OFFICE VISIT (OUTPATIENT)
Dept: FAMILY MEDICINE | Facility: CLINIC | Age: 73
End: 2023-12-20

## 2023-12-20 VITALS
DIASTOLIC BLOOD PRESSURE: 81 MMHG | BODY MASS INDEX: 29.55 KG/M2 | SYSTOLIC BLOOD PRESSURE: 118 MMHG | OXYGEN SATURATION: 96 % | WEIGHT: 224 LBS | HEART RATE: 76 BPM

## 2023-12-20 DIAGNOSIS — R20.2 PARESTHESIAS: Primary | ICD-10-CM

## 2023-12-20 DIAGNOSIS — E66.09 CLASS 1 OBESITY DUE TO EXCESS CALORIES WITH SERIOUS COMORBIDITY AND BODY MASS INDEX (BMI) OF 31.0 TO 31.9 IN ADULT: ICD-10-CM

## 2023-12-20 DIAGNOSIS — E66.811 CLASS 1 OBESITY DUE TO EXCESS CALORIES WITH SERIOUS COMORBIDITY AND BODY MASS INDEX (BMI) OF 31.0 TO 31.9 IN ADULT: ICD-10-CM

## 2023-12-20 LAB
ALBUMIN SERPL BCG-MCNC: 4.3 G/DL (ref 3.5–5.2)
ALP SERPL-CCNC: 51 U/L (ref 40–150)
ALT SERPL W P-5'-P-CCNC: 23 U/L (ref 0–70)
ANION GAP SERPL CALCULATED.3IONS-SCNC: 8 MMOL/L (ref 7–15)
AST SERPL W P-5'-P-CCNC: 21 U/L (ref 0–45)
BILIRUB SERPL-MCNC: 0.5 MG/DL
BUN SERPL-MCNC: 17.7 MG/DL (ref 8–23)
CALCIUM SERPL-MCNC: 9 MG/DL (ref 8.8–10.2)
CHLORIDE SERPL-SCNC: 101 MMOL/L (ref 98–107)
CREAT SERPL-MCNC: 0.81 MG/DL (ref 0.67–1.17)
DEPRECATED HCO3 PLAS-SCNC: 28 MMOL/L (ref 22–29)
EGFRCR SERPLBLD CKD-EPI 2021: >90 ML/MIN/1.73M2
GLUCOSE SERPL-MCNC: 123 MG/DL (ref 70–99)
POTASSIUM SERPL-SCNC: 3.4 MMOL/L (ref 3.4–5.3)
PROT SERPL-MCNC: 6.6 G/DL (ref 6.4–8.3)
SODIUM SERPL-SCNC: 137 MMOL/L (ref 135–145)
TSH SERPL DL<=0.005 MIU/L-ACNC: 1.22 UIU/ML (ref 0.3–4.2)
VIT B12 SERPL-MCNC: 438 PG/ML (ref 232–1245)

## 2023-12-20 PROCEDURE — 36415 COLL VENOUS BLD VENIPUNCTURE: CPT | Performed by: FAMILY MEDICINE

## 2023-12-20 PROCEDURE — 82607 VITAMIN B-12: CPT | Mod: ORL | Performed by: FAMILY MEDICINE

## 2023-12-20 PROCEDURE — 84443 ASSAY THYROID STIM HORMONE: CPT | Mod: ORL | Performed by: FAMILY MEDICINE

## 2023-12-20 PROCEDURE — 99214 OFFICE O/P EST MOD 30 MIN: CPT | Performed by: FAMILY MEDICINE

## 2023-12-20 PROCEDURE — 82040 ASSAY OF SERUM ALBUMIN: CPT | Mod: ORL | Performed by: FAMILY MEDICINE

## 2023-12-20 RX ORDER — SEMAGLUTIDE 1.7 MG/.75ML
INJECTION, SOLUTION SUBCUTANEOUS
Qty: 3 ML | Refills: 2 | Status: SHIPPED | OUTPATIENT
Start: 2023-12-20 | End: 2024-04-19

## 2023-12-20 NOTE — PROGRESS NOTES
Pola Branch is a 73 year old patient who presents to clinic for follow up    Obesity: has done well on wegovy, taking 1.7 mg dose, tolerating well.  Continues to lose weight.  No side effects.       Wt Readings from Last 4 Encounters:   12/20/23 101.6 kg (224 lb)   08/28/23 103.7 kg (228 lb 9.6 oz)   07/17/23 105.3 kg (232 lb 3.2 oz)   06/07/23 107 kg (236 lb)       Chronic pain: follows with [facility].  See my previous note for details.  He is on chronic gabapentin and Hydrocodone.  Pain is not controlled without hydrocodone.  Pain is fairly stable but has occasional flares.  Can be severe.     HLD: tolerating statin     HTN: at goal on Lisinopril and chlorthalidone.  No side effects.     Adrenal insufficiency: secondary, on hydrocortisone, followed at [place].      Low testosterone: on Axiron, followed at [place]    Reports occasional sharp pain on dorsal fingers.  Intermittent.  Also increased numbness in feet at times.    Review of Systems   Constitutional, HEENT, cardiovascular, pulmonary, GI, , musculoskeletal, neuro, skin, endocrine and psych systems are negative, except as otherwise noted.      Objective    /81   Pulse 76   Wt 101.6 kg (224 lb)   SpO2 96%   BMI 29.55 kg/m      General: Well appearing, NAD  Psych: normal mood and affect        No results found for this or any previous visit (from the past 24 hour(s)).    Paresthesias  Workup as below  - TSH with free T4 reflex; Future  - Vitamin B12; Future  - Comprehensive metabolic panel; Future  - VENOUS COLLECTION  - TSH with free T4 reflex  - Vitamin B12  - Comprehensive metabolic panel    Class 1 obesity due to excess calories with serious comorbidity and body mass index (BMI) of 31.0 to 31.9 in adult  Continues to do very well.  Prefers not to increase dose.  Follow up in 3 months.  - Semaglutide-Weight Management (WEGOVY) 1.7 MG/0.75ML pen; INJECT 1.7 MG SUBCUTANOUS ONCE A WEEK  - VENOUS COLLECTION    Follow up in 3 months          
I fell 2 weeks ago and was doing ok but today while walking down the steps I stepped with the right leg and heard a snap. I cant walk on it now and the pain is so much worse.

## 2024-01-08 ENCOUNTER — OFFICE VISIT (OUTPATIENT)
Dept: OPHTHALMOLOGY | Facility: CLINIC | Age: 74
End: 2024-01-08
Attending: OPHTHALMOLOGY
Payer: MEDICARE

## 2024-01-08 DIAGNOSIS — H25.13 NUCLEAR SENILE CATARACT OF BOTH EYES: Primary | ICD-10-CM

## 2024-01-08 PROCEDURE — 76519 ECHO EXAM OF EYE: CPT | Performed by: OPHTHALMOLOGY

## 2024-01-08 PROCEDURE — 92025 CPTRIZED CORNEAL TOPOGRAPHY: CPT | Performed by: OPHTHALMOLOGY

## 2024-01-08 PROCEDURE — G0463 HOSPITAL OUTPT CLINIC VISIT: HCPCS | Performed by: OPHTHALMOLOGY

## 2024-01-08 PROCEDURE — 99214 OFFICE O/P EST MOD 30 MIN: CPT | Performed by: OPHTHALMOLOGY

## 2024-01-08 PROCEDURE — 92134 CPTRZ OPH DX IMG PST SGM RTA: CPT | Performed by: OPHTHALMOLOGY

## 2024-01-08 PROCEDURE — 92015 DETERMINE REFRACTIVE STATE: CPT | Mod: GY

## 2024-01-08 ASSESSMENT — REFRACTION_WEARINGRX
OD_SPHERE: PLANO
OD_ADD: +3.00
OS_AXIS: 095
OS_CYLINDER: +1.25
SPECS_TYPE: PAL
OD_CYLINDER: +0.50
OS_ADD: +3.00
OD_AXIS: 100
OS_SPHERE: -0.75

## 2024-01-08 ASSESSMENT — CONF VISUAL FIELD
OD_SUPERIOR_NASAL_RESTRICTION: 0
OD_NORMAL: 1
OD_SUPERIOR_TEMPORAL_RESTRICTION: 0
OS_SUPERIOR_NASAL_RESTRICTION: 0
OD_INFERIOR_NASAL_RESTRICTION: 0
OS_INFERIOR_TEMPORAL_RESTRICTION: 0
OS_NORMAL: 1
OS_SUPERIOR_TEMPORAL_RESTRICTION: 0
OD_INFERIOR_TEMPORAL_RESTRICTION: 0
METHOD: COUNTING FINGERS
OS_INFERIOR_NASAL_RESTRICTION: 0

## 2024-01-08 ASSESSMENT — VISUAL ACUITY
OS_PH_CC: 20/25
OS_CC: 20/40
OD_CC+: -2
METHOD_MR: OPPOSITE SIGNS ARE CORRECT.
METHOD: SNELLEN - LINEAR
CORRECTION_TYPE: GLASSES
OS_PH_CC+: -3
OD_CC: 20/20

## 2024-01-08 ASSESSMENT — REFRACTION_MANIFEST
OD_SPHERE: +0.50
OS_ADD: +3.00
OD_CYLINDER: +0.50
OS_AXIS: 055
OS_CYLINDER: +1.75
OS_SPHERE: -1.00
OD_AXIS: 100
OD_ADD: +3.00

## 2024-01-08 ASSESSMENT — EXTERNAL EXAM - LEFT EYE: OS_EXAM: NORMAL

## 2024-01-08 ASSESSMENT — EXTERNAL EXAM - RIGHT EYE: OD_EXAM: NORMAL

## 2024-01-08 ASSESSMENT — SLIT LAMP EXAM - LIDS
COMMENTS: NORMAL
COMMENTS: NORMAL

## 2024-01-08 ASSESSMENT — TONOMETRY
OS_IOP_MMHG: 17
OD_IOP_MMHG: 17
IOP_METHOD: ICARE

## 2024-01-08 ASSESSMENT — CUP TO DISC RATIO
OD_RATIO: 0.25
OS_RATIO: 0.25

## 2024-01-08 NOTE — PROGRESS NOTES
Chief complaint   Routine eyeexam  Referring Provider:  Referring Provider: Referred Self    HPI    Jose Carlos Escamilla 73 year old male     Chief Complaint(s) and History of Present Illness(es)       COMPREHENSIVE EYE EXAM    Associated symptoms include dryness, tearing and headache.  Negative for eye pain, redness, flashes and floaters.  Treatments tried include warm compresses.  Pain was noted as 0/10.             Comments    Pt here today for annual routine eye exam.  DEB was here 4/1/2021 - no issues at last exam.  Pt states vision is stable - eyes are sticky in the morning.  Has gunky sensation in both eyes.    Ocular meds = none    Kody MAX, ERIC 9:35 AM 11/09/2022                          Interval hx: Intermittent blurry vision for 6-8 months.  Needs brighter light. No flashes/floaters.  MICKI in th am. No ocular meds.       Past ocular history   Prior eye surgery/laser/Trauma: No  CTL wearer:No  Glasses : prog  Family Hx of eye disease: No    PMH     Past Medical History:   Diagnosis Date    ACP (advance care planning)     Adrenal insufficiency (H24)     Chronic pain syndrome     Chronic rhinitis     Diverticulosis     Esophageal stenosis     Hypercholesterolemia     IBS (irritable bowel syndrome)     Meckel's diverticulitis 7/13/2021    Added automatically from request for surgery [id number]    Narcotic dependence (H)        PSH     Past Surgical History:   Procedure Laterality Date    ARTHROPLASTY KNEE Left 12/2/2016    Procedure: ARTHROPLASTY KNEE;  Surgeon: Marisa Page MD;  Location:  OR    ARTHROPLASTY KNEE Right 2/25/2022    Procedure: RIGHT KNEE ARTHROPLASTY;  Surgeon: Marisa Page MD;  Location:  OR    DISCECTOMY CERVICAL MINIMALLY INVASIVE ONE LEVEL      LAPAROSCOPIC APPENDECTOMY N/A 7/14/2021    Procedure: Laparoscopic appendectomy;  Surgeon: Kelsi Parker MD;  Location:  OR    LAPAROSCOPY DIAGNOSTIC (GENERAL) N/A 7/14/2021    Procedure: Diagnostic laparoscopy,  laparoscopic appendectomy, laparoscopic meckel excision;  Surgeon: Kelsi Parker MD;  Location: [facility] OR    NECK SURGERY  2001    [facility]     Current Outpatient Medications   Medication    chlorthalidone (HYGROTON) 25 MG tablet    dexamethasone (DECADRON) 4 MG/ML injection    gabapentin (NEURONTIN) 300 MG capsule    HYDROcodone-acetaminophen (NORCO)  MG per tablet    hydrocortisone (CORTEF) 5 MG tablet    lisinopril (ZESTRIL) 20 MG tablet    omeprazole (PRILOSEC) 20 MG DR capsule    rosuvastatin (CRESTOR) 10 MG tablet    Semaglutide-Weight Management (WEGOVY) 1.7 MG/0.75ML pen    senna-docusate (SENOKOT-S/PERICOLACE) 8.6-50 MG tablet    tadalafil (CIALIS) 20 MG tablet    testosterone (AXIRON) 30 MG/ACT topical solution    traZODone (DESYREL) 50 MG tablet     No current facility-administered medications for this visit.       Drops Currently Taking   None    Assessment/Plan     # Nuclear sclerosis, each eye  # Myopia with astigmatism  Became visually significant due to a cortical spoke in the center of the vision each eye  Patient is having difficulty with daily activities due to visual impairment. Patient feels that they are no longer managing with current correction and would like to move forward with cataract surgery. Explained benefits alternatives and risks including but not limited to loss of vision, severe infection, retinal detachment, need for more surgery, visual disturbances etc...  Informed patient that reaching uncorrected vision aim (without glasses) after cataract surgery is not certain. Made patient aware they may need glasses, laser vision correction or in worst case scenario, IOL exchange.      Dilates well  no PXF  no Flomax  no guttae    -Aim:distance left eye, right eye near (1.75  - dominant eye OS  -Previous refractive surgery status:-  -Corneal astigmatism>1  Patient is okay to toric lenses  Patient would like to have monovision  -to upload the [place] pictures on USB before  surgery  -patient has bob disease and told to increase steroid before surgery (after checking with his doctor)        # MICKI, each eye  - Rec ATs 4x/day    Follow up:  Attending Physician Attestation:  Complete documentation of historical and exam elements from today's encounter can be found in the full encounter summary report (not reduplicated in this progress note).  I personally obtained the chief complaint(s) and history of present illness.  I confirmed and edited as necessary the review of systems, past medical/surgical history, family history, social history, and examination findings as documented by others; and I examined the patient myself.  I personally reviewed the relevant tests, images, and reports as documented above.  I formulated and edited as necessary the assessment and plan and discussed the findings and management plan with the patient and family. - Jaimee Gunderson MD

## 2024-01-08 NOTE — NURSING NOTE
Chief Complaints and History of Present Illnesses   Patient presents with    COMPREHENSIVE EYE EXAM     Chief Complaint(s) and History of Present Illness(es)       COMPREHENSIVE EYE EXAM              Laterality: both eyes    Onset: gradual    Onset: years ago    Associated symptoms: a need for brighter lights and dryness.  Negative for glare, haloes, eye pain, tearing, flashes and floaters    Treatments tried: no treatments    Pain scale: 0/10              Comments    Intermittent blurry vision for 6-8 months.  Needs brighter light.  No flashes/floaters.  MICKI in th am.  No ocular meds.    MONET Pearce January 8, 2024 12:39 PM

## 2024-01-09 ENCOUNTER — TELEPHONE (OUTPATIENT)
Dept: OPHTHALMOLOGY | Facility: CLINIC | Age: 74
End: 2024-01-09
Payer: MEDICARE

## 2024-01-09 PROBLEM — H25.13 NUCLEAR SENILE CATARACT OF BOTH EYES: Status: ACTIVE | Noted: 2024-01-08

## 2024-01-14 DIAGNOSIS — I10 ESSENTIAL HYPERTENSION: ICD-10-CM

## 2024-01-15 ENCOUNTER — TRANSFERRED RECORDS (OUTPATIENT)
Dept: FAMILY MEDICINE | Facility: CLINIC | Age: 74
End: 2024-01-15

## 2024-01-15 RX ORDER — CHLORTHALIDONE 25 MG/1
TABLET ORAL
OUTPATIENT
Start: 2024-01-15

## 2024-01-15 NOTE — TELEPHONE ENCOUNTER
Chlorthalidone 25mg 90 tabs + 3 refills sent to [facility] #[id number] 7/21/23. Refill should not be due until 7/15/24. Please disregard.    Margarita Roldan CMA on 1/15/2024 at 9:28 AM

## 2024-01-16 ENCOUNTER — TRANSFERRED RECORDS (OUTPATIENT)
Dept: FAMILY MEDICINE | Facility: CLINIC | Age: 74
End: 2024-01-16

## 2024-01-26 ENCOUNTER — OFFICE VISIT (OUTPATIENT)
Dept: FAMILY MEDICINE | Facility: CLINIC | Age: 74
End: 2024-01-26

## 2024-01-26 VITALS
HEIGHT: 73 IN | BODY MASS INDEX: 29.55 KG/M2 | SYSTOLIC BLOOD PRESSURE: 101 MMHG | HEART RATE: 83 BPM | DIASTOLIC BLOOD PRESSURE: 73 MMHG | OXYGEN SATURATION: 96 % | WEIGHT: 223 LBS

## 2024-01-26 DIAGNOSIS — I10 ESSENTIAL HYPERTENSION: ICD-10-CM

## 2024-01-26 DIAGNOSIS — R63.1 POLYDIPSIA: ICD-10-CM

## 2024-01-26 DIAGNOSIS — R39.9 LOWER URINARY TRACT SYMPTOMS (LUTS): Primary | ICD-10-CM

## 2024-01-26 LAB
BACTERIA (RMG): NORMAL
BILIRUBIN (RMG): NEGATIVE
BLOOD (RMG): NEGATIVE
CASTS (RMG): NORMAL
COLOR UR: YELLOW
CRYSTAL (RMG): NORMAL
EPITHELIAL (RMG): NORMAL
GLUCOSE (RMG): NEGATIVE
HBA1C MFR BLD: 5.5 %
KETONE (RMG): NEGATIVE
LEUKOCYTE (RMG): NEGATIVE
MUCOUS (RMG): NORMAL
NITRITE (RMG): NEGATIVE
PH UR STRIP: 6 PH (ref 5–7)
PROTEIN (RMG): NEGATIVE
RBC (RMG): NORMAL
SP GR UR STRIP: 1.02
UROBILINOGEN (RMG): 0.2
WBC (RMG): NORMAL

## 2024-01-26 PROCEDURE — 99214 OFFICE O/P EST MOD 30 MIN: CPT | Performed by: FAMILY MEDICINE

## 2024-01-26 PROCEDURE — 83036 HEMOGLOBIN GLYCOSYLATED A1C: CPT | Mod: ORL | Performed by: FAMILY MEDICINE

## 2024-01-26 PROCEDURE — 36415 COLL VENOUS BLD VENIPUNCTURE: CPT | Performed by: FAMILY MEDICINE

## 2024-01-26 PROCEDURE — 81003 URINALYSIS AUTO W/O SCOPE: CPT | Performed by: FAMILY MEDICINE

## 2024-01-26 RX ORDER — TAMSULOSIN HYDROCHLORIDE 0.4 MG/1
0.4 CAPSULE ORAL DAILY
Qty: 90 CAPSULE | Refills: 0 | Status: SHIPPED | OUTPATIENT
Start: 2024-01-26 | End: 2024-04-19

## 2024-01-26 RX ORDER — CHLORTHALIDONE 25 MG/1
25 TABLET ORAL DAILY
Qty: 90 TABLET | Refills: 3 | Status: CANCELLED | OUTPATIENT
Start: 2024-01-26

## 2024-01-26 NOTE — PROGRESS NOTES
"Answers submitted by the patient for this visit:  General Questionnaire (Submitted on 1/25/2024)  Chief Complaint: Chronic problems general questions HPI Form  How many servings of fruits and vegetables do you eat daily?: 2-3  On average, how many sweetened beverages do you drink each day (Examples: soda, juice, sweet tea, etc.  Do NOT count diet or artificially sweetened beverages)?: 0  How many minutes a day do you exercise enough to make your heart beat faster?: 10 to 19  How many days a week do you exercise enough to make your heart beat faster?: 4  How many days per week do you miss taking your medication?: 0  General Concern (Submitted on 1/25/2024)  Chief Complaint: Chronic problems general questions HPI Form  What is the reason for your visit today?: I am having some problems at night with bladder control  What are your symptoms?: Occasionally I need to get up multiple times  during the night to urinate.Sometimes as many as 6 or 7.  On rare occasions I will need to urinate urgently.  How would you describe these symptoms?: Moderate  Are your symptoms:: Worsening  Have you had these symptoms before?: No  Is there anything that makes you feel worse?: It seems to be related to my adrenal insufficiency issues.  Is there anything that makes you feel better?: Usually rest and lack of stress lessens this problem      Subjective     Jose Carlos is a 73 year old patient who presents to clinic for evaluation.  Nocturia has been gradually worsening.  Now up numerous times per night.  Occasional small amount of burning with urination.  No fever, chills, nausea or flank pain.  No hematuria.  He has been out of chlorthalidone for 4-5 days.  BP has been normal or low.  Asymptomatic.        Review of Systems   Constitutional, HEENT, cardiovascular, pulmonary, GI, , musculoskeletal, neuro, skin, endocrine and psych systems are negative, except as otherwise noted.      Objective    /73   Pulse 83   Ht 1.854 m (6' 1\")   Wt " 101.2 kg (223 lb)   SpO2 96%   BMI 29.42 kg/m      General: Well appearing, NAD  Rectal: prostate is normal size and without bogginess or tenderness.  No nodule  Psych: normal mood and affect        Results for orders placed or performed in visit on 01/26/24 (from the past 24 hour(s))   Urinalysis (RMG)   Result Value Ref Range    Color Urine Yellow     pH Urine 6.0 pH    Specific Gravity Urine 1.020     PROTEIN (RMG) Negative Negative    GLUCOSE (RMG) Negative Negative    KETONE (RMG) Negative Negative    LEUKOCYTE (RMG) Negative Negative    BLOOD (RMG) Negative Negative    Nitrite (RMG) Negative Negative, Positive    BILIRUBIN (RMG) Negative Negative    UROBILINOGEN (RMG) 0.2 0.2 - 1    WBC (RMG) None     RBC (RMG) None     CRYSTAL (RMG) None     BACTERIA (RMG) None     MUCOUS (RMG) None     CASTS (RMG) None     EPITHELIAL (RMG) None        Lower urinary tract symptoms (LUTS)  UA is negative.  Prostate unremarkable but not easily palpated.  Trial of tamsulosin and recommend urology referral given normal exam and disruptive symptoms.  - Urinalysis (RMG)  - tamsulosin (FLOMAX) 0.4 MG capsule; Take 1 capsule (0.4 mg) by mouth daily  - Adult Urology  Referral - To a [facility] Location (Use POS/Location); Future    Essential hypertension  Stop chlorthalidone    Polydipsia  See above.  Check a1c  - VENOUS COLLECTION  - Hemoglobin A1c; Future  - Hemoglobin A1c

## 2024-02-07 ENCOUNTER — TRANSFERRED RECORDS (OUTPATIENT)
Dept: FAMILY MEDICINE | Facility: CLINIC | Age: 74
End: 2024-02-07

## 2024-03-05 ENCOUNTER — OFFICE VISIT (OUTPATIENT)
Dept: FAMILY MEDICINE | Facility: CLINIC | Age: 74
End: 2024-03-05

## 2024-03-05 VITALS
WEIGHT: 226 LBS | BODY MASS INDEX: 29.82 KG/M2 | DIASTOLIC BLOOD PRESSURE: 83 MMHG | SYSTOLIC BLOOD PRESSURE: 122 MMHG | HEART RATE: 69 BPM | OXYGEN SATURATION: 97 %

## 2024-03-05 DIAGNOSIS — G62.9 PERIPHERAL POLYNEUROPATHY: Primary | ICD-10-CM

## 2024-03-05 PROCEDURE — 99214 OFFICE O/P EST MOD 30 MIN: CPT | Performed by: FAMILY MEDICINE

## 2024-03-05 RX ORDER — NORTRIPTYLINE HCL 10 MG
CAPSULE ORAL
Qty: 249 CAPSULE | Refills: 0 | Status: SHIPPED | OUTPATIENT
Start: 2024-03-05 | End: 2024-04-29

## 2024-03-05 NOTE — PROGRESS NOTES
Pola Branch is a 74 year old patient who presents to clinic for evaluation.  Has chronic neuropathy of uncertain etiology that has been intermittently bothersome in addition to chronic pain syndrome.  He is on gabapentin 2700 mg daily.  TSH, B12 and other evaluation has been unremarkable.  Was on duloxetine in past for chronic pain and did not feel was effective.  He did start nortriptyline in the past but something happened with prescription and did not take regularly.          Review of Systems   Constitutional, HEENT, cardiovascular, pulmonary, GI, , musculoskeletal, neuro, skin, endocrine and psych systems are negative, except as otherwise noted.      Objective    /83   Pulse 69   Wt 102.5 kg (226 lb)   SpO2 97%   BMI 29.82 kg/m      General: Well appearing, NAD  Ext: normal in appearance  Psych: normal mood and affect        No results found for this or any previous visit (from the past 24 hour(s)).    Peripheral polyneuropathy  Uncertain etiology.  Trial of adding nortriptyline and will also refer to neurology for more comprehensive work up.  Proper use and potential benefits, harms and side effects discussed in detail.  - nortriptyline (PAMELOR) 10 MG capsule; Take 1 capsule (10 mg) by mouth at bedtime for 7 days, THEN 2 capsules (20 mg) at bedtime for 7 days, THEN 3 capsules (30 mg) at bedtime for 76 days.  - Adult Neurology  Referral - To a [facility] Location (Use POS/Location); Future    Follow up in 2-3 months, sooner if needed          Answers submitted by the patient for this visit:  General Questionnaire (Submitted on 3/1/2024)  Chief Complaint: Chronic problems general questions HPI Form  How many servings of fruits and vegetables do you eat daily?: 4 or more  On average, how many sweetened beverages do you drink each day (Examples: soda, juice, sweet tea, etc.  Do NOT count diet or artificially sweetened beverages)?: 0  How many minutes a day do you exercise  enough to make your heart beat faster?: 20 to 29  How many days a week do you exercise enough to make your heart beat faster?: 4  How many days per week do you miss taking your medication?: 0  General Concern (Submitted on 3/1/2024)  Chief Complaint: Chronic problems general questions HPI Form  What is the reason for your visit today?: Burning sensation in my feet - mostly at night  and continuing pain issues  When did your symptoms begin?: More than a month  What are your symptoms?: Burning in my feet and muscle pain  Are your symptoms:: Worsening  Have you had these symptoms before?: No  Is there anything that makes you feel better?: wearing socks to bed

## 2024-03-15 ENCOUNTER — TRANSFERRED RECORDS (OUTPATIENT)
Dept: FAMILY MEDICINE | Facility: CLINIC | Age: 74
End: 2024-03-15

## 2024-03-18 DIAGNOSIS — M35.3 PMR (POLYMYALGIA RHEUMATICA) (H): ICD-10-CM

## 2024-03-19 NOTE — TELEPHONE ENCOUNTER
Med: trazodone     LOV (related): 5/1/23      Due for F/U around: due soon for cpx    Next Appt: None

## 2024-03-20 RX ORDER — TRAZODONE HYDROCHLORIDE 50 MG/1
TABLET, FILM COATED ORAL
Qty: 180 TABLET | Refills: 0 | Status: SHIPPED | OUTPATIENT
Start: 2024-03-20 | End: 2024-06-14

## 2024-03-26 NOTE — TELEPHONE ENCOUNTER
3/26/24 patient in [place] now.  Will need preop in April.  Was told preop and physical should not be on the same day.

## 2024-04-15 DIAGNOSIS — E78.00 HYPERCHOLESTEROLEMIA: ICD-10-CM

## 2024-04-15 NOTE — TELEPHONE ENCOUNTER
"Med: rousuvastatin    LOV (related): 07/17/52023    Last Lab: 07/17/2023      Due for F/U around: 05/2024 (AWV)    Next Appt: 04/29/2024        Cholesterol   Date Value Ref Range Status   07/17/2023 152 100 - 199 mg/dL Final   05/01/2023 251 (A) 100 - 199 mg/dL Final   09/13/2021 229 (H) 100 - 199 mg/dL Final   06/16/2020 226 (H) 100 - 199 mg/dL Final     HDL Cholesterol   Date Value Ref Range Status   09/13/2021 56 >39 mg/dL Final   06/16/2020 61 >39 mg/dL Final     HDL   Date Value Ref Range Status   07/17/2023 53 40 mg/dL Final   05/01/2023 59 40 mg/dL Final     LDL Cholesterol Calculated   Date Value Ref Range Status   09/13/2021 154 (H) 0 - 99 mg/dL Final   06/16/2020 148 (H) 0 - 99 mg/dL Final     LDL CALCULATED ([facility])   Date Value Ref Range Status   07/17/2023 76 0 - 130 mg/dL Final   05/01/2023 172 (A) 0 - 130 mg/dL Final     Triglycerides   Date Value Ref Range Status   07/17/2023 115 0 - 149 mg/dL Final   05/01/2023 100 0 - 149 mg/dL Final   09/13/2021 107 0 - 149 mg/dL Final   06/16/2020 84 0 - 149 mg/dL Final     No results found for: \"CHOLHDLRATIO\"     "

## 2024-04-16 RX ORDER — ROSUVASTATIN CALCIUM 10 MG/1
10 TABLET, COATED ORAL DAILY
Qty: 90 TABLET | Refills: 3 | Status: SHIPPED | OUTPATIENT
Start: 2024-04-16

## 2024-04-17 ENCOUNTER — TRANSFERRED RECORDS (OUTPATIENT)
Dept: FAMILY MEDICINE | Facility: CLINIC | Age: 74
End: 2024-04-17

## 2024-04-18 DIAGNOSIS — E66.811 CLASS 1 OBESITY DUE TO EXCESS CALORIES WITH SERIOUS COMORBIDITY AND BODY MASS INDEX (BMI) OF 31.0 TO 31.9 IN ADULT: ICD-10-CM

## 2024-04-18 DIAGNOSIS — R39.9 LOWER URINARY TRACT SYMPTOMS (LUTS): ICD-10-CM

## 2024-04-18 DIAGNOSIS — E66.09 CLASS 1 OBESITY DUE TO EXCESS CALORIES WITH SERIOUS COMORBIDITY AND BODY MASS INDEX (BMI) OF 31.0 TO 31.9 IN ADULT: ICD-10-CM

## 2024-04-18 NOTE — TELEPHONE ENCOUNTER
Med: Wegovy  Tamsulosin       LOV (related): 1/26/24 for Tamsulosin   And 12/20/23 for Wegovy        Due for F/U around: 3 months for weight     Next Appt: 4/29/24 (preop) with Beny Winters   And 6/18/24 (cpx) with Beny Winters           Wt Readings from Last 2 Encounters:   03/05/24 102.5 kg (226 lb)   01/26/24 101.2 kg (223 lb)       BMI Readings from Last 2 Encounters:   03/05/24 29.82 kg/m    01/26/24 29.42 kg/m

## 2024-04-19 RX ORDER — TAMSULOSIN HYDROCHLORIDE 0.4 MG/1
0.4 CAPSULE ORAL DAILY
Qty: 90 CAPSULE | Refills: 0 | Status: SHIPPED | OUTPATIENT
Start: 2024-04-19 | End: 2024-07-19

## 2024-04-19 RX ORDER — SEMAGLUTIDE 1.7 MG/.75ML
INJECTION, SOLUTION SUBCUTANEOUS
Qty: 3 ML | Refills: 2 | Status: SHIPPED | OUTPATIENT
Start: 2024-04-19 | End: 2024-06-18 | Stop reason: DRUGHIGH

## 2024-04-22 ENCOUNTER — TRANSFERRED RECORDS (OUTPATIENT)
Dept: FAMILY MEDICINE | Facility: CLINIC | Age: 74
End: 2024-04-22

## 2024-04-29 ENCOUNTER — OFFICE VISIT (OUTPATIENT)
Dept: FAMILY MEDICINE | Facility: CLINIC | Age: 74
End: 2024-04-29

## 2024-04-29 VITALS
OXYGEN SATURATION: 95 % | SYSTOLIC BLOOD PRESSURE: 137 MMHG | BODY MASS INDEX: 29.82 KG/M2 | DIASTOLIC BLOOD PRESSURE: 85 MMHG | HEART RATE: 84 BPM | WEIGHT: 226 LBS

## 2024-04-29 DIAGNOSIS — H26.9 CATARACT OF BOTH EYES, UNSPECIFIED CATARACT TYPE: ICD-10-CM

## 2024-04-29 DIAGNOSIS — Z01.818 PREOP GENERAL PHYSICAL EXAM: Primary | ICD-10-CM

## 2024-04-29 DIAGNOSIS — G62.9 PERIPHERAL POLYNEUROPATHY: ICD-10-CM

## 2024-04-29 DIAGNOSIS — Q45.3 PANCREATIC DUCTAL ABNORMALITY: ICD-10-CM

## 2024-04-29 DIAGNOSIS — F11.20 NARCOTIC DEPENDENCE (H): ICD-10-CM

## 2024-04-29 DIAGNOSIS — E27.49 SECONDARY ADRENAL INSUFFICIENCY (H): ICD-10-CM

## 2024-04-29 DIAGNOSIS — R91.1 PULMONARY NODULE: ICD-10-CM

## 2024-04-29 DIAGNOSIS — G89.4 CHRONIC PAIN SYNDROME: ICD-10-CM

## 2024-04-29 PROCEDURE — 99214 OFFICE O/P EST MOD 30 MIN: CPT | Performed by: FAMILY MEDICINE

## 2024-04-29 PROCEDURE — G2211 COMPLEX E/M VISIT ADD ON: HCPCS | Performed by: FAMILY MEDICINE

## 2024-04-29 RX ORDER — DULOXETIN HYDROCHLORIDE 30 MG/1
30 CAPSULE, DELAYED RELEASE ORAL 2 TIMES DAILY
COMMUNITY
End: 2024-06-18

## 2024-04-29 RX ORDER — NORTRIPTYLINE HCL 10 MG
30 CAPSULE ORAL AT BEDTIME
Qty: 270 CAPSULE | Refills: 1 | Status: SHIPPED | OUTPATIENT
Start: 2024-04-29 | End: 2024-10-26

## 2024-04-29 NOTE — PROGRESS NOTES
Preoperative Evaluation  [facility] [address] [place] [address]  Phone: [phone]  Fax: [phone]  Primary Provider: Beny Winters  Pre-op Performing Provider: BENY WINTERS  Apr 29, 2024       Jose Carlos is a 74 year old, presenting for the following:  Pre-Op Exam (Cataracts) and Recheck Medication (Cymbalta from Neurologist - was suppose to start when back from vacation but has not yet - feels he has enough meds already)      Surgical Information  Surgery/Procedure: RIGHT EYE PHACOEMULSIFICATION, CATARACT, WITH STANDARD INTRAOCULAR LENS IMPLANT INSERTION   Surgery [facility]  [facility]  Surgeon: Jaimee Gunderson MD   Surgery Date: 5/16/24 (R eye) & 5/23/24 (L eye)  Time of Surgery: 1155  Where patient plans to recover: At home with family  Fax number for surgical facility: Note does not need to be faxed, will be available electronically in Epic.    Assessment & Plan     The proposed surgical procedure is considered LOW risk.    Preop general physical exam  No apparent contraindications    Cataract of both eyes, unspecified cataract type  Planned surgery    Pancreatic ductal abnormality  Obtain nonemergent MRI pancreas  - MR Pancreas wo & w Contrast; Future    Pulmonary nodule  Due to former smoker status will repeat CT in one year  - CT Chest w/o Contrast; Future    Peripheral polyneuropathy  Improved on nortriptyline  - nortriptyline (PAMELOR) 10 MG capsule; Take 3 capsules (30 mg) by mouth at bedtime for 180 days    Narcotic dependence (H)  No escalating use.  His goal to wean down when able    Secondary adrenal insufficiency (H24)  stable/controlled. Cont current medication(s) and treatment  Cont endo follow up    Chronic pain syndrome  stable/controlled. Cont current medication(s) and treatment            - No identified additional risk factors other than previously addressed    Antiplatelet or Anticoagulation Medication  Instructions   - Bleeding risk is low for this procedure (e.g. dental, skin, cataract).    Additional Medication Instructions  Patient is to take all scheduled medications on the day of surgery    Recommendation  APPROVAL GIVEN to proceed with proposed procedure, without further diagnostic evaluation.          Subjective       HPI related to upcoming procedure: Here for preoperative risk assessment prior to upcoming cataract surgery.  Doing well.      Of note, he had a recent ER evaluation with abd pain and vomiting.  Workup unrevealing and symptoms have resolved but two incidental findings.  One is a dilated pancreatic duct and also a small 3 mm pulmonary nodule.  Further testing is recommended.    Obesity: has done well on wegovy, taking 1.7 mg dose, tolerating well.  Continues to lose weight.  No side effects.       Wt Readings from Last 4 Encounters:   04/29/24 102.5 kg (226 lb)   03/05/24 102.5 kg (226 lb)   01/26/24 101.2 kg (223 lb)   12/20/23 101.6 kg (224 lb)       Chronic pain: follows with [facility].  See my previous note for details.  He is on chronic gabapentin and Hydrocodone. Nortriptyline added which has helped. Pain is not controlled without hydrocodone.  Pain is fairly stable but has occasional flares.  Can be severe.     HLD: tolerating statin     HTN: at goal on Lisinopril and chlorthalidone.  No side effects.     Adrenal insufficiency: secondary, on hydrocortisone, followed at [facility].      Low testosterone: on Axiron, followed at [facility]      4/28/2024     6:50 PM   Preop Questions   1. Have you ever had a heart attack or stroke? No   2. Have you ever had surgery on your heart or blood vessels, such as a stent placement, a coronary artery bypass, or surgery on an artery in your head, neck, heart, or legs? No   3. Do you have chest pain with activity? No   4. Do you have a history of  heart failure? No   5. Do you currently have a cold, bronchitis or symptoms of other infection? No   6. Do you have a  cough, shortness of breath, or wheezing? No   7. Do you or anyone in your family have previous history of blood clots? No   8. Do you or does anyone in your family have a serious bleeding problem such as prolonged bleeding following surgeries or cuts? No   9. Have you ever had problems with anemia or been told to take iron pills? YES - 60 years ago   10. Have you had any abnormal blood loss such as black, tarry or bloody stools? No   11. Have you ever had a blood transfusion? No   12. Are you willing to have a blood transfusion if it is medically needed before, during, or after your surgery? Yes   13. Have you or any of your relatives ever had problems with anesthesia? No   14. Do you have sleep apnea, excessive snoring or daytime drowsiness? No   15. Do you have any artifical heart valves or other implanted medical devices like a pacemaker, defibrillator, or continuous glucose monitor? No   16. Do you have artificial joints? YES - bilat knees   17. Are you allergic to latex? No       Health Care Directive  Patient does not have a Health Care Directive or Living Will: Discussed advance care planning with patient; information given to patient to review.    Preoperative Review of    reviewed - controlled substances reflected in medication list.      Status of Chronic Conditions:  See problem list for active medical problems.  Problems all longstanding and stable, except as noted/documented.  See ROS for pertinent symptoms related to these conditions.    Patient Active Problem List    Diagnosis Date Noted    Nuclear senile cataract of both eyes 01/08/2024     Priority: Medium    Secondary adrenal insufficiency (H24) 05/01/2023     Priority: Medium    Primary osteoarthritis of right knee 02/18/2022     Priority: Medium    Plantar fasciitis 03/07/2018     Priority: Medium    Total knee replacement status 12/02/2016     Priority: Medium    Benign essential hypertension 11/21/2016     Priority: Medium    Narcotic  dependence (H) 09/08/2016     Priority: Medium     1/19/2017 Patient is now seeing [facility].  They are witting his prescriptions for pain medication now.      Chronic pain syndrome 06/20/2016     Priority: Medium     Pain is in the legs and seemed to start after starting a statin. Huge w/u at [facility]. Done, gabapentin helps They are referring him to the [facility] as nothing is making sense for pain.      History of rheumatic fever 02/16/2016     Priority: Medium    Esophageal stenosis 06/11/2015     Priority: Medium    Obesity 06/11/2015     Priority: Medium    Esophageal reflux 04/10/2014     Priority: Medium    Family history of hemochromatosis 03/06/2013     Priority: Medium    ACP (advance care planning)      Priority: Medium     Advance Care Planning 2/9/2017: Receipt of ACP document:  Received: invalid HCD document dated 11-28-16.  Document previously scanned on 12-5-16.  Validation form completed indicating invalid document. Copy sent to client with information and facilitation resources. Validation form sent to be scanned as notation of invalid document received. Request made to delete invalid document.  Confirmed/documented designated decision maker(s).  Added by Trena Ronquillo RN Advance Care Planning Liaison with Lui Persaud            Hypercholesterolemia      Priority: Medium      Past Medical History:   Diagnosis Date    ACP (advance care planning)     Adrenal insufficiency (H24)     Chronic pain syndrome     Chronic rhinitis     Diverticulosis     Esophageal stenosis     Hypercholesterolemia     IBS (irritable bowel syndrome)     Meckel's diverticulitis 7/13/2021    Added automatically from request for surgery [id number]    Narcotic dependence (H)      Past Surgical History:   Procedure Laterality Date    ARTHROPLASTY KNEE Left 12/2/2016    Procedure: ARTHROPLASTY KNEE;  Surgeon: Marisa Page MD;  Location: [facility] OR    ARTHROPLASTY KNEE Right 2/25/2022    Procedure: RIGHT KNEE  ARTHROPLASTY;  Surgeon: Marisa Page MD;  Location: [facility] OR    DISCECTOMY CERVICAL MINIMALLY INVASIVE ONE LEVEL      LAPAROSCOPIC APPENDECTOMY N/A 7/14/2021    Procedure: Laparoscopic appendectomy;  Surgeon: Kelsi Parker MD;  Location: [facility] OR    LAPAROSCOPY DIAGNOSTIC (GENERAL) N/A 7/14/2021    Procedure: Diagnostic laparoscopy, laparoscopic appendectomy, laparoscopic meckel excision;  Surgeon: Kelsi Parker MD;  Location: [facility] OR    NECK SURGERY  2001    [place]     Current Outpatient Medications   Medication Sig Dispense Refill    dexamethasone (DECADRON) 4 MG/ML injection   3    gabapentin (NEURONTIN) 300 MG capsule Take 2,700 mg by mouth daily 4 Tabs in AM and 5 tabs in PM      HYDROcodone-acetaminophen (NORCO)  MG per tablet       hydrocortisone (CORTEF) 5 MG tablet Take 20 mg by mouth daily      lisinopril (ZESTRIL) 20 MG tablet TAKE 1 TABLET(20 MG) BY MOUTH DAILY 90 tablet 3    nortriptyline (PAMELOR) 10 MG capsule Take 3 capsules (30 mg) by mouth at bedtime for 180 days 270 capsule 1    omeprazole (PRILOSEC) 20 MG DR capsule Take 20 mg by mouth daily      rosuvastatin (CRESTOR) 10 MG tablet TAKE 1 TABLET(10 MG) BY MOUTH DAILY 90 tablet 3    Semaglutide-Weight Management (WEGOVY) 1.7 MG/0.75ML pen INJECT 1.7MG UNDER THE SKIN ONCE A WEEK 3 mL 2    senna-docusate (SENOKOT-S/PERICOLACE) 8.6-50 MG tablet Take 1-2 tablets by mouth 2 times daily Take while on oral narcotics to prevent or treat constipation. 30 tablet 0    tadalafil (CIALIS) 20 MG tablet TAKE ONE TABLET BY MOUTH ONE TIME DAILY AS NEEDED 30 tablet 3    tamsulosin (FLOMAX) 0.4 MG capsule TAKE 1 CAPSULE(0.4 MG) BY MOUTH DAILY 90 capsule 0    testosterone (AXIRON) 30 MG/ACT topical solution Place 30 mg onto the skin daily 2 pumps daily      traZODone (DESYREL) 50 MG tablet TAKE 2 TABLETS(100 MG) BY MOUTH AT BEDTIME 180 tablet 0    DULoxetine (CYMBALTA) 30 MG capsule Take 30 mg by mouth 2 times daily Not yet started (Patient not taking:  "Reported on 2024)         Allergies   Allergen Reactions    Penicillins Rash and Anaphylaxis     Other reaction(s): Breathing Difficulty    Zosyn [Piperacillin-Tazobactam In Dex] Palpitations and Rash        Social History     Tobacco Use    Smoking status: Former     Current packs/day: 0.00     Types: Cigarettes     Quit date: 1995     Years since quittin.8    Smokeless tobacco: Never   Substance Use Topics    Alcohol use: Yes     Alcohol/week: 0.0 - 3.3 standard drinks of alcohol     Comment: 1 drink per day     Family History   Problem Relation Age of Onset    Diabetes Mother     Hypertension Mother     Parkinsonism Father     Rheumatoid Arthritis Brother     Hypertension Brother     Hyperlipidemia Brother     Liver Cancer Brother     Hemochromatosis Brother     Hypertension Brother     Lung Cancer Sister     Alcoholism Sister     No Known Problems Son     No Known Problems Daughter     Macular Degeneration No family hx of     Glaucoma No family hx of      History   Drug Use    Types: Marijuana     Comment: Medical, Rarely used         Review of Systems    Review of Systems  Constitutional, HEENT, cardiovascular, pulmonary, GI, , musculoskeletal, neuro, skin, endocrine and psych systems are negative, except as otherwise noted.    Objective    /85   Pulse 84   Wt 102.5 kg (226 lb)   SpO2 95%   BMI 29.82 kg/m     Estimated body mass index is 29.82 kg/m  as calculated from the following:    Height as of 24: 1.854 m (6' 1\").    Weight as of this encounter: 102.5 kg (226 lb).  Physical Exam  GENERAL: alert and no distress  EYES: Eyes grossly normal to inspection, PERRL and conjunctivae and sclerae normal  HENT: ear canals and TM's normal, nose and mouth without ulcers or lesions  NECK: no adenopathy, no asymmetry, masses, or scars  RESP: lungs clear to auscultation - no rales, rhonchi or wheezes  CV: regular rate and rhythm, normal S1 S2, no S3 or S4, no murmur, click or rub, no " peripheral edema  ABDOMEN: soft, nontender, no hepatosplenomegaly, no masses and bowel sounds normal  MS: no gross musculoskeletal defects noted, no edema  SKIN: no suspicious lesions or rashes  NEURO: Normal strength and tone, mentation intact and speech normal  PSYCH: mentation appears normal, affect normal/bright    Recent Labs   Lab Test 01/26/24  1431 12/20/23  0854 07/17/23  1003 05/01/23  1011   NA  --  137  --  139   POTASSIUM  --  3.4  --  4.3   CR  --  0.81  --  0.83   A1C 5.5  --  5.5  --         Diagnostics  No labs were ordered during this visit.   No EKG required for low risk surgery (cataract, skin procedure, breast biopsy, etc).    Revised Cardiac Risk Index (RCRI)  The patient has the following serious cardiovascular risks for perioperative complications:   - No serious cardiac risks = 0 points     RCRI Interpretation: 0 points: Class I (very low risk - 0.4% complication rate)         Signed Electronically by: Beny Winters MD  Copy of this evaluation report is provided to requesting physician.

## 2024-04-29 NOTE — PATIENT INSTRUCTIONS
Preparing for Your Surgery  Getting started  A nurse will call you to review your health history and instructions. They will give you an arrival time based on your scheduled surgery time. Please be ready to share:  Your doctor's clinic name and phone number  Your medical, surgical, and anesthesia history  A list of allergies and sensitivities  A list of medicines, including herbal treatments and over-the-counter drugs  Whether the patient has a legal guardian (ask how to send us the papers in advance)  Please tell us if you're pregnant--or if there's any chance you might be pregnant. Some surgeries may injure a fetus (unborn baby), so they require a pregnancy test. Surgeries that are safe for a fetus don't always need a test, and you can choose whether to have one.   If you have a child who's having surgery, please ask for a copy of Preparing for Your Child's Surgery.    Preparing for surgery  Within 10 to 30 days of surgery: Have a pre-op exam (sometimes called an H&P, or History and Physical). This can be done at a clinic or pre-operative center.  If you're having a , you may not need this exam. Talk to your care team.  At your pre-op exam, talk to your care team about all medicines you take. If you need to stop any medicines before surgery, ask when to start taking them again.  We do this for your safety. Many medicines can make you bleed too much during surgery. Some change how well surgery (anesthesia) drugs work.  Call your insurance company to let them know you're having surgery. (If you don't have insurance, call [phone].)  Call your clinic if there's any change in your health. This includes signs of a cold or flu (sore throat, runny nose, cough, rash, fever). It also includes a scrape or scratch near the surgery site.  If you have questions on the day of surgery, call your hospital or surgery center.  Eating and drinking guidelines  For your safety: Unless your surgeon tells you otherwise,  follow the guidelines below.  Eat and drink as usual until 8 hours before you arrive for surgery. After that, no food or milk.  Drink clear liquids until 2 hours before you arrive. These are liquids you can see through, like water, Gatorade, and Propel Water. They also include plain black coffee and tea (no cream or milk), candy, and breath mints. You can spit out gum when you arrive.  If you drink alcohol: Stop drinking it the night before surgery.  If your care team tells you to take medicine on the morning of surgery, it's okay to take it with a sip of water.  Preventing infection  Shower or bathe the night before and morning of your surgery. Follow the instructions your clinic gave you. (If no instructions, use regular soap.)  Don't shave or clip hair near your surgery site. We'll remove the hair if needed.  Don't smoke or vape the morning of surgery. You may chew nicotine gum up to 2 hours before surgery. A nicotine patch is okay.  Note: Some surgeries require you to completely quit smoking and nicotine. Check with your surgeon.  Your care team will make every effort to keep you safe from infection. We will:  Clean our hands often with soap and water (or an alcohol-based hand rub).  Clean the skin at your surgery site with a special soap that kills germs.  Give you a special gown to keep you warm. (Cold raises the risk of infection.)  Wear special hair covers, masks, gowns and gloves during surgery.  Give antibiotic medicine, if prescribed. Not all surgeries need antibiotics.  What to bring on the day of surgery  Photo ID and insurance card  Copy of your health care directive, if you have one  Glasses and hearing aids (bring cases)  You can't wear contacts during surgery  Inhaler and eye drops, if you use them (tell us about these when you arrive)  CPAP machine or breathing device, if you use them  A few personal items, if spending the night  If you have . . .  A pacemaker, ICD (cardiac defibrillator) or other  implant: Bring the ID card.  An implanted stimulator: Bring the remote control.  A legal guardian: Bring a copy of the certified (court-stamped) guardianship papers.  Please remove any jewelry, including body piercings. Leave jewelry and other valuables at home.  If you're going home the day of surgery  You must have a responsible adult drive you home. They should stay with you overnight as well.  If you don't have someone to stay with you, and you aren't safe to go home alone, we may keep you overnight. Insurance often won't pay for this.  After surgery  If it's hard to control your pain or you need more pain medicine, please call your surgeon's office.  Questions?   If you have any questions for your care team, list them here: _________________________________________________________________________________________________________________________________________________________________________ ____________________________________ ____________________________________ ____________________________________  For informational purposes only. Not to replace the advice of your health care provider. Copyright   2003, 2019 [facility]. All rights reserved. Clinically reviewed by Komal Rebolledo MD. [employer] [id number] - REV 12/22.

## 2024-04-30 ENCOUNTER — HOSPITAL ENCOUNTER (EMERGENCY)
Facility: CLINIC | Age: 74
Discharge: HOME | End: 2024-05-01
Attending: EMERGENCY MEDICINE | Admitting: EMERGENCY MEDICINE
Payer: MEDICARE

## 2024-04-30 VITALS
TEMPERATURE: 98.3 F | BODY MASS INDEX: 27.83 KG/M2 | SYSTOLIC BLOOD PRESSURE: 109 MMHG | HEIGHT: 73 IN | WEIGHT: 210 LBS | RESPIRATION RATE: 18 BRPM | HEART RATE: 99 BPM | DIASTOLIC BLOOD PRESSURE: 68 MMHG | OXYGEN SATURATION: 96 %

## 2024-04-30 DIAGNOSIS — R11.2 NAUSEA VOMITING AND DIARRHEA: ICD-10-CM

## 2024-04-30 DIAGNOSIS — R19.7 NAUSEA VOMITING AND DIARRHEA: ICD-10-CM

## 2024-04-30 DIAGNOSIS — E27.1 ADDISON'S DISEASE (H): ICD-10-CM

## 2024-04-30 LAB
BASOPHILS # BLD AUTO: 0.1 10E3/UL (ref 0–0.2)
BASOPHILS NFR BLD AUTO: 0 %
EOSINOPHIL # BLD AUTO: 0.4 10E3/UL (ref 0–0.7)
EOSINOPHIL NFR BLD AUTO: 2 %
ERYTHROCYTE [DISTWIDTH] IN BLOOD BY AUTOMATED COUNT: 11.9 % (ref 10–15)
HCT VFR BLD AUTO: 50.4 % (ref 40–53)
HGB BLD-MCNC: 17.6 G/DL (ref 13.3–17.7)
HOLD SPECIMEN: NORMAL
HOLD SPECIMEN: NORMAL
IMM GRANULOCYTES # BLD: 0.1 10E3/UL
IMM GRANULOCYTES NFR BLD: 1 %
LACTATE SERPL-SCNC: 1.5 MMOL/L (ref 0.7–2)
LYMPHOCYTES # BLD AUTO: 1.1 10E3/UL (ref 0.8–5.3)
LYMPHOCYTES NFR BLD AUTO: 6 %
MCH RBC QN AUTO: 31.9 PG (ref 26.5–33)
MCHC RBC AUTO-ENTMCNC: 34.9 G/DL (ref 31.5–36.5)
MCV RBC AUTO: 91 FL (ref 78–100)
MONOCYTES # BLD AUTO: 0.7 10E3/UL (ref 0–1.3)
MONOCYTES NFR BLD AUTO: 4 %
NEUTROPHILS # BLD AUTO: 16.2 10E3/UL (ref 1.6–8.3)
NEUTROPHILS NFR BLD AUTO: 87 %
NRBC # BLD AUTO: 0 10E3/UL
NRBC BLD AUTO-RTO: 0 /100
PLATELET # BLD AUTO: 381 10E3/UL (ref 150–450)
RBC # BLD AUTO: 5.52 10E6/UL (ref 4.4–5.9)
WBC # BLD AUTO: 18.6 10E3/UL (ref 4–11)

## 2024-04-30 PROCEDURE — 93005 ELECTROCARDIOGRAM TRACING: CPT

## 2024-04-30 PROCEDURE — 83605 ASSAY OF LACTIC ACID: CPT | Performed by: EMERGENCY MEDICINE

## 2024-04-30 PROCEDURE — 96374 THER/PROPH/DIAG INJ IV PUSH: CPT | Mod: 59

## 2024-04-30 PROCEDURE — 80053 COMPREHEN METABOLIC PANEL: CPT | Performed by: EMERGENCY MEDICINE

## 2024-04-30 PROCEDURE — 99285 EMERGENCY DEPT VISIT HI MDM: CPT | Mod: 25

## 2024-04-30 PROCEDURE — 84484 ASSAY OF TROPONIN QUANT: CPT | Performed by: EMERGENCY MEDICINE

## 2024-04-30 PROCEDURE — 36415 COLL VENOUS BLD VENIPUNCTURE: CPT | Performed by: EMERGENCY MEDICINE

## 2024-04-30 PROCEDURE — 96375 TX/PRO/DX INJ NEW DRUG ADDON: CPT

## 2024-04-30 PROCEDURE — 96361 HYDRATE IV INFUSION ADD-ON: CPT

## 2024-04-30 PROCEDURE — 85025 COMPLETE CBC W/AUTO DIFF WBC: CPT | Performed by: EMERGENCY MEDICINE

## 2024-04-30 RX ORDER — ONDANSETRON 2 MG/ML
4 INJECTION INTRAMUSCULAR; INTRAVENOUS ONCE
Status: COMPLETED | OUTPATIENT
Start: 2024-04-30 | End: 2024-05-01

## 2024-04-30 ASSESSMENT — COLUMBIA-SUICIDE SEVERITY RATING SCALE - C-SSRS
6. HAVE YOU EVER DONE ANYTHING, STARTED TO DO ANYTHING, OR PREPARED TO DO ANYTHING TO END YOUR LIFE?: NO
1. IN THE PAST MONTH, HAVE YOU WISHED YOU WERE DEAD OR WISHED YOU COULD GO TO SLEEP AND NOT WAKE UP?: NO
2. HAVE YOU ACTUALLY HAD ANY THOUGHTS OF KILLING YOURSELF IN THE PAST MONTH?: NO

## 2024-05-01 ENCOUNTER — APPOINTMENT (OUTPATIENT)
Dept: CT IMAGING | Facility: CLINIC | Age: 74
End: 2024-05-01
Attending: EMERGENCY MEDICINE
Payer: MEDICARE

## 2024-05-01 LAB
ALBUMIN SERPL BCG-MCNC: 4.4 G/DL (ref 3.5–5.2)
ALBUMIN UR-MCNC: NEGATIVE MG/DL
ALP SERPL-CCNC: 62 U/L (ref 40–150)
ALT SERPL W P-5'-P-CCNC: 17 U/L (ref 0–70)
ANION GAP SERPL CALCULATED.3IONS-SCNC: 16 MMOL/L (ref 7–15)
APPEARANCE UR: CLEAR
AST SERPL W P-5'-P-CCNC: 20 U/L (ref 0–45)
ATRIAL RATE - MUSE: 97 BPM
BILIRUB SERPL-MCNC: 0.8 MG/DL
BILIRUB UR QL STRIP: NEGATIVE
BUN SERPL-MCNC: 22 MG/DL (ref 8–23)
CALCIUM SERPL-MCNC: 9.1 MG/DL (ref 8.8–10.2)
CHLORIDE SERPL-SCNC: 99 MMOL/L (ref 98–107)
COLOR UR AUTO: YELLOW
CREAT SERPL-MCNC: 0.86 MG/DL (ref 0.67–1.17)
DEPRECATED HCO3 PLAS-SCNC: 20 MMOL/L (ref 22–29)
DIASTOLIC BLOOD PRESSURE - MUSE: NORMAL MMHG
EGFRCR SERPLBLD CKD-EPI 2021: >90 ML/MIN/1.73M2
GLUCOSE SERPL-MCNC: 126 MG/DL (ref 70–99)
GLUCOSE UR STRIP-MCNC: NEGATIVE MG/DL
HGB UR QL STRIP: NEGATIVE
INTERPRETATION ECG - MUSE: NORMAL
KETONES UR STRIP-MCNC: NEGATIVE MG/DL
LEUKOCYTE ESTERASE UR QL STRIP: NEGATIVE
MUCOUS THREADS #/AREA URNS LPF: PRESENT /LPF
NITRATE UR QL: NEGATIVE
P AXIS - MUSE: 68 DEGREES
PH UR STRIP: 7 [PH] (ref 5–7)
POTASSIUM SERPL-SCNC: 4.7 MMOL/L (ref 3.4–5.3)
PR INTERVAL - MUSE: 152 MS
PROT SERPL-MCNC: 7 G/DL (ref 6.4–8.3)
QRS DURATION - MUSE: 98 MS
QT - MUSE: 356 MS
QTC - MUSE: 452 MS
R AXIS - MUSE: 66 DEGREES
RBC URINE: 1 /HPF
SODIUM SERPL-SCNC: 135 MMOL/L (ref 135–145)
SP GR UR STRIP: 1.01 (ref 1–1.03)
SQUAMOUS EPITHELIAL: <1 /HPF
SYSTOLIC BLOOD PRESSURE - MUSE: NORMAL MMHG
T AXIS - MUSE: 62 DEGREES
TROPONIN T SERPL HS-MCNC: <6 NG/L
UROBILINOGEN UR STRIP-MCNC: NORMAL MG/DL
VENTRICULAR RATE- MUSE: 97 BPM
WBC URINE: 1 /HPF

## 2024-05-01 PROCEDURE — 81001 URINALYSIS AUTO W/SCOPE: CPT | Performed by: EMERGENCY MEDICINE

## 2024-05-01 PROCEDURE — 250N000011 HC RX IP 250 OP 636: Performed by: EMERGENCY MEDICINE

## 2024-05-01 PROCEDURE — 250N000009 HC RX 250: Performed by: EMERGENCY MEDICINE

## 2024-05-01 PROCEDURE — 258N000003 HC RX IP 258 OP 636: Performed by: EMERGENCY MEDICINE

## 2024-05-01 PROCEDURE — G1010 CDSM STANSON: HCPCS

## 2024-05-01 PROCEDURE — 74177 CT ABD & PELVIS W/CONTRAST: CPT | Mod: MG

## 2024-05-01 RX ORDER — IOPAMIDOL 755 MG/ML
105 INJECTION, SOLUTION INTRAVASCULAR ONCE
Status: COMPLETED | OUTPATIENT
Start: 2024-05-01 | End: 2024-05-01

## 2024-05-01 RX ADMIN — SODIUM CHLORIDE 1000 ML: 9 INJECTION, SOLUTION INTRAVENOUS at 00:41

## 2024-05-01 RX ADMIN — ONDANSETRON 4 MG: 2 INJECTION INTRAMUSCULAR; INTRAVENOUS at 00:41

## 2024-05-01 RX ADMIN — IOPAMIDOL 105 ML: 755 INJECTION, SOLUTION INTRAVENOUS at 01:16

## 2024-05-01 RX ADMIN — HYDROCORTISONE SODIUM SUCCINATE 50 MG: 100 INJECTION, POWDER, FOR SOLUTION INTRAMUSCULAR; INTRAVENOUS at 00:41

## 2024-05-01 RX ADMIN — SODIUM CHLORIDE 70 ML: 9 INJECTION, SOLUTION INTRAVENOUS at 01:16

## 2024-05-01 ASSESSMENT — ACTIVITIES OF DAILY LIVING (ADL)
ADLS_ACUITY_SCORE: 38

## 2024-05-01 NOTE — ED NOTES
Bed: ED08  Expected date:   Expected time:   Means of arrival:   Comments:  Triage Jose Carlos ANDRES

## 2024-05-01 NOTE — ED PROVIDER NOTES
"  History     Chief Complaint:  Abdominal Pain and Nausea       HPI   Jose Carlos Escamilla is a 74 year old male with history of GERD who presents to the ED for evaluation of abdominal pain and nausea. 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 He came home feeling in pain, uncomfortable, and weak so he decided to take a nap but had worsening pain afterwards along with vomiting and diarrhea. He currently endorses centralized abdominal pain but denies blood in stool, blood in urine, dysuria or back pain. He states not being able to keep his hydrocortisone down due to vomiting. He normally takes 15 mg in the morning and 5 mg at noon but double doses when he sick so would take 10 mg at noon for current symptoms. Patient had similar symptoms for the past 10 days and was seen [facility]. He has a schedule MRI on his pancreas on 5/20.     Independent Historian:   None - Patient Only    Review of External Notes:   ED visit from 4/19/2024    Medications:    Decadron  Neurontin  Hydrocodone-acetaminophen  Cortef  Lisinopril  Pamelor  Prilosec   Crestor  Senna-docusate  Cialis  Flomax  Trazodone  Elavil     Past Medical History:    Adrenal insufficiency   Chronic pain syndrome  Chronic rhinitis  Diverticulosis  Esophageal stenosis  Hypercholesterolemia  IBS (irritable bowel syndrome)  Meckel's diverticulitis  Narcotic dependence   Hyperlipidemia   Essential hypertension   GERD    Past Surgical History:    Left knee arthroplasty  Right knee arthroplasty  Discectomy cervical minimally invasive one level  Laparoscopic Appendectomy  Neck surgery   Tonsillectomy   Adenoidectomy      Physical Exam   Patient Vitals for the past 24 hrs:   BP Temp Temp src Pulse Resp SpO2 Height Weight   04/30/24 2320 109/68 98.3  F (36.8  C) Oral 99 18 96 % 1.854 m (6' 1\") 95.3 kg (210 lb)      Physical Exam  General: Patient is alert and normal appearing.  HEENT: Head atraumatic "    Eyes: pupils equal and reactive. Conjunctiva clear   Nares: patent   Oropharynx: no lesions, uvula midline, no palatal draping, normal voice, no trismus  Neck: Supple without lymphadenopathy, no meningismus  Chest: Heart regular rate and rhythm.   Lungs: Equal clear to auscultation with no wheeze or rales  Abdomen: Soft, generalized tenderness to palpation greatest in the mid to lower abdomen, no rebound or guarding nondistended, normal bowel sounds  Back: No costovertebral angle tenderness, no midline C, T or L spine tenderness  Neuro: Grossly nonfocal, normal speech, strength equal bilaterally, CN 2-12 intact  Extremities: No deformities, equal radial and DP pulses. No clubbing, cyanosis.  No edema  Skin: Warm and dry with no rash.       Emergency Department Course   ECG  ECG taken at 2322, ECG read at 2346  Normal sinus rhythm    No significant change as compared to prior, dated 7/14/2021.  Rate 97 bpm. SC interval 152 ms. QRS duration 98 ms. QT/QTc 356/452 ms. P-R-T axes 68 66 62.     Imaging:  CT Abdomen Pelvis w Contrast   Final Result   IMPRESSION:    1.  Fluid content in the small and large bowel could reflect a gastroenteritis. Recommend correlation.         Report per radiology    Laboratory:  Labs Ordered and Resulted from Time of ED Arrival to Time of ED Departure   COMPREHENSIVE METABOLIC PANEL - Abnormal       Result Value    Sodium 135      Potassium 4.7      Carbon Dioxide (CO2) 20 (*)     Anion Gap 16 (*)     Urea Nitrogen 22.0      Creatinine 0.86      GFR Estimate >90      Calcium 9.1      Chloride 99      Glucose 126 (*)     Alkaline Phosphatase 62      AST 20      ALT 17      Protein Total 7.0      Albumin 4.4      Bilirubin Total 0.8     CBC WITH PLATELETS AND DIFFERENTIAL - Abnormal    WBC Count 18.6 (*)     RBC Count 5.52      Hemoglobin 17.6      Hematocrit 50.4      MCV 91      MCH 31.9      MCHC 34.9      RDW 11.9      Platelet Count 381      % Neutrophils 87      % Lymphocytes 6      %  Monocytes 4      % Eosinophils 2      % Basophils 0      % Immature Granulocytes 1      NRBCs per 100 WBC 0      Absolute Neutrophils 16.2 (*)     Absolute Lymphocytes 1.1      Absolute Monocytes 0.7      Absolute Eosinophils 0.4      Absolute Basophils 0.1      Absolute Immature Granulocytes 0.1      Absolute NRBCs 0.0     ROUTINE UA WITH MICROSCOPIC REFLEX TO CULTURE - Abnormal    Color Urine Yellow      Appearance Urine Clear      Glucose Urine Negative      Bilirubin Urine Negative      Ketones Urine Negative      Specific Gravity Urine 1.010      Blood Urine Negative      pH Urine 7.0      Protein Albumin Urine Negative      Urobilinogen Urine Normal      Nitrite Urine Negative      Leukocyte Esterase Urine Negative      Mucus Urine Present (*)     RBC Urine 1      WBC Urine 1      Squamous Epithelials Urine <1     TROPONIN T, HIGH SENSITIVITY - Normal    Troponin T, High Sensitivity <6     LACTIC ACID WHOLE BLOOD - Normal    Lactic Acid 1.5       Procedures   None    Emergency Department Course & Assessments:    Interventions:  Medications   ondansetron (ZOFRAN) injection 4 mg (4 mg Intravenous $Given 5/1/24 0041)   sodium chloride 0.9% BOLUS 1,000 mL (1,000 mLs Intravenous $New Bag 5/1/24 0041)   hydrocortisone sodium succinate PF (solu-CORTEF) injection 50 mg (50 mg Intravenous $Given 5/1/24 0041)   iopamidol (ISOVUE-370) solution 105 mL (105 mLs Intravenous $Given 5/1/24 0116)   Saline Flush (70 mLs Intravenous $Given 5/1/24 0116)      Independent Interpretation (X-rays, CTs, rhythm strip):  None    Assessments/Consultations/Discussion of Management or Tests:  ED Course as of 05/01/24 0253   Tue Apr 30, 2024   2341 I obtained history and examined the patient as noted above.    Wed May 01, 2024   0149 I rechecked the patient and explained findings.    0155 I prepared the patient to be discharged home.      Social Determinants of Health affecting care:   None    Disposition:  The patient was discharged.      Impression & Plan    CMS Diagnoses: None    MIPS (If applicable):  N/A    Medical Decision Making:  Jose Carlos Escamilla is a 74 year old male who presents for evaluation of nausea, vomiting and diarrhea with mild abdominal pain in a nonfocal abdominal exam.  There are no ill contacts.  I considered a broad differential diagnosis for this patient including viral gastroenteritis, bacterial infection of the large intestine (salmonella, shigella, campylobacter, e coli, etc), bowel obstruction, intra-abdominal infection such as colitis, food poisoning, cholecystitis, UTI, pyelonephritis, appendicitis, etc.  There are no signs of worrisome intra-abdominal pathologies detected during the visit today.  Patient with significant abdominal pain although relatively benign examination and therefore CT scan was obtained.  This showed fluid-filled large and small bowel consistent with gastroenteritis.  No surgical emergency or intra-abdominal catastrophe.  Supportive outpatient management is therefore indicated.  Abdominal pain precautions are given for home.   No indication for stool studies at this time.   He passed oral challenge here in ED. It was discussed to return to the ED for blood in stool, increasing pain, or fevers more than 102.   Feels much improved after interventions in ED. patient was given stress dose steroids IV here.  Recommend return precautions emergency department and follow-up instructions.  Recommend follow-up with GI as well.  Patient is agreeable with the plan and all questions and concerns addressed.        Diagnosis:    ICD-10-CM    1. 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   4/30/2024   Faith Thompson MD Neuner, Maria Bea, MD  05/01/24 0321

## 2024-05-01 NOTE — ED TRIAGE NOTES
Pt comes In for severe abdominal pain nausea and weakness. Hx addisons. Recently had a 10 day episode with similar symptoms. Says he had can't keep his hydrocortisone down d/t nausea. Pt is laying on ground in triage d/t paIN

## 2024-05-06 ENCOUNTER — OFFICE VISIT (OUTPATIENT)
Dept: FAMILY MEDICINE | Facility: CLINIC | Age: 74
End: 2024-05-06

## 2024-05-06 VITALS
SYSTOLIC BLOOD PRESSURE: 124 MMHG | OXYGEN SATURATION: 96 % | TEMPERATURE: 97.8 F | BODY MASS INDEX: 28.92 KG/M2 | DIASTOLIC BLOOD PRESSURE: 96 MMHG | WEIGHT: 219.2 LBS | HEART RATE: 80 BPM

## 2024-05-06 DIAGNOSIS — R10.84 ABDOMINAL PAIN, GENERALIZED: ICD-10-CM

## 2024-05-06 DIAGNOSIS — E27.1 ADDISON DISEASE (H): Primary | ICD-10-CM

## 2024-05-06 DIAGNOSIS — R11.10 VOMITING AND DIARRHEA: ICD-10-CM

## 2024-05-06 DIAGNOSIS — R19.7 VOMITING AND DIARRHEA: ICD-10-CM

## 2024-05-06 DIAGNOSIS — A49.8: ICD-10-CM

## 2024-05-06 PROCEDURE — 99214 OFFICE O/P EST MOD 30 MIN: CPT | Performed by: FAMILY MEDICINE

## 2024-05-06 NOTE — PROGRESS NOTES
4/17 terrible diarrhea in [place]    Then ER at [facility],   Goes to the [place] who manage his Mario Alberto's    Has had 3 episodes in past 2 weeks,  Has been owrking hard  Just the worst 2 weeks ever    ER visits reviwed.    ROS:  General and Resp. completed and negative except as noted above    Histories: reviewed    OBJECTIVE:                                                    BP (!) 124/96   Pulse 80   Temp 97.8  F (36.6  C) (Oral)   Wt 99.4 kg (219 lb 3.2 oz)   SpO2 96%   BMI 28.92 kg/m    Body mass index is 28.92 kg/m .   APPEARANCE: = alert and mild distress  Breath Sounds = Good air movement with no rales or rhonchi in any lung fields  Heart Rate, Rhythm, & sounds (no Murm)  = Regular rate and rhythm with no S3, S4, or murmur appreciated.  Abdomen = Soft, nontender, no masses, & bowel sounds in all quadrants     ASSESSMENT/PLAN:                                                    Quality gaps reviewed    Jose Carlos was seen today for er f/u.    Diagnoses and all orders for this visit:    Chester disease (H)    Vomiting and diarrhea  -     Cancel: Enteric Bacteria and Virus Panel PCR; Future    Abdominal pain, generalized    Confusing but bowel infection makes most sense.  Ellis diet and check culture,  To ER if worsens again    See Patient Instructions    Health maintenance items are reviewed in Epic and correct as of today:    Baljit Salazar MD  [facility]  Family Practice  [facility]  [phone]    For any issues my office # is [phone]        Answers submitted by the patient for this visit:  General Questionnaire (Submitted on 5/6/2024)  Chief Complaint: Chronic problems general questions HPI Form  How many servings of fruits and vegetables do you eat daily?: 4 or more  On average, how many sweetened beverages do you drink each day (Examples: soda, juice, sweet tea, etc.  Do NOT count diet or artificially sweetened beverages)?: 0  How many minutes a day do you exercise enough to  make your heart beat faster?: 20 to 29  How many days a week do you exercise enough to make your heart beat faster?: 3 or less  How many days per week do you miss taking your medication?: 0  General Concern (Submitted on 5/6/2024)  Chief Complaint: Chronic problems general questions HPI Form  What is the reason for your visit today?: severe ABD pain  When did your symptoms begin?: 3-4 weeks ago  What are your symptoms?: diarrhea, vomiting  How would you describe these symptoms?: Mild  Are your symptoms:: Staying the same  Have you had these symptoms before?: No

## 2024-05-07 DIAGNOSIS — R10.10 UPPER ABDOMINAL PAIN: ICD-10-CM

## 2024-05-07 DIAGNOSIS — R19.7 DIARRHEA: Primary | ICD-10-CM

## 2024-05-07 LAB

## 2024-05-07 PROCEDURE — 87507 IADNA-DNA/RNA PROBE TQ 12-25: CPT | Mod: ORL | Performed by: FAMILY MEDICINE

## 2024-05-09 ENCOUNTER — PREP FOR PROCEDURE (OUTPATIENT)
Dept: OPHTHALMOLOGY | Facility: CLINIC | Age: 74
End: 2024-05-09
Payer: MEDICARE

## 2024-05-09 ENCOUNTER — TELEPHONE (OUTPATIENT)
Dept: FAMILY MEDICINE | Facility: CLINIC | Age: 74
End: 2024-05-09

## 2024-05-13 NOTE — TELEPHONE ENCOUNTER
----- Message from Baljit Salazar MD sent at 5/8/2024  2:11 PM CDT -----  Can you talk with Stacy tomorrow about this,  Im getting on the plane and can't decypher this    thanks,    ENTEROAGGREGATIVE E. COLI  Enteroaggregative E. coli (EAEC) was first described in the 1980s, when collections of E. coli from studies of patients with diarrhea in resource-limited settings were examined in tissue culture adherence assays [40]. E. coli that had a distinctive palisading adherence on human epithelial type 2 (HEp-2) cells were observed more frequently in the stools of children with diarrhea than in children without diarrhea [41].  Epidemiology - EAEC appears to be a cause of acute and chronic diarrheal illness among many different subpopulations in both resource-limited and resource-rich regions.  Surveys of E. coli from diarrhea outbreaks in [place] have demonstrated the presence of EAEC in both outbreaks and sporadic cases of diarrhea [42,43]. In a prospective study of patients presenting with diarrhea to two large medical [facility] in the [place], EAEC was observed in 4.5 percent of cases and was identified more frequently among cases than asymptomatic controls [44]. One meta-analysis noted a positive correlation between acute diarrheal illness and EAEC excretion (diagnosed by aggregative adherence assay) in the following groups [45]:Children residing in both resource-limited and resource-rich regions  dults with and without human immunodeficiency virus (HIV) infection residing in resource-limited regions  nternational travelers to resource-limited regions  EAEC has also been repeatedly associated with persistent diarrhea among children studied from [place], [place], [place], [place], and [place] [46-52], and has been associated with persistent diarrhea among adults with HIV infection, particularly those with advanced immune suppression, in the [place] and [place] [53,54].  As with many diarrheal  pathogens, not all studies have identified a link between EAEC and clinically significant diarrhea [12,55]. We consider treatment of patients shedding EAEC in the setting of clinically significant diarrhea in the absence of another likely pathogen.    In a large multi-center, case-control study among children younger than five years of age in sub[place] or [place], EAEC was extremely common among both children with diarrhea and controls and was not epidemiologically associated with moderate-to-severe diarrhea [12]. However, a multi-center longitudinal study of children in resource-limited countries suggested that asymptomatic carriage of EAEC could predict linear growth retardation [56]. This association has been previously reported in individual studies [57] and suggests that the burden of EAEC disease may more closely parallel asymptomatic carriage than watery diarrhea.  Pathogenesis - The pathogenesis of diarrhea caused by EAEC is incompletely elucidated [41]. EAEC are capable of inducing the release of interleukin (IL)-8 from polarized colonic T84 cells [58] and inducing epithelial barrier dysfunction [59].  Most EAEC strains harbor a transcriptional activator called AggR [60]; AggR activates a large number of genes that are likely involved in pathogenesis. EAEC strains that possess AggR and its regulon are termed typical EAEC strains. Given that most epidemiologic studies addressing EAEC detect the organism by virtue of AggR-dependent genes, most of what is known about EAEC involves typical strains.  A variety of EAEC pathogenetic mechanisms have been postulated:  Adherence fimbriae (variants AAF/1-AAF/V) are present in nearly all typical EAEC strains, and the AAF-encoding genes are directly controlled by AggR itself [61]. AAF-encoding genes are carried on a virulence plasmid (Norma) in typical EAEC; Norma also encodes AggR. An AggR-activated and Norma-encoded protein called dispersin (encoded by the aap  gene) appears to promote AAF-mediated colonization [62].  Mucosal destruction has been demonstrated in clinical specimens and in tissue culture assays, and a protease cytotoxin has been identified in many EAEC strains [63]. Serine family proteases are commonly found in EAEC and may be associated with diarrheagenic strains [64].  Some EAEC strains may invade tissue culture cells and induce IL-8 production via mitogen-activated protein kinase [65,66].  Clinical features - As above, both acute and chronic diarrhea have been associated with EAEC. Patients with symptomatic infection typically present with watery diarrhea without blood in the stools. In one study that included 37 patients with EAEC-associated diarrhea, concomitant fever, abdominal pain, and vomiting were reported in 43, 65, and 57 percent, respectively [44].  In 2011, a typical EAEC strain that had become lysogenized with a Shiga toxin-encoding phage was associated with a large multi-country outbreak of hemorrhagic colitis and hemolytic uremic syndrome (HUS) [67]. This outbreak, responsible for 52 deaths, was linked to contaminated fenugreek sprouts. The implicated strain has not been isolated since the outbreak, although other Shiga toxin-producing EAEC have been isolated. Whether these clones will emerge epidemiologically remains to be seen.  Molecular diagnosis - The gold standard for the diagnosis of EAEC is detection of the AggR regulon using molecular techniques. Commonly available molecular panels detect EAEC this way. Although no single deoxyribonucleic acid (DNA) target has been epidemiologically linked to pathogenicity, the linkage of the AggR regulon with pathogenesis suggests that targeting any number of AggR-activated genes for detection will produce similar diagnostic performance.  Other methods for identifying EAEC include detection of aggregative adherence to HEp-2 cells in culture; EAEC has a distinctive palisading adherence pattern.  While not used in clinical laboratories, research studies have used this technique for diagnosing cases [45].

## 2024-05-13 NOTE — TELEPHONE ENCOUNTER
Called and spoke with patient regarding stool test results [facility]. EAEC is typically self limiting and symptoms resolve without abx treatment. Per patient symptoms have improved and he does not wish any treatment at this time. He is advised to call clinic with questions or concerns. Kayla Schuster

## 2024-05-15 ENCOUNTER — ANESTHESIA EVENT (OUTPATIENT)
Dept: SURGERY | Facility: AMBULATORY SURGERY CENTER | Age: 74
End: 2024-05-15
Payer: MEDICARE

## 2024-05-15 ASSESSMENT — LIFESTYLE VARIABLES: TOBACCO_USE: 1

## 2024-05-15 NOTE — ANESTHESIA PREPROCEDURE EVALUATION
Anesthesia Pre-Procedure Evaluation    Patient: Jose Carlos Escamilla   MRN: [id number] : 1950        Procedure : Procedure(s):  LEFT EYE PHACOEMULSIFICATION, CATARACT, WITH TORIC INTRAOCULAR LENS IMPLANT INSERTION          Past Medical History:   Diagnosis Date     ACP (advance care planning)      Adrenal insufficiency (H24)      Chronic pain syndrome      Chronic rhinitis      Diverticulosis      Esophageal stenosis      Hypercholesterolemia      IBS (irritable bowel syndrome)      Meckel's diverticulitis 2021    Added automatically from request for surgery [id number]     Narcotic dependence (H)       Past Surgical History:   Procedure Laterality Date     ARTHROPLASTY KNEE Left 2016    Procedure: ARTHROPLASTY KNEE;  Surgeon: Marisa Page MD;  Location:  OR     ARTHROPLASTY KNEE Right 2022    Procedure: RIGHT KNEE ARTHROPLASTY;  Surgeon: Marisa Page MD;  Location:  OR     DISCECTOMY CERVICAL MINIMALLY INVASIVE ONE LEVEL       IR LUMBAR PUNCTURE  2016     LAPAROSCOPIC APPENDECTOMY N/A 2021    Procedure: Laparoscopic appendectomy;  Surgeon: Kelsi Parker MD;  Location:  OR     LAPAROSCOPY DIAGNOSTIC (GENERAL) N/A 2021    Procedure: Diagnostic laparoscopy, laparoscopic appendectomy, laparoscopic meckel excision;  Surgeon: Kelsi Parker MD;  Location:  OR     NECK SURGERY      [place]      Allergies   Allergen Reactions     Penicillins Rash and Anaphylaxis     Other reaction(s): Breathing Difficulty     Zosyn [Piperacillin-Tazobactam In Dex] Palpitations and Rash      Social History     Tobacco Use     Smoking status: Former     Current packs/day: 0.00     Types: Cigarettes     Quit date: 1995     Years since quittin.8     Smokeless tobacco: Never   Substance Use Topics     Alcohol use: Yes     Alcohol/week: 0.0 - 3.3 standard drinks of alcohol     Comment: 1 drink per day      Wt Readings from Last 1 Encounters:   24 99.4 kg (219 lb 3.2 oz)         Anesthesia Evaluation   Pt has had prior anesthetic. Type: General and MAC.    No history of anesthetic complications       ROS/MED HX  ENT/Pulmonary:     (+)           allergic rhinitis,     tobacco use (Quit in '95), Past use,                       Neurologic:     (+)    peripheral neuropathy,                            Cardiovascular:     (+) Dyslipidemia hypertension- -   -  - -                                 Previous cardiac testing   Echo: Date: Results:    Stress Test:  Date: Results:    ECG Reviewed:  Date: 2/18/22 Results:  NSR  Cath:  Date: Results:      METS/Exercise Tolerance:     Hematologic:       Musculoskeletal: Comment: PMR      GI/Hepatic:     (+) GERD,  esophageal disease,                 Renal/Genitourinary:       Endo: Comment: Adrenal insufficiency    (+)            Chronic steroid usage for Arthritis.  Obesity (Class 1),       Psychiatric/Substance Use:     (+)    H/O chronic opiod use  (60 MME/day; Norco 10's).     Infectious Disease:       Malignancy:       Other:      (+)  , H/O Chronic Pain,         Physical Exam    Airway        Mallampati: II   TM distance: > 3 FB   Neck ROM: full   Mouth opening: > 3 cm    Respiratory Devices and Support         Dental       (+) Modest Abnormalities - crowns, retainers, 1 or 2 missing teeth      Cardiovascular   cardiovascular exam normal       Rhythm and rate: regular     Pulmonary   pulmonary exam normal        breath sounds clear to auscultation       OUTSIDE LABS:  CBC:   Lab Results   Component Value Date    WBC 18.6 (H) 04/30/2024    WBC 7.7 02/18/2022    HGB 17.6 04/30/2024    HGB 12.7 (L) 02/26/2022    HCT 50.4 04/30/2024    HCT 46.2 02/18/2022     04/30/2024     02/18/2022     BMP:   Lab Results   Component Value Date     04/30/2024     12/20/2023    POTASSIUM 4.7 04/30/2024    POTASSIUM 3.4 12/20/2023    CHLORIDE 99 04/30/2024    CHLORIDE 101 12/20/2023    CO2 20 (L) 04/30/2024    CO2 28 12/20/2023    BUN 22.0  "04/30/2024    BUN 17.7 12/20/2023    CR 0.86 04/30/2024    CR 0.81 12/20/2023     (H) 04/30/2024     (H) 12/20/2023     COAGS: No results found for: \"PTT\", \"INR\", \"FIBR\"  POC: No results found for: \"BGM\", \"HCG\", \"HCGS\"  HEPATIC:   Lab Results   Component Value Date    ALBUMIN 4.4 04/30/2024    PROTTOTAL 7.0 04/30/2024    ALT 17 04/30/2024    AST 20 04/30/2024    ALKPHOS 62 04/30/2024    BILITOTAL 0.8 04/30/2024     OTHER:   Lab Results   Component Value Date    LACT 1.5 04/30/2024    A1C 5.5 01/26/2024    TRI 9.1 04/30/2024    TSH 1.22 12/20/2023    T4 1.16 03/21/2019    CRP 51 (H) 07/12/2021    SED 0 07/12/2021       Anesthesia Plan    ASA Status:  3    NPO Status:  NPO Appropriate    Anesthesia Type: MAC.     - Reason for MAC: straight local not clinically adequate              Consents    Anesthesia Plan(s) and associated risks, benefits, and realistic alternatives discussed. Questions answered and patient/representative(s) expressed understanding.     - Discussed:     - Discussed with:  Patient      - Extended Intubation/Ventilatory Support Discussed: No.      - Patient is DNR/DNI Status: No     Use of blood products discussed: No .     Postoperative Care    Pain management: Multi-modal analgesia.        Comments:               Alba Yuan MD    I have reviewed the pertinent notes and labs in the chart from the past 30 days and (re)examined the patient.  Any updates or changes from those notes are reflected in this note.     # Hyponatremia: Lowest Na = 135 mmol/L in last 30 days, will monitor as appropriate          # Overweight: Estimated body mass index is 28.92 kg/m  as calculated from the following:    Height as of 4/30/24: 1.854 m (6' 1\").    Weight as of 5/6/24: 99.4 kg (219 lb 3.2 oz).      "

## 2024-05-16 ENCOUNTER — ANESTHESIA (OUTPATIENT)
Dept: SURGERY | Facility: AMBULATORY SURGERY CENTER | Age: 74
End: 2024-05-16
Payer: MEDICARE

## 2024-05-16 ENCOUNTER — HOSPITAL ENCOUNTER (OUTPATIENT)
Facility: AMBULATORY SURGERY CENTER | Age: 74
Discharge: HOME | End: 2024-05-16
Attending: OPHTHALMOLOGY
Payer: MEDICARE

## 2024-05-16 VITALS
HEIGHT: 73 IN | WEIGHT: 210 LBS | BODY MASS INDEX: 27.83 KG/M2 | RESPIRATION RATE: 18 BRPM | HEART RATE: 73 BPM | DIASTOLIC BLOOD PRESSURE: 84 MMHG | SYSTOLIC BLOOD PRESSURE: 130 MMHG | TEMPERATURE: 96.9 F | OXYGEN SATURATION: 94 %

## 2024-05-16 PROCEDURE — 66984 XCAPSL CTRC RMVL W/O ECP: CPT | Performed by: NURSE ANESTHETIST, CERTIFIED REGISTERED

## 2024-05-16 PROCEDURE — 99100 ANES PT EXTEME AGE<1 YR&>70: CPT | Performed by: ANESTHESIOLOGY

## 2024-05-16 PROCEDURE — 99100 ANES PT EXTEME AGE<1 YR&>70: CPT | Performed by: NURSE ANESTHETIST, CERTIFIED REGISTERED

## 2024-05-16 PROCEDURE — V2599 CONTACT LENS/ES OTHER TYPE: HCPCS | Mod: DBP,LT

## 2024-05-16 PROCEDURE — 66984 XCAPSL CTRC RMVL W/O ECP: CPT | Performed by: ANESTHESIOLOGY

## 2024-05-16 PROCEDURE — 66984 XCAPSL CTRC RMVL W/O ECP: CPT | Mod: LT

## 2024-05-16 DEVICE — IMPLANTABLE DEVICE: Type: IMPLANTABLE DEVICE | Site: EYE | Status: FUNCTIONAL

## 2024-05-16 RX ORDER — CYCLOPENTOLAT/TROPIC/PHENYLEPH 1%-1%-2.5%
1 DROPS (EA) OPHTHALMIC (EYE)
Status: COMPLETED | OUTPATIENT
Start: 2024-05-16 | End: 2024-05-16

## 2024-05-16 RX ORDER — ONDANSETRON 4 MG/1
4 TABLET, ORALLY DISINTEGRATING ORAL EVERY 30 MIN PRN
Status: DISCONTINUED | OUTPATIENT
Start: 2024-05-16 | End: 2024-05-17 | Stop reason: HOSPADM

## 2024-05-16 RX ORDER — OXYCODONE HYDROCHLORIDE 5 MG/1
10 TABLET ORAL
Status: DISCONTINUED | OUTPATIENT
Start: 2024-05-16 | End: 2024-05-17 | Stop reason: HOSPADM

## 2024-05-16 RX ORDER — LIDOCAINE 40 MG/G
CREAM TOPICAL
Status: DISCONTINUED | OUTPATIENT
Start: 2024-05-16 | End: 2024-05-17 | Stop reason: HOSPADM

## 2024-05-16 RX ORDER — MOXIFLOXACIN IN NACL,ISO-OS/PF 0.3MG/0.3
SYRINGE (ML) INTRAOCULAR PRN
Status: DISCONTINUED | OUTPATIENT
Start: 2024-05-16 | End: 2024-05-16 | Stop reason: HOSPADM

## 2024-05-16 RX ORDER — ONDANSETRON 2 MG/ML
4 INJECTION INTRAMUSCULAR; INTRAVENOUS EVERY 30 MIN PRN
Status: DISCONTINUED | OUTPATIENT
Start: 2024-05-16 | End: 2024-05-17 | Stop reason: HOSPADM

## 2024-05-16 RX ORDER — LIDOCAINE HYDROCHLORIDE 10 MG/ML
INJECTION, SOLUTION EPIDURAL; INFILTRATION; INTRACAUDAL; PERINEURAL PRN
Status: DISCONTINUED | OUTPATIENT
Start: 2024-05-16 | End: 2024-05-16 | Stop reason: HOSPADM

## 2024-05-16 RX ORDER — FENTANYL CITRATE 50 UG/ML
INJECTION, SOLUTION INTRAMUSCULAR; INTRAVENOUS PRN
Status: DISCONTINUED | OUTPATIENT
Start: 2024-05-16 | End: 2024-05-16

## 2024-05-16 RX ORDER — BALANCED SALT SOLUTION 6.4; .75; .48; .3; 3.9; 1.7 MG/ML; MG/ML; MG/ML; MG/ML; MG/ML; MG/ML
SOLUTION OPHTHALMIC PRN
Status: DISCONTINUED | OUTPATIENT
Start: 2024-05-16 | End: 2024-05-16 | Stop reason: HOSPADM

## 2024-05-16 RX ORDER — TRIAMCINOLONE ACETONIDE 40 MG/ML
INJECTION, SUSPENSION INTRA-ARTICULAR; INTRAMUSCULAR PRN
Status: DISCONTINUED | OUTPATIENT
Start: 2024-05-16 | End: 2024-05-16 | Stop reason: HOSPADM

## 2024-05-16 RX ORDER — SODIUM CHLORIDE, SODIUM LACTATE, POTASSIUM CHLORIDE, CALCIUM CHLORIDE 600; 310; 30; 20 MG/100ML; MG/100ML; MG/100ML; MG/100ML
INJECTION, SOLUTION INTRAVENOUS CONTINUOUS
Status: DISCONTINUED | OUTPATIENT
Start: 2024-05-16 | End: 2024-05-17 | Stop reason: HOSPADM

## 2024-05-16 RX ORDER — DEXAMETHASONE SODIUM PHOSPHATE 10 MG/ML
4 INJECTION, SOLUTION INTRAMUSCULAR; INTRAVENOUS
Status: DISCONTINUED | OUTPATIENT
Start: 2024-05-16 | End: 2024-05-17 | Stop reason: HOSPADM

## 2024-05-16 RX ORDER — PROPARACAINE HYDROCHLORIDE 5 MG/ML
1 SOLUTION/ DROPS OPHTHALMIC ONCE
Status: COMPLETED | OUTPATIENT
Start: 2024-05-16 | End: 2024-05-16

## 2024-05-16 RX ORDER — NALOXONE HYDROCHLORIDE 0.4 MG/ML
0.1 INJECTION, SOLUTION INTRAMUSCULAR; INTRAVENOUS; SUBCUTANEOUS
Status: DISCONTINUED | OUTPATIENT
Start: 2024-05-16 | End: 2024-05-17 | Stop reason: HOSPADM

## 2024-05-16 RX ORDER — OXYCODONE HYDROCHLORIDE 5 MG/1
5 TABLET ORAL
Status: DISCONTINUED | OUTPATIENT
Start: 2024-05-16 | End: 2024-05-17 | Stop reason: HOSPADM

## 2024-05-16 RX ORDER — ACETAMINOPHEN 325 MG/1
975 TABLET ORAL ONCE
Status: COMPLETED | OUTPATIENT
Start: 2024-05-16 | End: 2024-05-16

## 2024-05-16 RX ADMIN — Medication 1 DROP: at 10:38

## 2024-05-16 RX ADMIN — Medication 1 DROP: at 10:41

## 2024-05-16 RX ADMIN — SODIUM CHLORIDE, SODIUM LACTATE, POTASSIUM CHLORIDE, CALCIUM CHLORIDE: 600; 310; 30; 20 INJECTION, SOLUTION INTRAVENOUS at 10:38

## 2024-05-16 RX ADMIN — FENTANYL CITRATE 50 MCG: 50 INJECTION, SOLUTION INTRAMUSCULAR; INTRAVENOUS at 11:53

## 2024-05-16 RX ADMIN — Medication 1 DROP: at 10:30

## 2024-05-16 RX ADMIN — PROPARACAINE HYDROCHLORIDE 1 DROP: 5 SOLUTION/ DROPS OPHTHALMIC at 10:30

## 2024-05-16 RX ADMIN — FENTANYL CITRATE 50 MCG: 50 INJECTION, SOLUTION INTRAMUSCULAR; INTRAVENOUS at 11:49

## 2024-05-16 NOTE — DISCHARGE INSTRUCTIONS
After Care Instructions  Discharge Instructions  If you have a clear eye shield, you may remove the eye shield when you get home.Discharge Instructions  Wear the clear eye shield for protection when sleeping for the next 5 days.  Discharge Instructions  Discharge Instructions  Artificial tears may be used immediately for foreign body sensation, dryness or itching.  Administer Artificial Tear drops 3-5 minutes apart from any prescription eye drops.  Discharge Instructions  Do not rub the eye that had the operation.  Discharge Instructions  Your eye may be sensitive to light.  Wearing sunglasses may be more comfortable for your eye.  Discharge Instructions  You may have some eye discomfort and irritation.  Acetaminophen (Tylenol) or Ibuprofen (Advil) may be taken for eye discomfort. If eye discomfort persists, call your doctor's office.  Discharge Instructions  Keep the eye that had surgery dry. You may wash your hair, bathe or shower, but keep your eye closed.  Discharge Instructions  Avoid bending over, strenuous activity or heavy lifting until approved by your doctor.  Discharge Instructions  You have a follow-up appointment with your doctor today or tomorrow with Dr Theodore Dallas, Ophthalmology: [phone]    Bring all your prescribed eye drops with you to your follow-up appointment.  Discharge Instructions  If you take glaucoma medication(s), bring the medication(s) with you to your follow-up appointment.  Discharge Instructions  Occasional blood-tinged tears are normal the day or two after surgery. If you notice increased or persistent bleeding from the eye, call your doctor right away.  Discharge Instructions  Call your doctor Dr Theodore Dallas, Ophthalmology: [phone]   if any of the following should occur:  ~ Any sudden vision changes, including decreased vision,  ~ Nausea or severe headache,  ~ Increase in pain or discomfort that is not controlled with Acetaminophen (Tylenol) or Ibuprofen (Advil)  ~ Signs of  infection (pus, increasing redness or tenderness),  ~ Severe sensitivity to light,  ~ An increase in floaters (black spots in your vision)    [facility]  Home Care Following Anesthesia  For 24 hours after surgery:  Get plenty of rest.  A responsible adult must stay with you for at least 24 hours after you leave the surgery center.  Do not drive or use heavy equipment.  If you have weakness or tingling, don't drive or use heavy equipment until this feeling goes away.   Do not drink alcohol.   Avoid strenuous or risky activities.  Ask for help when climbing stairs.  You may feel lightheaded.  IF so, sit for a few minutes before standing.  Have someone help you get up.   If you have nausea (feel sick to your stomach): Drink only clear liquids such as apple juice, ginger ale, broth or 7-Up.  Rest may also help.  Be sure to drink enough fluids.  Move to a regular diet as you feel able.   You may have a slight fever.  Call the doctor if your fever is over 100 F (37.7 C) (taken under the tongue) or lasts longer than 24 hours.  You may have a dry mouth, a sore throat, muscle aches or trouble sleeping. These should go away after 24 hours.  Do not make important or legal decisions.   It is recommended to avoid smoking.               Tips for taking pain medications  To get the best pain relief possible, remember these points:  Take pain medications as directed, before pain becomes severe.  Pain medication can upset your stomach: taking it with food may help.  Constipation is a common side effect of pain medication. Drink plenty of  fluids.  Eat foods high in fiber. Take a stool softener if recommended by your doctor or pharmacist.  Do not drink alcohol, drive or operate machinery while taking pain medications.  Ask about other ways to control pain, such as with heat, ice or relaxation.    Tylenol/Acetaminophen Consumption    If you feel your pain relief is insufficient, you may take  Tylenol/Acetaminophen in addition to your narcotic pain medication.   Be careful not to exceed 4,000 mg of Tylenol/Acetaminophen in a 24 hour period from all sources.  If you are taking extra strength Tylenol/acetaminophen (500 mg), the maximum dose is 8 tablets in 24 hours.  If you are taking regular strength acetaminophen (325 mg), the maximum dose is 12 tablets in 24 hours.    Call a doctor for any of the following:  Signs of infection (fever, growing tenderness at the surgery site, a large amount of drainage or bleeding, severe pain, foul-smelling drainage, redness, swelling).  It has been over 8 to 10 hours since surgery and you are still not able to urinate (pass water).  Headache for over 24 hours.  Numbness, tingling or weakness the day after surgery (if you had spinal anesthesia).  Signs of Covid-19 infection (temperature over 100 degrees, shortness of breath, cough, loss of taste/smell, generalized body aches, persistent headache, chills, sore throat, nausea/vomiting/diarrhea)    Your doctor is:  Dr. Jaimee Gunderson, Ophthalmology: [phone]                  Or dial [phone] and ask for the resident on call for:  Ophthalmology  For emergency care, call the:  [facility]:  [phone] (TTY for hearing impaired: [phone])

## 2024-05-16 NOTE — ANESTHESIA POSTPROCEDURE EVALUATION
Patient: Jose Carlos Escamilla    Procedure: Procedure(s):  LEFT EYE PHACOEMULSIFICATION, CATARACT, WITH TORIC INTRAOCULAR LENS IMPLANT INSERTION       Anesthesia Type:  MAC    Note:  Disposition: Outpatient   Postop Pain Control: Uneventful            Sign Out: Well controlled pain   PONV: No   Neuro/Psych: Uneventful            Sign Out: Acceptable/Baseline neuro status   Airway/Respiratory: Uneventful            Sign Out: Acceptable/Baseline resp. status   CV/Hemodynamics: Uneventful            Sign Out: Acceptable CV status; No obvious hypovolemia; No obvious fluid overload   Other NRE: NONE   DID A NON-ROUTINE EVENT OCCUR? No       Last vitals:  Vitals Value Taken Time   /84 05/16/24 1206   Temp 36.1  C (96.9  F) 05/16/24 1206   Pulse 73 05/16/24 1206   Resp 18 05/16/24 1206   SpO2 94 % 05/16/24 1206       Electronically Signed By: Alba Yuan MD  May 16, 2024  12:48 PM

## 2024-05-16 NOTE — ANESTHESIA CARE TRANSFER NOTE
Patient: Jose Carlos Escamilla    Procedure: Procedure(s):  LEFT EYE PHACOEMULSIFICATION, CATARACT, WITH TORIC INTRAOCULAR LENS IMPLANT INSERTION       Diagnosis: Nuclear senile cataract of both eyes [H25.13]  Diagnosis Additional Information: No value filed.    Anesthesia Type:   MAC     Note:    Oropharynx: spontaneously breathing  Level of Consciousness: awake  Oxygen Supplementation: room air    Independent Airway: airway patency satisfactory and stable  Dentition: dentition unchanged  Vital Signs Stable: post-procedure vital signs reviewed and stable  Report to RN Given: handoff report given  Patient transferred to: Phase II    Handoff Report: Identifed the Patient, Identified the Reponsible Provider, Reviewed the pertinent medical history, Discussed the surgical course, Reviewed Intra-OP anesthesia mangement and issues during anesthesia, Set expectations for post-procedure period and Allowed opportunity for questions and acknowledgement of understanding  Vitals:  Vitals Value Taken Time   BP     Temp     Pulse     Resp     SpO2         Electronically Signed By: NIMA Neal CRNA  May 16, 2024  12:05 PM  
[place]

## 2024-05-17 ENCOUNTER — OFFICE VISIT (OUTPATIENT)
Dept: OPHTHALMOLOGY | Facility: CLINIC | Age: 74
End: 2024-05-17
Attending: OPHTHALMOLOGY
Payer: MEDICARE

## 2024-05-17 DIAGNOSIS — Z98.890 POSTSURGICAL STATE, EYE: Primary | ICD-10-CM

## 2024-05-17 PROCEDURE — 99024 POSTOP FOLLOW-UP VISIT: CPT | Performed by: OPHTHALMOLOGY

## 2024-05-17 PROCEDURE — G0463 HOSPITAL OUTPT CLINIC VISIT: HCPCS | Performed by: OPHTHALMOLOGY

## 2024-05-17 ASSESSMENT — REFRACTION_WEARINGRX
OD_AXIS: 100
OD_CYLINDER: +0.50
OS_CYLINDER: +1.25
OS_ADD: +3.00
OS_AXIS: 095
SPECS_TYPE: PAL
OS_SPHERE: -0.75
OD_SPHERE: PLANO
OD_ADD: +3.00

## 2024-05-17 ASSESSMENT — EXTERNAL EXAM - RIGHT EYE: OD_EXAM: NORMAL

## 2024-05-17 ASSESSMENT — CONF VISUAL FIELD
OD_INFERIOR_TEMPORAL_RESTRICTION: 0
OS_NORMAL: 1
OD_SUPERIOR_NASAL_RESTRICTION: 0
OS_SUPERIOR_TEMPORAL_RESTRICTION: 0
OS_INFERIOR_NASAL_RESTRICTION: 0
OS_SUPERIOR_NASAL_RESTRICTION: 0
OD_NORMAL: 1
OS_INFERIOR_TEMPORAL_RESTRICTION: 0
OD_SUPERIOR_TEMPORAL_RESTRICTION: 0
METHOD: COUNTING FINGERS
OD_INFERIOR_NASAL_RESTRICTION: 0

## 2024-05-17 ASSESSMENT — SLIT LAMP EXAM - LIDS
COMMENTS: NORMAL
COMMENTS: NORMAL

## 2024-05-17 ASSESSMENT — VISUAL ACUITY
METHOD: SNELLEN - LINEAR
CORRECTION_TYPE: GLASSES
OS_SC: 20/30
OS_PH_SC: 20/25
OD_CC: 20/20

## 2024-05-17 ASSESSMENT — TONOMETRY
OS_IOP_MMHG: 19
OD_IOP_MMHG: 19
IOP_METHOD: ICARE

## 2024-05-17 ASSESSMENT — EXTERNAL EXAM - LEFT EYE: OS_EXAM: NORMAL

## 2024-05-17 NOTE — OP NOTE
Operative  Report    Patient Name: Jose Carlos Escamilla    MRN: [id number]    Date of Surgery: 5/16/2024    Surgeon: Jaimee Gunderson M.D    Assistant surgeon: -    Preoperative Diagnosis: Visually significant cataract, left eye    Postoperative Diagnosis: Same    Procedure: Phacoemulsification with TORIC intraocular lens implant, left eye    Implant: AIM distance  Implant Name Type Inv. Item Serial No.  Lot No. LRB No. Used Action   Clareon Toric UV IOL [id number]   [id number] SCARLET  Left 1 Implanted       Anesthesia Type: mac    Estimated Blood Loss: Trace    Complications: None    INDICATIONS FOR PROCEDURE: Patient has a history of visually significant cataract affecting the patient's activity of daily living. After a discussion with the patient, the patient has elected to have the listed procedure(s) to maximize visual potential. All of the appropriate consent forms have been signed and all of the patient's questions have been answered.    DETAILS OF THE PROCEDURE: Patient was identified in the pre operative area and given pre operative eye drops. The patient was then brought into the operating room and intravenous sedation was begun after a time out was performed. The patient was prepped and draped in the usual sterile ophthalmic fashion.     A lid speculum was placed and the operating microscope was brought into position. A paracentesis was made and lidocain and viscoelastic was injected into the anterior chamber. A clear corneal incision was made using a keratome blade. A cystotome needle and Utrata forceps were used to create an anterior continuous curvilinear capsulorhexis. Then BSS on a blunt tipped cannula was used for hydrodissection and hydrodelineation. Using the phacoemulsification unit, the nucleus was then removed from the eye. Using irrigation and aspiration, the remaining cortical material was removed from the eye. The capsular bag was  infused with viscoelastic material. The intraocular lens was then placed into the capsular bag and secured into position. The remaining viscoelastic material was then removed from the eye via irrigation and aspiration. BSS on a blunt tipped cannula was used to hydrate all of the wounds. At the end of the case, the wounds were noted to be watertight, the pupil was noted to be round, the lens appeared to be in good position, and the pressure was palpated to be physiologic. Intracameral moxifloxacin and a subconjunctival injection of Kenalog were administered.     Post operative ointment was applied and the eye was patched and shielded. Patient tolerated procedure and was brought to the recover area in good condition. Patient will follow in the eye clinic in one day.

## 2024-05-17 NOTE — PATIENT INSTRUCTIONS
Post-Operative Instructions     FIRST 24 HOURS AFTER SURGERY:  You are allowed to Tylenol (acetaminophen) 650mg every four hours as needed for pain unless you have liver disease or are allergic to Tylenol..  Continue taking your regular medications and eye drops in the non-operative eye.  Do not remove the metal or plastic shield unless otherwise instructed by your surgeon.  Do not operate a car, motorcycle, or machinery for 24 hours after surgery.  Call ED immediately if you have SEVERE PAIN unrelieved with Tylenol. And  ask for the resident on call.     MEDICATION INSTRUCTIONS:  -You will not have treatment eye drops prescription as medications were injected into your eye.  -If you feel your eyes are dry and itchy, you can use regular lubricating drops for one month after the surgery. These eye drops can be found over the counter Brand names example: Systane, Refresh. Please choose preservative free drops. To be used four times a day and as needed for 1 month  -Instilling the eye drops directly into the eye.    -If you had previously any glaucoma eye drops, You can remove the cover and instill the drops as prescribed before and after the procedure      GENERAL INSTRUCTIONS:  Hygiene of the operated eye  Wash your hands thoroughly before caring for the eye.  If the lids are sticky or itchy in the morning, debris, or matter can be gently wiped away with a cotton ball moistened with tap water. DO NOT press on the lids or eyeball.     FIVE MINUTES APART. If ointment is prescribed, it should be applied last.  If you have been taking medications in the non-operated eye, continue as they were prescribed.  If you take medications by mouth for a medical problem, continue as they were prescribed (unless otherwise instructed by physician)    EYE PROTECTION  From the time of surgery until the time of your post-operative day 1 appointment, wear the eye shield at all times. Then, wear it whenever sleeping, for 1  week.  Protective sunglasses may be worn as needed for your comfort, and are suggested for outdoor activities.    ACTIVITIES  Avoid vigorous exertion and heavy lifting for the first week after surgery  You may take a bath or shower, but avoid getting water directly into your eye for one week after surgery, usually by keeping your eyes closed during the bath.  You should discuss driving and traveling with your surgeon. You may ride in a car and fly in an airplane unless otherwise instructed.  Avoid swimming or using a hot tube/sauna/pool/lake for 2 weeks after the surgery.      WHAT TO EXPECT:  Mild irritation and discomfort are normal.    Call the doctor if you experience any of the following:  Severe eye pain  Nausea  Vomiting  Severe headache

## 2024-05-17 NOTE — NURSING NOTE
Chief Complaints and History of Present Illnesses   Patient presents with    Post Op (Ophthalmology) Left Eye     POD#1 s/p CE/IOL LE 05/16/2024       Chief Complaint(s) and History of Present Illness(es)       Post Op (Ophthalmology) Left Eye              Comments: POD#1 s/p CE/IOL LE 05/16/2024                Comments    Pt states is ok. Occasional flickering in LE that comes and goes.   No eye pain today, but notes some soreness in LE yesterday.    LESLY Dunn May 17, 2024 11:05 AM

## 2024-05-17 NOTE — PROGRESS NOTES
Chief complaint   Routine eyeexam  Referring Provider:  Referring Provider: Referred Self    HPI    Jose Carlos Escamilla 74 year old male     Chief Complaint(s) and History of Present Illness(es)       COMPREHENSIVE EYE EXAM    Associated symptoms include dryness, tearing and headache.  Negative for eye pain, redness, flashes and floaters.  Treatments tried include warm compresses.  Pain was noted as 0/10.             Comments    Pt here today for annual routine eye exam.  DEB was here 4/1/2021 - no issues at last exam.  Pt states vision is stable - eyes are sticky in the morning.  Has gunky sensation in both eyes.    Ocular meds = none    Kody MAX, ERIC 9:35 AM 11/09/2022                          Interval hx 05/17/2024  Chief Complaint(s) and History of Present Illness(es)       Post Op (Ophthalmology) Left Eye     Additional comments: POD#1 s/p CE/IOL LE 05/16/2024               Comments    Pt states is ok. Occasional flickering in LE that comes and goes.   No eye pain today, but notes some soreness in LE yesterday.    LESLY Dunn May 17, 2024 11:05 AM                             Past ocular history   Prior eye surgery/laser/Trauma: No  CTL wearer:No  Glasses : prog  Family Hx of eye disease: No    PMH     Past Medical History:   Diagnosis Date    ACP (advance care planning)     Adrenal insufficiency (H24)     Chronic pain syndrome     Chronic rhinitis     Diverticulosis     Esophageal stenosis     Hypercholesterolemia     IBS (irritable bowel syndrome)     Meckel's diverticulitis 7/13/2021    Added automatically from request for surgery [id number]    Narcotic dependence (H)        PSH     Past Surgical History:   Procedure Laterality Date    ARTHROPLASTY KNEE Left 12/2/2016    Procedure: ARTHROPLASTY KNEE;  Surgeon: Marisa Page MD;  Location:  OR    ARTHROPLASTY KNEE Right 2/25/2022    Procedure: RIGHT KNEE ARTHROPLASTY;  Surgeon: Marisa Page MD;  Location:  OR    DISCECTOMY  CERVICAL MINIMALLY INVASIVE ONE LEVEL      IR LUMBAR PUNCTURE  7/14/2016    LAPAROSCOPIC APPENDECTOMY N/A 7/14/2021    Procedure: Laparoscopic appendectomy;  Surgeon: Kelsi Parker MD;  Location:  OR    LAPAROSCOPY DIAGNOSTIC (GENERAL) N/A 7/14/2021    Procedure: Diagnostic laparoscopy, laparoscopic appendectomy, laparoscopic meckel excision;  Surgeon: Kelsi Parker MD;  Location:  OR    NECK SURGERY  2001    [place]    PHACOEMULSIFICATION WITH STANDARD INTRAOCULAR LENS IMPLANT Left 5/16/2024    Procedure: LEFT EYE PHACOEMULSIFICATION, CATARACT, WITH TORIC INTRAOCULAR LENS IMPLANT INSERTION;  Surgeon: Jaimee Gunderson MD;  Location: [facility] OR       [facility]     Current Outpatient Medications   Medication Sig Dispense Refill    dexamethasone (DECADRON) 4 MG/ML injection   3    DULoxetine (CYMBALTA) 30 MG capsule Take 30 mg by mouth 2 times daily Not yet started      gabapentin (NEURONTIN) 300 MG capsule Take 2,700 mg by mouth daily 4 Tabs in AM and 5 tabs in PM      HYDROcodone-acetaminophen (NORCO)  MG per tablet       hydrocortisone (CORTEF) 5 MG tablet Take 20 mg by mouth daily      lisinopril (ZESTRIL) 20 MG tablet TAKE 1 TABLET(20 MG) BY MOUTH DAILY 90 tablet 3    nortriptyline (PAMELOR) 10 MG capsule Take 3 capsules (30 mg) by mouth at bedtime for 180 days 270 capsule 1    omeprazole (PRILOSEC) 20 MG DR capsule Take 20 mg by mouth daily      rosuvastatin (CRESTOR) 10 MG tablet TAKE 1 TABLET(10 MG) BY MOUTH DAILY 90 tablet 3    Semaglutide-Weight Management (WEGOVY) 1.7 MG/0.75ML pen INJECT 1.7MG UNDER THE SKIN ONCE A WEEK 3 mL 2    senna-docusate (SENOKOT-S/PERICOLACE) 8.6-50 MG tablet Take 1-2 tablets by mouth 2 times daily Take while on oral narcotics to prevent or treat constipation. 30 tablet 0    tadalafil (CIALIS) 20 MG tablet TAKE ONE TABLET BY MOUTH ONE TIME DAILY AS NEEDED 30 tablet 3    tamsulosin (FLOMAX) 0.4 MG capsule TAKE 1 CAPSULE(0.4 MG) BY MOUTH DAILY 90 capsule 0    testosterone  (AXIRON) 30 MG/ACT topical solution Place 30 mg onto the skin daily 2 pumps daily      traZODone (DESYREL) 50 MG tablet TAKE 2 TABLETS(100 MG) BY MOUTH AT BEDTIME 180 tablet 0     No current facility-administered medications for this visit.       Drops Currently Taking   None    Assessment/Plan   #pseudophakia left eye  No complications,   Clear cornea  No eye drops  Precautions given       # Nuclear sclerosis, each eye  # Myopia with astigmatism  Became visually significant due to a cortical spoke in the center of the vision each eye  Patient is having difficulty with daily activities due to visual impairment. Patient feels that they are no longer managing with current correction and would like to move forward with cataract surgery. Explained benefits alternatives and risks including but not limited to loss of vision, severe infection, retinal detachment, need for more surgery, visual disturbances etc...  Informed patient that reaching uncorrected vision aim (without glasses) after cataract surgery is not certain. Made patient aware they may need glasses, laser vision correction or in worst case scenario, IOL exchange.      Dilates well  no PXF  no Flomax  no guttae    -Aim:distance left eye, right eye near (1.75  - dominant eye OS  -Previous refractive surgery status:-  -Corneal astigmatism>1  Patient is okay to toric lenses  Patient would like to have monovision  -to upload the [place] pictures on USB before surgery  -patient has bob disease and told to increase steroid before surgery (after checking with his doctor)        # MICKI, each eye  - Rec ATs 4x/day    Follow up:  Attending Physician Attestation:  Complete documentation of historical and exam elements from today's encounter can be found in the full encounter summary report (not reduplicated in this progress note).  I personally obtained the chief complaint(s) and history of present illness.  I confirmed and edited as necessary the review of systems,  past medical/surgical history, family history, social history, and examination findings as documented by others; and I examined the patient myself.  I personally reviewed the relevant tests, images, and reports as documented above.  I formulated and edited as necessary the assessment and plan and discussed the findings and management plan with the patient and family. - Jaimee Gunderson MD

## 2024-05-20 ENCOUNTER — TRANSFERRED RECORDS (OUTPATIENT)
Dept: FAMILY MEDICINE | Facility: CLINIC | Age: 74
End: 2024-05-20

## 2024-05-22 ENCOUNTER — ANESTHESIA EVENT (OUTPATIENT)
Dept: SURGERY | Facility: AMBULATORY SURGERY CENTER | Age: 74
End: 2024-05-22
Payer: MEDICARE

## 2024-05-23 ENCOUNTER — ANESTHESIA (OUTPATIENT)
Dept: SURGERY | Facility: AMBULATORY SURGERY CENTER | Age: 74
End: 2024-05-23
Payer: MEDICARE

## 2024-05-23 ENCOUNTER — HOSPITAL ENCOUNTER (OUTPATIENT)
Facility: AMBULATORY SURGERY CENTER | Age: 74
Discharge: HOME | End: 2024-05-23
Attending: OPHTHALMOLOGY
Payer: MEDICARE

## 2024-05-23 VITALS
HEART RATE: 75 BPM | OXYGEN SATURATION: 94 % | DIASTOLIC BLOOD PRESSURE: 88 MMHG | HEIGHT: 73 IN | TEMPERATURE: 97 F | SYSTOLIC BLOOD PRESSURE: 135 MMHG | WEIGHT: 210 LBS | BODY MASS INDEX: 27.83 KG/M2 | RESPIRATION RATE: 16 BRPM

## 2024-05-23 DIAGNOSIS — H25.13 NUCLEAR SENILE CATARACT OF BOTH EYES: Primary | ICD-10-CM

## 2024-05-23 PROCEDURE — 99100 ANES PT EXTEME AGE<1 YR&>70: CPT | Performed by: ANESTHESIOLOGY

## 2024-05-23 PROCEDURE — 66984 XCAPSL CTRC RMVL W/O ECP: CPT | Mod: RT

## 2024-05-23 PROCEDURE — V2599 CONTACT LENS/ES OTHER TYPE: HCPCS | Mod: DBP,RT

## 2024-05-23 PROCEDURE — 66984 XCAPSL CTRC RMVL W/O ECP: CPT | Performed by: ANESTHESIOLOGY

## 2024-05-23 PROCEDURE — 66984 XCAPSL CTRC RMVL W/O ECP: CPT | Performed by: NURSE ANESTHETIST, CERTIFIED REGISTERED

## 2024-05-23 PROCEDURE — 96999 UNLISTED SPEC DERM SVC/PX: CPT | Mod: LT | Performed by: OPHTHALMOLOGY

## 2024-05-23 PROCEDURE — 66984 XCAPSL CTRC RMVL W/O ECP: CPT | Mod: 79 | Performed by: OPHTHALMOLOGY

## 2024-05-23 PROCEDURE — 99100 ANES PT EXTEME AGE<1 YR&>70: CPT | Performed by: NURSE ANESTHETIST, CERTIFIED REGISTERED

## 2024-05-23 RX ORDER — HYDROMORPHONE HYDROCHLORIDE 1 MG/ML
0.2 INJECTION, SOLUTION INTRAMUSCULAR; INTRAVENOUS; SUBCUTANEOUS EVERY 5 MIN PRN
Status: DISCONTINUED | OUTPATIENT
Start: 2024-05-23 | End: 2024-05-24 | Stop reason: HOSPADM

## 2024-05-23 RX ORDER — OXYCODONE HYDROCHLORIDE 5 MG/1
5 TABLET ORAL
Status: DISCONTINUED | OUTPATIENT
Start: 2024-05-23 | End: 2024-05-24 | Stop reason: HOSPADM

## 2024-05-23 RX ORDER — KETOROLAC TROMETHAMINE 30 MG/ML
15 INJECTION, SOLUTION INTRAMUSCULAR; INTRAVENOUS
Status: DISCONTINUED | OUTPATIENT
Start: 2024-05-23 | End: 2024-05-24 | Stop reason: HOSPADM

## 2024-05-23 RX ORDER — ACETAMINOPHEN 325 MG/1
975 TABLET ORAL ONCE
Status: DISCONTINUED | OUTPATIENT
Start: 2024-05-23 | End: 2024-05-24 | Stop reason: HOSPADM

## 2024-05-23 RX ORDER — DIPHENHYDRAMINE HYDROCHLORIDE 50 MG/ML
12.5 INJECTION INTRAMUSCULAR; INTRAVENOUS EVERY 6 HOURS PRN
Status: DISCONTINUED | OUTPATIENT
Start: 2024-05-23 | End: 2024-05-24 | Stop reason: HOSPADM

## 2024-05-23 RX ORDER — HYDRALAZINE HYDROCHLORIDE 20 MG/ML
2.5-5 INJECTION INTRAMUSCULAR; INTRAVENOUS EVERY 10 MIN PRN
Status: DISCONTINUED | OUTPATIENT
Start: 2024-05-23 | End: 2024-05-24 | Stop reason: HOSPADM

## 2024-05-23 RX ORDER — DEXAMETHASONE SODIUM PHOSPHATE 10 MG/ML
4 INJECTION, SOLUTION INTRAMUSCULAR; INTRAVENOUS
Status: DISCONTINUED | OUTPATIENT
Start: 2024-05-23 | End: 2024-05-24 | Stop reason: HOSPADM

## 2024-05-23 RX ORDER — MEPERIDINE HYDROCHLORIDE 25 MG/ML
12.5 INJECTION INTRAMUSCULAR; INTRAVENOUS; SUBCUTANEOUS EVERY 5 MIN PRN
Status: DISCONTINUED | OUTPATIENT
Start: 2024-05-23 | End: 2024-05-24 | Stop reason: HOSPADM

## 2024-05-23 RX ORDER — LIDOCAINE 40 MG/G
CREAM TOPICAL
Status: DISCONTINUED | OUTPATIENT
Start: 2024-05-23 | End: 2024-05-24 | Stop reason: HOSPADM

## 2024-05-23 RX ORDER — ONDANSETRON 2 MG/ML
4 INJECTION INTRAMUSCULAR; INTRAVENOUS EVERY 30 MIN PRN
Status: DISCONTINUED | OUTPATIENT
Start: 2024-05-23 | End: 2024-05-24 | Stop reason: HOSPADM

## 2024-05-23 RX ORDER — FENTANYL CITRATE 50 UG/ML
50 INJECTION, SOLUTION INTRAMUSCULAR; INTRAVENOUS EVERY 5 MIN PRN
Status: DISCONTINUED | OUTPATIENT
Start: 2024-05-23 | End: 2024-05-24 | Stop reason: HOSPADM

## 2024-05-23 RX ORDER — TRIAMCINOLONE ACETONIDE 40 MG/ML
INJECTION, SUSPENSION INTRA-ARTICULAR; INTRAMUSCULAR PRN
Status: DISCONTINUED | OUTPATIENT
Start: 2024-05-23 | End: 2024-05-23 | Stop reason: HOSPADM

## 2024-05-23 RX ORDER — MOXIFLOXACIN IN NACL,ISO-OS/PF 0.3MG/0.3
SYRINGE (ML) INTRAOCULAR PRN
Status: DISCONTINUED | OUTPATIENT
Start: 2024-05-23 | End: 2024-05-23 | Stop reason: HOSPADM

## 2024-05-23 RX ORDER — NALOXONE HYDROCHLORIDE 0.4 MG/ML
0.1 INJECTION, SOLUTION INTRAMUSCULAR; INTRAVENOUS; SUBCUTANEOUS
Status: DISCONTINUED | OUTPATIENT
Start: 2024-05-23 | End: 2024-05-24 | Stop reason: HOSPADM

## 2024-05-23 RX ORDER — FENTANYL CITRATE 50 UG/ML
25 INJECTION, SOLUTION INTRAMUSCULAR; INTRAVENOUS EVERY 5 MIN PRN
Status: DISCONTINUED | OUTPATIENT
Start: 2024-05-23 | End: 2024-05-24 | Stop reason: HOSPADM

## 2024-05-23 RX ORDER — LORAZEPAM 2 MG/ML
.5-1 INJECTION INTRAMUSCULAR
Status: DISCONTINUED | OUTPATIENT
Start: 2024-05-23 | End: 2024-05-24 | Stop reason: HOSPADM

## 2024-05-23 RX ORDER — ONDANSETRON 4 MG/1
4 TABLET, ORALLY DISINTEGRATING ORAL EVERY 30 MIN PRN
Status: DISCONTINUED | OUTPATIENT
Start: 2024-05-23 | End: 2024-05-24 | Stop reason: HOSPADM

## 2024-05-23 RX ORDER — BALANCED SALT SOLUTION 6.4; .75; .48; .3; 3.9; 1.7 MG/ML; MG/ML; MG/ML; MG/ML; MG/ML; MG/ML
SOLUTION OPHTHALMIC PRN
Status: DISCONTINUED | OUTPATIENT
Start: 2024-05-23 | End: 2024-05-23 | Stop reason: HOSPADM

## 2024-05-23 RX ORDER — FENTANYL CITRATE 50 UG/ML
INJECTION, SOLUTION INTRAMUSCULAR; INTRAVENOUS PRN
Status: DISCONTINUED | OUTPATIENT
Start: 2024-05-23 | End: 2024-05-23

## 2024-05-23 RX ORDER — ALBUTEROL SULFATE 0.83 MG/ML
2.5 SOLUTION RESPIRATORY (INHALATION) EVERY 4 HOURS PRN
Status: DISCONTINUED | OUTPATIENT
Start: 2024-05-23 | End: 2024-05-24 | Stop reason: HOSPADM

## 2024-05-23 RX ORDER — CYCLOPENTOLAT/TROPIC/PHENYLEPH 1%-1%-2.5%
1 DROPS (EA) OPHTHALMIC (EYE)
Status: COMPLETED | OUTPATIENT
Start: 2024-05-23 | End: 2024-05-23

## 2024-05-23 RX ORDER — SODIUM CHLORIDE, SODIUM LACTATE, POTASSIUM CHLORIDE, CALCIUM CHLORIDE 600; 310; 30; 20 MG/100ML; MG/100ML; MG/100ML; MG/100ML
INJECTION, SOLUTION INTRAVENOUS CONTINUOUS
Status: DISCONTINUED | OUTPATIENT
Start: 2024-05-23 | End: 2024-05-24 | Stop reason: HOSPADM

## 2024-05-23 RX ORDER — DIPHENHYDRAMINE HCL 12.5 MG/5ML
12.5 SOLUTION ORAL EVERY 6 HOURS PRN
Status: DISCONTINUED | OUTPATIENT
Start: 2024-05-23 | End: 2024-05-24 | Stop reason: HOSPADM

## 2024-05-23 RX ORDER — LABETALOL HYDROCHLORIDE 5 MG/ML
10 INJECTION, SOLUTION INTRAVENOUS
Status: DISCONTINUED | OUTPATIENT
Start: 2024-05-23 | End: 2024-05-24 | Stop reason: HOSPADM

## 2024-05-23 RX ORDER — ACETAMINOPHEN 325 MG/1
975 TABLET ORAL ONCE
Status: COMPLETED | OUTPATIENT
Start: 2024-05-23 | End: 2024-05-23

## 2024-05-23 RX ORDER — HYDROMORPHONE HYDROCHLORIDE 1 MG/ML
0.4 INJECTION, SOLUTION INTRAMUSCULAR; INTRAVENOUS; SUBCUTANEOUS EVERY 5 MIN PRN
Status: DISCONTINUED | OUTPATIENT
Start: 2024-05-23 | End: 2024-05-24 | Stop reason: HOSPADM

## 2024-05-23 RX ORDER — PROPARACAINE HYDROCHLORIDE 5 MG/ML
1 SOLUTION/ DROPS OPHTHALMIC ONCE
Status: COMPLETED | OUTPATIENT
Start: 2024-05-23 | End: 2024-05-23

## 2024-05-23 RX ORDER — FENTANYL CITRATE 50 UG/ML
25 INJECTION, SOLUTION INTRAMUSCULAR; INTRAVENOUS
Status: DISCONTINUED | OUTPATIENT
Start: 2024-05-23 | End: 2024-05-24 | Stop reason: HOSPADM

## 2024-05-23 RX ORDER — OXYCODONE HYDROCHLORIDE 5 MG/1
10 TABLET ORAL
Status: DISCONTINUED | OUTPATIENT
Start: 2024-05-23 | End: 2024-05-24 | Stop reason: HOSPADM

## 2024-05-23 RX ORDER — SODIUM CHLORIDE, SODIUM LACTATE, POTASSIUM CHLORIDE, CALCIUM CHLORIDE 600; 310; 30; 20 MG/100ML; MG/100ML; MG/100ML; MG/100ML
INJECTION, SOLUTION INTRAVENOUS CONTINUOUS
Status: CANCELLED | OUTPATIENT
Start: 2024-05-23

## 2024-05-23 RX ORDER — TETRACAINE HYDROCHLORIDE 5 MG/ML
SOLUTION OPHTHALMIC PRN
Status: DISCONTINUED | OUTPATIENT
Start: 2024-05-23 | End: 2024-05-23 | Stop reason: HOSPADM

## 2024-05-23 RX ORDER — LIDOCAINE HYDROCHLORIDE 10 MG/ML
INJECTION, SOLUTION EPIDURAL; INFILTRATION; INTRACAUDAL; PERINEURAL PRN
Status: DISCONTINUED | OUTPATIENT
Start: 2024-05-23 | End: 2024-05-23 | Stop reason: HOSPADM

## 2024-05-23 RX ADMIN — Medication 1 DROP: at 06:36

## 2024-05-23 RX ADMIN — FENTANYL CITRATE 50 MCG: 50 INJECTION, SOLUTION INTRAMUSCULAR; INTRAVENOUS at 07:16

## 2024-05-23 RX ADMIN — Medication 1 DROP: at 06:31

## 2024-05-23 RX ADMIN — SODIUM CHLORIDE, SODIUM LACTATE, POTASSIUM CHLORIDE, CALCIUM CHLORIDE: 600; 310; 30; 20 INJECTION, SOLUTION INTRAVENOUS at 06:39

## 2024-05-23 RX ADMIN — PROPARACAINE HYDROCHLORIDE 1 DROP: 5 SOLUTION/ DROPS OPHTHALMIC at 06:31

## 2024-05-23 RX ADMIN — Medication 1 DROP: at 06:40

## 2024-05-23 RX ADMIN — FENTANYL CITRATE 50 MCG: 50 INJECTION, SOLUTION INTRAMUSCULAR; INTRAVENOUS at 07:13

## 2024-05-23 ASSESSMENT — LIFESTYLE VARIABLES: TOBACCO_USE: 1

## 2024-05-23 NOTE — ANESTHESIA PREPROCEDURE EVALUATION
Anesthesia Pre-Procedure Evaluation    Patient: Jose Carlos Escamlila   MRN: [id number] : 1950        Procedure : Procedure(s):RIGHT  EYE PHACOEMULSIFICATION, CATARACT, WITH TORIC INTRAOCULAR LENS IMPLANT INSERTION          Past Medical History:   Diagnosis Date    ACP (advance care planning)     Adrenal insufficiency (H24)     Chronic pain syndrome     Chronic rhinitis     Diverticulosis     Esophageal stenosis     Hypercholesterolemia     IBS (irritable bowel syndrome)     Meckel's diverticulitis 2021    Added automatically from request for surgery [id number]    Narcotic dependence (H)       Past Surgical History:   Procedure Laterality Date    ARTHROPLASTY KNEE Left 2016    Procedure: ARTHROPLASTY KNEE;  Surgeon: Marisa Page MD;  Location: [facility]    ARTHROPLASTY KNEE Right 2022    Procedure: RIGHT KNEE ARTHROPLASTY;  Surgeon: Marisa Page MD;  Location: [facility]    DISCECTOMY CERVICAL MINIMALLY INVASIVE ONE LEVEL      IR LUMBAR PUNCTURE  2016    LAPAROSCOPIC APPENDECTOMY N/A 2021    Procedure: Laparoscopic appendectomy;  Surgeon: Kelsi Parker MD;  Location: [facility]    LAPAROSCOPY DIAGNOSTIC (GENERAL) N/A 2021    Procedure: Diagnostic laparoscopy, laparoscopic appendectomy, laparoscopic meckel excision;  Surgeon: Kelsi Parker MD;  Location: [facility]    NECK SURGERY      [place]    PHACOEMULSIFICATION WITH STANDARD INTRAOCULAR LENS IMPLANT Left 2024    Procedure: LEFT EYE PHACOEMULSIFICATION, CATARACT, WITH TORIC INTRAOCULAR LENS IMPLANT INSERTION;  Surgeon: Jaimee Gunderson MD;  Location: [facility] OR      Allergies   Allergen Reactions    Penicillins Rash and Anaphylaxis     Other reaction(s): Breathing Difficulty    Zosyn [Piperacillin-Tazobactam In Dex] Palpitations and Rash      Social History     Tobacco Use    Smoking status: Former     Current packs/day: 0.00     Types: Cigarettes     Quit date: 1995     Years since quittin.8    Smokeless tobacco:  Never   Substance Use Topics    Alcohol use: Yes     Alcohol/week: 0.0 - 3.3 standard drinks of alcohol     Comment: 1 drink per day      Wt Readings from Last 1 Encounters:   05/23/24 95.3 kg (210 lb)        Anesthesia Evaluation   Pt has had prior anesthetic. Type: General and MAC.    No history of anesthetic complications       ROS/MED HX  ENT/Pulmonary:     (+)           allergic rhinitis,     tobacco use (Quit in '95), Past use,                       Neurologic:     (+)    peripheral neuropathy,                            Cardiovascular:     (+) Dyslipidemia hypertension- -   -  - -                                 Previous cardiac testing   Echo: Date: Results:    Stress Test:  Date: Results:    ECG Reviewed:  Date: 2/18/22 Results:  NSR  Cath:  Date: Results:      METS/Exercise Tolerance:     Hematologic:       Musculoskeletal: Comment: PMR      GI/Hepatic:     (+) GERD,  esophageal disease,                 Renal/Genitourinary:       Endo: Comment: Adrenal insufficiency    (+)            Chronic steroid usage for Arthritis.  Obesity (Class 1),       Psychiatric/Substance Use:     (+)    H/O chronic opiod use  (60 MME/day; Norco 10's).     Infectious Disease:       Malignancy:       Other:      (+)  , H/O Chronic Pain,         Physical Exam    Airway        Mallampati: II   TM distance: > 3 FB   Neck ROM: full   Mouth opening: > 3 cm    Respiratory Devices and Support         Dental       (+) Modest Abnormalities - crowns, retainers, 1 or 2 missing teeth      Cardiovascular   cardiovascular exam normal       Rhythm and rate: regular     Pulmonary   pulmonary exam normal        breath sounds clear to auscultation         OUTSIDE LABS:  CBC:   Lab Results   Component Value Date    WBC 18.6 (H) 04/30/2024    WBC 7.7 02/18/2022    HGB 17.6 04/30/2024    HGB 12.7 (L) 02/26/2022    HCT 50.4 04/30/2024    HCT 46.2 02/18/2022     04/30/2024     02/18/2022     BMP:   Lab Results   Component Value Date     " 04/30/2024     12/20/2023    POTASSIUM 4.7 04/30/2024    POTASSIUM 3.4 12/20/2023    CHLORIDE 99 04/30/2024    CHLORIDE 101 12/20/2023    CO2 20 (L) 04/30/2024    CO2 28 12/20/2023    BUN 22.0 04/30/2024    BUN 17.7 12/20/2023    CR 0.86 04/30/2024    CR 0.81 12/20/2023     (H) 04/30/2024     (H) 12/20/2023     COAGS: No results found for: \"PTT\", \"INR\", \"FIBR\"  POC: No results found for: \"BGM\", \"HCG\", \"HCGS\"  HEPATIC:   Lab Results   Component Value Date    ALBUMIN 4.4 04/30/2024    PROTTOTAL 7.0 04/30/2024    ALT 17 04/30/2024    AST 20 04/30/2024    ALKPHOS 62 04/30/2024    BILITOTAL 0.8 04/30/2024     OTHER:   Lab Results   Component Value Date    LACT 1.5 04/30/2024    A1C 5.5 01/26/2024    TRI 9.1 04/30/2024    TSH 1.22 12/20/2023    T4 1.16 03/21/2019    CRP 51 (H) 07/12/2021    SED 0 07/12/2021       Anesthesia Plan    ASA Status:  3    NPO Status:  NPO Appropriate    Anesthesia Type: MAC.     - Reason for MAC: straight local not clinically adequate              Consents    Anesthesia Plan(s) and associated risks, benefits, and realistic alternatives discussed. Questions answered and patient/representative(s) expressed understanding.     - Discussed:     - Discussed with:  Patient      - Extended Intubation/Ventilatory Support Discussed: No.      - Patient is DNR/DNI Status: No     Use of blood products discussed: No .     Postoperative Care    Pain management: Multi-modal analgesia.        Comments:               José Miguel Stahl MD    I have reviewed the pertinent notes and labs in the chart from the past 30 days and (re)examined the patient.  Any updates or changes from those notes are reflected in this note.     # Hyponatremia: Lowest Na = 135 mmol/L in last 30 days, will monitor as appropriate          # Overweight: Estimated body mass index is 27.71 kg/m  as calculated from the following:    Height as of this encounter: 1.854 m (6' 1\").    Weight as of this encounter: " 95.3 kg (210 lb).

## 2024-05-23 NOTE — BRIEF OP NOTE
[facility] [place]    Brief Operative Note    Pre-operative diagnosis: Nuclear senile cataract of both eyes [H25.13]  Post-operative diagnosis Same as pre-operative diagnosis    Procedure: RIGHT EYE PHACOEMULSIFICATION, CATARACT, WITH TORIC  INTRAOCULAR LENS IMPLANT INSERTION, Right - Eye    Surgeon: Surgeons and Role:     * Jaimee Gunderson MD - Primary     * Mari Simon MD - Resident - Assisting     * Jessica Jara MD - Fellow - Assisting  Anesthesia: MAC with Local   Estimated Blood Loss: None    Drains: None  Specimens: * No specimens in log *  Findings:   None.  Complications: None.  Implants:   Implant Name Type Inv. Item Serial No.  Lot No. LRB No. Used Action   Clareon Toric Aspheric UV Absorbing IOL, D: +20.0   [id number]   Right 1 Implanted

## 2024-05-23 NOTE — OP NOTE
Operative  Report    Patient Name: Jose Carlos Escamilla    MRN: [id number]    Date of Surgery: 5/23/2024    Surgeon: Jaimee Gunderson M.D    Assistant surgeon: Jessica Jara MD      Preoperative Diagnosis: Visually significant cataract, right eye    Postoperative Diagnosis: Same    Procedure: Phacoemulsification with intraocular lens implant, right eye    Implant: AIM near  Implant Name Type Inv. Item Serial No.  Lot No. LRB No. Used Action   Clareon Toric Aspheric UV Absorbing IOL, D: +20.0   [id number]   Right 1 Implanted       Anesthesia Type: MAC+topical    Estimated Blood Loss: Trace loss    Complications: None    INDICATIONS FOR PROCEDURE: Patient has a history of visually significant cataract affecting the patient's activity of daily living. After a discussion with the patient, the patient has elected to have the listed procedure(s) to maximize visual potential. All of the appropriate consent forms have been signed and all of the patient's questions have been answered.    DETAILS OF THE PROCEDURE: Patient was identified in the pre operative area and given pre operative eye drops. The patient was then brought into the operating room and intravenous sedation was begun after a time out was performed. The patient was prepped and draped in the usual sterile ophthalmic fashion.     A lid speculum was placed and the operating microscope was brought into position. A paracentesis was made and lidocain and viscoelastic was injected into the anterior chamber. A clear corneal incision was made using a keratome blade. A cystotome needle and Utrata forceps were used to create an anterior continuous curvilinear capsulorhexis. Then BSS on a blunt tipped cannula was used for hydrodissection and hydrodelineation. Using the phacoemulsification unit, the nucleus was then removed from the eye. Using irrigation and aspiration, the remaining cortical material was removed  from the eye. The capsular bag was infused with viscoelastic material. The intraocular lens was then placed into the capsular bag and secured into position and rotated to the appropriate axis of 98 degrees. The remaining viscoelastic material was then removed from the eye via irrigation and aspiration. BSS on a blunt tipped cannula was used to hydrate all of the wounds. At the end of the case, the wounds were noted to be watertight, the pupil was noted to be round, the lens appeared to be in good position, and the pressure was palpated to be physiologic. Intracameral moxifloxacin and a subconjunctival injection of Kenalog were administered.     Post operative ointment was applied and the eye was patched and shielded. Patient tolerated procedure and was brought to the recover area in good condition. Patient will follow in the eye clinic in one day.     Jessica Jara MD  Cornea and External Disease Fellow  [facility]     I was present in the entire surgery on 5/23/24  Jaimee Gunderson MD

## 2024-05-23 NOTE — ANESTHESIA CARE TRANSFER NOTE
Patient: Jose Carlos Escamilla    Procedure: Procedure(s):  RIGHT EYE PHACOEMULSIFICATION, CATARACT, WITH TORIC  INTRAOCULAR LENS IMPLANT INSERTION       Diagnosis: Nuclear senile cataract of both eyes [H25.13]  Diagnosis Additional Information: No value filed.    Anesthesia Type:   MAC     Note:    Oropharynx: spontaneously breathing  Level of Consciousness: awake  Oxygen Supplementation: room air    Independent Airway: airway patency satisfactory and stable  Dentition: dentition unchanged  Vital Signs Stable: post-procedure vital signs reviewed and stable  Report to RN Given: handoff report given  Patient transferred to: Phase II    Handoff Report: Identifed the Patient, Identified the Reponsible Provider, Reviewed the pertinent medical history, Discussed the surgical course, Reviewed Intra-OP anesthesia mangement and issues during anesthesia, Set expectations for post-procedure period and Allowed opportunity for questions and acknowledgement of understanding  Vitals:  Vitals Value Taken Time   BP     Temp     Pulse     Resp     SpO2         Electronically Signed By: NIMA Neal CRNA  May 23, 2024  7:50 AM

## 2024-05-23 NOTE — DISCHARGE INSTRUCTIONS
"[facility]  Home Care Following Anesthesia  For 24 hours after surgery:  Get plenty of rest.  A responsible adult must stay with you for at least 24 hours after you leave the surgery center.  Do not drive or use heavy equipment.  If you have weakness or tingling, don't drive or use heavy equipment until this feeling goes away.   Do not drink alcohol.   Avoid strenuous or risky activities.  Ask for help when climbing stairs.  You may feel lightheaded.  IF so, sit for a few minutes before standing.  Have someone help you get up.   If you have nausea (feel sick to your stomach): Drink only clear liquids such as apple juice, ginger ale, broth or 7-Up.  Rest may also help.  Be sure to drink enough fluids.  Move to a regular diet as you feel able.   You may have a slight fever.  Call the doctor if your fever is over 100 F (37.7 C) (taken under the tongue) or lasts longer than 24 hours.  You may have a dry mouth, a sore throat, muscle aches or trouble sleeping. These should go away after 24 hours.  Do not make important or legal decisions.   It is recommended to avoid smoking.        Today you received a Marcaine or bupivacaine block to numb the nerves near your surgery site.  This is a block using local anesthetic or \"numbing\" medication injected around the nerves to anesthetize or \"numb\" the area supplied by those nerves.  This block is injected into the muscle layer near your surgical site.  The medication may numb the location where you had surgery for 6-18 hours, but may last up to 24 hours.  If your surgical site is an arm or leg you should be careful with your affected limb, since it is possible to injure your limb without being aware of it due to the numbing.  Until full feeling returns, you should guard against bumping or hitting your limb, and avoid extreme hot or cold temperatures on the skin.  As the block wears off, the feeling will return as a tingling or prickly " sensation near your surgical site.  You will experience more discomfort from your incision as the feeling returns.  You may want to take a pain pill (a narcotic or Tylenol if this was prescribed by your surgeon) when you start to experience mild pain before the pain beccomes more severe.  If your pain medications do not control your pain you should notifiy your surgeon.    Tips for taking pain medications  To get the best pain relief possible, remember these points:  Take pain medications as directed, before pain becomes severe.  Pain medication can upset your stomach: taking it with food may help.  Constipation is a common side effect of pain medication. Drink plenty of  fluids.  Eat foods high in fiber. Take a stool softener if recommended by your doctor or pharmacist.  Do not drink alcohol, drive or operate machinery while taking pain medications.  Ask about other ways to control pain, such as with heat, ice or relaxation.    Tylenol/Acetaminophen Consumption    If you feel your pain relief is insufficient, you may take Tylenol/Acetaminophen in addition to your narcotic pain medication.   Be careful not to exceed 4,000 mg of Tylenol/Acetaminophen in a 24 hour period from all sources.  If you are taking extra strength Tylenol/acetaminophen (500 mg), the maximum dose is 8 tablets in 24 hours.  If you are taking regular strength acetaminophen (325 mg), the maximum dose is 12 tablets in 24 hours.    Call a doctor for any of the following:  Signs of infection (fever, growing tenderness at the surgery site, a large amount of drainage or bleeding, severe pain, foul-smelling drainage, redness, swelling).  It has been over 8 to 10 hours since surgery and you are still not able to urinate (pass water).  Headache for over 24 hours.  Signs of Covid-19 infection (temperature over 100 degrees, shortness of breath, cough, loss of taste/smell, generalized body aches, persistent headache, chills, sore throat,  nausea/vomiting/diarrhea)  Your doctor is:  Dr. Jaimee Gunderson, Ophthalmology: [phone]                 Or dial [phone] and ask for the resident on call for:  Ophthalmology  For emergency care, call the:  [facility]:  [phone] (TTY for hearing impaired: [phone])                                   Discharge Instructions Following Cataract Surgery    Date: 5/23/2024    You do not have any new eye drops, medications were administered during your eye surgery.    For 7 days: Wear your glasses or leave the eye uncovered while awake.    Wear the eye shield every night and during daytime naps.  DO NOT use padding under eye shield.    You may notice blurred or double vision initially, this will gradually clear.     If you experience any severe pain, sudden decrease in vision, or discharge for the eye, contact your doctor immediately.     Minor itching, scratching, burning etc. is normal. Use regular or extra-strength Tylenol for discomfort.    Refrain from heavy lifting, excessive bending over, and heavy exertion for 1 week.    You may bathe or shower but do not get the eye wet.    Do not rub or push on the operative eye for 1 week.  You may wipe the lids gently with a warm wet cloth to remove matter from eyelashes.    Continue with your usual diet and medications prescribed by your doctor.    Sunglasses will increase your comfort post-operatively.    Post Operative Visit on 5/24/24  Bring all material and medications to the office on the first post-op day.  Call the eye clinic at [phone] during day hours, if it is after hours call the eye clinic and ask to be transferred to speak with the on-call ophthalmology resident for any emergencies, especially pain.

## 2024-05-24 ENCOUNTER — OFFICE VISIT (OUTPATIENT)
Dept: OPHTHALMOLOGY | Facility: CLINIC | Age: 74
End: 2024-05-24
Attending: OPHTHALMOLOGY
Payer: MEDICARE

## 2024-05-24 DIAGNOSIS — Z98.890 POSTSURGICAL STATE, EYE: Primary | ICD-10-CM

## 2024-05-24 PROCEDURE — G0463 HOSPITAL OUTPT CLINIC VISIT: HCPCS | Performed by: OPHTHALMOLOGY

## 2024-05-24 PROCEDURE — 99024 POSTOP FOLLOW-UP VISIT: CPT | Performed by: OPHTHALMOLOGY

## 2024-05-24 ASSESSMENT — SLIT LAMP EXAM - LIDS
COMMENTS: NORMAL
COMMENTS: NORMAL

## 2024-05-24 ASSESSMENT — TONOMETRY
OD_IOP_MMHG: 20
IOP_METHOD: ICARE
OS_IOP_MMHG: 11

## 2024-05-24 ASSESSMENT — VISUAL ACUITY
OD_SC: 20/25
METHOD: SNELLEN - LINEAR
OS_SC: 20/30

## 2024-05-24 ASSESSMENT — EXTERNAL EXAM - LEFT EYE: OS_EXAM: NORMAL

## 2024-05-24 ASSESSMENT — EXTERNAL EXAM - RIGHT EYE: OD_EXAM: NORMAL

## 2024-05-24 NOTE — NURSING NOTE
Chief Complaints and History of Present Illnesses   Patient presents with    Post Op (Ophthalmology) Right Eye     Chief Complaint(s) and History of Present Illness(es)       Post Op (Ophthalmology) Right Eye              Laterality: right eye    Onset: gradual    Onset: days ago    Quality: States the va is great      Associated symptoms: headache (this am).  Negative for eye pain              Comments    S/P RIGHT EYE PHACOEMULSIFICATION, CATARACT, WITH TORIC  INTRAOCULAR LENS IMPLANT INSERTION  Did not sleep well but not due to eyes  Celeste Reyes COT 10:36 AM May 24, 2024

## 2024-05-24 NOTE — PROGRESS NOTES
Chief complaint   Post op  Referring Provider:  Referring Provider: Referred Self    HPI    Jose Carlos Escamilla 74 year old male     Chief Complaint(s) and History of Present Illness(es)       COMPREHENSIVE EYE EXAM    Associated symptoms include dryness, tearing and headache.  Negative for eye pain, redness, flashes and floaters.  Treatments tried include warm compresses.  Pain was noted as 0/10.             Comments    Pt here today for annual routine eye exam.  DEB was here 4/1/2021 - no issues at last exam.  Pt states vision is stable - eyes are sticky in the morning.  Has gunky sensation in both eyes.    Ocular meds = none    Kody Bueno COA, ERIC 9:35 AM 11/09/2022                          Interval hx 05/24/2024  Chief Complaint(s) and History of Present Illness(es)       Post Op (Ophthalmology) Right Eye    In right eye.  Onset was gradual.  This started days ago.  Quality: States the va is great  .  Associated symptoms include headache (this am).  Negative for eye pain.             Comments    S/P RIGHT EYE PHACOEMULSIFICATION, CATARACT, WITH TORIC  INTRAOCULAR LENS IMPLANT INSERTION  Did not sleep well but not due to eyes  Celeste Reyes COT 10:36 AM May 24, 2024                                  Past ocular history   Prior eye surgery/laser/Trauma: No  CTL wearer:No  Glasses : prog  Family Hx of eye disease: No    PMH     Past Medical History:   Diagnosis Date    ACP (advance care planning)     Adrenal insufficiency (H24)     Chronic pain syndrome     Chronic rhinitis     Diverticulosis     Esophageal stenosis     Hypercholesterolemia     IBS (irritable bowel syndrome)     Meckel's diverticulitis 7/13/2021    Added automatically from request for surgery [id number]    Narcotic dependence (H)        PSH     Past Surgical History:   Procedure Laterality Date    ARTHROPLASTY KNEE Left 12/2/2016    Procedure: ARTHROPLASTY KNEE;  Surgeon: Marisa Page MD;  Location: [facility] OR    ARTHROPLASTY KNEE Right  2/25/2022    Procedure: RIGHT KNEE ARTHROPLASTY;  Surgeon: Marisa Page MD;  Location: [facility] OR    DISCECTOMY CERVICAL MINIMALLY INVASIVE ONE LEVEL      IR LUMBAR PUNCTURE  7/14/2016    LAPAROSCOPIC APPENDECTOMY N/A 7/14/2021    Procedure: Laparoscopic appendectomy;  Surgeon: Kelsi Parker MD;  Location: [facility] OR    LAPAROSCOPY DIAGNOSTIC (GENERAL) N/A 7/14/2021    Procedure: Diagnostic laparoscopy, laparoscopic appendectomy, laparoscopic meckel excision;  Surgeon: Kelsi Parker MD;  Location:  OR    NECK SURGERY  2001    [place]    PHACOEMULSIFICATION WITH STANDARD INTRAOCULAR LENS IMPLANT Left 5/16/2024    Procedure: LEFT EYE PHACOEMULSIFICATION, CATARACT, WITH TORIC INTRAOCULAR LENS IMPLANT INSERTION;  Surgeon: Jaimee Gunderson MD;  Location: [facility]    PHACOEMULSIFICATION WITH STANDARD INTRAOCULAR LENS IMPLANT Right 5/23/2024    Procedure: RIGHT EYE PHACOEMULSIFICATION, CATARACT, WITH TORIC  INTRAOCULAR LENS IMPLANT INSERTION;  Surgeon: Jaimee Gunderson MD;  Location: [facility]       [facility]     Current Outpatient Medications   Medication Sig Dispense Refill    dexamethasone (DECADRON) 4 MG/ML injection   3    DULoxetine (CYMBALTA) 30 MG capsule Take 30 mg by mouth 2 times daily Not yet started      gabapentin (NEURONTIN) 300 MG capsule Take 2,700 mg by mouth daily 4 Tabs in AM and 5 tabs in PM      HYDROcodone-acetaminophen (NORCO)  MG per tablet       hydrocortisone (CORTEF) 5 MG tablet Take 20 mg by mouth daily      lisinopril (ZESTRIL) 20 MG tablet TAKE 1 TABLET(20 MG) BY MOUTH DAILY 90 tablet 3    nortriptyline (PAMELOR) 10 MG capsule Take 3 capsules (30 mg) by mouth at bedtime for 180 days 270 capsule 1    omeprazole (PRILOSEC) 20 MG DR capsule Take 20 mg by mouth daily      rosuvastatin (CRESTOR) 10 MG tablet TAKE 1 TABLET(10 MG) BY MOUTH DAILY 90 tablet 3    Semaglutide-Weight Management (WEGOVY) 1.7 MG/0.75ML pen INJECT 1.7MG UNDER THE SKIN ONCE A WEEK 3 mL 2    senna-docusate  (SENOKOT-S/PERICOLACE) 8.6-50 MG tablet Take 1-2 tablets by mouth 2 times daily Take while on oral narcotics to prevent or treat constipation. 30 tablet 0    tadalafil (CIALIS) 20 MG tablet TAKE ONE TABLET BY MOUTH ONE TIME DAILY AS NEEDED 30 tablet 3    tamsulosin (FLOMAX) 0.4 MG capsule TAKE 1 CAPSULE(0.4 MG) BY MOUTH DAILY 90 capsule 0    testosterone (AXIRON) 30 MG/ACT topical solution Place 30 mg onto the skin daily 2 pumps daily      traZODone (DESYREL) 50 MG tablet TAKE 2 TABLETS(100 MG) BY MOUTH AT BEDTIME 180 tablet 0     No current facility-administered medications for this visit.       Drops Currently Taking   None    Assessment/Plan   #pseudophakia each eye  , right eye for near    No complications,   No eye drops  Precautions given       # MICKI, each eye  - Rec ATs 4x/day    Follow up: 3 weeks  Attending Physician Attestation:  Complete documentation of historical and exam elements from today's encounter can be found in the full encounter summary report (not reduplicated in this progress note).  I personally obtained the chief complaint(s) and history of present illness.  I confirmed and edited as necessary the review of systems, past medical/surgical history, family history, social history, and examination findings as documented by others; and I examined the patient myself.  I personally reviewed the relevant tests, images, and reports as documented above.  I formulated and edited as necessary the assessment and plan and discussed the findings and management plan with the patient and family. - Jaimee Gunderson MD

## 2024-05-24 NOTE — PROGRESS NOTES
Chief complaint   Routine eyeexam  Referring Provider:  Referring Provider: Referred Self    HPI    Jose Carlos Escamilla 74 year old male     Chief Complaint(s) and History of Present Illness(es)       COMPREHENSIVE EYE EXAM    Associated symptoms include dryness, tearing and headache.  Negative for eye pain, redness, flashes and floaters.  Treatments tried include warm compresses.  Pain was noted as 0/10.             Comments    Pt here today for annual routine eye exam.  DEB was here 4/1/2021 - no issues at last exam.  Pt states vision is stable - eyes are sticky in the morning.  Has gunky sensation in both eyes.    Ocular meds = none    Kody MAX, ERIC 9:35 AM 11/09/2022                          Interval hx 05/24/2024  Chief Complaint(s) and History of Present Illness(es)       Post Op (Ophthalmology) Left Eye     Additional comments: POD#1 s/p CE/IOL LE 05/16/2024               Comments    Pt states is ok. Occasional flickering in LE that comes and goes.   No eye pain today, but notes some soreness in LE yesterday.    LESLY Dunn May 17, 2024 11:05 AM                             Past ocular history   Prior eye surgery/laser/Trauma: No  CTL wearer:No  Glasses : prog  Family Hx of eye disease: No    PMH     Past Medical History:   Diagnosis Date    ACP (advance care planning)     Adrenal insufficiency (H24)     Chronic pain syndrome     Chronic rhinitis     Diverticulosis     Esophageal stenosis     Hypercholesterolemia     IBS (irritable bowel syndrome)     Meckel's diverticulitis 7/13/2021    Added automatically from request for surgery [id number]    Narcotic dependence (H)        PSH     Past Surgical History:   Procedure Laterality Date    ARTHROPLASTY KNEE Left 12/2/2016    Procedure: ARTHROPLASTY KNEE;  Surgeon: Marisa Page MD;  Location:  OR    ARTHROPLASTY KNEE Right 2/25/2022    Procedure: RIGHT KNEE ARTHROPLASTY;  Surgeon: Marisa Page MD;  Location:  OR    DISCECTOMY  CERVICAL MINIMALLY INVASIVE ONE LEVEL      IR LUMBAR PUNCTURE  7/14/2016    LAPAROSCOPIC APPENDECTOMY N/A 7/14/2021    Procedure: Laparoscopic appendectomy;  Surgeon: Kelsi Parker MD;  Location: [facility] OR    LAPAROSCOPY DIAGNOSTIC (GENERAL) N/A 7/14/2021    Procedure: Diagnostic laparoscopy, laparoscopic appendectomy, laparoscopic meckel excision;  Surgeon: Kelsi Parker MD;  Location:  OR    NECK SURGERY  2001    [place]    PHACOEMULSIFICATION WITH STANDARD INTRAOCULAR LENS IMPLANT Left 5/16/2024    Procedure: LEFT EYE PHACOEMULSIFICATION, CATARACT, WITH TORIC INTRAOCULAR LENS IMPLANT INSERTION;  Surgeon: Jaimee Gunderson MD;  Location: [facility] OR    PHACOEMULSIFICATION WITH STANDARD INTRAOCULAR LENS IMPLANT Right 5/23/2024    Procedure: RIGHT EYE PHACOEMULSIFICATION, CATARACT, WITH TORIC  INTRAOCULAR LENS IMPLANT INSERTION;  Surgeon: Jaimee Gunderson MD;  [place] [facility] [place]       [facility]     Current Outpatient Medications   Medication Sig Dispense Refill    dexamethasone (DECADRON) 4 MG/ML injection   3    DULoxetine (CYMBALTA) 30 MG capsule Take 30 mg by mouth 2 times daily Not yet started      gabapentin (NEURONTIN) 300 MG capsule Take 2,700 mg by mouth daily 4 Tabs in AM and 5 tabs in PM      HYDROcodone-acetaminophen (NORCO)  MG per tablet       hydrocortisone (CORTEF) 5 MG tablet Take 20 mg by mouth daily      lisinopril (ZESTRIL) 20 MG tablet TAKE 1 TABLET(20 MG) BY MOUTH DAILY 90 tablet 3    nortriptyline (PAMELOR) 10 MG capsule Take 3 capsules (30 mg) by mouth at bedtime for 180 days 270 capsule 1    omeprazole (PRILOSEC) 20 MG DR capsule Take 20 mg by mouth daily      rosuvastatin (CRESTOR) 10 MG tablet TAKE 1 TABLET(10 MG) BY MOUTH DAILY 90 tablet 3    Semaglutide-Weight Management (WEGOVY) 1.7 MG/0.75ML pen INJECT 1.7MG UNDER THE SKIN ONCE A WEEK 3 mL 2    senna-docusate (SENOKOT-S/PERICOLACE) 8.6-50 MG tablet Take 1-2 tablets by mouth 2 times daily Take while on oral narcotics to prevent or  treat constipation. 30 tablet 0    tadalafil (CIALIS) 20 MG tablet TAKE ONE TABLET BY MOUTH ONE TIME DAILY AS NEEDED 30 tablet 3    tamsulosin (FLOMAX) 0.4 MG capsule TAKE 1 CAPSULE(0.4 MG) BY MOUTH DAILY 90 capsule 0    testosterone (AXIRON) 30 MG/ACT topical solution Place 30 mg onto the skin daily 2 pumps daily      traZODone (DESYREL) 50 MG tablet TAKE 2 TABLETS(100 MG) BY MOUTH AT BEDTIME 180 tablet 0     No current facility-administered medications for this visit.       Drops Currently Taking   None    Assessment/Plan   #pseudophakia left eye  No complications,   Clear cornea  No eye drops  Precautions given       # Nuclear sclerosis, each eye  # Myopia with astigmatism  Became visually significant due to a cortical spoke in the center of the vision each eye  Patient is having difficulty with daily activities due to visual impairment. Patient feels that they are no longer managing with current correction and would like to move forward with cataract surgery. Explained benefits alternatives and risks including but not limited to loss of vision, severe infection, retinal detachment, need for more surgery, visual disturbances etc...  Informed patient that reaching uncorrected vision aim (without glasses) after cataract surgery is not certain. Made patient aware they may need glasses, laser vision correction or in worst case scenario, IOL exchange.      Dilates well  no PXF  no Flomax  no guttae    -Aim:distance left eye, right eye near (1.75  - dominant eye OS  -Previous refractive surgery status:-  -Corneal astigmatism>1  Patient is okay to toric lenses  Patient would like to have monovision  -to upload the [place] pictures on USB before surgery  -patient has bob disease and told to increase steroid before surgery (after checking with his doctor)    #. POD1 s/p CEIOL  ***  Implant: ***. Aim ***.   Doing well  wounds harjeet negative, lens in good position, IOP acceptable    PLAN:   Eyedrops: None  No eye  rubbing  No contact lens use  No cosmetic use  For 1 week, use protective eyewear during the day  For 1 week, wear the eye shield whenever laying down (e.g. when napping during the day or sleeping at night)   For 1 week, no water in the eye (shower with your back to the water, no swimming of any kind)  For 1 week, no bending at the waist (can squat down to grab something off the floor if needed), no handstands/headstands, no strenuous activity, no valsalva maneuvers  For 1 week, no lifting anything heavier than 5 pounds  Call for any worsening vision, eye redness, pain, discharge, floaters, flashing lights, or curtain falling over the vision.          # MICKI, each eye  - Rec ATs 4x/day

## 2024-05-28 ENCOUNTER — OFFICE VISIT (OUTPATIENT)
Dept: FAMILY MEDICINE | Facility: CLINIC | Age: 74
End: 2024-05-28

## 2024-05-28 VITALS
HEART RATE: 84 BPM | OXYGEN SATURATION: 94 % | WEIGHT: 223.2 LBS | SYSTOLIC BLOOD PRESSURE: 128 MMHG | DIASTOLIC BLOOD PRESSURE: 88 MMHG | BODY MASS INDEX: 29.45 KG/M2

## 2024-05-28 DIAGNOSIS — A09 DIARRHEA OF INFECTIOUS ORIGIN: Primary | ICD-10-CM

## 2024-05-28 PROCEDURE — G2211 COMPLEX E/M VISIT ADD ON: HCPCS

## 2024-05-28 PROCEDURE — 99213 OFFICE O/P EST LOW 20 MIN: CPT

## 2024-05-28 RX ORDER — AZITHROMYCIN 500 MG/1
500 TABLET, FILM COATED ORAL DAILY
Qty: 3 TABLET | Refills: 0 | Status: SHIPPED | OUTPATIENT
Start: 2024-05-28 | End: 2024-05-31

## 2024-05-28 NOTE — PROGRESS NOTES
"  Assessment & Plan     Diarrhea of infectious origin  Patient presents for ongoing diarrhea. Stool was positive for Enteroaggregative E. coli (EAEC) on 5/7/24. Discussed how treatment for this type of diarrhea includes supportive cares with fluids and electrolytes and should be self-limiting, but due to duration of symptoms will trial Azithromycin 500 mg x 3 days. Side effects reviewed. Recommendations include rest, hydration, and following the BRAT diet (bananas, rice, apple sauce, toast). Anticipatory guidance given. Red flags that warrant emergent evaluation discussed. Recommend follow up as needed or sooner if symptoms worsen or fail to improve. Patient agreeable with plan. All questions answered.   - azithromycin (ZITHROMAX) 500 MG tablet  Dispense: 3 tablet; Refill: 0            BMI  Estimated body mass index is 29.45 kg/m  as calculated from the following:    Height as of 5/23/24: 1.854 m (6' 1\").    Weight as of this encounter: 101.2 kg (223 lb 3.2 oz).   Weight management plan: Discussed healthy diet and exercise guidelines      Work on weight loss  Regular exercise  See Patient Instructions    Return if symptoms worsen or fail to improve, for Follow up.    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 Decreased appetite  Description:       Consistency of stool: watery starting on Friday night       Blood in stool: No       Number " "of loose stools past 24 hours: none today but over 20 on the night it returned with lots of gas  Progression of Symptoms: waxing and waning  Accompanying signs and symptoms:       Fever: No       Nausea/Vomiting: YES- nausea       Abdominal pain: YES- \"lots\"       Weight loss: maybe a few pounds       Episodes of constipation: No/maybe then made up for  History   Ill contacts: No  Recent use of antibiotics: No  Recent travels: No  Recent medication-new or changes(Rx or OTC): No  Precipitating or alleviating factors: None  Therapies tried and outcome: none        Review of Systems  Constitutional, HEENT, cardiovascular, pulmonary, GI, , musculoskeletal, neuro, skin, endocrine and psych systems are negative, except as otherwise noted.      Objective    /88   Pulse 84   Wt 101.2 kg (223 lb 3.2 oz)   SpO2 94%   BMI 29.45 kg/m    Body mass index is 29.45 kg/m .  Physical Exam   GENERAL: alert and no distress  RESP: lungs clear to auscultation - no rales, rhonchi or wheezes  CV: regular rate and rhythm, normal S1 S2, no S3 or S4, no murmur, click or rub, no peripheral edema  ABDOMEN: soft, nontender and bowel sounds normal  MS: no gross musculoskeletal defects noted, no edema  PSYCH: mentation appears normal, affect normal/bright    Orders Only on 05/07/2024   Component Date Value Ref Range Status    Campylobacter species 05/07/2024 Negative  Negative Final    Salmonella species 05/07/2024 Negative  Negative Final    Vibrio species 05/07/2024 Negative  Negative Final    Vibrio cholerae 05/07/2024 Negative  Negative Final    Yersinia enterocolitica 05/07/2024 Negative  Negative Final    Enteropathogenic E. coli (EPEC) 05/07/2024 Negative  Negative, NA Final    Shiga-like toxin-producing E. coli* 05/07/2024 Negative  Negative Final    Shigella/Enteroinvasive E. coli (E* 05/07/2024 Negative  Negative Final    Cryptosporidium species 05/07/2024 Negative  Negative Final    Giardia lamblia 05/07/2024 Negative  " Negative Final    Norovirus Gl/Gll 05/07/2024 Negative  Negative Final    Rotavirus A 05/07/2024 Negative  Negative Final    Plesiomonas shigelloides 05/07/2024 Negative  Negative Final    Enteroaggregative E. coli (EAEC) 05/07/2024 Positive (A)  Negative Final    Enterotoxigenic E. coli (ETEC) 05/07/2024 Negative  Negative Final    E. coli O157 05/07/2024 NA  Negative, NA Final    Cyclospora cayetanensis 05/07/2024 Negative  Negative Final    Entamoeba histolytica 05/07/2024 Negative  Negative Final    Adenovirus F40/41 05/07/2024 Negative  Negative Final    Astrovirus 05/07/2024 Negative  Negative Final    Sapovirus 05/07/2024 Negative  Negative Final           Signed Electronically by: NIMA Soto CNP

## 2024-06-09 ENCOUNTER — HEALTH MAINTENANCE LETTER (OUTPATIENT)
Age: 74
End: 2024-06-09

## 2024-06-11 SDOH — HEALTH STABILITY: PHYSICAL HEALTH: ON AVERAGE, HOW MANY DAYS PER WEEK DO YOU ENGAGE IN MODERATE TO STRENUOUS EXERCISE (LIKE A BRISK WALK)?: 3 DAYS

## 2024-06-11 SDOH — HEALTH STABILITY: PHYSICAL HEALTH: ON AVERAGE, HOW MANY MINUTES DO YOU ENGAGE IN EXERCISE AT THIS LEVEL?: 40 MIN

## 2024-06-11 ASSESSMENT — SOCIAL DETERMINANTS OF HEALTH (SDOH): HOW OFTEN DO YOU GET TOGETHER WITH FRIENDS OR RELATIVES?: ONCE A WEEK

## 2024-06-14 ENCOUNTER — OFFICE VISIT (OUTPATIENT)
Dept: OPHTHALMOLOGY | Facility: CLINIC | Age: 74
End: 2024-06-14
Attending: OPHTHALMOLOGY
Payer: MEDICARE

## 2024-06-14 DIAGNOSIS — I10 BENIGN ESSENTIAL HYPERTENSION: ICD-10-CM

## 2024-06-14 DIAGNOSIS — Z98.890 POSTSURGICAL STATE, EYE: Primary | ICD-10-CM

## 2024-06-14 DIAGNOSIS — M35.3 PMR (POLYMYALGIA RHEUMATICA) (H): ICD-10-CM

## 2024-06-14 DIAGNOSIS — H35.351 CYSTOID MACULAR EDEMA OF RIGHT EYE: ICD-10-CM

## 2024-06-14 PROCEDURE — G0463 HOSPITAL OUTPT CLINIC VISIT: HCPCS | Performed by: OPHTHALMOLOGY

## 2024-06-14 PROCEDURE — 92134 CPTRZ OPH DX IMG PST SGM RTA: CPT | Mod: 26 | Performed by: OPHTHALMOLOGY

## 2024-06-14 PROCEDURE — 92134 CPTRZ OPH DX IMG PST SGM RTA: CPT | Performed by: OPHTHALMOLOGY

## 2024-06-14 PROCEDURE — 99024 POSTOP FOLLOW-UP VISIT: CPT | Mod: GC | Performed by: OPHTHALMOLOGY

## 2024-06-14 RX ORDER — PREDNISOLONE ACETATE 10 MG/ML
1 SUSPENSION/ DROPS OPHTHALMIC 4 TIMES DAILY
Qty: 10 ML | Refills: 2 | Status: SHIPPED | OUTPATIENT
Start: 2024-06-14 | End: 2024-08-15

## 2024-06-14 RX ORDER — KETOROLAC TROMETHAMINE 5 MG/ML
1 SOLUTION OPHTHALMIC 4 TIMES DAILY
Qty: 10 ML | Refills: 2 | Status: SHIPPED | OUTPATIENT
Start: 2024-06-14 | End: 2024-08-15

## 2024-06-14 RX ORDER — TRAZODONE HYDROCHLORIDE 50 MG/1
TABLET, FILM COATED ORAL
Qty: 180 TABLET | Refills: 0 | OUTPATIENT
Start: 2024-06-14

## 2024-06-14 RX ORDER — LISINOPRIL 20 MG/1
TABLET ORAL
Qty: 90 TABLET | Refills: 3 | Status: SHIPPED | OUTPATIENT
Start: 2024-06-14

## 2024-06-14 RX ORDER — TRAZODONE HYDROCHLORIDE 50 MG/1
TABLET, FILM COATED ORAL
Qty: 180 TABLET | Refills: 0 | Status: SHIPPED | OUTPATIENT
Start: 2024-06-14 | End: 2024-09-16

## 2024-06-14 ASSESSMENT — TONOMETRY
IOP_METHOD: TONOPEN
OD_IOP_MMHG: 18
OS_IOP_MMHG: 13

## 2024-06-14 ASSESSMENT — VISUAL ACUITY
METHOD: SNELLEN - LINEAR
OD_PH_SC: 20/40
OD_SC+: -1
OS_SC+: -2
METHOD_MR: MRX BY PJO
OD_SC: 20/70
OS_SC: 20/20

## 2024-06-14 ASSESSMENT — REFRACTION_MANIFEST
OS_ADD: +2.50
OD_CYLINDER: +0.50
OD_SPHERE: -2.00
OS_SPHERE: -0.25
OS_AXIS: 020
OS_CYLINDER: +0.50
OD_ADD: +2.50
OD_AXIS: 165

## 2024-06-14 ASSESSMENT — SLIT LAMP EXAM - LIDS
COMMENTS: NORMAL
COMMENTS: NORMAL

## 2024-06-14 ASSESSMENT — EXTERNAL EXAM - LEFT EYE: OS_EXAM: NORMAL

## 2024-06-14 ASSESSMENT — EXTERNAL EXAM - RIGHT EYE: OD_EXAM: NORMAL

## 2024-06-14 NOTE — PROGRESS NOTES
Chief complaint   Post op  Referring Provider:  Referring Provider: Referred Self    HPI    Jose Carlos Escamilla 74 year old male     Interval hx 06/14/2024 Everything looks  more bright and this is improving. Hasn't worn glasses since surgery. Can see his crossword puzzle perfect when there is good lighting.  Chief Complaint(s) and History of Present Illness(es)       Post Op (Ophthalmology) Both Eyes    In both eyes.  Associated symptoms include dryness and photophobia.  Negative for eye pain, tearing and floaters.  Treatments tried: Systane Complete 1-2 times per day.  Response to treatment was mild improvement.  Pain was noted as 0/10.             Comments    S/P CE/IOL toric lens right eye (5/23/2024) S/P CE/IOL toric lens left eye (5/16/2024).Patient states had left eye set for distance and right eye for near.Difficult to read in low light situations.Seems to be able to see well for driving and for reading normally per patient.    Sarah Bhatti on 6/14/2024, at 10:45 AM                           Past ocular history   Prior eye surgery/laser/Trauma:   5/16/24 left eye CE IOL emmetropia  5/23/24 right eye CE IOL near    CTL wearer:No  Glasses : prog  Family Hx of eye disease: No    PMH     Past Medical History:   Diagnosis Date    ACP (advance care planning)     Adrenal insufficiency (H24)     Chronic pain syndrome     Chronic rhinitis     Diverticulosis     Esophageal stenosis     Hypercholesterolemia     IBS (irritable bowel syndrome)     Meckel's diverticulitis 7/13/2021    Added automatically from request for surgery [id number]    Narcotic dependence (H)        PSH     Past Surgical History:   Procedure Laterality Date    ARTHROPLASTY KNEE Left 12/2/2016    Procedure: ARTHROPLASTY KNEE;  Surgeon: Marisa Page MD;  Location:  OR    ARTHROPLASTY KNEE Right 2/25/2022    Procedure: RIGHT KNEE ARTHROPLASTY;  Surgeon: Marisa Page MD;  Location:  OR    DISCECTOMY CERVICAL MINIMALLY INVASIVE ONE LEVEL       IR LUMBAR PUNCTURE  7/14/2016    LAPAROSCOPIC APPENDECTOMY N/A 7/14/2021    Procedure: Laparoscopic appendectomy;  Surgeon: Kelsi Parker MD;  Location:  OR    LAPAROSCOPY DIAGNOSTIC (GENERAL) N/A 7/14/2021    Procedure: Diagnostic laparoscopy, laparoscopic appendectomy, laparoscopic meckel excision;  Surgeon: Kelsi Parker MD;  Location:  OR    NECK SURGERY  2001    [place]    PHACOEMULSIFICATION WITH STANDARD INTRAOCULAR LENS IMPLANT Left 5/16/2024    Procedure: LEFT EYE PHACOEMULSIFICATION, CATARACT, WITH TORIC INTRAOCULAR LENS IMPLANT INSERTION;  Surgeon: Jaimee Gunderson MD;  Location: [facility] OR    PHACOEMULSIFICATION WITH STANDARD INTRAOCULAR LENS IMPLANT Right 5/23/2024    Procedure: RIGHT EYE PHACOEMULSIFICATION, CATARACT, WITH TORIC  INTRAOCULAR LENS IMPLANT INSERTION;  Surgeon: Jaimee Gunderson MD;  [place] [facility] [place]       [facility]     Current Outpatient Medications   Medication Sig Dispense Refill    dexamethasone (DECADRON) 4 MG/ML injection   3    DULoxetine (CYMBALTA) 30 MG capsule Take 30 mg by mouth 2 times daily Not yet started      gabapentin (NEURONTIN) 300 MG capsule Take 2,700 mg by mouth daily 4 Tabs in AM and 5 tabs in PM      HYDROcodone-acetaminophen (NORCO)  MG per tablet       hydrocortisone (CORTEF) 5 MG tablet Take 20 mg by mouth daily      lisinopril (ZESTRIL) 20 MG tablet TAKE 1 TABLET(20 MG) BY MOUTH DAILY 90 tablet 3    nortriptyline (PAMELOR) 10 MG capsule Take 3 capsules (30 mg) by mouth at bedtime for 180 days 270 capsule 1    omeprazole (PRILOSEC) 20 MG DR capsule Take 20 mg by mouth daily      rosuvastatin (CRESTOR) 10 MG tablet TAKE 1 TABLET(10 MG) BY MOUTH DAILY 90 tablet 3    Semaglutide-Weight Management (WEGOVY) 1.7 MG/0.75ML pen INJECT 1.7MG UNDER THE SKIN ONCE A WEEK 3 mL 2    senna-docusate (SENOKOT-S/PERICOLACE) 8.6-50 MG tablet Take 1-2 tablets by mouth 2 times daily Take while on oral narcotics to prevent or treat constipation. 30 tablet 0     tadalafil (CIALIS) 20 MG tablet TAKE ONE TABLET BY MOUTH ONE TIME DAILY AS NEEDED 30 tablet 3    tamsulosin (FLOMAX) 0.4 MG capsule TAKE 1 CAPSULE(0.4 MG) BY MOUTH DAILY 90 capsule 0    testosterone (AXIRON) 30 MG/ACT topical solution Place 30 mg onto the skin daily 2 pumps daily      traZODone (DESYREL) 50 MG tablet TAKE 2 TABLETS(100 MG) BY MOUTH AT BEDTIME 180 tablet 0     No current facility-administered medications for this visit.       Drops Currently Taking   None    Assessment/Plan   #pseudophakia each eye , right eye for near, left eye distance  5/16/24 left eye CE IOL emmetropia  5/23/24 right eye CE IOL near  Enjoying glasses freedom    No complications,   No eye drops  Precautions given     #Cystoid macular edema, right eye   Start prednisolone and ketorolac QID right eye  Repeat mac oct in 4-6 weeks then taper if resolved    # MICKI, each eye  - Rec ATs 4x/day    Follow up: 4-6 weeks VT dilate right eye, mac oct ou, sooner as needed    Jessica Jara MD  Cornea and External Disease Fellow  [facility]   Attending Physician Attestation:  Complete documentation of historical and exam elements from today's encounter can be found in the full encounter summary report (not reduplicated in this progress note).  I personally obtained the chief complaint(s) and history of present illness.  I confirmed and edited as necessary the review of systems, past medical/surgical history, family history, social history, and examination findings as documented by others; and I examined the patient myself.  I personally reviewed the relevant tests, images, and reports as documented above.  I formulated and edited as necessary the assessment and plan and discussed the findings and management plan with the patient and family. - Jaimee Gunderson MD

## 2024-06-14 NOTE — PATIENT INSTRUCTIONS
Start prednisolone 1 drop 4 times per day right eye     Start ketorolac 1 drop 4 times per day right eye     Return in 1 month   denies pain/discomfort

## 2024-06-14 NOTE — TELEPHONE ENCOUNTER
Med: Lisinopril    LOV (related): 01/26/2024    Last Lab: 04/30/2024      Due for F/U around: Overdue for CPX since 05/2024    Next Appt: 06/18/2024        BP Readings from Last 3 Encounters:   05/28/24 128/88   05/23/24 135/88   05/16/24 (P) 121/82       Last Comprehensive Metabolic Panel:  Lab Results   Component Value Date     04/30/2024    POTASSIUM 4.7 04/30/2024    CHLORIDE 99 04/30/2024    CO2 20 (L) 04/30/2024    ANIONGAP 16 (H) 04/30/2024     (H) 04/30/2024    BUN 22.0 04/30/2024    CR 0.86 04/30/2024    GFRESTIMATED >90 04/30/2024    TRI 9.1 04/30/2024     Med: Trazodone     LOV (related): 05/01/2023 - CPX      Due for F/U around: Now, CPX is overdue    Next Appt: 06/18/2024

## 2024-06-14 NOTE — NURSING NOTE
Chief Complaints and History of Present Illnesses   Patient presents with    Post Op (Ophthalmology) Both Eyes     Chief Complaint(s) and History of Present Illness(es)       Post Op (Ophthalmology) Both Eyes              Laterality: both eyes    Associated symptoms: dryness and photophobia.  Negative for eye pain, tearing and floaters    Treatments tried: Systane Complete 1-2 times per day    Response to treatment: mild improvement    Pain scale: 0/10              Comments    S/P CE/IOL toric lens right eye (5/23/2024) S/P CE/IOL toric lens left eye (5/16/2024).Patient states had left eye set for distance and right eye for near.Difficult to read in low light situations.Seems to be able to see well for driving and for reading normally per patient.    Sarah Bhatti on 6/14/2024, at 10:45 AM

## 2024-06-17 NOTE — PATIENT INSTRUCTIONS
"Patient Education   Preventive Care Advice   This is general advice we often give to help people stay healthy. Your care team may have specific advice just for you. Please talk to your care team about your own preventive care needs.  Lifestyle  Exercise at least 150 minutes each week (30 minutes a day, 5 days a week).  Do muscle strengthening activities 2 days a week. These help control your weight and prevent disease.  No smoking.  Wear sunscreen to prevent skin cancer.  Have your home tested for radon every 2 to 5 years. Radon is a colorless, odorless gas that can harm your lungs. To learn more, go to www.health.[facility] [facility].mn.us and search for \"Radon in Homes.\"  Keep guns unloaded and locked up in a safe place like a safe or gun vault, or, use a gun lock and hide the keys. Always lock away bullets separately. To learn more, visit [employer].gov and search for \"safe gun storage.\"  Nutrition  Eat 5 or more servings of fruits and vegetables each day.  Try wheat bread, brown rice and whole grain pasta (instead of white bread, rice, and pasta).  Get enough calcium and vitamin D. Check the label on foods and aim for 100% of the RDA (recommended daily allowance).  Regular exams  Have a dental exam and cleaning every 6 months.  See your health care team every year to talk about:  Any changes in your health.  Any medicines your care team has prescribed.  Preventive care, family planning, and ways to prevent chronic diseases.  Shots (vaccines)   HPV shots (up to age 26), if you've never had them before.  Hepatitis B shots (up to age 59), if you've never had them before.  COVID-19 shot: Get this shot when it's due.  Flu shot: Get a flu shot every year.  Tetanus shot: Get a tetanus shot every 10 years.  Pneumococcal, hepatitis A, and RSV shots: Ask your care team if you need these based on your risk.  Shingles shot (for age 50 and up).  General health tests  Diabetes screening:  Starting at age 35, Get screened for diabetes at least " every 3 years.  If you are younger than age 35, ask your care team if you should be screened for diabetes.  Cholesterol test: At age 39, start having a cholesterol test every 5 years, or more often if advised.  Bone density scan (DEXA): At age 50, ask your care team if you should have this scan for osteoporosis (brittle bones).  Hepatitis C: Get tested at least once in your life.  Abdominal aortic aneurysm screening: Talk to your doctor about having this screening if you:  Have ever smoked; and  Are biologically male; and  Are between the ages of 65 and 75.  STIs (sexually transmitted infections)  Before age 24: Ask your care team if you should be screened for STIs.  After age 24: Get screened for STIs if you're at risk. You are at risk for STIs (including HIV) if:  You are sexually active with more than one person.  You don't use condoms every time.  You or a partner was diagnosed with a sexually transmitted infection.  If you are at risk for HIV, ask about PrEP medicine to prevent HIV.  Get tested for HIV at least once in your life, whether you are at risk for HIV or not.  Cancer screening tests  Cervical cancer screening: If you have a cervix, begin getting regular cervical cancer screening tests at age 21. Most people who have regular screenings with normal results can stop after age 65. Talk about this with your provider.  Breast cancer scan (mammogram): If you've ever had breasts, begin having regular mammograms starting at age 40. This is a scan to check for breast cancer.  Colon cancer screening: It is important to start screening for colon cancer at age 45.  Have a colonoscopy test every 10 years (or more often if you're at risk) Or, ask your provider about stool tests like a FIT test every year or Cologuard test every 3 years.  To learn more about your testing options, visit: www.[employer]/036963.pdf.  For help making a decision, visit: ines/rn25010.  Prostate cancer screening test: If you have a  prostate and are age 55 to 69, ask your provider if you would benefit from a yearly prostate cancer screening test.  Lung cancer screening: If you are a current or former smoker age 50 to 80, ask your care team if ongoing lung cancer screenings are right for you.  For informational purposes only. Not to replace the advice of your health care provider. Copyright   2023 [facility]. All rights reserved. Clinically reviewed by the  [facility]. [employer] [id number] - REV 04/24.  Bladder Training: Care Instructions  Your Care Instructions     Bladder training is used to treat urge incontinence and stress incontinence. Urge incontinence means that the need to urinate comes on so fast that you can't get to a toilet in time. Stress incontinence means that you leak urine because of pressure on your bladder. For example, it may happen when you laugh, cough, or lift something heavy.  Bladder training can increase how long you can wait before you have to urinate. It can also help your bladder hold more urine. And it can give you better control over the urge to urinate.  It is important to remember that bladder training takes a few weeks to a few months to make a difference. You may not see results right away, but don't give up.  Follow-up care is a key part of your treatment and safety. Be sure to make and go to all appointments, and call your doctor if you are having problems. It's also a good idea to know your test results and keep a list of the medicines you take.  How can you care for yourself at home?  Work with your doctor to come up with a bladder training program that is right for you. You may use one or more of the following methods.  Delayed urination  In the beginning, try to keep from urinating for 5 minutes after you first feel the need to go.  While you wait, take deep, slow breaths to relax. Kegel exercises can also help you delay the need to go to the bathroom.  After some  "practice, when you can easily wait 5 minutes to urinate, try to wait 10 minutes before you urinate.  Slowly increase the waiting period until you are able to control when you have to urinate.  Scheduled urination  Empty your bladder when you first wake up in the morning.  Schedule times throughout the day when you will urinate.  Start by going to the bathroom every hour, even if you don't need to go.  Slowly increase the time between trips to the bathroom.  When you have found a schedule that works well for you, keep doing it.  If you wake up during the night and have to urinate, do it. Apply your schedule to waking hours only.  Kegel exercises  These tighten and strengthen pelvic muscles, which can help you control the flow of urine. (If doing these exercises causes pain, stop doing them and talk with your doctor.) To do Kegel exercises:  Squeeze your muscles as if you were trying not to pass gas. Or squeeze your muscles as if you were stopping the flow of urine. Your belly, legs, and buttocks shouldn't move.  Hold the squeeze for 3 seconds, then relax for 5 to 10 seconds.  Start with 3 seconds, then add 1 second each week until you are able to squeeze for 10 seconds.  Repeat the exercise 10 times a session. Do 3 to 8 sessions a day.  When should you call for help?  Watch closely for changes in your health, and be sure to contact your doctor if:    Your incontinence is getting worse.     You do not get better as expected.   Where can you learn more?  Go to https://www.LyricFind.net/patiented  Enter V684 in the search box to learn more about \"Bladder Training: Care Instructions.\"  Current as of: November 15, 2023               Content Version: 14.0    2944-3453 [employer].   Care instructions adapted under license by your healthcare professional. If you have questions about a medical condition or this instruction, always ask your healthcare professional. [employer] disclaims any warranty " or liability for your use of this information.      Chronic Pain: Care Instructions  Your Care Instructions     Chronic pain is pain that lasts a long time (months or even years) and may or may not have a clear cause. It is different from acute pain, which usually does have a clear cause--like an injury or illness--and gets better over time. Chronic pain:  Lasts over time but may vary from day to day.  Does not go away despite efforts to end it.  May disrupt your sleep and lead to fatigue.  May cause depression or anxiety.  May make your muscles tense, causing more pain.  Can disrupt your work, hobbies, home life, and relationships with friends and family.  Chronic pain is a very real condition. It is not just in your head. Treatment can help and usually includes several methods used together, such as medicines, physical therapy, exercise, and other treatments. Learning how to relax and changing negative thought patterns can also help you cope.  Chronic pain is complex. Taking an active role in your treatment will help you better manage your pain. Tell your doctor if you have trouble dealing with your pain. You may have to try several things before you find what works best for you.  Follow-up care is a key part of your treatment and safety. Be sure to make and go to all appointments, and call your doctor if you are having problems. It's also a good idea to know your test results and keep a list of the medicines you take.  How can you care for yourself at home?  Pace yourself. Break up large jobs into smaller tasks. Save harder tasks for days when you have less pain, or go back and forth between hard tasks and easier ones. Take rest breaks.  Relax, and reduce stress. Relaxation techniques such as deep breathing or meditation can help.  Keep moving. Gentle, daily exercise can help reduce pain over the long run. Try low- or no-impact exercises such as walking, swimming, and stationary biking. Do stretches to stay  flexible.  Try heat, cold packs, and massage.  Get enough sleep. Chronic pain can make you tired and drain your energy. Talk with your doctor if you have trouble sleeping because of pain.  Think positive. Your thoughts can affect your pain level. Do things that you enjoy to distract yourself when you have pain instead of focusing on the pain. See a movie, read a book, listen to music, or spend time with a friend.  If you think you are depressed, talk to your doctor about treatment.  Keep a daily pain diary. Record how your moods, thoughts, sleep patterns, activities, and medicine affect your pain. You may find that your pain is worse during or after certain activities or when you are feeling a certain emotion. Having a record of your pain can help you and your doctor find the best ways to treat your pain.  Take pain medicines exactly as directed.  If the doctor gave you a prescription medicine for pain, take it as prescribed.  If you are not taking a prescription pain medicine, ask your doctor if you can take an over-the-counter medicine.  Reducing constipation caused by pain medicine  Talk to your doctor about a laxative. If a laxative doesn't work, your doctor may suggest a prescription medicine.  Include fruits, vegetables, beans, and whole grains in your diet each day. These foods are high in fiber.  If your doctor recommends it, get more exercise. Walking is a good choice. Bit by bit, increase the amount you walk every day. Try for at least 30 minutes on most days of the week.  Schedule time each day for a bowel movement. A daily routine may help. Take your time and do not strain when having a bowel movement.  When should you call for help?   Call your doctor now or seek immediate medical care if:    Your pain gets worse or is out of control.     You feel down or blue, or you do not enjoy things like you once did. You may be depressed, which is common in people with chronic pain. Depression can be treated.      "You have vomiting or cramps for more than 2 hours.   Watch closely for changes in your health, and be sure to contact your doctor if:    You cannot sleep because of pain.     You are very worried or anxious about your pain.     You have trouble taking your pain medicine.     You have any concerns about your pain medicine.     You have trouble with bowel movements, such as:  No bowel movement in 3 days.  Blood in the anal area, in your stool, or on the toilet paper.  Diarrhea for more than 24 hours.   Where can you learn more?  Go to https://www.[employer].net/patiented  Enter N004 in the search box to learn more about \"Chronic Pain: Care Instructions.\"  Current as of: July 10, 2023               Content Version: 14.0    0979-0453 [employer].   Care instructions adapted under license by your healthcare professional. If you have questions about a medical condition or this instruction, always ask your healthcare professional. [employer] disclaims any warranty or liability for your use of this information.         "

## 2024-06-18 ENCOUNTER — OFFICE VISIT (OUTPATIENT)
Dept: FAMILY MEDICINE | Facility: CLINIC | Age: 74
End: 2024-06-18

## 2024-06-18 VITALS
DIASTOLIC BLOOD PRESSURE: 84 MMHG | HEART RATE: 67 BPM | SYSTOLIC BLOOD PRESSURE: 134 MMHG | OXYGEN SATURATION: 96 % | BODY MASS INDEX: 29.45 KG/M2 | WEIGHT: 222.2 LBS | HEIGHT: 73 IN

## 2024-06-18 DIAGNOSIS — Q45.3 PANCREATIC DUCTAL ABNORMALITY: ICD-10-CM

## 2024-06-18 DIAGNOSIS — N40.1 BENIGN PROSTATIC HYPERPLASIA WITH NOCTURIA: ICD-10-CM

## 2024-06-18 DIAGNOSIS — R91.1 PULMONARY NODULE: ICD-10-CM

## 2024-06-18 DIAGNOSIS — F19.20 CORTICOSTEROID DEPENDENCE (H): ICD-10-CM

## 2024-06-18 DIAGNOSIS — R35.1 BENIGN PROSTATIC HYPERPLASIA WITH NOCTURIA: ICD-10-CM

## 2024-06-18 DIAGNOSIS — L81.9 PIGMENTED SKIN LESION: ICD-10-CM

## 2024-06-18 DIAGNOSIS — Z23 NEED FOR SHINGLES VACCINE: ICD-10-CM

## 2024-06-18 DIAGNOSIS — F11.20 NARCOTIC DEPENDENCE (H): ICD-10-CM

## 2024-06-18 DIAGNOSIS — I10 PRIMARY HYPERTENSION: ICD-10-CM

## 2024-06-18 DIAGNOSIS — E66.3 OVERWEIGHT (BMI 25.0-29.9): ICD-10-CM

## 2024-06-18 DIAGNOSIS — Z00.00 ENCOUNTER FOR MEDICARE ANNUAL WELLNESS EXAM: Primary | ICD-10-CM

## 2024-06-18 DIAGNOSIS — L57.0 ACTINIC KERATOSIS: ICD-10-CM

## 2024-06-18 DIAGNOSIS — E27.49 SECONDARY ADRENAL INSUFFICIENCY (H): ICD-10-CM

## 2024-06-18 DIAGNOSIS — E78.5 DYSLIPIDEMIA: ICD-10-CM

## 2024-06-18 DIAGNOSIS — G89.4 CHRONIC PAIN SYNDROME: ICD-10-CM

## 2024-06-18 DIAGNOSIS — R21 RASH: ICD-10-CM

## 2024-06-18 LAB
ALBUMIN SERPL BCG-MCNC: 4.3 G/DL (ref 3.5–5.2)
ALP SERPL-CCNC: 53 U/L (ref 40–150)
ALT SERPL W P-5'-P-CCNC: 17 U/L (ref 0–70)
ANION GAP SERPL CALCULATED.3IONS-SCNC: 11 MMOL/L (ref 7–15)
AST SERPL W P-5'-P-CCNC: 16 U/L (ref 0–45)
BILIRUB SERPL-MCNC: 0.5 MG/DL
BUN SERPL-MCNC: 14.4 MG/DL (ref 8–23)
CALCIUM SERPL-MCNC: 9.3 MG/DL (ref 8.8–10.2)
CHLORIDE SERPL-SCNC: 105 MMOL/L (ref 98–107)
CHOLESTEROL: 160 MG/DL (ref 100–199)
CREAT SERPL-MCNC: 0.75 MG/DL (ref 0.67–1.17)
DEPRECATED HCO3 PLAS-SCNC: 23 MMOL/L (ref 22–29)
EGFRCR SERPLBLD CKD-EPI 2021: >90 ML/MIN/1.73M2
FASTING STATUS PATIENT QL REPORTED: YES
FASTING?: YES
GLUCOSE SERPL-MCNC: 99 MG/DL (ref 70–99)
HDL (RMG): 61 MG/DL (ref 40–?)
LDL CALCULATED (RMG): 75 MG/DL (ref 0–130)
POTASSIUM SERPL-SCNC: 4.1 MMOL/L (ref 3.4–5.3)
PROT SERPL-MCNC: 6.6 G/DL (ref 6.4–8.3)
SODIUM SERPL-SCNC: 139 MMOL/L (ref 135–145)
TRIGLYCERIDES (RMG): 70 MG/DL (ref 0–149)

## 2024-06-18 PROCEDURE — 80061 LIPID PANEL: CPT | Mod: QW | Performed by: FAMILY MEDICINE

## 2024-06-18 PROCEDURE — 80053 COMPREHEN METABOLIC PANEL: CPT | Mod: ORL | Performed by: FAMILY MEDICINE

## 2024-06-18 PROCEDURE — 17000 DESTRUCT PREMALG LESION: CPT | Performed by: FAMILY MEDICINE

## 2024-06-18 PROCEDURE — 36415 COLL VENOUS BLD VENIPUNCTURE: CPT | Performed by: FAMILY MEDICINE

## 2024-06-18 PROCEDURE — 99214 OFFICE O/P EST MOD 30 MIN: CPT | Mod: 25 | Performed by: FAMILY MEDICINE

## 2024-06-18 PROCEDURE — G0439 PPPS, SUBSEQ VISIT: HCPCS | Mod: 25 | Performed by: FAMILY MEDICINE

## 2024-06-18 NOTE — PROGRESS NOTES
Preventive Care Visit  [facility]  Beny Winters MD, Family Medicine  Jun 18, 2024      Assessment & Plan     Encounter for Medicare annual wellness exam  - discussed preventative guidelines, healthy diet, exercise and weight management  - VENOUS COLLECTION    Chronic pain syndrome  Stable, cont current meds and follow up  - VENOUS COLLECTION    Secondary adrenal insufficiency (H24)  Stable, cont hydrocortisone and endo follow up  - VENOUS COLLECTION    Dyslipidemia  Recheck, cont statin  - Lipid Profile ([facility])  - VENOUS COLLECTION    Primary hypertension  Above goal, monitor at home and follow up if elevate  - Comprehensive metabolic panel; Future  - VENOUS COLLECTION  - Comprehensive metabolic panel    Need for shingles vaccine    Corticosteroid dependence (H)  Recommend DEXA  - DX Bone Density; Future    Narcotic dependence (H)  Stable, follows with pain clinic  - VENOUS COLLECTION    Pulmonary nodule  Surveillance planned  - VENOUS COLLECTION    Pancreatic ductal abnormality  Repeat imaging scheduled  - VENOUS COLLECTION    Benign prostatic hyperplasia with nocturia  stable/controlled. Cont current medication(s) and treatment  - VENOUS COLLECTION    Overweight (BMI 25.0-29.9)  Increase dose and follow up  - Semaglutide-Weight Management (WEGOVY) 2.4 MG/0.75ML pen; Inject 2.4 mg Subcutaneous once a week  - VENOUS COLLECTION    Rash  Referral, ? photodamage  - Adult Dermatology  Referral - To a [facility] Location (Use POS/Location); Future  - VENOUS COLLECTION    Pigmented skin lesion  Referral to further evaluate  - Adult Dermatology  Referral - To a [facility] Location (Use POS/Location); Future  - VENOUS COLLECTION    Actinic keratosis  Cryo  - DESTRUCT PREMALIGNANT LESION, FIRST    Patient has been advised of split billing requirements and indicates understanding: Yes        Counseling  Appropriate preventive services were discussed with this patient,  including applicable screening as appropriate for fall prevention, nutrition, physical activity, Tobacco-use cessation, weight loss and cognition.  Checklist reviewing preventive services available has been given to the patient.  Reviewed patient's diet, addressing concerns and/or questions.   He is at risk for lack of exercise and has been provided with information to increase physical activity for the benefit of his well-being.   The patient was instructed to see the dentist every 6 months.   Information on urinary incontinence and treatment options given to patient.   I have reviewed Opioid Use Disorder and Substance Use Disorder risk factors and made any needed referrals.       See Patient Instructions    No follow-ups on file.    Pola Branch is a 74 year old, presenting for the following:  Wellness Visit (Fasting. GI stuff has calmed down (E coli) + addisons flare, much better now but took about 1 mon), Hearing Screening (Pass all bilat ), and Health Maintenance (0 ACP docs)          Health Care Directive  Patient does not have a Health Care Directive or Living Will: Discussed advance care planning with patient; information given to patient to review.    HPI    Obesity: has done well on wegovy, taking 1.7 mg dose, tolerating well.  Weight has stabilized. No side effects.       Wt Readings from Last 4 Encounters:   06/18/24 100.8 kg (222 lb 3.2 oz)   05/28/24 101.2 kg (223 lb 3.2 oz)   05/23/24 95.3 kg (210 lb)   05/16/24 95.3 kg (210 lb)       Chronic pain: follows with [facility].  He is on chronic gabapentin and Hydrocodone. Nortriptyline added which has helped. Pain is not controlled without hydrocodone.  Pain is fairly stable but has occasional flares.  Can be severe.     HLD: tolerating statin     HTN: at goal on Lisinopril.  Was previously on CTD.  No side effects.     Adrenal insufficiency: secondary, on hydrocortisone, followed at [facility].      Low testosterone: on Axiron, followed at [facility]    Peripheral  neuropathy: feels nearly resolved now with nortriptyline.  Also on gabapentin.    Dilated pancreatic duct: repeat imaging scheduled    Pulmonary nodule: surveillance CT planned            6/11/2024   General Health   How would you rate your overall physical health? (!) FAIR   Feel stress (tense, anxious, or unable to sleep) To some extent   (!) STRESS CONCERN      6/11/2024   Nutrition   Diet: Regular (no restrictions)         6/11/2024   Exercise   Days per week of moderate/strenous exercise 3 days   Average minutes spent exercising at this level 40 min         6/11/2024   Social Factors   Frequency of gathering with friends or relatives Once a week   Worry food won't last until get money to buy more No   Food not last or not have enough money for food? No   Do you have housing?  Yes   Are you worried about losing your housing? No   Lack of transportation? No   Unable to get utilities (heat,electricity)? No         6/11/2024   Fall Risk   Fallen 2 or more times in the past year? No   Trouble with walking or balance? No          6/11/2024   Activities of Daily Living- Home Safety   Needs help with the following daily activites None of the above   Safety concerns in the home None of the above         6/11/2024   Dental   Dentist two times every year? (!) NO         6/11/2024   Hearing Screening   Hearing concerns? None of the above     Hearing Screening:  Right Ear  4000Hz: pass  2000Hz: pass  1000Hz: pass  500Hz: pass    Left Ear  4000Hz: pass  2000Hz: pass  1000Hz: pass  500Hz: pass         6/11/2024   Driving Risk Screening   Patient/family members have concerns about driving No         6/11/2024   General Alertness/Fatigue Screening   Have you been more tired than usual lately? No         6/11/2024   Urinary Incontinence Screening   Bothered by leaking urine in past 6 months Yes         6/11/2024   TB Screening   Were you born outside of the [place]? No         6/11/2024   Substance Use   Alcohol more than 3/day or  more than 7/wk No   Do you have a current opioid prescription? (!) YES   How severe/bad is pain from 1 to 10? 6/10   Do you use any other substances recreationally? No       Social History     Tobacco Use    Smoking status: Former     Current packs/day: 0.00     Types: Cigarettes     Quit date: 1995     Years since quittin.9    Smokeless tobacco: Never   Vaping Use    Vaping status: Never Used   Substance Use Topics    Alcohol use: Yes     Alcohol/week: 0.0 - 3.3 standard drinks of alcohol     Comment: 1 drink per day    Drug use: Yes     Types: Marijuana     Comment: Medical, Rarely used       ASCVD Risk   The 10-year ASCVD risk score (Mirtha HERNANDEZ, et al., 2019) is: 22.6%    Values used to calculate the score:      Age: 74 years      Sex: Male      Is Non- : No      Diabetic: No      Tobacco smoker: No      Systolic Blood Pressure: 135 mmHg      Is BP treated: Yes      HDL Cholesterol: 53 mg/dL      Total Cholesterol: 152 mg/dL            Reviewed and updated as needed this visit by Provider                    Lab work is in process  Current providers sharing in care for this patient include:  Patient Care Team:  Beny Winters MD as PCP - General (Family Medicine)  Beny Winters MD as Assigned PCP  Tolu Brewer OD as MD (Optometry)  Ana Arreaga MD as Anesthesiologist (Pain Medicine)  Jaimee Gunderson MD as Physician (Ophthalmology)  Jaimee Gunderson MD as Assigned Surgical Provider    The following health maintenance items are reviewed in Epic and correct as of today:  Health Maintenance   Topic Date Due    HEPATITIS A IMMUNIZATION (2 of 3 - Hep A Twinrix risk 3-dose series) 2019    LIPID  2024    COVID-19 Vaccine ( season) 2024 (Originally 3/2/2024)    MEDICARE ANNUAL WELLNESS VISIT  2025    FALL RISK ASSESSMENT  2025    GLUCOSE  2027    DTAP/TDAP/TD IMMUNIZATION (5 - Td or Tdap) 2029     "ADVANCE CARE PLANNING  04/29/2029    HEPATITIS C SCREENING  Completed    PHQ-2 (once per calendar year)  Completed    INFLUENZA VACCINE  Completed    Pneumococcal Vaccine: 65+ Years  Completed    ZOSTER IMMUNIZATION  Completed    RSV VACCINE (Pregnancy & 60+)  Completed    AORTIC ANEURYSM SCREENING (SYSTEM ASSIGNED)  Completed    IPV IMMUNIZATION  Aged Out    HPV IMMUNIZATION  Aged Out    MENINGITIS IMMUNIZATION  Aged Out    RSV MONOCLONAL ANTIBODY  Aged Out    COLORECTAL CANCER SCREENING  Discontinued         Review of Systems  Constitutional, HEENT, cardiovascular, pulmonary, GI, , musculoskeletal, neuro, skin, endocrine and psych systems are negative, except as otherwise noted.     Objective    Exam  BP (!) 135/92   Pulse 67   Ht 1.854 m (6' 1\")   Wt 100.8 kg (222 lb 3.2 oz)   SpO2 96%   BMI 29.32 kg/m     Estimated body mass index is 29.32 kg/m  as calculated from the following:    Height as of this encounter: 1.854 m (6' 1\").    Weight as of this encounter: 100.8 kg (222 lb 3.2 oz).    Physical Exam  GENERAL: alert and no distress  EYES: Eyes grossly normal to inspection, PERRL and conjunctivae and sclerae normal  HENT: ear canals and TM's normal, nose and mouth without ulcers or lesions  NECK: no adenopathy, no asymmetry, masses, or scars  RESP: lungs clear to auscultation - no rales, rhonchi or wheezes  CV: regular rate and rhythm, normal S1 S2, no S3 or S4, no murmur, click or rub, no peripheral edema  ABDOMEN: soft, nontender, no hepatosplenomegaly, no masses and bowel sounds normal  MS: no gross musculoskeletal defects noted, no edema  SKIN: pink flaky papule right temple.  Erythematous patch covering entire upper chest.  Pigmented brown macule right eyelid  NEURO: Normal strength and tone, mentation intact and speech normal  PSYCH: mentation appears normal, affect normal/bright         6/18/2024   Mini Cog   Clock Draw Score 2 Normal   3 Item Recall 3 objects recalled   Mini Cog Total Score 5 "              Signed Electronically by: Beny Winters MD

## 2024-06-20 ENCOUNTER — TRANSFERRED RECORDS (OUTPATIENT)
Dept: FAMILY MEDICINE | Facility: CLINIC | Age: 74
End: 2024-06-20

## 2024-06-24 ENCOUNTER — ANCILLARY PROCEDURE (OUTPATIENT)
Dept: MRI IMAGING | Facility: CLINIC | Age: 74
End: 2024-06-24
Attending: FAMILY MEDICINE
Payer: MEDICARE

## 2024-06-24 ENCOUNTER — ANCILLARY PROCEDURE (OUTPATIENT)
Dept: MRI IMAGING | Facility: CLINIC | Age: 74
End: 2024-06-24
Attending: NURSE PRACTITIONER
Payer: MEDICARE

## 2024-06-24 DIAGNOSIS — Q45.3 PANCREATIC DUCTAL ABNORMALITY: ICD-10-CM

## 2024-06-24 DIAGNOSIS — M54.14 RADICULOPATHY, THORACIC REGION: ICD-10-CM

## 2024-06-24 DIAGNOSIS — M47.896 OTHER SPONDYLOSIS, LUMBAR REGION: ICD-10-CM

## 2024-06-24 PROCEDURE — 72146 MRI CHEST SPINE W/O DYE: CPT

## 2024-06-24 PROCEDURE — 74183 MRI ABD W/O CNTR FLWD CNTR: CPT | Mod: MG

## 2024-06-24 PROCEDURE — A9585 GADOBUTROL INJECTION: HCPCS | Mod: JZ | Performed by: FAMILY MEDICINE

## 2024-06-24 PROCEDURE — 255N000002 HC RX 255 OP 636: Mod: JZ | Performed by: FAMILY MEDICINE

## 2024-06-24 PROCEDURE — 72148 MRI LUMBAR SPINE W/O DYE: CPT

## 2024-06-24 RX ORDER — GADOBUTROL 604.72 MG/ML
10 INJECTION INTRAVENOUS ONCE
Status: COMPLETED | OUTPATIENT
Start: 2024-06-24 | End: 2024-06-24

## 2024-06-24 RX ADMIN — GADOBUTROL 10 ML: 604.72 INJECTION INTRAVENOUS at 12:27

## 2024-07-12 ENCOUNTER — OFFICE VISIT (OUTPATIENT)
Dept: FAMILY MEDICINE | Facility: CLINIC | Age: 74
End: 2024-07-12

## 2024-07-12 VITALS
DIASTOLIC BLOOD PRESSURE: 80 MMHG | HEART RATE: 98 BPM | WEIGHT: 215.8 LBS | OXYGEN SATURATION: 96 % | SYSTOLIC BLOOD PRESSURE: 120 MMHG | BODY MASS INDEX: 28.47 KG/M2

## 2024-07-12 DIAGNOSIS — R19.7 DIARRHEA OF PRESUMED INFECTIOUS ORIGIN: Primary | ICD-10-CM

## 2024-07-12 DIAGNOSIS — A04.0: ICD-10-CM

## 2024-07-12 DIAGNOSIS — E27.1 ADDISON'S DISEASE (H): ICD-10-CM

## 2024-07-12 DIAGNOSIS — R10.9 ABDOMINAL CRAMPING: ICD-10-CM

## 2024-07-12 PROBLEM — M17.11 PRIMARY OSTEOARTHRITIS OF RIGHT KNEE: Status: RESOLVED | Noted: 2022-02-18 | Resolved: 2024-07-12

## 2024-07-12 PROBLEM — E29.1 HYPOGONADISM IN MALE: Status: ACTIVE | Noted: 2024-02-29

## 2024-07-12 LAB
% GRANULOCYTES: 82.3 % (ref 42.2–75.2)
ALBUMIN SERPL BCG-MCNC: 4.5 G/DL (ref 3.5–5.2)
ALP SERPL-CCNC: 62 U/L (ref 40–150)
ALT SERPL W P-5'-P-CCNC: 24 U/L (ref 0–70)
ANION GAP SERPL CALCULATED.3IONS-SCNC: 9 MMOL/L (ref 7–15)
AST SERPL W P-5'-P-CCNC: 19 U/L (ref 0–45)
BILIRUB SERPL-MCNC: 0.5 MG/DL
BUN SERPL-MCNC: 18.5 MG/DL (ref 8–23)
CALCIUM SERPL-MCNC: 9.4 MG/DL (ref 8.8–10.2)
CHLORIDE SERPL-SCNC: 105 MMOL/L (ref 98–107)
CREAT SERPL-MCNC: 1 MG/DL (ref 0.67–1.17)
DEPRECATED HCO3 PLAS-SCNC: 22 MMOL/L (ref 22–29)
EGFRCR SERPLBLD CKD-EPI 2021: 80 ML/MIN/1.73M2
FASTING STATUS PATIENT QL REPORTED: NO
GLUCOSE SERPL-MCNC: 119 MG/DL (ref 70–99)
HCT VFR BLD AUTO: 47.5 % (ref 39–51)
HEMOGLOBIN: 16.2 G/DL (ref 13.4–17.5)
LIPASE SERPL-CCNC: 27 U/L (ref 13–60)
LYMPHOCYTES NFR BLD AUTO: 13.3 % (ref 20.5–51.1)
Lab: NORMAL
MCH RBC QN AUTO: 31.7 PG (ref 27–31)
MCHC RBC AUTO-ENTMCNC: 34.2 G/DL (ref 33–37)
MCV RBC AUTO: 92.6 FL (ref 80–100)
MONOCYTES NFR BLD AUTO: 4.4 % (ref 1.7–9.3)
PERFORMING LABORATORY: NORMAL
PLATELET # BLD AUTO: 320 K/UL (ref 140–450)
POTASSIUM SERPL-SCNC: 4.4 MMOL/L (ref 3.4–5.3)
PROT SERPL-MCNC: 7.1 G/DL (ref 6.4–8.3)
RBC # BLD AUTO: 5.13 X10/CMM (ref 4.2–5.9)
SODIUM SERPL-SCNC: 136 MMOL/L (ref 135–145)
SPECIMEN STATUS: NORMAL
TEST NAME: NORMAL
WBC # BLD AUTO: 11 X10/CMM (ref 3.8–11)

## 2024-07-12 PROCEDURE — 80053 COMPREHEN METABOLIC PANEL: CPT | Mod: ORL | Performed by: FAMILY MEDICINE

## 2024-07-12 PROCEDURE — 84999 UNLISTED CHEMISTRY PROCEDURE: CPT | Performed by: FAMILY MEDICINE

## 2024-07-12 PROCEDURE — 85025 COMPLETE CBC W/AUTO DIFF WBC: CPT | Performed by: FAMILY MEDICINE

## 2024-07-12 PROCEDURE — G2211 COMPLEX E/M VISIT ADD ON: HCPCS | Performed by: FAMILY MEDICINE

## 2024-07-12 PROCEDURE — 36415 COLL VENOUS BLD VENIPUNCTURE: CPT | Performed by: FAMILY MEDICINE

## 2024-07-12 PROCEDURE — 83690 ASSAY OF LIPASE: CPT | Mod: ORL | Performed by: FAMILY MEDICINE

## 2024-07-12 PROCEDURE — 99214 OFFICE O/P EST MOD 30 MIN: CPT | Performed by: FAMILY MEDICINE

## 2024-07-12 NOTE — ANESTHESIA POSTPROCEDURE EVALUATION
Patient: Jose Carlos Escamilla    Procedure: Procedure(s):  RIGHT EYE PHACOEMULSIFICATION, CATARACT, WITH TORIC  INTRAOCULAR LENS IMPLANT INSERTION       Anesthesia Type:  MAC    Note:  Disposition: Outpatient   Postop Pain Control: Uneventful            Sign Out: Well controlled pain   PONV: No   Neuro/Psych: Uneventful            Sign Out: Acceptable/Baseline neuro status   Airway/Respiratory: Uneventful            Sign Out: Acceptable/Baseline resp. status   CV/Hemodynamics: Uneventful            Sign Out: Acceptable CV status   Other NRE: NONE   DID A NON-ROUTINE EVENT OCCUR? No           Last vitals:  Vitals Value Taken Time   /84 06/18/24 0935   Temp 36.1  C (97  F) 05/23/24 0803   Pulse 67 06/18/24 0849   Resp 16 05/23/24 0803   SpO2 96 % 06/18/24 0849       Electronically Signed By: José Miguel Stahl MD  July 12, 2024  9:43 AM

## 2024-07-12 NOTE — PROGRESS NOTES
SUBJECTIVE:    Jose Carlos Escamilla, is a 74 year old male with GERD and adrenal insufficiency: secondary, on hydrocortisone, followed at [place], presenting for the below:     1. Ongoing stomach cramping and loose stool, coming is episodic flares since (and despite of) treatment for Enteroaggregative E. coli (EAEC) on 5/7/24 (suspected food borne). Received azithromycin 500 mg x 3 days.     Since then, episodic cramping and loose stool. No melena, hematochezia.  Loose stool triggered by eating. Has reduced intake due to this.     No recent new medications (prescription or OTC). No recent Abx (other than azithromycin) or foreign travel. No camping or drinking lake, river water.     Cologuard UTD : negative Sept 2021.     OBJECTIVE:  Vitals:    07/12/24 1437   BP: 120/80   Pulse: 98   SpO2: 96%   Weight: 97.9 kg (215 lb 12.8 oz)    Body mass index is 28.47 kg/m .  General: no acute distress, cooperative with exam.  Lungs: clear to auscultation bilaterally, normal respiratory effort.  Heart: regular rate, normal S1 and S2, no murmurs.   Abdomen: normal bowel sounds, nontender, no palpable organomegaly.  Extremities: warm, perfused, no swelling or edema.      Wt Readings from Last 3 Encounters:   07/12/24 97.9 kg (215 lb 12.8 oz)   06/18/24 100.8 kg (222 lb 3.2 oz)   05/28/24 101.2 kg (223 lb 3.2 oz)     CT abdo 4/19/2024  IMPRESSION:    1. No acute findings to explain the patient's pain and vomiting.   2. The main pancreatic duct is mildly dilated within the pancreatic head. No upstream dilation or glandular atrophy. A nonemergent pancreatic protocol MRI/MRCP is recommended for further assessment.   3. Scattered colonic diverticulosis, no diverticulitis.   4. Moderate rectosigmoid stool burden.   5. 3 mm right middle lobe pulmonary nodule. See follow-up recommendations below.     ASSESSMENT / PLAN:      Diarrhea of presumed infectious origin  Abdominal cramping  Colitis due to enteropathogenic Escherichia coli  Weight loss =  8 lbs since last 2 months.   Diff Dx inclusive of post infection colitis, ongoing / partially treated E Coli / inflammatory bowel disease.  Recheck stool culture  Referral to GI : consideration of colonoscopy with Bx.  Patient expressed understanding and agreement with the plan.    Red flag symptoms that should prompt emergent evaluation discussed and understood.  -     Adult GI  Referral - Consult Only; Future  -     Comprehensive metabolic panel; Future  -     CBC with Diff/Plt ([facility])  -     Lipase; Future  -     VENOUS COLLECTION  -     [facility]; [id number]; stool culture (Laboratory Miscellaneous Order); Future  -     [id number]; stool culture: [facility] Miscellaneous Test    Chaffee's disease (H)  Recent flare related to above.     The longitudinal plan of care for the diagnosis(es)/condition(s) as documented were addressed during this visit. Due to the added complexity in care, I will continue to support Jose Carlos in the subsequent management and with ongoing continuity of care.

## 2024-07-17 ENCOUNTER — TRANSFERRED RECORDS (OUTPATIENT)
Dept: FAMILY MEDICINE | Facility: CLINIC | Age: 74
End: 2024-07-17

## 2024-07-18 DIAGNOSIS — R39.9 LOWER URINARY TRACT SYMPTOMS (LUTS): ICD-10-CM

## 2024-07-19 ENCOUNTER — TELEPHONE (OUTPATIENT)
Dept: OPHTHALMOLOGY | Facility: CLINIC | Age: 74
End: 2024-07-19
Payer: MEDICARE

## 2024-07-19 LAB — MISCELLANEOUS TEST 1 (ARUP): NORMAL

## 2024-07-19 RX ORDER — TAMSULOSIN HYDROCHLORIDE 0.4 MG/1
0.4 CAPSULE ORAL DAILY
Qty: 90 CAPSULE | Refills: 0 | Status: SHIPPED | OUTPATIENT
Start: 2024-07-19

## 2024-07-19 NOTE — TELEPHONE ENCOUNTER
Received voicemail from patient stating that he isn't feeling well and cancelled his clinic appointment for today. He requested a call back to reschedule. Encounter routed to clinic to return patient's call.  Katie Keane on 7/19/2024 at 10:42 AM

## 2024-07-19 NOTE — TELEPHONE ENCOUNTER
Patient confirmed rescheduled POS-OP appointment:  Date: 7/29/24  Time: 1:15pm  Visit type: POST-OP  Provider:   Location: [facility] Location   Testing/imaging: VTD, OCT MAC  Additional notes: POST-OP- VTD, OCT MAC-- 4-6 weeks-Appt Per PT     Patient will see appointment in [facility].-Per Patient

## 2024-07-19 NOTE — TELEPHONE ENCOUNTER
Patient confirmed rescheduled POS-OP appointment:  Date: 7/29/24  Time: 1:15pm  Visit type: POST-OP  Provider:   Location: [facility] Location   Testing/imaging: VTD, OCT MAC  Additional notes: POST-OP- VTD, OCT MAC-- 4-6 weeks-Appt Per PT     Patient will see appointment in [facility].-Per Patient

## 2024-07-26 LAB
SCANNED LAB RESULT: NORMAL
TEST NAME: NORMAL

## 2024-07-29 ENCOUNTER — OFFICE VISIT (OUTPATIENT)
Dept: OPHTHALMOLOGY | Facility: CLINIC | Age: 74
End: 2024-07-29
Attending: OPHTHALMOLOGY
Payer: MEDICARE

## 2024-07-29 DIAGNOSIS — Z96.1 PSEUDOPHAKIA: ICD-10-CM

## 2024-07-29 DIAGNOSIS — H35.351 CYSTOID MACULAR EDEMA OF RIGHT EYE: Primary | ICD-10-CM

## 2024-07-29 DIAGNOSIS — Z98.890 POSTSURGICAL STATE, EYE: ICD-10-CM

## 2024-07-29 PROCEDURE — G0463 HOSPITAL OUTPT CLINIC VISIT: HCPCS | Performed by: OPHTHALMOLOGY

## 2024-07-29 PROCEDURE — 92134 CPTRZ OPH DX IMG PST SGM RTA: CPT | Performed by: OPHTHALMOLOGY

## 2024-07-29 PROCEDURE — 99024 POSTOP FOLLOW-UP VISIT: CPT | Mod: GC | Performed by: OPHTHALMOLOGY

## 2024-07-29 ASSESSMENT — CONF VISUAL FIELD
OS_NORMAL: 1
OS_INFERIOR_NASAL_RESTRICTION: 0
OS_SUPERIOR_NASAL_RESTRICTION: 0
OS_SUPERIOR_TEMPORAL_RESTRICTION: 0
OD_SUPERIOR_NASAL_RESTRICTION: 0
OD_SUPERIOR_TEMPORAL_RESTRICTION: 0
OD_INFERIOR_TEMPORAL_RESTRICTION: 0
METHOD: COUNTING FINGERS
OS_INFERIOR_TEMPORAL_RESTRICTION: 0
OD_INFERIOR_NASAL_RESTRICTION: 0
OD_NORMAL: 1

## 2024-07-29 ASSESSMENT — REFRACTION_WEARINGRX
SPECS_TYPE: PAL
OS_CYLINDER: +1.25
OS_SPHERE: -0.75
OS_ADD: +3.00
OD_CYLINDER: +0.50
OD_ADD: +3.00
OS_AXIS: 095
OD_AXIS: 100
OD_SPHERE: PLANO

## 2024-07-29 ASSESSMENT — SLIT LAMP EXAM - LIDS
COMMENTS: NORMAL
COMMENTS: NORMAL

## 2024-07-29 ASSESSMENT — EXTERNAL EXAM - RIGHT EYE: OD_EXAM: NORMAL

## 2024-07-29 ASSESSMENT — EXTERNAL EXAM - LEFT EYE: OS_EXAM: NORMAL

## 2024-07-29 ASSESSMENT — CUP TO DISC RATIO
OD_RATIO: 0.25
OS_RATIO: 0.25

## 2024-07-29 ASSESSMENT — VISUAL ACUITY
OD_SC: 20/60
METHOD: SNELLEN - LINEAR
OD_PH_SC: 20/30
OS_SC: 20/25

## 2024-07-29 ASSESSMENT — TONOMETRY
OS_IOP_MMHG: 16
IOP_METHOD: TONOPEN
OD_IOP_MMHG: 19

## 2024-07-29 NOTE — NURSING NOTE
Chief Complaints and History of Present Illnesses   Patient presents with    Pseudophakia Follow Up     6 week follow up pseudophakia each eye , right eye for near, left eye distance     Chief Complaint(s) and History of Present Illness(es)       Pseudophakia Follow Up              Comments: 6 week follow up pseudophakia each eye , right eye for near, left eye distance              Comments    Pt states vision is much better. No new flashes or floaters. No new dryness. Pt notes some redness upon waking this morning after taking a shower.  No discharge.    LESLY Dunn July 29, 2024 1:11 PM

## 2024-07-29 NOTE — PROGRESS NOTES
Chief complaint   Post op  Referring Provider:  Referring Provider: Referred Self    HPI    Jose Carlos Escamilla 74 year old male     Interval hx 07/29/2024  Chief Complaint(s) and History of Present Illness(es)       Pseudophakia Follow Up     Additional comments: 6 week follow up pseudophakia each eye , right eye for near, left eye distance             Comments    Pt states vision is much better. No new flashes or floaters. No new dryness. Pt notes some redness upon waking this morning after taking a shower.  No discharge.    LESLY Dunn July 29, 2024 1:11 PM                               Past ocular history   Prior eye surgery/laser/Trauma:   5/16/24 left eye CE IOL emmetropia  5/23/24 right eye CE IOL near    CTL wearer:No  Glasses : prog  Family Hx of eye disease: No    PMH     Past Medical History:   Diagnosis Date    ACP (advance care planning)     Adrenal insufficiency (H24)     Chronic pain syndrome     Chronic rhinitis     Diverticulosis     Esophageal stenosis     Hypercholesterolemia     IBS (irritable bowel syndrome)     Meckel's diverticulitis 7/13/2021    Added automatically from request for surgery [id number]    Narcotic dependence (H)        PSH     Past Surgical History:   Procedure Laterality Date    ARTHROPLASTY KNEE Left 12/2/2016    Procedure: ARTHROPLASTY KNEE;  Surgeon: Marisa Page MD;  Location:  OR    ARTHROPLASTY KNEE Right 2/25/2022    Procedure: RIGHT KNEE ARTHROPLASTY;  Surgeon: Marisa Page MD;  Location:  OR    DISCECTOMY CERVICAL MINIMALLY INVASIVE ONE LEVEL      IR LUMBAR PUNCTURE  7/14/2016    LAPAROSCOPIC APPENDECTOMY N/A 7/14/2021    Procedure: Laparoscopic appendectomy;  Surgeon: Kelsi Parker MD;  Location:  OR    LAPAROSCOPY DIAGNOSTIC (GENERAL) N/A 7/14/2021    Procedure: Diagnostic laparoscopy, laparoscopic appendectomy, laparoscopic meckel excision;  Surgeon: Kelsi Parker MD;  Location:  OR    NECK SURGERY  2001    [place]     PHACOEMULSIFICATION WITH STANDARD INTRAOCULAR LENS IMPLANT Left 5/16/2024    Procedure: LEFT EYE PHACOEMULSIFICATION, CATARACT, WITH TORIC INTRAOCULAR LENS IMPLANT INSERTION;  Surgeon: Jaimee Gunderson MD;  Location: [facility] OR    PHACOEMULSIFICATION WITH STANDARD INTRAOCULAR LENS IMPLANT Right 5/23/2024    Procedure: RIGHT EYE PHACOEMULSIFICATION, CATARACT, WITH TORIC  INTRAOCULAR LENS IMPLANT INSERTION;  Surgeon: Jaimee Gunderson MD;  Location: [facility] OR       [facility]     Current Outpatient Medications   Medication Sig Dispense Refill    dexamethasone (DECADRON) 4 MG/ML injection   3    gabapentin (NEURONTIN) 300 MG capsule Take 2,100 mg by mouth daily 3 Tabs in AM and 3 tabs in PM      HYDROcodone-acetaminophen (NORCO)  MG per tablet       hydrocortisone (CORTEF) 5 MG tablet Take 20 mg by mouth daily      ketorolac (ACULAR) 0.5 % ophthalmic solution Place 1 drop into the right eye 4 times daily 10 mL 2    lisinopril (ZESTRIL) 20 MG tablet TAKE 1 TABLET(20 MG) BY MOUTH DAILY 90 tablet 3    nortriptyline (PAMELOR) 10 MG capsule Take 3 capsules (30 mg) by mouth at bedtime for 180 days 270 capsule 1    omeprazole (PRILOSEC) 20 MG DR capsule Take 20 mg by mouth daily      prednisoLONE acetate (PRED FORTE) 1 % ophthalmic suspension Place 1 drop into the right eye 4 times daily 10 mL 2    rosuvastatin (CRESTOR) 10 MG tablet TAKE 1 TABLET(10 MG) BY MOUTH DAILY 90 tablet 3    Semaglutide-Weight Management (WEGOVY) 2.4 MG/0.75ML pen Inject 2.4 mg Subcutaneous once a week 3 mL 2    senna-docusate (SENOKOT-S/PERICOLACE) 8.6-50 MG tablet Take 1-2 tablets by mouth 2 times daily Take while on oral narcotics to prevent or treat constipation. 30 tablet 0    tadalafil (CIALIS) 20 MG tablet TAKE ONE TABLET BY MOUTH ONE TIME DAILY AS NEEDED 30 tablet 3    tamsulosin (FLOMAX) 0.4 MG capsule TAKE 1 CAPSULE(0.4 MG) BY MOUTH DAILY 90 capsule 0    testosterone (AXIRON) 30 MG/ACT topical solution Place 30 mg onto the skin daily 2 pumps  daily      traZODone (DESYREL) 50 MG tablet TAKE 2 TABLETS(100 MG) BY MOUTH AT BEDTIME 180 tablet 0     No current facility-administered medications for this visit.       Drops Currently Taking   None    Assessment/Plan   #pseudophakia each eye , right eye for near, left eye distance  5/16/24 left eye CE IOL emmetropia  5/23/24 right eye CE IOL near  Enjoying glasses freedom    No complications,   No eye drops  Precautions given     #Cystoid macular edema, right eye   Self-discontinued prednisolone and ketorolac last week   Resolution of CME on OCT     # MICKI, each eye  - Rec ATs 4x/day    Follow up: 1 year, sooner prn with local doctors    Jade Sweeney MD  PGY-3  Department of Ophthalmology  July 29, 2024 2:19 PM     Attending Physician Attestation:  Complete documentation of historical and exam elements from today's encounter can be found in the full encounter summary report (not reduplicated in this progress note).  I personally obtained the chief complaint(s) and history of present illness.  I confirmed and edited as necessary the review of systems, past medical/surgical history, family history, social history, and examination findings as documented by others; and I examined the patient myself.  I personally reviewed the relevant tests, images, and reports as documented above.  I formulated and edited as necessary the assessment and plan and discussed the findings and management plan with the patient and family. - Jaimee Gunderson MD

## 2024-07-31 ENCOUNTER — TELEPHONE (OUTPATIENT)
Dept: FAMILY MEDICINE | Facility: CLINIC | Age: 74
End: 2024-07-31

## 2024-07-31 DIAGNOSIS — R19.7 DIARRHEA: Primary | ICD-10-CM

## 2024-07-31 NOTE — TELEPHONE ENCOUNTER
Patient calls reporting had another one of his bouts of severe abd crarmping and pain with diarrhea last night and into today. Denies N&V, fever, chills, blood in stool. States diarrhea has slowed but now will be in bed ill for 2 days recovering. Does not want to go to ER again. States has been there twice and gets CT scans and IV fluids then sent home.   See April and May ED visits and scans.  Also had an MRI pancreas 6/24/24 for pancreatic abnormality - this was stable and not other abnormal abdominal findings noted.   Reports this has been going on for months  and occurring every 1-2 weeks. Last bout was 10 days ago.    States is very ill and and can not continue to wait for this to resolve and needs an urgent eval with GI MD. Is worried something bad is going on despite the scan results showing otherwise.     Saw Dr. Madden for this 7/12/24. GI referral was made then to [facility]. Patient scheduled on his own a phone visit with [facility] that took place on 7/16/24. Per Care Everywhere note, plan was likely IBS and to continue fiber and senna daily and get colonoscopy to rule out inflam or other abnormality. Patient has not scheduled the colonoscopy yet. Says was waiting to see how things went.   Says has tried to contact [facility] office but can't figure out their portal and wants us to arrange for an urgent GI eval at [facility] or elsewhere. Discussed would make sense to follow up with [facility] since recently saw them.   Patient declines and repeatedly says during this conversation how he is very sick right now and this is very serious. Nurse continually encourages patient to go to ED but patient declines and asks nurse to call and schedule an in person GI consult for asap.   Nurse called and scheduled with Dr. Shin for 8/8/24 at 130pm. Patient appreciative and states if symptoms worsen or has another bout will go to ED.     Referral and records faxed to Dr. Shin's office.     Ashanti Green RN

## 2024-08-01 NOTE — TELEPHONE ENCOUNTER
08/01/24 9am   Patient left message on nurse voicemail stating did hear back from [facility] and now he wonders if should stick with their clinic or keep the appt this nurse made with Dr. Shin for next week 8/8?     12:40 PM Per patient's request on phone yesterday afternoon, sent Tw[employer] message to patient with appt details for Ale[facility] MCFARLAND appt on 8/8/24 at 130pm.   Also advised in the Twitch[employer] message that we will leave decision on which GI clinic he follows up with to him.  Ashanti Green RN

## 2024-08-14 ENCOUNTER — HOSPITAL ENCOUNTER (EMERGENCY)
Facility: CLINIC | Age: 74
Discharge: LEFT BEFORE DISCHARGE | End: 2024-08-14
Admitting: EMERGENCY MEDICINE
Payer: MEDICARE

## 2024-08-14 VITALS
WEIGHT: 210 LBS | DIASTOLIC BLOOD PRESSURE: 84 MMHG | HEART RATE: 92 BPM | HEIGHT: 73 IN | TEMPERATURE: 98.1 F | BODY MASS INDEX: 27.83 KG/M2 | OXYGEN SATURATION: 95 % | RESPIRATION RATE: 22 BRPM | SYSTOLIC BLOOD PRESSURE: 128 MMHG

## 2024-08-14 LAB
ATRIAL RATE - MUSE: 83 BPM
DIASTOLIC BLOOD PRESSURE - MUSE: NORMAL MMHG
INTERPRETATION ECG - MUSE: NORMAL
P AXIS - MUSE: 42 DEGREES
PR INTERVAL - MUSE: 174 MS
QRS DURATION - MUSE: 82 MS
QT - MUSE: 358 MS
QTC - MUSE: 420 MS
R AXIS - MUSE: 8 DEGREES
SYSTOLIC BLOOD PRESSURE - MUSE: NORMAL MMHG
T AXIS - MUSE: 22 DEGREES
VENTRICULAR RATE- MUSE: 83 BPM

## 2024-08-14 PROCEDURE — 93005 ELECTROCARDIOGRAM TRACING: CPT

## 2024-08-14 PROCEDURE — 99281 EMR DPT VST MAYX REQ PHY/QHP: CPT

## 2024-08-14 ASSESSMENT — ACTIVITIES OF DAILY LIVING (ADL): ADLS_ACUITY_SCORE: 38

## 2024-08-15 ENCOUNTER — OFFICE VISIT (OUTPATIENT)
Dept: FAMILY MEDICINE | Facility: CLINIC | Age: 74
End: 2024-08-15

## 2024-08-15 VITALS
DIASTOLIC BLOOD PRESSURE: 82 MMHG | OXYGEN SATURATION: 97 % | BODY MASS INDEX: 29.69 KG/M2 | SYSTOLIC BLOOD PRESSURE: 129 MMHG | WEIGHT: 225 LBS | HEART RATE: 84 BPM

## 2024-08-15 DIAGNOSIS — I47.10 PAROXYSMAL SUPRAVENTRICULAR TACHYCARDIA (H): ICD-10-CM

## 2024-08-15 DIAGNOSIS — R00.2 PALPITATIONS: Primary | ICD-10-CM

## 2024-08-15 LAB — TSH SERPL DL<=0.005 MIU/L-ACNC: 0.87 UIU/ML (ref 0.3–4.2)

## 2024-08-15 PROCEDURE — 84443 ASSAY THYROID STIM HORMONE: CPT | Mod: ORL | Performed by: FAMILY MEDICINE

## 2024-08-15 PROCEDURE — 99214 OFFICE O/P EST MOD 30 MIN: CPT | Performed by: FAMILY MEDICINE

## 2024-08-15 PROCEDURE — 93244 EXT ECG>48HR<7D REV&INTERPJ: CPT | Performed by: FAMILY MEDICINE

## 2024-08-15 PROCEDURE — G2211 COMPLEX E/M VISIT ADD ON: HCPCS | Performed by: FAMILY MEDICINE

## 2024-08-15 PROCEDURE — 36415 COLL VENOUS BLD VENIPUNCTURE: CPT | Performed by: FAMILY MEDICINE

## 2024-08-15 PROCEDURE — 93246 EXT ECG>7D<15D RECORDING: CPT | Performed by: FAMILY MEDICINE

## 2024-08-15 RX ORDER — PANTOPRAZOLE SODIUM 40 MG/1
TABLET, DELAYED RELEASE ORAL
COMMUNITY
Start: 2024-08-13

## 2024-08-15 NOTE — NURSING NOTE
Jose Carlos Escamilla arrived here on 8/15/2024 3:00 PM for 8-14 Days  Zio monitor placement per ordering provider Allie Madden for the diagnosis Palpitations.  Patient s skin was prepped per protocol. Dr. Allie Madden is the supervising MD.  Zio monitor was placed.  Instructions were reviewed with and given to the patient.  Patient verbalized understanding of wear, troubleshooting and monitor return instructions.

## 2024-08-15 NOTE — PROGRESS NOTES
SUBJECTIVE:    Jose Carlos Escamilla, is a 74 year old male presenting for the below:     -5 week h/o intermittent palpitations. Over this time, 2-3 episodes of sudden onset racing heart. Lasts between 10 to 45 mins or so then resolves gradually. Heart rate of round 170 to max 200 recorded on smart watch.  No associated shortness of breath, lightheaded, chest pain, nausea, no jaw or arm pain.     Presented to ER yesterday : completed intake process (including EKG) then opted to leave prior to seeing MD due to wait time.  Onset on all 3 occassions while lying down, resting. Minimal caffeine intake.     OBJECTIVE:  Vitals:    08/15/24 1422   BP: 129/82   Pulse: 84   SpO2: 97%   Weight: 102.1 kg (225 lb)    Body mass index is 29.69 kg/m .  General: no acute distress, cooperative with exam.  Lungs: clear to auscultation bilaterally, normal respiratory effort.  Heart: regular rate, normal S1 and S2, no murmurs.   Extremities: warm, perfused, no swelling or edema.    TSH   Date Value Ref Range Status   12/20/2023 1.22 0.30 - 4.20 uIU/mL Final     EKG in ER 8/14/24  Sinus rhythm   Normal ECG   When compared with ECG of 30-Apr-2024 23:22,   Questionable change in QRS axis     ASSESSMENT / PLAN:      Palpitations  Suggestive of tachyarrhythmia. No evidence of accessory pathway on EKG in ER yesterday.   Screen for hyperthyroid. Hgb within normal limits 1 months ago.   Reviewed ER encounter.  Proceed to leadless monitoring (ok to wear while having upcoming colonoscopy).  Patient expressed understanding and agreement with the plan.  Red flag symptoms that should prompt emergent evaluation discussed and understood.  -     TSH; Future  -     ZIO PATCH 8-14 DAYS (additional cost to patient)  -     ZIO PATCH 8-14 DAYS APPLICATION  -     VENOUS COLLECTION    The longitudinal plan of care for the diagnosis(es)/condition(s) as documented were addressed during this visit. Due to the added complexity in care, I will continue to support Jose Carlos  in the subsequent management and with ongoing continuity of care.

## 2024-08-16 ENCOUNTER — TRANSFERRED RECORDS (OUTPATIENT)
Dept: FAMILY MEDICINE | Facility: CLINIC | Age: 74
End: 2024-08-16

## 2024-08-19 ENCOUNTER — TRANSFERRED RECORDS (OUTPATIENT)
Dept: FAMILY MEDICINE | Facility: CLINIC | Age: 74
End: 2024-08-19

## 2024-08-20 ENCOUNTER — TRANSFERRED RECORDS (OUTPATIENT)
Dept: FAMILY MEDICINE | Facility: CLINIC | Age: 74
End: 2024-08-20

## 2024-09-12 ENCOUNTER — TRANSFERRED RECORDS (OUTPATIENT)
Dept: FAMILY MEDICINE | Facility: CLINIC | Age: 74
End: 2024-09-12

## 2024-09-13 DIAGNOSIS — M35.3 PMR (POLYMYALGIA RHEUMATICA) (H): ICD-10-CM

## 2024-09-14 NOTE — TELEPHONE ENCOUNTER
Med: Trazodone    LOV (related): 6/18/24 awv with Dr. Winters       Due for F/U around: 6/18/2025 for AWV    Next Appt: None

## 2024-09-16 RX ORDER — TRAZODONE HYDROCHLORIDE 50 MG/1
TABLET, FILM COATED ORAL
Qty: 180 TABLET | Refills: 1 | Status: SHIPPED | OUTPATIENT
Start: 2024-09-16

## 2024-10-14 ENCOUNTER — TELEPHONE (OUTPATIENT)
Dept: CARDIOLOGY | Facility: CLINIC | Age: 74
End: 2024-10-14
Payer: MEDICARE

## 2024-10-14 NOTE — TELEPHONE ENCOUNTER
Spoke to pt to see if he had done and EKG of his episode of SVT that per pt lasted almost and hour. Pt does have the rate on his phone, but did not do an EKG. Pt when to the ER, but by the time he made if his HR was down to the 80's.  Pt discussed how he has [place]'s disease and had Fibromyalgia and the pain causes him to have difficulty sleeping and the Mario Alberto caused some dehydration and at that time pt has some racing heart.  Pt states that his rates have been ok since the monitor was worn.  Pt made aware of how he can use his phone and watch more to capture his EKG's and where to find them.  Also discussed if having a racing heart how he can bare down like he was having a BM or a good strong cough to help break his rhythm. Pt will see Dr Arriola on 10/18. Celio

## 2024-10-16 ENCOUNTER — TRANSFERRED RECORDS (OUTPATIENT)
Dept: FAMILY MEDICINE | Facility: CLINIC | Age: 74
End: 2024-10-16

## 2024-10-16 ENCOUNTER — OFFICE VISIT (OUTPATIENT)
Dept: CARDIOLOGY | Facility: CLINIC | Age: 74
End: 2024-10-16
Payer: MEDICARE

## 2024-10-16 VITALS
BODY MASS INDEX: 30.72 KG/M2 | HEIGHT: 73 IN | WEIGHT: 231.8 LBS | DIASTOLIC BLOOD PRESSURE: 84 MMHG | OXYGEN SATURATION: 96 % | HEART RATE: 73 BPM | SYSTOLIC BLOOD PRESSURE: 133 MMHG

## 2024-10-16 DIAGNOSIS — I47.10 PAROXYSMAL SUPRAVENTRICULAR TACHYCARDIA (H): Primary | ICD-10-CM

## 2024-10-16 PROCEDURE — 99204 OFFICE O/P NEW MOD 45 MIN: CPT | Performed by: INTERNAL MEDICINE

## 2024-10-16 NOTE — LETTER
10/16/2024    Beny Winters MD  [address]  [facility] [address]    RE: Jose Carlos Escamilla       Dear Colleague,     I had the pleasure of seeing Jose Carlos Escamilla in the [facility].  PHYSICIAN NOTE:  This visit was completed in person at the [facility].      I had the pleasure of seeing Mr. Jose Carlos Escamilla for evaluation of SVT. He has been referred to EP by Dr. Allie Lopez.      Very pleasant 74-year-old male with h/o [place]'s disease, hypertension, dyslipidemia, GERD, DJD with previous knee replacement who developed tachycardia symptoms last summer.    Longest event lasted ~1 hour in mid August. He felt his heart racing and his i-watch indicated a heart rate of ~200 bpm. No associated lightheadedness or chest discomfort.    He recalls 3 other events lasting approximately 30 minutes each.    For further evaluation an outpatient 10-day cardiac monitor was completed last August: it showed 4 SVT events, longest lasting 1 minute and 55 seconds, rate 150-170 bpm. tracings reviewed, SVT is consistent with a short RP tachycardia that starts with PACs.    He is physically active without chest discomfort to suggest angina, no orthopnea, PND, LE edema syncope or near syncope.    Social hx: he lives with his daughter. Non-smoker. Light ETOH.   Family hx: negative for premature atherosclerotic heart disease, +for hemochromatosis (brother/sister)      PHYSICAL EXAMINATION:  Vital signs: 133/84, 73, 105 kg, BMI 30.6  General: very pleasant gentleman, in no apparent distress  ENT/Mouth:  no nasal discharge.  Eyes:  normal conjunctivae.   Neck:  no thyromegaly or lymphadenopathy.  Chest/Lungs:  patient is not dyspneic.  Lungs CTA, without rales or wheezing  Cardiovascular: normal JVP, rhythm is regular.  No gallop, murmur or rub.    Abdomen:  no abdominal distention.     Extremities:  no edema  Skin:  no xanthelasma.    Neurologic:  alert & oriented x 3.    Vascular:  2+ carotids without  bruits.  2+ radials.        DIAGNOSTIC STUDIES (reviewed/interpreted in the clinic today):  Laboratory studies: sodium 136, potassium 4.4, creatinine 1.0, platelets 320K, hematocrit 47%,  ECG: tracing from 08/2024 shows sinus rhythm and is WNL. No ventricular preexcitation.  Echocardiogram: no recent study.      IMPRESSION:  Supraventricular tachycardia. Symptoms started ~2 months ago. Appears to have a short RP tachycardia that starts with PAC. Most likely reentrant arrhythmia, less likely AT.  I reassured Mr. Escamilla that SVT is generally benign. However, because of very fast rate and lengthy events treatment is necessary.  We reviewed the following 2 options: (A) chronic pharmacologic therapy to suppress SVT (beta-blocker or calcium blocker, and (B) EP study and catheter ablation.  I went over the pros and cons of both. With regard to the EP procedure, I quoted a high success rate (for reentrant SVT) with low risk of complication. Potential complications include, but are not limited to, vascular injury, bleeding, cardiac perforation, conduction system injury requiring pacemaker implantation, TIA/CVA.     RECOMMENDATIONS:  The patient prefers option B; he said he already takes several medications and wishes to avoid taking more.  We will ask the patient to reach out to his PCP with regard to whether stress steroids will be needed before the procedure.  Echocardiogram.    It was my pleasure seeing this delightful patient.  Please feel free to call with any questions.     Carrie Arriola MD, FACC      (Chart documentation was completed, in part, using Dragon voice-recognition software. The note was reviewed, however grammatical and spelling errors may be present.)      CURRENT MEDICATIONS:  Current Outpatient Medications   Medication Sig Dispense Refill     dexamethasone (DECADRON) 4 MG/ML injection  (Patient not taking: Reported on 8/15/2024)  3     gabapentin (NEURONTIN) 300 MG capsule Take 2,100 mg by mouth  daily 3 Tabs in AM and 3 tabs in PM       HYDROcodone-acetaminophen (NORCO)  MG per tablet        hydrocortisone (CORTEF) 5 MG tablet Take 20 mg by mouth daily       lisinopril (ZESTRIL) 20 MG tablet TAKE 1 TABLET(20 MG) BY MOUTH DAILY 90 tablet 3     nortriptyline (PAMELOR) 10 MG capsule Take 3 capsules (30 mg) by mouth at bedtime for 180 days 270 capsule 1     omeprazole (PRILOSEC) 20 MG DR capsule Take 20 mg by mouth daily (Patient not taking: Reported on 8/15/2024)       pantoprazole (PROTONIX) 40 MG EC tablet        rosuvastatin (CRESTOR) 10 MG tablet TAKE 1 TABLET(10 MG) BY MOUTH DAILY 90 tablet 3     Semaglutide-Weight Management (WEGOVY) 2.4 MG/0.75ML pen Inject 2.4 mg Subcutaneous once a week 3 mL 2     senna-docusate (SENOKOT-S/PERICOLACE) 8.6-50 MG tablet Take 1-2 tablets by mouth 2 times daily Take while on oral narcotics to prevent or treat constipation. (Patient not taking: Reported on 8/15/2024) 30 tablet 0     tadalafil (CIALIS) 20 MG tablet TAKE ONE TABLET BY MOUTH ONE TIME DAILY AS NEEDED 30 tablet 3     tamsulosin (FLOMAX) 0.4 MG capsule TAKE 1 CAPSULE(0.4 MG) BY MOUTH DAILY 90 capsule 0     testosterone (AXIRON) 30 MG/ACT topical solution Place 30 mg onto the skin daily 2 pumps daily       traZODone (DESYREL) 50 MG tablet TAKE 2 TABLETS(100 MG) BY MOUTH AT BEDTIME 180 tablet 1       ALLERGIES     Allergies   Allergen Reactions     Penicillins Rash and Anaphylaxis     Other reaction(s): Breathing Difficulty     Zosyn [Piperacillin-Tazobactam In Dex] Palpitations and Rash       PAST MEDICAL HISTORY:  Past Medical History:   Diagnosis Date     ACP (advance care planning)      Adrenal insufficiency (H)      Chronic pain syndrome      Chronic rhinitis      Diverticulosis      Esophageal stenosis      Hypercholesterolemia      IBS (irritable bowel syndrome)      Meckel's diverticulitis 7/13/2021    Added automatically from request for surgery [id number]     Narcotic dependence (H)        PAST  SURGICAL HISTORY:  Past Surgical History:   Procedure Laterality Date     ARTHROPLASTY KNEE Left 12/2/2016    Procedure: ARTHROPLASTY KNEE;  Surgeon: Marisa Page MD;  Location:  OR     ARTHROPLASTY KNEE Right 2/25/2022    Procedure: RIGHT KNEE ARTHROPLASTY;  Surgeon: Marisa Page MD;  Location:  OR     DISCECTOMY CERVICAL MINIMALLY INVASIVE ONE LEVEL       IR LUMBAR PUNCTURE  7/14/2016     LAPAROSCOPIC APPENDECTOMY N/A 7/14/2021    Procedure: Laparoscopic appendectomy;  Surgeon: Kelsi Parker MD;  Location:  OR     LAPAROSCOPY DIAGNOSTIC (GENERAL) N/A 7/14/2021    Procedure: Diagnostic laparoscopy, laparoscopic appendectomy, laparoscopic meckel excision;  Surgeon: Kelsi Parker MD;  Location:  OR     NECK SURGERY  2001    [place]     PHACOEMULSIFICATION WITH STANDARD INTRAOCULAR LENS IMPLANT Left 5/16/2024    Procedure: LEFT EYE PHACOEMULSIFICATION, CATARACT, WITH TORIC INTRAOCULAR LENS IMPLANT INSERTION;  Surgeon: Jaimee Gunderson MD;  Location: [facility] OR     PHACOEMULSIFICATION WITH STANDARD INTRAOCULAR LENS IMPLANT Right 5/23/2024    Procedure: RIGHT EYE PHACOEMULSIFICATION, CATARACT, WITH TORIC  INTRAOCULAR LENS IMPLANT INSERTION;  Surgeon: Jaimee Gunderson MD;  Location: [facility] OR       FAMILY HISTORY:  Family History   Problem Relation Age of Onset     Diabetes Mother      Hypertension Mother      Parkinsonism Father      Rheumatoid Arthritis Brother      Hypertension Brother      Hyperlipidemia Brother      Liver Cancer Brother      Hemochromatosis Brother      Hypertension Brother      Lung Cancer Sister      Alcoholism Sister      No Known Problems Son      No Known Problems Daughter      Macular Degeneration No family hx of      Glaucoma No family hx of        SOCIAL HISTORY:  Social History     Socioeconomic History     Marital status: Single   Tobacco Use     Smoking status: Former     Current packs/day: 0.00     Types: Cigarettes     Quit date: 7/7/1995     Years since  quittin.2     Smokeless tobacco: Never   Vaping Use     Vaping status: Never Used   Substance and Sexual Activity     Alcohol use: Yes     Alcohol/week: 0.0 - 3.3 standard drinks of alcohol     Comment: 1 drink per day     Drug use: Yes     Types: Marijuana     Comment: Medical, Rarely used     Sexual activity: Yes   Social History Narrative    Living: , 2 kids    Work: , half retired    Exercise: as active as possible with pain    EtOH: 1 drink/day    Tobacco: former, quit     Hobbies: fishing, sailing, art         Social Determinants of Health     Financial Resource Strain: Low Risk  (2024)    Financial Resource Strain      Within the past 12 months, have you or your family members you live with been unable to get utilities (heat, electricity) when it was really needed?: No   Food Insecurity: Low Risk  (2024)    Food Insecurity      Within the past 12 months, did you worry that your food would run out before you got money to buy more?: No      Within the past 12 months, did the food you bought just not last and you didn t have money to get more?: No   Transportation Needs: Low Risk  (2024)    Transportation Needs      Within the past 12 months, has lack of transportation kept you from medical appointments, getting your medicines, non-medical meetings or appointments, work, or from getting things that you need?: No   Physical Activity: Insufficiently Active (2024)    Exercise Vital Sign      Days of Exercise per Week: 3 days      Minutes of Exercise per Session: 40 min   Stress: Stress Concern Present (2024)    [employer] [place] [employer] - Occupational Stress Questionnaire      Feeling of Stress : To some extent   Social Connections: Unknown (2024)    Social Connection and Isolation Panel [NHANES]      Frequency of Social Gatherings with Friends and Family: Once a week   Interpersonal Safety: Unknown (2024)    Received from [facility]     Humiliation, Afraid, Rape, and Kick questionnaire      Physically Abused: No      Sexually Abused: No   Housing Stability: Low Risk  (6/11/2024)    Housing Stability      Do you have housing? : Yes      Are you worried about losing your housing?: No         CC  Allie Madden MD  [address] [place] [address]                  Thank you for allowing me to participate in the care of your patient.      Sincerely,     Carrie Arriola MD     [facility]  cc:   Allie Madden MD  [address] [place] [address]

## 2024-10-16 NOTE — PROGRESS NOTES
PHYSICIAN NOTE:  This visit was completed in person at the [facility].      I had the pleasure of seeing . Jose Carlos Escamilla for evaluation of SVT. He has been referred to EP by Dr. Allie Lopez.      Very pleasant 74-year-old male with h/o [place]'s disease, hypertension, dyslipidemia, GERD, DJD with previous knee replacement who developed tachycardia symptoms last summer.    Longest event lasted ~1 hour in mid August. He felt his heart racing and his i-watch indicated a heart rate of ~200 bpm. No associated lightheadedness or chest discomfort.    He recalls 3 other events lasting approximately 30 minutes each.    For further evaluation an outpatient 10-day cardiac monitor was completed last August: it showed 4 SVT events, longest lasting 1 minute and 55 seconds, rate 150-170 bpm. tracings reviewed, SVT is consistent with a short RP tachycardia that starts with PACs.    He is physically active without chest discomfort to suggest angina, no orthopnea, PND, LE edema syncope or near syncope.    Social hx: he lives with his daughter. Non-smoker. Light ETOH.   Family hx: negative for premature atherosclerotic heart disease, +for hemochromatosis (brother/sister)      PHYSICAL EXAMINATION:  Vital signs: 133/84, 73, 105 kg, BMI 30.6  General: very pleasant gentleman, in no apparent distress  ENT/Mouth:  no nasal discharge.  Eyes:  normal conjunctivae.   Neck:  no thyromegaly or lymphadenopathy.  Chest/Lungs:  patient is not dyspneic.  Lungs CTA, without rales or wheezing  Cardiovascular: normal JVP, rhythm is regular.  No gallop, murmur or rub.    Abdomen:  no abdominal distention.     Extremities:  no edema  Skin:  no xanthelasma.    Neurologic:  alert & oriented x 3.    Vascular:  2+ carotids without bruits.  2+ radials.        DIAGNOSTIC STUDIES (reviewed/interpreted in the clinic today):  Laboratory studies: sodium 136, potassium 4.4, creatinine 1.0, platelets 320K, hematocrit 47%,  ECG: tracing from  08/2024 shows sinus rhythm and is WNL. No ventricular preexcitation.  Echocardiogram: no recent study.      IMPRESSION:  Supraventricular tachycardia. Symptoms started ~2 months ago. Appears to have a short RP tachycardia that starts with PAC. Most likely reentrant arrhythmia, less likely AT.  I reassured Mr. Escamilla that SVT is generally benign. However, because of very fast rate and lengthy events treatment is necessary.  We reviewed the following 2 options: (A) chronic pharmacologic therapy to suppress SVT (beta-blocker or calcium blocker, and (B) EP study and catheter ablation.  I went over the pros and cons of both. With regard to the EP procedure, I quoted a high success rate (for reentrant SVT) with low risk of complication. Potential complications include, but are not limited to, vascular injury, bleeding, cardiac perforation, conduction system injury requiring pacemaker implantation, TIA/CVA.     RECOMMENDATIONS:  The patient prefers option B; he said he already takes several medications and wishes to avoid taking more.  We will ask the patient to reach out to his PCP with regard to whether stress steroids will be needed before the procedure.  Echocardiogram.    It was my pleasure seeing this delightful patient.  Please feel free to call with any questions.     Carrie Arriola MD, [facility]      (Chart documentation was completed, in part, using Dragon voice-recognition software. The note was reviewed, however grammatical and spelling errors may be present.)      CURRENT MEDICATIONS:  Current Outpatient Medications   Medication Sig Dispense Refill    dexamethasone (DECADRON) 4 MG/ML injection  (Patient not taking: Reported on 8/15/2024)  3    gabapentin (NEURONTIN) 300 MG capsule Take 2,100 mg by mouth daily 3 Tabs in AM and 3 tabs in PM      HYDROcodone-acetaminophen (NORCO)  MG per tablet       hydrocortisone (CORTEF) 5 MG tablet Take 20 mg by mouth daily      lisinopril (ZESTRIL) 20 MG tablet TAKE 1  TABLET(20 MG) BY MOUTH DAILY 90 tablet 3    nortriptyline (PAMELOR) 10 MG capsule Take 3 capsules (30 mg) by mouth at bedtime for 180 days 270 capsule 1    omeprazole (PRILOSEC) 20 MG DR capsule Take 20 mg by mouth daily (Patient not taking: Reported on 8/15/2024)      pantoprazole (PROTONIX) 40 MG EC tablet       rosuvastatin (CRESTOR) 10 MG tablet TAKE 1 TABLET(10 MG) BY MOUTH DAILY 90 tablet 3    Semaglutide-Weight Management (WEGOVY) 2.4 MG/0.75ML pen Inject 2.4 mg Subcutaneous once a week 3 mL 2    senna-docusate (SENOKOT-S/PERICOLACE) 8.6-50 MG tablet Take 1-2 tablets by mouth 2 times daily Take while on oral narcotics to prevent or treat constipation. (Patient not taking: Reported on 8/15/2024) 30 tablet 0    tadalafil (CIALIS) 20 MG tablet TAKE ONE TABLET BY MOUTH ONE TIME DAILY AS NEEDED 30 tablet 3    tamsulosin (FLOMAX) 0.4 MG capsule TAKE 1 CAPSULE(0.4 MG) BY MOUTH DAILY 90 capsule 0    testosterone (AXIRON) 30 MG/ACT topical solution Place 30 mg onto the skin daily 2 pumps daily      traZODone (DESYREL) 50 MG tablet TAKE 2 TABLETS(100 MG) BY MOUTH AT BEDTIME 180 tablet 1       ALLERGIES     Allergies   Allergen Reactions    Penicillins Rash and Anaphylaxis     Other reaction(s): Breathing Difficulty    Zosyn [Piperacillin-Tazobactam In Dex] Palpitations and Rash       PAST MEDICAL HISTORY:  Past Medical History:   Diagnosis Date    ACP (advance care planning)     Adrenal insufficiency (H)     Chronic pain syndrome     Chronic rhinitis     Diverticulosis     Esophageal stenosis     Hypercholesterolemia     IBS (irritable bowel syndrome)     Meckel's diverticulitis 7/13/2021    Added automatically from request for surgery [id number]    Narcotic dependence (H)        PAST SURGICAL HISTORY:  Past Surgical History:   Procedure Laterality Date    ARTHROPLASTY KNEE Left 12/2/2016    Procedure: ARTHROPLASTY KNEE;  Surgeon: Marisa Page MD;  Location: [facility] OR    ARTHROPLASTY KNEE Right 2/25/2022    Procedure:  RIGHT KNEE ARTHROPLASTY;  Surgeon: Marisa Page MD;  Location:  OR    DISCECTOMY CERVICAL MINIMALLY INVASIVE ONE LEVEL      IR LUMBAR PUNCTURE  2016    LAPAROSCOPIC APPENDECTOMY N/A 2021    Procedure: Laparoscopic appendectomy;  Surgeon: Kelsi Parker MD;  Location:  OR    LAPAROSCOPY DIAGNOSTIC (GENERAL) N/A 2021    Procedure: Diagnostic laparoscopy, laparoscopic appendectomy, laparoscopic meckel excision;  Surgeon: Kelsi Parker MD;  Location:  OR    NECK SURGERY      [place]    PHACOEMULSIFICATION WITH STANDARD INTRAOCULAR LENS IMPLANT Left 2024    Procedure: LEFT EYE PHACOEMULSIFICATION, CATARACT, WITH TORIC INTRAOCULAR LENS IMPLANT INSERTION;  Surgeon: Jaimee Gunderson MD;  Location: [facility] OR    PHACOEMULSIFICATION WITH STANDARD INTRAOCULAR LENS IMPLANT Right 2024    Procedure: RIGHT EYE PHACOEMULSIFICATION, CATARACT, WITH TORIC  INTRAOCULAR LENS IMPLANT INSERTION;  Surgeon: Jaimee Gunderson MD;  Location: [facility] OR       FAMILY HISTORY:  Family History   Problem Relation Age of Onset    Diabetes Mother     Hypertension Mother     Parkinsonism Father     Rheumatoid Arthritis Brother     Hypertension Brother     Hyperlipidemia Brother     Liver Cancer Brother     Hemochromatosis Brother     Hypertension Brother     Lung Cancer Sister     Alcoholism Sister     No Known Problems Son     No Known Problems Daughter     Macular Degeneration No family hx of     Glaucoma No family hx of        SOCIAL HISTORY:  Social History     Socioeconomic History    Marital status: Single   Tobacco Use    Smoking status: Former     Current packs/day: 0.00     Types: Cigarettes     Quit date: 1995     Years since quittin.2    Smokeless tobacco: Never   Vaping Use    Vaping status: Never Used   Substance and Sexual Activity    Alcohol use: Yes     Alcohol/week: 0.0 - 3.3 standard drinks of alcohol     Comment: 1 drink per day    Drug use: Yes     Types: Marijuana     Comment:  Medical, Rarely used    Sexual activity: Yes   Social History Narrative    Living: , 2 kids    Work: , half retired    Exercise: as active as possible with pain    EtOH: 1 drink/day    Tobacco: former, quit 1990s    Hobbies: fishing, sailing, art         Social Determinants of Health     Financial Resource Strain: Low Risk  (6/11/2024)    Financial Resource Strain     Within the past 12 months, have you or your family members you live with been unable to get utilities (heat, electricity) when it was really needed?: No   Food Insecurity: Low Risk  (6/11/2024)    Food Insecurity     Within the past 12 months, did you worry that your food would run out before you got money to buy more?: No     Within the past 12 months, did the food you bought just not last and you didn t have money to get more?: No   Transportation Needs: Low Risk  (6/11/2024)    Transportation Needs     Within the past 12 months, has lack of transportation kept you from medical appointments, getting your medicines, non-medical meetings or appointments, work, or from getting things that you need?: No   Physical Activity: Insufficiently Active (6/11/2024)    Exercise Vital Sign     Days of Exercise per Week: 3 days     Minutes of Exercise per Session: 40 min   Stress: Stress Concern Present (6/11/2024)    [employer] [place] [employer] - Occupational Stress Questionnaire     Feeling of Stress : To some extent   Social Connections: Unknown (6/11/2024)    Social Connection and Isolation Panel [NHANES]     Frequency of Social Gatherings with Friends and Family: Once a week   Interpersonal Safety: Unknown (4/19/2024)    Received from [facility]    Humiliation, Afraid, Rape, and Kick questionnaire     Physically Abused: No     Sexually Abused: No   Housing Stability: Low Risk  (6/11/2024)    Housing Stability     Do you have housing? : Yes     Are you worried about losing your housing?: No         CC  Allie Madden MD  [address]  [place] [address]

## 2024-10-18 ENCOUNTER — TELEPHONE (OUTPATIENT)
Dept: CARDIOLOGY | Facility: CLINIC | Age: 74
End: 2024-10-18
Payer: MEDICARE

## 2024-10-18 NOTE — TELEPHONE ENCOUNTER
10/18/24 Recd order sheet for SVT Ablation scheduled for 11/21. Med list shows Wegovy weekly injections  Called pt who states he takes injections on Sundays  Asked that last dose be given on 11/10  Pt voiced understanding and agreement with plan.   Fazal 1030 am

## 2024-10-24 DIAGNOSIS — R39.9 LOWER URINARY TRACT SYMPTOMS (LUTS): ICD-10-CM

## 2024-10-24 DIAGNOSIS — E66.3 OVERWEIGHT (BMI 25.0-29.9): ICD-10-CM

## 2024-10-24 NOTE — TELEPHONE ENCOUNTER
Med: Wegovy    LOV (related): 06/18/2024      Due for F/U around: Not specified    Next Appt: None scheduled        Wt Readings from Last 2 Encounters:   10/16/24 105.1 kg (231 lb 12.8 oz)   08/15/24 102.1 kg (225 lb)       BMI Readings from Last 2 Encounters:   10/16/24 30.58 kg/m    08/15/24 29.69 kg/m      Med: tamsulosin    LOV (related): 06/18/2024      Due for F/U around: 06/2025 (MAW)    Next Appt: None scheduled

## 2024-10-25 RX ORDER — TAMSULOSIN HYDROCHLORIDE 0.4 MG/1
0.4 CAPSULE ORAL DAILY
Qty: 90 CAPSULE | Refills: 0 | Status: SHIPPED | OUTPATIENT
Start: 2024-10-25

## 2024-10-25 RX ORDER — SEMAGLUTIDE 2.4 MG/.75ML
INJECTION, SOLUTION SUBCUTANEOUS
Qty: 3 ML | Refills: 2 | Status: SHIPPED | OUTPATIENT
Start: 2024-10-25

## 2024-11-11 ENCOUNTER — OFFICE VISIT (OUTPATIENT)
Dept: CARDIOLOGY | Facility: CLINIC | Age: 74
End: 2024-11-11
Payer: MEDICARE

## 2024-11-11 VITALS
DIASTOLIC BLOOD PRESSURE: 87 MMHG | HEART RATE: 90 BPM | BODY MASS INDEX: 31.01 KG/M2 | HEIGHT: 73 IN | SYSTOLIC BLOOD PRESSURE: 136 MMHG | WEIGHT: 234 LBS

## 2024-11-11 DIAGNOSIS — I47.10 SVT (SUPRAVENTRICULAR TACHYCARDIA) (H): Primary | ICD-10-CM

## 2024-11-11 PROCEDURE — 99213 OFFICE O/P EST LOW 20 MIN: CPT | Performed by: NURSE PRACTITIONER

## 2024-11-11 PROCEDURE — G2211 COMPLEX E/M VISIT ADD ON: HCPCS | Performed by: NURSE PRACTITIONER

## 2024-11-11 RX ORDER — DICYCLOMINE HYDROCHLORIDE 10 MG/1
10 CAPSULE ORAL DAILY
COMMUNITY
Start: 2024-08-13

## 2024-11-11 NOTE — LETTER
11/11/2024    Beny Winters MD  [address] [facility] [facility] [address]    RE: Jose Carlos Escamilla       Dear Colleague,     I had the pleasure of seeing Jose Carlos Escamilla in the [facility].    Electrophysiology Clinic Progress Note  Jose Carlos Escamilla MRN# [id number]   YOB: 1950 Age: 74 year old     Primary cardiologist: Dr. Arriola    Reason for visit: SVT    History of presenting illness:    Jose Carlos Escamilla is a pleasant 74 year old patient with past medical history significant for:    SVT  [place]'s disease  Hypertension  Dyslipidemia  GERD  DJD status post knee replacement    The patient developed tachycardia symptoms in the summer 2024 with longest lasting approximately 1 hour in August.  According to his iWatch his heart rate was around 200 bpm without associated lightheadedness or chest discomfort.  There were 3 additional events lasting approximately 30 minutes.  An outpatient monitor revealed 4 events of SVT with the longest lasting 1 minute and 55 seconds between 150-170 bpm consistent with short RP tachycardia starting with PACs.     He met with Dr. Arriola on 10/16/2024 and they discussed management for SVT including chronic pharmacotherapy versus EP catheter ablation.  The patient opted for the latter and is scheduled for his procedure on 11/21 with echocardiogram 2 days prior.    Diagnotic studies:  Zio patch 9/2024: SVT the longest being 1 minute and 55 seconds at a rate between 150-170 bpm.  Consistent with SVT with short RP tachycardia starting with PACs.          Assessment and Plan:     ASSESSMENT:    SVT  Initially diagnosed in summer of 2024 with Zio Patch also confirming SVT   Discussed pharmacologic therapy (BB v CCB) or ablation and patient elected for the latter  Minor episodes of SVT since consultation and converts with cold back to face.     PLAN:     Echocardiogram 11/19  Proceed with SVT ablation as scheduled on 11/21  Hold Wegovy as on 11/10. No dose on 11/17.    Follow up in 1 month post procedure    We discussed the risks, benefits and indications of proceeding with electrophysiology study and possible ablation, including but not limited to the use of conscious sedation and need for a  upon discharge, peripheral vessel injury and discomfort, heart attack, death, stroke, cardiac puncture and/or tamponade, pneumothorax, [id number]-gd-wdkzz-arrhythmias requiring further treatment and damage to existing electrical structures that may require permanent pacemaker implantation. We reviewed that additional procedures may be required. We briefly discussed post-procedural restrictions and post-procedural discomfort. The patient voiced understanding and is willing to proceed. A consent form will be signed by the procedural physician.       Orders this Visit:  No orders of the defined types were placed in this encounter.    Orders Placed This Encounter   Medications     dicyclomine (BENTYL) 10 MG capsule     Sig: Take 10 mg by mouth daily.     There are no discontinued medications.    Today's clinic visit entailed:  Review of the result(s) of each unique test -  Zio   I spent a total of 21 minutes on the day of the visit.   Time spent by me today doing chart review, history and exam, documentation and further activities per the note  Provider  Link to [facility] Help Grid     The level of medical decision making during this visit was of moderate complexity.      The longitudinal plan of care for the diagnosis(es)/condition(s) as documented were addressed during this visit. Due to the added complexity in care, I will continue to support Jose Carlos Escamilla in the subsequent management and with ongoing continuity of care.          Review of Systems:     Review of Systems:  Skin:        Eyes:       ENT:       Respiratory:       Cardiovascular:       Gastroenterology:      Genitourinary:       Musculoskeletal:       Neurologic:       Psychiatric:       Heme/Lymph/Imm:       Endocrine:         "         Physical Exam:     Vitals: /87 (BP Location: Left arm, Patient Position: Sitting)   Pulse 90   Ht 1.854 m (6' 1\")   Wt 106.1 kg (234 lb)   BMI 30.87 kg/m    Constitutional: Well nourished and in no apparent distress.  Eyes: Pupils equal, round.   HEENT: Normocephalic, atraumatic.   Neck: Supple.   Respiratory: Breathing non-labored. Lungs clear to auscultation bilaterally.  Cardiovascular:  Regular rate and rhythm, normal S1 and S2. No murmur   Skin: Warm, dry.   Extremities: No edema.  Neurologic: No gross motor deficits. Alert, awake, and oriented to person, place and time.  Psychiatric: Affect appropriate.        CURRENT MEDICATIONS:  Current Outpatient Medications   Medication Sig Dispense Refill     dicyclomine (BENTYL) 10 MG capsule Take 10 mg by mouth daily.       gabapentin (NEURONTIN) 300 MG capsule Take by mouth. Taking 4 in the AM and 5 in the PM       HYDROcodone-acetaminophen (NORCO)  MG per tablet        hydrocortisone (CORTEF) 5 MG tablet Take 20 mg by mouth daily. 3 in the AM 1 at noon       lisinopril (ZESTRIL) 20 MG tablet TAKE 1 TABLET(20 MG) BY MOUTH DAILY 90 tablet 3     nortriptyline (PAMELOR) 10 MG capsule Take 3 capsules (30 mg) by mouth at bedtime for 180 days 270 capsule 1     omeprazole (PRILOSEC) 20 MG DR capsule Take 20 mg by mouth daily.       rosuvastatin (CRESTOR) 10 MG tablet TAKE 1 TABLET(10 MG) BY MOUTH DAILY 90 tablet 3     senna-docusate (SENOKOT-S/PERICOLACE) 8.6-50 MG tablet Take 1-2 tablets by mouth 2 times daily Take while on oral narcotics to prevent or treat constipation. 30 tablet 0     tadalafil (CIALIS) 20 MG tablet TAKE ONE TABLET BY MOUTH ONE TIME DAILY AS NEEDED 30 tablet 3     tamsulosin (FLOMAX) 0.4 MG capsule TAKE 1 CAPSULE(0.4 MG) BY MOUTH DAILY 90 capsule 0     testosterone (AXIRON) 30 MG/ACT topical solution Place 30 mg onto the skin daily 2 pumps daily       traZODone (DESYREL) 50 MG tablet TAKE 2 TABLETS(100 MG) BY MOUTH AT BEDTIME 180 " tablet 1     WEGOVY 2.4 MG/0.75ML pen ADMINISTER 2.4 MG UNDER THE SKIN 1 TIME A WEEK 3 mL 2     dexamethasone (DECADRON) 4 MG/ML injection  (Patient not taking: Reported on 11/11/2024)  3       ALLERGIES  Allergies   Allergen Reactions     Penicillins Rash and Anaphylaxis     Other reaction(s): Breathing Difficulty     Zosyn [Piperacillin-Tazobactam In Dex] Palpitations and Rash         PAST MEDICAL HISTORY:  Past Medical History:   Diagnosis Date     ACP (advance care planning)      Adrenal insufficiency (H)      Chronic pain syndrome      Chronic rhinitis      Diverticulosis      Esophageal stenosis      Hypercholesterolemia      IBS (irritable bowel syndrome)      Meckel's diverticulitis 7/13/2021    Added automatically from request for surgery [id number]     Narcotic dependence (H)        PAST SURGICAL HISTORY:  Past Surgical History:   Procedure Laterality Date     ARTHROPLASTY KNEE Left 12/2/2016    Procedure: ARTHROPLASTY KNEE;  Surgeon: Marisa Page MD;  Location:  OR     ARTHROPLASTY KNEE Right 2/25/2022    Procedure: RIGHT KNEE ARTHROPLASTY;  Surgeon: Marisa Page MD;  Location:  OR     DISCECTOMY CERVICAL MINIMALLY INVASIVE ONE LEVEL       IR LUMBAR PUNCTURE  7/14/2016     LAPAROSCOPIC APPENDECTOMY N/A 7/14/2021    Procedure: Laparoscopic appendectomy;  Surgeon: Kelsi Parker MD;  Location:  OR     LAPAROSCOPY DIAGNOSTIC (GENERAL) N/A 7/14/2021    Procedure: Diagnostic laparoscopy, laparoscopic appendectomy, laparoscopic meckel excision;  Surgeon: Kelsi Parker MD;  Location:  OR     NECK SURGERY  2001    [place]     PHACOEMULSIFICATION WITH STANDARD INTRAOCULAR LENS IMPLANT Left 5/16/2024    Procedure: LEFT EYE PHACOEMULSIFICATION, CATARACT, WITH TORIC INTRAOCULAR LENS IMPLANT INSERTION;  Surgeon: Jaimee Gunderson MD;  Location: [facility] OR     PHACOEMULSIFICATION WITH STANDARD INTRAOCULAR LENS IMPLANT Right 5/23/2024    Procedure: RIGHT EYE PHACOEMULSIFICATION, CATARACT, WITH  TORIC  INTRAOCULAR LENS IMPLANT INSERTION;  Surgeon: Jaimee Gunderson MD;  Location: [facility]       FAMILY HISTORY:  Family History   Problem Relation Age of Onset     Diabetes Mother      Hypertension Mother      Parkinsonism Father      Rheumatoid Arthritis Brother      Hypertension Brother      Hyperlipidemia Brother      Liver Cancer Brother      Hemochromatosis Brother      Hypertension Brother      Lung Cancer Sister      Alcoholism Sister      No Known Problems Son      No Known Problems Daughter      Macular Degeneration No family hx of      Glaucoma No family hx of        SOCIAL HISTORY:  Social History     Socioeconomic History     Marital status: Single     Spouse name: None     Number of children: None     Years of education: None     Highest education level: None   Tobacco Use     Smoking status: Former     Current packs/day: 0.00     Types: Cigarettes     Quit date: 1995     Years since quittin.3     Smokeless tobacco: Never   Vaping Use     Vaping status: Never Used   Substance and Sexual Activity     Alcohol use: Yes     Alcohol/week: 0.0 - 3.3 standard drinks of alcohol     Comment: 3-4 drinks a week     Drug use: Yes     Types: Marijuana     Comment: Medical, Rarely used     Sexual activity: Yes   Social History Narrative    Living: , 2 kids    Work: , half retired    Exercise: as active as possible with pain    EtOH: 1 drink/day    Tobacco: former, quit     Hobbies: fishing, sailing, art         Social Drivers of Health     Financial Resource Strain: Low Risk  (2024)    Financial Resource Strain      Within the past 12 months, have you or your family members you live with been unable to get utilities (heat, electricity) when it was really needed?: No   Food Insecurity: Low Risk  (2024)    Food Insecurity      Within the past 12 months, did you worry that your food would run out before you got money to buy more?: No      Within the past 12 months, did the  food you bought just not last and you didn t have money to get more?: No   Transportation Needs: Low Risk  (6/11/2024)    Transportation Needs      Within the past 12 months, has lack of transportation kept you from medical appointments, getting your medicines, non-medical meetings or appointments, work, or from getting things that you need?: No   Physical Activity: Insufficiently Active (6/11/2024)    Exercise Vital Sign      Days of Exercise per Week: 3 days      Minutes of Exercise per Session: 40 min   Stress: Stress Concern Present (6/11/2024)    [place] [employer] - Occupational Stress Questionnaire      Feeling of Stress : To some extent   Social Connections: Unknown (6/11/2024)    Social Connection and Isolation Panel [NHANES]      Frequency of Social Gatherings with Friends and Family: Once a week   Interpersonal Safety: Unknown (4/19/2024)    Received from [facility]    Humiliation, Afraid, Rape, and Kick questionnaire      Physically Abused: No      Sexually Abused: No   Housing Stability: Low Risk  (6/11/2024)    Housing Stability      Do you have housing? : Yes      Are you worried about losing your housing?: No               Thank you for allowing me to participate in the care of your patient.      Sincerely,     NIMA Gonsalez [facility]  cc:   No referring provider defined for this encounter.

## 2024-11-11 NOTE — PATIENT INSTRUCTIONS
Today's Recommendations    Nothing to eat 8 hours prior to arrival and only clear liquids up to 2 hours prior to arrival  Last Wegovy injection on 11/10 and please hold 11/17 dose.  Check-in at 7:30 AM for a 9:30 AM procedure on 11/21  He will need someone to drive you home from the procedure and stay with you for at least 24 hours post cardiology  Please follow up in 1 month postprocedure as scheduled    Please send [employer] message or call for further questions or concerns.     It was a pleasure to see you today.     Jonnathan-    Alba Calloway, APRN, CNP    EP [phone]  General scheduling and after hours number [phone]  EP scheduling [phone]

## 2024-11-11 NOTE — PROGRESS NOTES
Electrophysiology Clinic Progress Note  Jose Carlos Escamilla MRN# [id number]   YOB: 1950 Age: 74 year old     Primary cardiologist: Dr. Arriola    Reason for visit: SVT    History of presenting illness:    Jose Carlos Escamilla is a pleasant 74 year old patient with past medical history significant for:    SVT  [place]'s disease  Hypertension  Dyslipidemia  GERD  DJD status post knee replacement    The patient developed tachycardia symptoms in the summer 2024 with longest lasting approximately 1 hour in August.  According to his iWatch his heart rate was around 200 bpm without associated lightheadedness or chest discomfort.  There were 3 additional events lasting approximately 30 minutes.  An outpatient monitor revealed 4 events of SVT with the longest lasting 1 minute and 55 seconds between 150-170 bpm consistent with short RP tachycardia starting with PACs.     He met with Dr. Arriola on 10/16/2024 and they discussed management for SVT including chronic pharmacotherapy versus EP catheter ablation.  The patient opted for the latter and is scheduled for his procedure on 11/21 with echocardiogram 2 days prior.    Diagnotic studies:  Zio patch 9/2024: SVT the longest being 1 minute and 55 seconds at a rate between 150-170 bpm.  Consistent with SVT with short RP tachycardia starting with PACs.          Assessment and Plan:     ASSESSMENT:    SVT  Initially diagnosed in summer of 2024 with Zio Patch also confirming SVT   Discussed pharmacologic therapy (BB v CCB) or ablation and patient elected for the latter  Minor episodes of SVT since consultation and converts with cold back to face.     PLAN:     Echocardiogram 11/19  Proceed with SVT ablation as scheduled on 11/21  Hold Wegovy as on 11/10. No dose on 11/17.   Follow up in 1 month post procedure    We discussed the risks, benefits and indications of proceeding with electrophysiology study and possible ablation, including but not limited to the use of conscious  "sedation and need for a  upon discharge, peripheral vessel injury and discomfort, heart attack, death, stroke, cardiac puncture and/or tamponade, pneumothorax, ycpxz-jz-[id number]-arrhythmias requiring further treatment and damage to existing electrical structures that may require permanent pacemaker implantation. We reviewed that additional procedures may be required. We briefly discussed post-procedural restrictions and post-procedural discomfort. The patient voiced understanding and is willing to proceed. A consent form will be signed by the procedural physician.       Orders this Visit:  No orders of the defined types were placed in this encounter.    Orders Placed This Encounter   Medications    dicyclomine (BENTYL) 10 MG capsule     Sig: Take 10 mg by mouth daily.     There are no discontinued medications.    Today's clinic visit entailed:  Review of the result(s) of each unique test -  Zio   I spent a total of 21 minutes on the day of the visit.   Time spent by me today doing chart review, history and exam, documentation and further activities per the note  Provider  Link to [facility] Help Grid     The level of medical decision making during this visit was of moderate complexity.      The longitudinal plan of care for the diagnosis(es)/condition(s) as documented were addressed during this visit. Due to the added complexity in care, I will continue to support Jose Carlos MARIE Escamilla in the subsequent management and with ongoing continuity of care.          Review of Systems:     Review of Systems:  Skin:        Eyes:       ENT:       Respiratory:       Cardiovascular:       Gastroenterology:      Genitourinary:       Musculoskeletal:       Neurologic:       Psychiatric:       Heme/Lymph/Imm:       Endocrine:                 Physical Exam:     Vitals: /87 (BP Location: Left arm, Patient Position: Sitting)   Pulse 90   Ht 1.854 m (6' 1\")   Wt 106.1 kg (234 lb)   BMI 30.87 kg/m    Constitutional: Well nourished and " in no apparent distress.  Eyes: Pupils equal, round.   HEENT: Normocephalic, atraumatic.   Neck: Supple.   Respiratory: Breathing non-labored. Lungs clear to auscultation bilaterally.  Cardiovascular:  Regular rate and rhythm, normal S1 and S2. No murmur   Skin: Warm, dry.   Extremities: No edema.  Neurologic: No gross motor deficits. Alert, awake, and oriented to person, place and time.  Psychiatric: Affect appropriate.        CURRENT MEDICATIONS:  Current Outpatient Medications   Medication Sig Dispense Refill    dicyclomine (BENTYL) 10 MG capsule Take 10 mg by mouth daily.      gabapentin (NEURONTIN) 300 MG capsule Take by mouth. Taking 4 in the AM and 5 in the PM      HYDROcodone-acetaminophen (NORCO)  MG per tablet       hydrocortisone (CORTEF) 5 MG tablet Take 20 mg by mouth daily. 3 in the AM 1 at noon      lisinopril (ZESTRIL) 20 MG tablet TAKE 1 TABLET(20 MG) BY MOUTH DAILY 90 tablet 3    nortriptyline (PAMELOR) 10 MG capsule Take 3 capsules (30 mg) by mouth at bedtime for 180 days 270 capsule 1    omeprazole (PRILOSEC) 20 MG DR capsule Take 20 mg by mouth daily.      rosuvastatin (CRESTOR) 10 MG tablet TAKE 1 TABLET(10 MG) BY MOUTH DAILY 90 tablet 3    senna-docusate (SENOKOT-S/PERICOLACE) 8.6-50 MG tablet Take 1-2 tablets by mouth 2 times daily Take while on oral narcotics to prevent or treat constipation. 30 tablet 0    tadalafil (CIALIS) 20 MG tablet TAKE ONE TABLET BY MOUTH ONE TIME DAILY AS NEEDED 30 tablet 3    tamsulosin (FLOMAX) 0.4 MG capsule TAKE 1 CAPSULE(0.4 MG) BY MOUTH DAILY 90 capsule 0    testosterone (AXIRON) 30 MG/ACT topical solution Place 30 mg onto the skin daily 2 pumps daily      traZODone (DESYREL) 50 MG tablet TAKE 2 TABLETS(100 MG) BY MOUTH AT BEDTIME 180 tablet 1    WEGOVY 2.4 MG/0.75ML pen ADMINISTER 2.4 MG UNDER THE SKIN 1 TIME A WEEK 3 mL 2    dexamethasone (DECADRON) 4 MG/ML injection  (Patient not taking: Reported on 11/11/2024)  3       ALLERGIES  Allergies   Allergen  Reactions    Penicillins Rash and Anaphylaxis     Other reaction(s): Breathing Difficulty    Zosyn [Piperacillin-Tazobactam In Dex] Palpitations and Rash         PAST MEDICAL HISTORY:  Past Medical History:   Diagnosis Date    ACP (advance care planning)     Adrenal insufficiency (H)     Chronic pain syndrome     Chronic rhinitis     Diverticulosis     Esophageal stenosis     Hypercholesterolemia     IBS (irritable bowel syndrome)     Meckel's diverticulitis 7/13/2021    Added automatically from request for surgery [id number]    Narcotic dependence (H)        PAST SURGICAL HISTORY:  Past Surgical History:   Procedure Laterality Date    ARTHROPLASTY KNEE Left 12/2/2016    Procedure: ARTHROPLASTY KNEE;  Surgeon: Marisa Page MD;  Location:  OR    ARTHROPLASTY KNEE Right 2/25/2022    Procedure: RIGHT KNEE ARTHROPLASTY;  Surgeon: Marisa Page MD;  Location:  OR    DISCECTOMY CERVICAL MINIMALLY INVASIVE ONE LEVEL      IR LUMBAR PUNCTURE  7/14/2016    LAPAROSCOPIC APPENDECTOMY N/A 7/14/2021    Procedure: Laparoscopic appendectomy;  Surgeon: Kelsi Parker MD;  Location:  OR    LAPAROSCOPY DIAGNOSTIC (GENERAL) N/A 7/14/2021    Procedure: Diagnostic laparoscopy, laparoscopic appendectomy, laparoscopic meckel excision;  Surgeon: Kelsi Parker MD;  Location:  OR    NECK SURGERY  2001    [place]    PHACOEMULSIFICATION WITH STANDARD INTRAOCULAR LENS IMPLANT Left 5/16/2024    Procedure: LEFT EYE PHACOEMULSIFICATION, CATARACT, WITH TORIC INTRAOCULAR LENS IMPLANT INSERTION;  Surgeon: Jaimee Gunderson MD;  Location: [facility] [place] OR    PHACOEMULSIFICATION WITH STANDARD INTRAOCULAR LENS IMPLANT Right 5/23/2024    Procedure: RIGHT EYE PHACOEMULSIFICATION, CATARACT, WITH TORIC  INTRAOCULAR LENS IMPLANT INSERTION;  Surgeon: Jaimee Gunderson MD;  Location: [facility] [place] OR       FAMILY HISTORY:  Family History   Problem Relation Age of Onset    Diabetes Mother     Hypertension Mother     Parkinsonism Father     Rheumatoid  Arthritis Brother     Hypertension Brother     Hyperlipidemia Brother     Liver Cancer Brother     Hemochromatosis Brother     Hypertension Brother     Lung Cancer Sister     Alcoholism Sister     No Known Problems Son     No Known Problems Daughter     Macular Degeneration No family hx of     Glaucoma No family hx of        SOCIAL HISTORY:  Social History     Socioeconomic History    Marital status: Single     Spouse name: None    Number of children: None    Years of education: None    Highest education level: None   Tobacco Use    Smoking status: Former     Current packs/day: 0.00     Types: Cigarettes     Quit date: 1995     Years since quittin.3    Smokeless tobacco: Never   Vaping Use    Vaping status: Never Used   Substance and Sexual Activity    Alcohol use: Yes     Alcohol/week: 0.0 - 3.3 standard drinks of alcohol     Comment: 3-4 drinks a week    Drug use: Yes     Types: Marijuana     Comment: Medical, Rarely used    Sexual activity: Yes   Social History Narrative    Living: , 2 kids    Work: , half retired    Exercise: as active as possible with pain    EtOH: 1 drink/day    Tobacco: former, quit     Hobbies: fishing, sailing, art         Social Drivers of Health     Financial Resource Strain: Low Risk  (2024)    Financial Resource Strain     Within the past 12 months, have you or your family members you live with been unable to get utilities (heat, electricity) when it was really needed?: No   Food Insecurity: Low Risk  (2024)    Food Insecurity     Within the past 12 months, did you worry that your food would run out before you got money to buy more?: No     Within the past 12 months, did the food you bought just not last and you didn t have money to get more?: No   Transportation Needs: Low Risk  (2024)    Transportation Needs     Within the past 12 months, has lack of transportation kept you from medical appointments, getting your medicines, non-medical  meetings or appointments, work, or from getting things that you need?: No   Physical Activity: Insufficiently Active (6/11/2024)    Exercise Vital Sign     Days of Exercise per Week: 3 days     Minutes of Exercise per Session: 40 min   Stress: Stress Concern Present (6/11/2024)    [place] of [employer]ational [employer] - Occupational Stress Questionnaire     Feeling of Stress : To some extent   Social Connections: Unknown (6/11/2024)    Social Connection and Isolation Panel [NHANES]     Frequency of Social Gatherings with Friends and Family: Once a week   Interpersonal Safety: Unknown (4/19/2024)    Received from [facility]    Humiliation, Afraid, Rape, and Kick questionnaire     Physically Abused: No     Sexually Abused: No   Housing Stability: Low Risk  (6/11/2024)    Housing Stability     Do you have housing? : Yes     Are you worried about losing your housing?: No

## 2024-11-14 ENCOUNTER — TRANSFERRED RECORDS (OUTPATIENT)
Dept: FAMILY MEDICINE | Facility: CLINIC | Age: 74
End: 2024-11-14

## 2024-11-15 ENCOUNTER — TELEPHONE (OUTPATIENT)
Dept: CARDIOLOGY | Facility: CLINIC | Age: 74
End: 2024-11-15
Payer: MEDICARE

## 2024-11-15 NOTE — TELEPHONE ENCOUNTER
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 He states that with the ablation on 11/21/24 he would normally double dose that day since it is a stress related procedure. Pt wants to confirm if that is ok. Please call pt if there are any concerns or further recommendations. Pt states his Endo provider at [facility], Dr Thomas would have additional information if needed. Please review. Thank you!      Action Taken: Message routed to:  Other: [facility]    Travel Screening: Not Applicable     Date of Service:

## 2024-11-16 DIAGNOSIS — N52.9 ERECTILE DYSFUNCTION, UNSPECIFIED ERECTILE DYSFUNCTION TYPE: ICD-10-CM

## 2024-11-18 DIAGNOSIS — G62.9 PERIPHERAL POLYNEUROPATHY: ICD-10-CM

## 2024-11-18 RX ORDER — TADALAFIL 20 MG/1
20 TABLET ORAL DAILY PRN
Qty: 30 TABLET | Refills: 0 | Status: SHIPPED | OUTPATIENT
Start: 2024-11-18

## 2024-11-18 NOTE — TELEPHONE ENCOUNTER
Med: Rob HERNADEZ (related): 6/18/24 CPX      Due for F/U around: 6/18/25    Next Appt: Not Scheduled

## 2024-11-18 NOTE — TELEPHONE ENCOUNTER
Med: Nortriptyline     LOV (related): 6/18/24      Due for F/U around: 6/18/25    Next Appt: Not Scheduled

## 2024-11-18 NOTE — TELEPHONE ENCOUNTER
Pt made aware that per Dr Arriola he can take the recommended dose of hydrocortisone prior to to stress related procedure, which is a double dose. Pt states understanding. JNelsonRN

## 2024-11-19 ENCOUNTER — HOSPITAL ENCOUNTER (OUTPATIENT)
Dept: CARDIOLOGY | Facility: CLINIC | Age: 74
Discharge: HOME | End: 2024-11-19
Attending: INTERNAL MEDICINE
Payer: MEDICARE

## 2024-11-19 ENCOUNTER — TRANSFERRED RECORDS (OUTPATIENT)
Dept: FAMILY MEDICINE | Facility: CLINIC | Age: 74
End: 2024-11-19

## 2024-11-19 DIAGNOSIS — I47.10 PAROXYSMAL SUPRAVENTRICULAR TACHYCARDIA (H): ICD-10-CM

## 2024-11-19 LAB — LVEF ECHO: NORMAL

## 2024-11-19 PROCEDURE — 999N000208 ECHOCARDIOGRAM COMPLETE

## 2024-11-19 PROCEDURE — 255N000002 HC RX 255 OP 636: Performed by: INTERNAL MEDICINE

## 2024-11-19 PROCEDURE — C8929 TTE W OR WO FOL WCON,DOPPLER: HCPCS

## 2024-11-19 RX ORDER — NORTRIPTYLINE HYDROCHLORIDE 10 MG/1
CAPSULE ORAL
Qty: 270 CAPSULE | Refills: 1 | Status: SHIPPED | OUTPATIENT
Start: 2024-11-19

## 2024-11-19 RX ADMIN — HUMAN ALBUMIN MICROSPHERES AND PERFLUTREN 9 ML: 10; .22 INJECTION, SOLUTION INTRAVENOUS at 09:36

## 2024-11-20 ENCOUNTER — TELEPHONE (OUTPATIENT)
Dept: CARDIOLOGY | Facility: CLINIC | Age: 74
End: 2024-11-20
Payer: MEDICARE

## 2024-11-20 DIAGNOSIS — I47.10 PAROXYSMAL SUPRAVENTRICULAR TACHYCARDIA (H): Primary | ICD-10-CM

## 2024-11-20 RX ORDER — SODIUM CHLORIDE, SODIUM LACTATE, POTASSIUM CHLORIDE, CALCIUM CHLORIDE 600; 310; 30; 20 MG/100ML; MG/100ML; MG/100ML; MG/100ML
INJECTION, SOLUTION INTRAVENOUS CONTINUOUS
Status: CANCELLED | OUTPATIENT
Start: 2024-11-20

## 2024-11-20 RX ORDER — LIDOCAINE 40 MG/G
CREAM TOPICAL
Status: CANCELLED | OUTPATIENT
Start: 2024-11-20

## 2024-11-20 NOTE — TELEPHONE ENCOUNTER
Spoke to pt regarding SVT ablation tomorrow.  Pt aware of arrival time at 0730 and where to check in   Pt reminded to have no solids 8 hours prior to arrival time  And may have Clear liquids up until 2 hours prior to arrival  Discussed clear liquids allowed ( 7 up, ginger ale, chicken broth, apple juice, water, coffee with no cream or sugar)     Pt may have sips of water for am meds  Discussed meds to be held: Pt has not medications that need to be held.          GLP-1 Agonists: Wegovy last dose on 11/10  - Byetta (exenitide), Victoza (liraglutide), Ozempic, Wegovy, Rybelsus (semaglutide), Trulicity (dulaglutide), Mounjaro (tirzepatide), Bydureon (Exenatide ER), Adlyxin (Lixisendatide)   - (Weekly dosing, hold GLP-1 agonists 7 days before procedure)  - (Daily or BID dosing, hold GLP-1 agonists day before and day of procedure)  - (Oral semaglutide, hold 7 days before procedure due to long half-life)      Pt has a  for ride home and pt daughter to stay with pt x 24 hours once pt arrives home   Pt will come to the hospital via Uber and then brother will .   Pt aware that he will go home with instructions on what he can and can not do and phone numbers to call if needed.  Pt aware that he will also get a call the day after to see how pt is doing post procedure.   Pt voiced understanding and has no further questions at this time.  Celio

## 2024-11-21 ENCOUNTER — HOSPITAL ENCOUNTER (OUTPATIENT)
Facility: CLINIC | Age: 74
Discharge: HOME | End: 2024-11-21
Attending: INTERNAL MEDICINE | Admitting: INTERNAL MEDICINE
Payer: MEDICARE

## 2024-11-21 VITALS
TEMPERATURE: 98.1 F | DIASTOLIC BLOOD PRESSURE: 103 MMHG | WEIGHT: 226 LBS | HEIGHT: 73 IN | BODY MASS INDEX: 29.95 KG/M2 | OXYGEN SATURATION: 97 % | RESPIRATION RATE: 16 BRPM | HEART RATE: 87 BPM | SYSTOLIC BLOOD PRESSURE: 153 MMHG

## 2024-11-21 DIAGNOSIS — I47.10 PAROXYSMAL SUPRAVENTRICULAR TACHYCARDIA (H): Primary | ICD-10-CM

## 2024-11-21 LAB
ACT BLD: 257 SECONDS (ref 74–150)
ACT BLD: 312 SECONDS (ref 74–150)
ANION GAP SERPL CALCULATED.3IONS-SCNC: 10 MMOL/L (ref 7–15)
ATRIAL RATE - MUSE: 69 BPM
BUN SERPL-MCNC: 12.3 MG/DL (ref 8–23)
CALCIUM SERPL-MCNC: 9.2 MG/DL (ref 8.8–10.4)
CHLORIDE SERPL-SCNC: 102 MMOL/L (ref 98–107)
CREAT SERPL-MCNC: 0.78 MG/DL (ref 0.67–1.17)
DIASTOLIC BLOOD PRESSURE - MUSE: NORMAL MMHG
EGFRCR SERPLBLD CKD-EPI 2021: >90 ML/MIN/1.73M2
ERYTHROCYTE [DISTWIDTH] IN BLOOD BY AUTOMATED COUNT: 11.9 % (ref 10–15)
GLUCOSE SERPL-MCNC: 98 MG/DL (ref 70–99)
HCO3 SERPL-SCNC: 26 MMOL/L (ref 22–29)
HCT VFR BLD AUTO: 43.3 % (ref 40–53)
HGB BLD-MCNC: 14.8 G/DL (ref 13.3–17.7)
HOLD SPECIMEN: NORMAL
INTERPRETATION ECG - MUSE: NORMAL
MCH RBC QN AUTO: 31.6 PG (ref 26.5–33)
MCHC RBC AUTO-ENTMCNC: 34.2 G/DL (ref 31.5–36.5)
MCV RBC AUTO: 92 FL (ref 78–100)
P AXIS - MUSE: 55 DEGREES
PLATELET # BLD AUTO: 226 10E3/UL (ref 150–450)
POTASSIUM SERPL-SCNC: 4.2 MMOL/L (ref 3.4–5.3)
PR INTERVAL - MUSE: 188 MS
QRS DURATION - MUSE: 96 MS
QT - MUSE: 390 MS
QTC - MUSE: 417 MS
R AXIS - MUSE: 30 DEGREES
RBC # BLD AUTO: 4.69 10E6/UL (ref 4.4–5.9)
SODIUM SERPL-SCNC: 138 MMOL/L (ref 135–145)
SYSTOLIC BLOOD PRESSURE - MUSE: NORMAL MMHG
T AXIS - MUSE: 57 DEGREES
VENTRICULAR RATE- MUSE: 69 BPM
WBC # BLD AUTO: 5.6 10E3/UL (ref 4–11)

## 2024-11-21 PROCEDURE — 93005 ELECTROCARDIOGRAM TRACING: CPT

## 2024-11-21 PROCEDURE — 82565 ASSAY OF CREATININE: CPT | Performed by: INTERNAL MEDICINE

## 2024-11-21 PROCEDURE — C1759 CATH, INTRA ECHOCARDIOGRAPHY: HCPCS | Performed by: INTERNAL MEDICINE

## 2024-11-21 PROCEDURE — 99152 MOD SED SAME PHYS/QHP 5/>YRS: CPT | Performed by: INTERNAL MEDICINE

## 2024-11-21 PROCEDURE — 99153 MOD SED SAME PHYS/QHP EA: CPT | Performed by: INTERNAL MEDICINE

## 2024-11-21 PROCEDURE — 93653 COMPRE EP EVAL TX SVT: CPT | Performed by: INTERNAL MEDICINE

## 2024-11-21 PROCEDURE — 93462 L HRT CATH TRNSPTL PUNCTURE: CPT | Performed by: INTERNAL MEDICINE

## 2024-11-21 PROCEDURE — C1730 CATH, EP, 19 OR FEW ELECT: HCPCS | Performed by: INTERNAL MEDICINE

## 2024-11-21 PROCEDURE — 80048 BASIC METABOLIC PNL TOTAL CA: CPT | Performed by: INTERNAL MEDICINE

## 2024-11-21 PROCEDURE — 93662 INTRACARDIAC ECG (ICE): CPT | Performed by: INTERNAL MEDICINE

## 2024-11-21 PROCEDURE — 93662 INTRACARDIAC ECG (ICE): CPT | Mod: 26 | Performed by: INTERNAL MEDICINE

## 2024-11-21 PROCEDURE — C1732 CATH, EP, DIAG/ABL, 3D/VECT: HCPCS | Performed by: INTERNAL MEDICINE

## 2024-11-21 PROCEDURE — 250N000009 HC RX 250: Performed by: INTERNAL MEDICINE

## 2024-11-21 PROCEDURE — 999N000071 HC STATISTIC HEART CATH LAB OR EP LAB

## 2024-11-21 PROCEDURE — 999N000054 HC STATISTIC EKG NON-CHARGEABLE

## 2024-11-21 PROCEDURE — 250N000011 HC RX IP 250 OP 636: Performed by: INTERNAL MEDICINE

## 2024-11-21 PROCEDURE — C1894 INTRO/SHEATH, NON-LASER: HCPCS | Performed by: INTERNAL MEDICINE

## 2024-11-21 PROCEDURE — 85347 COAGULATION TIME ACTIVATED: CPT

## 2024-11-21 PROCEDURE — 82310 ASSAY OF CALCIUM: CPT | Performed by: INTERNAL MEDICINE

## 2024-11-21 PROCEDURE — C1769 GUIDE WIRE: HCPCS | Performed by: INTERNAL MEDICINE

## 2024-11-21 PROCEDURE — 85014 HEMATOCRIT: CPT | Performed by: INTERNAL MEDICINE

## 2024-11-21 PROCEDURE — C1760 CLOSURE DEV, VASC: HCPCS | Performed by: INTERNAL MEDICINE

## 2024-11-21 PROCEDURE — 36415 COLL VENOUS BLD VENIPUNCTURE: CPT | Performed by: INTERNAL MEDICINE

## 2024-11-21 PROCEDURE — 272N000001 HC OR GENERAL SUPPLY STERILE: Performed by: INTERNAL MEDICINE

## 2024-11-21 PROCEDURE — 999N000184 HC STATISTIC TELEMETRY

## 2024-11-21 RX ORDER — SODIUM CHLORIDE, SODIUM LACTATE, POTASSIUM CHLORIDE, CALCIUM CHLORIDE 600; 310; 30; 20 MG/100ML; MG/100ML; MG/100ML; MG/100ML
INJECTION, SOLUTION INTRAVENOUS CONTINUOUS
Status: DISCONTINUED | OUTPATIENT
Start: 2024-11-21 | End: 2024-11-21 | Stop reason: HOSPADM

## 2024-11-21 RX ORDER — FENTANYL CITRATE 50 UG/ML
INJECTION, SOLUTION INTRAMUSCULAR; INTRAVENOUS
Status: DISCONTINUED | OUTPATIENT
Start: 2024-11-21 | End: 2024-11-21 | Stop reason: HOSPADM

## 2024-11-21 RX ORDER — NALOXONE HYDROCHLORIDE 0.4 MG/ML
0.4 INJECTION, SOLUTION INTRAMUSCULAR; INTRAVENOUS; SUBCUTANEOUS
Status: DISCONTINUED | OUTPATIENT
Start: 2024-11-21 | End: 2024-11-21 | Stop reason: HOSPADM

## 2024-11-21 RX ORDER — NALOXONE HYDROCHLORIDE 0.4 MG/ML
0.2 INJECTION, SOLUTION INTRAMUSCULAR; INTRAVENOUS; SUBCUTANEOUS
Status: DISCONTINUED | OUTPATIENT
Start: 2024-11-21 | End: 2024-11-21 | Stop reason: HOSPADM

## 2024-11-21 RX ORDER — HEPARIN SODIUM 200 [USP'U]/100ML
100-600 INJECTION, SOLUTION INTRAVENOUS CONTINUOUS PRN
Status: DISCONTINUED | OUTPATIENT
Start: 2024-11-21 | End: 2024-11-21 | Stop reason: HOSPADM

## 2024-11-21 RX ORDER — PROTAMINE SULFATE 10 MG/ML
INJECTION, SOLUTION INTRAVENOUS
Status: DISCONTINUED | OUTPATIENT
Start: 2024-11-21 | End: 2024-11-21 | Stop reason: HOSPADM

## 2024-11-21 RX ORDER — MIDAZOLAM HCL IN 0.9 % NACL/PF 1 MG/ML
.5-6 PLASTIC BAG, INJECTION (ML) INTRAVENOUS CONTINUOUS PRN
Status: DISCONTINUED | OUTPATIENT
Start: 2024-11-21 | End: 2024-11-21 | Stop reason: HOSPADM

## 2024-11-21 RX ORDER — LIDOCAINE 40 MG/G
CREAM TOPICAL
Status: DISCONTINUED | OUTPATIENT
Start: 2024-11-21 | End: 2024-11-21 | Stop reason: HOSPADM

## 2024-11-21 RX ORDER — HEPARIN SODIUM 1000 [USP'U]/ML
INJECTION, SOLUTION INTRAVENOUS; SUBCUTANEOUS
Status: DISCONTINUED | OUTPATIENT
Start: 2024-11-21 | End: 2024-11-21 | Stop reason: HOSPADM

## 2024-11-21 RX ORDER — ACETAMINOPHEN 325 MG/1
650 TABLET ORAL EVERY 4 HOURS PRN
Status: DISCONTINUED | OUTPATIENT
Start: 2024-11-21 | End: 2024-11-21 | Stop reason: HOSPADM

## 2024-11-21 RX ORDER — HEPARIN SODIUM 200 [USP'U]/100ML
100-500 INJECTION, SOLUTION INTRAVENOUS CONTINUOUS PRN
Status: DISCONTINUED | OUTPATIENT
Start: 2024-11-21 | End: 2024-11-21 | Stop reason: HOSPADM

## 2024-11-21 ASSESSMENT — ACTIVITIES OF DAILY LIVING (ADL)
ADLS_ACUITY_SCORE: 0

## 2024-11-21 NOTE — PROGRESS NOTES
Dictated.    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 Successful ablation using the transseptal approach.    EBL 20-25 ml.  No apparent complication.    Vascade x 2; 6 Fr sheath was manually pulled.    Plan:  -bedrest for 3 hours  -discharge home later this afternoon, if doing well  -follow-up EP clinic appointment MATTEO

## 2024-11-21 NOTE — DISCHARGE INSTRUCTIONS
SVT Ablation Discharge Instructions      After you go home:    Have an adult stay with you until tomorrow.  You may resume your normal diet.       For 24 hours - due to the sedation you received:  Relax and take it easy.  Do NOT make any important or legal decisions.  Do NOT drive or operate machines at home or at work.  Do NOT drink alcohol.    Care of Puncture Sites:    For the first 24 hrs - check the puncture site every 1-2 hours while awake.  For 2 days, when you cough, sneeze, laugh or move your bowels, hold your hand over the puncture site and press firmly.  Remove the dressing(s) after 24 hours recommend taking off in shower. If there is minor oozing, apply a bandaid and remove it after 12 hours.  It is normal to have bruising or pea sized lump or soreness at the site.  You may shower tomorrow. Do NOT take a bath, or use a hot tub or pool for at least 3 days. Do NOT scrub the site. Do not use lotion or powder near the puncture site.      Activity - For 3 days:    No stooping or squatting  Do NOT do any heavy activity such as exercise, lifting, or straining.   Do NOT do housework, yard work or any activity that make you sweat  Do NOT lift more than 10 pounds  Limit going up and down the stairs repetitively, for the first 24 hours after procedure.       Bleeding:     If you start bleeding from the site in your groin/chest, lie down flat and press firmly on the site for 10 minutes.   Once bleeding stops, lay flat for 2 hours.  Call [facility] as soon as you can.       Call 911 right away if you have heavy bleeding or bleeding that does not stop.      Medicines:    Take your medications, including blood thinners, unless your provider tells you not to.  If you have stopped any medicines, check with your provider about when to restart them.  If you have pain or shortness of breath, you may take Advil (ibuprofen) or Tylenol (acetaminophen).      Follow Up Appointments:    Alba Calloway on 12/23 at  8:50 with an ECG  You will receive a phone call the following day/Monday from an RN - Mercedes or Christina.    Call the clinic if:    You have increased pain or a large or growing hard lump around the site.  The site is red, swollen, hot or tender.  Blood or fluid is draining from the site.  You have chills or a fever greater than 101 F (38 C).  Your leg feels numb, cool or changes color.  Increased pain in the chest and/or groin.  Increased shortness of breath  Chest pain not relieved by Tylenol or Advil  New pain in the back or belly that you cannot control with Tylenol.  Recurrent irregular or fast heart rate lasting over 2 hours.  Any questions or concerns.    Heart rhythms:  You may have some irregular heartbeats. These feel very strong. They may make you feel that the fast heart rhythm is going to start again.  Give it time. The irregular beats should occur less often.       [facility]:    [phone] ( 8am-5pm M-F)  Mercedes Hummel    [phone] option 2 (7 days a week)  on call Cardiologist

## 2024-11-21 NOTE — PRE-PROCEDURE
GENERAL PRE-PROCEDURE:   Procedure:  EP study and ablation  Date/Time:  11/21/2024 9:31 AM    Written consent obtained?: Yes    Risks and benefits: Risks, benefits and alternatives were discussed    Consent given by:  Patient  Patient states understanding of procedure being performed: Yes    Patient's understanding of procedure matches consent: Yes    Procedure consent matches procedure scheduled: Yes    Expected level of sedation:  Moderate  Appropriately NPO:  Yes  ASA Class:  1  Mallampati  :  Grade 1- soft palate, uvula, tonsillar pillars, and posterior pharyngeal wall visible  Lungs:  Lungs clear with good breath sounds bilaterally  Heart:  Normal heart sounds and rate  History & Physical reviewed:  History and physical reviewed and no updates needed  Statement of review:  I have reviewed the lab findings, diagnostic data, medications, and the plan for sedation    
independent

## 2024-11-21 NOTE — PROGRESS NOTES
Care Suites Post Procedure Note    Patient Information  Name: Jose Carlos Escamilla  Age: 74 year old    Post Procedure  Time patient returned to Care Suites: 1123  Concerns/abnormal assessment: No  If abnormal assessment, provider notified: N/A  Plan/Other: Per orders.     Bedrest for 3 hours. Per Flako's note, up around 1415. Anticipated discharge 1445. Right groin site is CDI. No swelling, bleeding or hematoma. Patient denies pain. CMS/pulses are at baseline. Patient informed of activity restrictions post-procedure. Patient gave verbal understanding.     Polly Bruno RN

## 2024-11-21 NOTE — PROGRESS NOTES
Care Suites Discharge Nursing Note    Patient Information  Name: Jose Carlos Escamilla  Age: 74 year old    Discharge Education:  Discharge instructions reviewed: Yes  Additional education/resources provided: none  Patient/patient representative verbalizes understanding: Yes  Patient discharging on new medications: No  Medication education completed: N/A    Discharge Plans:   Discharge location: home  Discharge ride contacted: Yes  Approximate discharge time: 1445    Discharge Criteria:  Discharge criteria met and vital signs stable: Yes  Ambulated in hallway without difficulty.  No change in groin site post.  Dr Arriola here to see pt. Aware of current BP.  Pt was hypertensive pre procedure.  Patient Belongs:  Patient belongings returned to patient: Yes    Daisy Salmon RN

## 2024-11-21 NOTE — PROGRESS NOTES
Care Suites Admission Nursing Note    Patient Information  Name: Jose Carlos Escamilla  Age: 74 year old  Reason for admission: SVT ablation  Care Suites arrival time: 0730    Visitor Information  Name: none     Patient Admission/Assessment   Pre-procedure assessment complete: Yes  If abnormal assessment/labs, provider notified: N/A  NPO: Yes  Medications held per instructions/orders: Yes  Consent: deferred  If applicable, pregnancy test status: deferred  Patient oriented to room: Yes  Education/questions answered: Yes  Plan/other:     Discharge Planning  Discharge name/phone number: brother Alfonso [phone]  Overnight post sedation caregiver: daughter  Discharge location: home    Kena Lees RN

## 2024-11-21 NOTE — Clinical Note
Procedure scheduled as Elective. Patient states she has oxygen at home that she needs PRN.  Patient states she obtained o2 years ago, and that her portable does not work and her home concentrator recently stopped working.  Patient states she spoke with DME company whose name she cannot currently recall and has an outstanding balance.  Patient has no community insurance at present./oxygen

## 2024-12-16 ENCOUNTER — TRANSFERRED RECORDS (OUTPATIENT)
Dept: FAMILY MEDICINE | Facility: CLINIC | Age: 74
End: 2024-12-16

## 2024-12-17 ENCOUNTER — TRANSFERRED RECORDS (OUTPATIENT)
Dept: FAMILY MEDICINE | Facility: CLINIC | Age: 74
End: 2024-12-17

## 2024-12-18 ENCOUNTER — ANCILLARY PROCEDURE (OUTPATIENT)
Dept: MRI IMAGING | Facility: CLINIC | Age: 74
End: 2024-12-18
Attending: NURSE PRACTITIONER
Payer: MEDICARE

## 2024-12-18 DIAGNOSIS — M47.892 OTHER SPONDYLOSIS, CERVICAL REGION: ICD-10-CM

## 2024-12-18 PROCEDURE — 72141 MRI NECK SPINE W/O DYE: CPT

## 2024-12-23 ENCOUNTER — OFFICE VISIT (OUTPATIENT)
Dept: CARDIOLOGY | Facility: CLINIC | Age: 74
End: 2024-12-23
Payer: MEDICARE

## 2024-12-23 VITALS
OXYGEN SATURATION: 94 % | HEART RATE: 84 BPM | DIASTOLIC BLOOD PRESSURE: 81 MMHG | WEIGHT: 235.1 LBS | BODY MASS INDEX: 31.16 KG/M2 | HEIGHT: 73 IN | SYSTOLIC BLOOD PRESSURE: 141 MMHG

## 2024-12-23 DIAGNOSIS — I47.10 SVT (SUPRAVENTRICULAR TACHYCARDIA) (H): Primary | ICD-10-CM

## 2024-12-23 NOTE — LETTER
12/23/2024    Beny Winters MD  [address]  [facility] [address]    RE: Jose Carlos Escamilla       Dear Colleague,     I had the pleasure of seeing Jose Carlos Escamilla in the [facility].    Electrophysiology Clinic Progress Note  Jose Carlos Escamilla MRN# [id number]   YOB: 1950 Age: 74 year old     Primary cardiologist: Dr. 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   [place]'s disease  Hypertension  Dyslipidemia  GERD  DJD status post knee replacement    He met with Dr. Arriola on 10/16/2024 after an outpatient monitor in 9/2024 noted 4 events of SVT with the longest lasting 1 minute and 55 seconds between 150-170 bpm consistent with short RP tachycardia starting with PACs. and they discussed management for SVT including chronic pharmacotherapy versus EP catheter ablation.      The patient elected for an EPS/ablation and on 11/21/2024 of an inducible AVNRT mediated by a a single concealed left posterior pathway. Post procedure he had moderate ecchymosis of the RFV site that has resolved. Thankfully, he has not had break through SVT or palpitations post procedure.     Diagnotic studies:  EKG 12/23/2024: SR at 84 bpm, , QTc 418, QRSd 98  Echocardiogram 11/2024: LVEF 55-60%, suboptimal study but no noted valvular abnormalities   Zio patch 9/2024: SVT the longest being 1 minute and 55 seconds at a rate between 150-170 bpm.  Consistent with SVT with short RP tachycardia starting with PACs.          Assessment and Plan:     ASSESSMENT:    SVT  Initially diagnosed in summer of 2024 with Zio Patch also confirming SVT   Discussed pharmacologic therapy (BB v CCB) or ablation and patient elected for the latter  S/p EPS and ablation on 11/21/2024 of an inducible  "AVNRT mediated by a a single concealed left posterior pathway.     PLAN:     Follow up with EP as needed.       Orders this Visit:  Orders Placed This Encounter   Procedures     EKG 12-lead complete w/read - Clinics (performed today)     No orders of the defined types were placed in this encounter.    There are no discontinued medications.    Today's clinic visit entailed:  Review of the result(s) of each unique test -  Zio, EKG, echo, ablation   I spent a total of 20 minutes on the day of the visit.   Time spent by me today doing chart review, history and exam, documentation and further activities per the note  Provider  Link to [facility] Help Grid     The level of medical decision making during this visit was of moderate complexity.         Review of Systems:     Review of Systems:  Skin:        Eyes:       ENT:       Respiratory:       Cardiovascular:       Gastroenterology:      Genitourinary:       Musculoskeletal:       Neurologic:       Psychiatric:       Heme/Lymph/Imm:       Endocrine:                 Physical Exam:     Vitals: BP (!) 141/81 (BP Location: Left arm)   Pulse 84   Ht 1.854 m (6' 1\")   Wt 106.6 kg (235 lb 1.6 oz)   SpO2 94%   BMI 31.02 kg/m    Constitutional: Well nourished and in no apparent distress.  Eyes: Pupils equal, round.   HEENT: Normocephalic, atraumatic.   Neck: Supple.   Respiratory: Breathing non-labored. Lungs clear to auscultation bilaterally.  Cardiovascular:  Regular rate and rhythm, normal S1 and S2. No murmur   Skin: Warm, dry.   Extremities: No edema.  Neurologic: No gross motor deficits. Alert, awake, and oriented to person, place and time.  Psychiatric: Affect appropriate.        CURRENT MEDICATIONS:  Current Outpatient Medications   Medication Sig Dispense Refill     dexamethasone (DECADRON) 4 MG/ML injection   3     dicyclomine (BENTYL) 10 MG capsule Take 10 mg by mouth daily.       gabapentin (NEURONTIN) 300 MG capsule Take by mouth. Taking 4 in the AM and 5 in the PM   "     HYDROcodone-acetaminophen (NORCO)  MG per tablet        hydrocortisone (CORTEF) 5 MG tablet Take 20 mg by mouth daily. 3 in the AM 1 at noon       lisinopril (ZESTRIL) 20 MG tablet TAKE 1 TABLET(20 MG) BY MOUTH DAILY 90 tablet 3     nortriptyline (PAMELOR) 10 MG capsule TAKE 3 CAPSULES(30 MG) BY MOUTH AT BEDTIME 270 capsule 1     omeprazole (PRILOSEC) 20 MG DR capsule Take 20 mg by mouth daily.       rosuvastatin (CRESTOR) 10 MG tablet TAKE 1 TABLET(10 MG) BY MOUTH DAILY 90 tablet 3     senna-docusate (SENOKOT-S/PERICOLACE) 8.6-50 MG tablet Take 1-2 tablets by mouth 2 times daily Take while on oral narcotics to prevent or treat constipation. 30 tablet 0     tadalafil (CIALIS) 20 MG tablet TAKE ONE TABLET BY MOUTH DAILY AS NEEDED 30 tablet 0     tamsulosin (FLOMAX) 0.4 MG capsule TAKE 1 CAPSULE(0.4 MG) BY MOUTH DAILY 90 capsule 0     testosterone (AXIRON) 30 MG/ACT topical solution Place 30 mg onto the skin daily 2 pumps daily       traZODone (DESYREL) 50 MG tablet TAKE 2 TABLETS(100 MG) BY MOUTH AT BEDTIME 180 tablet 1     WEGOVY 2.4 MG/0.75ML pen ADMINISTER 2.4 MG UNDER THE SKIN 1 TIME A WEEK 3 mL 2       ALLERGIES  Allergies   Allergen Reactions     Penicillins Rash and Anaphylaxis     Other reaction(s): Breathing Difficulty     Zosyn [Piperacillin-Tazobactam In Dex] Palpitations and Rash         PAST MEDICAL HISTORY:  Past Medical History:   Diagnosis Date     ACP (advance care planning)      Adrenal insufficiency (H)      Chronic pain syndrome      Chronic rhinitis      Diverticulosis      Esophageal stenosis      Hypercholesterolemia      IBS (irritable bowel syndrome)      Meckel's diverticulitis 7/13/2021    Added automatically from request for surgery [id number]     Narcotic dependence (H)        PAST SURGICAL HISTORY:  Past Surgical History:   Procedure Laterality Date     ARTHROPLASTY KNEE Left 12/2/2016    Procedure: ARTHROPLASTY KNEE;  Surgeon: Marisa Page MD;  Location:  OR      ARTHROPLASTY KNEE Right 2/25/2022    Procedure: RIGHT KNEE ARTHROPLASTY;  Surgeon: Marisa Page MD;  Location:  OR     DISCECTOMY CERVICAL MINIMALLY INVASIVE ONE LEVEL       EP ABLATION SVT N/A 11/21/2024    Procedure: Ablation Supraventricular Tachycardia;  Surgeon: Carrie Arriola MD;  Location:  HEART [facility]ARD[facility] CATH LAB     IR LUMBAR PUNCTURE  7/14/2016     LAPAROSCOPIC APPENDECTOMY N/A 7/14/2021    Procedure: Laparoscopic appendectomy;  Surgeon: Kelsi Parker MD;  Location:  OR     LAPAROSCOPY DIAGNOSTIC (GENERAL) N/A 7/14/2021    Procedure: Diagnostic laparoscopy, laparoscopic appendectomy, laparoscopic meckel excision;  Surgeon: Kelsi Parker MD;  Location:  OR     NECK SURGERY  2001    [place]     PHACOEMULSIFICATION WITH STANDARD INTRAOCULAR LENS IMPLANT Left 5/16/2024    Procedure: LEFT EYE PHACOEMULSIFICATION, CATARACT, WITH TORIC INTRAOCULAR LENS IMPLANT INSERTION;  Surgeon: Jaimee Gunderson MD;  Location: [facility] OR     PHACOEMULSIFICATION WITH STANDARD INTRAOCULAR LENS IMPLANT Right 5/23/2024    Procedure: RIGHT EYE PHACOEMULSIFICATION, CATARACT, WITH TORIC  INTRAOCULAR LENS IMPLANT INSERTION;  Surgeon: Jaimee Gunderson MD;  Location: [facility] OR       FAMILY HISTORY:  Family History   Problem Relation Age of Onset     Diabetes Mother      Hypertension Mother      Parkinsonism Father      Rheumatoid Arthritis Brother      Hypertension Brother      Hyperlipidemia Brother      Liver Cancer Brother      Hemochromatosis Brother      Hypertension Brother      Lung Cancer Sister      Alcoholism Sister      No Known Problems Son      No Known Problems Daughter      Macular Degeneration No family hx of      Glaucoma No family hx of        SOCIAL HISTORY:  Social History     Socioeconomic History     Marital status: Single     Spouse name: None     Number of children: None     Years of education: None     Highest education level: None   Tobacco Use     Smoking status: Former      Current packs/day: 0.00     Types: Cigarettes     Quit date: 1995     Years since quittin.4     Smokeless tobacco: Never   Vaping Use     Vaping status: Never Used   Substance and Sexual Activity     Alcohol use: Yes     Alcohol/week: 0.0 - 3.3 standard drinks of alcohol     Comment: 3-4 drinks a week     Drug use: Yes     Types: Marijuana     Comment: Medical, Rarely used     Sexual activity: Yes   Social History Narrative    Living: , 2 kids    Work: , half retired    Exercise: as active as possible with pain    EtOH: 1 drink/day    Tobacco: former, quit     Hobbies: fishing, sailing, art         Social Drivers of Health     Financial Resource Strain: Low Risk  (2024)    Financial Resource Strain      Within the past 12 months, have you or your family members you live with been unable to get utilities (heat, electricity) when it was really needed?: No   Food Insecurity: Low Risk  (2024)    Food Insecurity      Within the past 12 months, did you worry that your food would run out before you got money to buy more?: No      Within the past 12 months, did the food you bought just not last and you didn t have money to get more?: No   Transportation Needs: Low Risk  (2024)    Transportation Needs      Within the past 12 months, has lack of transportation kept you from medical appointments, getting your medicines, non-medical meetings or appointments, work, or from getting things that you need?: No   Physical Activity: Insufficiently Active (2024)    Exercise Vital Sign      Days of Exercise per Week: 3 days      Minutes of Exercise per Session: 40 min   Stress: Stress Concern Present (2024)    [employer] - Occupational Stress Questionnaire      Feeling of Stress : To some extent   Social Connections: Unknown (2024)    Social Connection and Isolation Panel [NHANES]      Frequency of Social Gatherings with Friends and Family: Once a  week   Interpersonal Safety: Low Risk  (11/21/2024)    Interpersonal Safety      Do you feel physically and emotionally safe where you currently live?: Yes      Within the past 12 months, have you been hit, slapped, kicked or otherwise physically hurt by someone?: No      Within the past 12 months, have you been humiliated or emotionally abused in other ways by your partner or ex-partner?: No   Housing Stability: Low Risk  (6/11/2024)    Housing Stability      Do you have housing? : Yes      Are you worried about losing your housing?: No               Thank you for allowing me to participate in the care of your patient.      Sincerely,     NIMA Gonsalez [facility]  cc:   Inocente Nix MD  No address on file

## 2024-12-23 NOTE — PROGRESS NOTES
Electrophysiology Clinic Progress Note  Jose Carlos Escamilla MRN# [id number]   YOB: 1950 Age: 74 year old     Primary cardiologist: Dr. 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   Desha's disease  Hypertension  Dyslipidemia  GERD  DJD status post knee replacement    He met with Dr. Arriola on 10/16/2024 after an outpatient monitor in 9/2024 noted 4 events of SVT with the longest lasting 1 minute and 55 seconds between 150-170 bpm consistent with short RP tachycardia starting with PACs. and they discussed management for SVT including chronic pharmacotherapy versus EP catheter ablation.      The patient elected for an EPS/ablation and on 11/21/2024 of an inducible AVNRT mediated by a a single concealed left posterior pathway. Post procedure he had moderate ecchymosis of the RFV site that has resolved. Thankfully, he has not had break through SVT or palpitations post procedure.     Diagnotic studies:  EKG 12/23/2024: SR at 84 bpm, , QTc 418, QRSd 98  Echocardiogram 11/2024: LVEF 55-60%, suboptimal study but no noted valvular abnormalities   Zio patch 9/2024: SVT the longest being 1 minute and 55 seconds at a rate between 150-170 bpm.  Consistent with SVT with short RP tachycardia starting with PACs.          Assessment and Plan:     ASSESSMENT:    SVT  Initially diagnosed in summer of 2024 with Zio Patch also confirming SVT   Discussed pharmacologic therapy (BB v CCB) or ablation and patient elected for the latter  S/p EPS and ablation on 11/21/2024 of an inducible AVNRT mediated by a a single concealed left posterior pathway.     PLAN:     Follow up with EP as needed.       Orders this Visit:  Orders Placed This Encounter   Procedures    EKG 12-lead complete w/read -  "Clinics (performed today)     No orders of the defined types were placed in this encounter.    There are no discontinued medications.    Today's clinic visit entailed:  Review of the result(s) of each unique test -  Zio, EKG, echo, ablation   I spent a total of 20 minutes on the day of the visit.   Time spent by me today doing chart review, history and exam, documentation and further activities per the note  Provider  Link to [facility] Help Grid     The level of medical decision making during this visit was of moderate complexity.         Review of Systems:     Review of Systems:  Skin:        Eyes:       ENT:       Respiratory:       Cardiovascular:       Gastroenterology:      Genitourinary:       Musculoskeletal:       Neurologic:       Psychiatric:       Heme/Lymph/Imm:       Endocrine:                 Physical Exam:     Vitals: BP (!) 141/81 (BP Location: Left arm)   Pulse 84   Ht 1.854 m (6' 1\")   Wt 106.6 kg (235 lb 1.6 oz)   SpO2 94%   BMI 31.02 kg/m    Constitutional: Well nourished and in no apparent distress.  Eyes: Pupils equal, round.   HEENT: Normocephalic, atraumatic.   Neck: Supple.   Respiratory: Breathing non-labored. Lungs clear to auscultation bilaterally.  Cardiovascular:  Regular rate and rhythm, normal S1 and S2. No murmur   Skin: Warm, dry.   Extremities: No edema.  Neurologic: No gross motor deficits. Alert, awake, and oriented to person, place and time.  Psychiatric: Affect appropriate.        CURRENT MEDICATIONS:  Current Outpatient Medications   Medication Sig Dispense Refill    dexamethasone (DECADRON) 4 MG/ML injection   3    dicyclomine (BENTYL) 10 MG capsule Take 10 mg by mouth daily.      gabapentin (NEURONTIN) 300 MG capsule Take by mouth. Taking 4 in the AM and 5 in the PM      HYDROcodone-acetaminophen (NORCO)  MG per tablet       hydrocortisone (CORTEF) 5 MG tablet Take 20 mg by mouth daily. 3 in the AM 1 at noon      lisinopril (ZESTRIL) 20 MG tablet TAKE 1 TABLET(20 MG) " BY MOUTH DAILY 90 tablet 3    nortriptyline (PAMELOR) 10 MG capsule TAKE 3 CAPSULES(30 MG) BY MOUTH AT BEDTIME 270 capsule 1    omeprazole (PRILOSEC) 20 MG DR capsule Take 20 mg by mouth daily.      rosuvastatin (CRESTOR) 10 MG tablet TAKE 1 TABLET(10 MG) BY MOUTH DAILY 90 tablet 3    senna-docusate (SENOKOT-S/PERICOLACE) 8.6-50 MG tablet Take 1-2 tablets by mouth 2 times daily Take while on oral narcotics to prevent or treat constipation. 30 tablet 0    tadalafil (CIALIS) 20 MG tablet TAKE ONE TABLET BY MOUTH DAILY AS NEEDED 30 tablet 0    tamsulosin (FLOMAX) 0.4 MG capsule TAKE 1 CAPSULE(0.4 MG) BY MOUTH DAILY 90 capsule 0    testosterone (AXIRON) 30 MG/ACT topical solution Place 30 mg onto the skin daily 2 pumps daily      traZODone (DESYREL) 50 MG tablet TAKE 2 TABLETS(100 MG) BY MOUTH AT BEDTIME 180 tablet 1    WEGOVY 2.4 MG/0.75ML pen ADMINISTER 2.4 MG UNDER THE SKIN 1 TIME A WEEK 3 mL 2       ALLERGIES  Allergies   Allergen Reactions    Penicillins Rash and Anaphylaxis     Other reaction(s): Breathing Difficulty    Zosyn [Piperacillin-Tazobactam In Dex] Palpitations and Rash         PAST MEDICAL HISTORY:  Past Medical History:   Diagnosis Date    ACP (advance care planning)     Adrenal insufficiency (H)     Chronic pain syndrome     Chronic rhinitis     Diverticulosis     Esophageal stenosis     Hypercholesterolemia     IBS (irritable bowel syndrome)     Meckel's diverticulitis 7/13/2021    Added automatically from request for surgery [id number]    Narcotic dependence (H)        PAST SURGICAL HISTORY:  Past Surgical History:   Procedure Laterality Date    ARTHROPLASTY KNEE Left 12/2/2016    Procedure: ARTHROPLASTY KNEE;  Surgeon: Marisa Page MD;  Location: [facility] OR    ARTHROPLASTY KNEE Right 2/25/2022    Procedure: RIGHT KNEE ARTHROPLASTY;  Surgeon: Marisa Page MD;  Location: [facility] OR    DISCECTOMY CERVICAL MINIMALLY INVASIVE ONE LEVEL      EP ABLATION SVT N/A 11/21/2024    Procedure: Ablation  Supraventricular Tachycardia;  Surgeon: Carrie Arriola MD;  Location:  [facility]    IR LUMBAR PUNCTURE  2016    LAPAROSCOPIC APPENDECTOMY N/A 2021    Procedure: Laparoscopic appendectomy;  Surgeon: Kelsi Parker MD;  Location:  OR    LAPAROSCOPY DIAGNOSTIC (GENERAL) N/A 2021    Procedure: Diagnostic laparoscopy, laparoscopic appendectomy, laparoscopic meckel excision;  Surgeon: Kelsi Parker MD;  Location:  OR    NECK SURGERY      [place]    PHACOEMULSIFICATION WITH STANDARD INTRAOCULAR LENS IMPLANT Left 2024    Procedure: LEFT EYE PHACOEMULSIFICATION, CATARACT, WITH TORIC INTRAOCULAR LENS IMPLANT INSERTION;  Surgeon: Jaimee Gunderson MD;  [place] [facility] [place]    PHACOEMULSIFICATION WITH STANDARD INTRAOCULAR LENS IMPLANT Right 2024    Procedure: RIGHT EYE PHACOEMULSIFICATION, CATARACT, WITH TORIC  INTRAOCULAR LENS IMPLANT INSERTION;  Surgeon: Jaimee Gunderson MD;  [place] [facility] [place]       FAMILY HISTORY:  Family History   Problem Relation Age of Onset    Diabetes Mother     Hypertension Mother     Parkinsonism Father     Rheumatoid Arthritis Brother     Hypertension Brother     Hyperlipidemia Brother     Liver Cancer Brother     Hemochromatosis Brother     Hypertension Brother     Lung Cancer Sister     Alcoholism Sister     No Known Problems Son     No Known Problems Daughter     Macular Degeneration No family hx of     Glaucoma No family hx of        SOCIAL HISTORY:  Social History     Socioeconomic History    Marital status: Single     Spouse name: None    Number of children: None    Years of education: None    Highest education level: None   Tobacco Use    Smoking status: Former     Current packs/day: 0.00     Types: Cigarettes     Quit date: 1995     Years since quittin.4    Smokeless tobacco: Never   Vaping Use    Vaping status: Never Used   Substance and Sexual Activity    Alcohol use: Yes     Alcohol/week: 0.0 - 3.3 standard drinks  of alcohol     Comment: 3-4 drinks a week    Drug use: Yes     Types: Marijuana     Comment: Medical, Rarely used    Sexual activity: Yes   Social History Narrative    Living: , 2 kids    Work: , half retired    Exercise: as active as possible with pain    EtOH: 1 drink/day    Tobacco: former, quit 1990s    Hobbies: fishing, sailing, art         Social Drivers of Health     Financial Resource Strain: Low Risk  (6/11/2024)    Financial Resource Strain     Within the past 12 months, have you or your family members you live with been unable to get utilities (heat, electricity) when it was really needed?: No   Food Insecurity: Low Risk  (6/11/2024)    Food Insecurity     Within the past 12 months, did you worry that your food would run out before you got money to buy more?: No     Within the past 12 months, did the food you bought just not last and you didn t have money to get more?: No   Transportation Needs: Low Risk  (6/11/2024)    Transportation Needs     Within the past 12 months, has lack of transportation kept you from medical appointments, getting your medicines, non-medical meetings or appointments, work, or from getting things that you need?: No   Physical Activity: Insufficiently Active (6/11/2024)    Exercise Vital Sign     Days of Exercise per Week: 3 days     Minutes of Exercise per Session: 40 min   Stress: Stress Concern Present (6/11/2024)    [employer] - Occupational Stress Questionnaire     Feeling of Stress : To some extent   Social Connections: Unknown (6/11/2024)    Social Connection and Isolation Panel [NHANES]     Frequency of Social Gatherings with Friends and Family: Once a week   Interpersonal Safety: Low Risk  (11/21/2024)    Interpersonal Safety     Do you feel physically and emotionally safe where you currently live?: Yes     Within the past 12 months, have you been hit, slapped, kicked or otherwise physically hurt by someone?: No     Within  the past 12 months, have you been humiliated or emotionally abused in other ways by your partner or ex-partner?: No   Housing Stability: Low Risk  (6/11/2024)    Housing Stability     Do you have housing? : Yes     Are you worried about losing your housing?: No

## 2025-01-22 ENCOUNTER — TRANSFERRED RECORDS (OUTPATIENT)
Dept: FAMILY MEDICINE | Facility: CLINIC | Age: 75
End: 2025-01-22

## 2025-02-17 DIAGNOSIS — M35.3 PMR (POLYMYALGIA RHEUMATICA): ICD-10-CM

## 2025-02-17 RX ORDER — TRAZODONE HYDROCHLORIDE 50 MG/1
TABLET ORAL
Qty: 180 TABLET | Refills: 0 | Status: SHIPPED | OUTPATIENT
Start: 2025-02-17

## 2025-02-17 NOTE — TELEPHONE ENCOUNTER
Med:  traZODone (DESYREL) 50 MG tablet  for PMR    LOV (related): 8/15/24      Due for F/U around: June for AWV    Next Appt: none  Sed Rate   Date Value Ref Range Status   07/12/2021 0 0 - 15 mm/hr Final

## 2025-03-19 ENCOUNTER — TRANSFERRED RECORDS (OUTPATIENT)
Dept: FAMILY MEDICINE | Facility: CLINIC | Age: 75
End: 2025-03-19

## 2025-04-08 DIAGNOSIS — R39.9 LOWER URINARY TRACT SYMPTOMS (LUTS): ICD-10-CM

## 2025-04-08 DIAGNOSIS — E66.3 OVERWEIGHT (BMI 25.0-29.9): ICD-10-CM

## 2025-04-08 NOTE — TELEPHONE ENCOUNTER
"Med: Wegovy    LOV (related): 6/18/2024    Due for F/U around: 6/18/2025    Note from 6/18/2024 AWV states \"increase dose and follow up\". A specific time frame is not given. However, he has not been seen for associated DX since.    Next Appt: none scheduled        Wt Readings from Last 2 Encounters:   12/23/24 106.6 kg (235 lb 1.6 oz)   11/21/24 102.5 kg (226 lb)       BMI Readings from Last 2 Encounters:   12/23/24 31.02 kg/m    11/21/24 29.82 kg/m            Med: Tamsulosin    LOV (related): 6/18/2024    Due for F/U around: 6/18/2025 for AWV    Next Appt: none scheduled    "

## 2025-04-09 RX ORDER — SEMAGLUTIDE 2.4 MG/.75ML
INJECTION, SOLUTION SUBCUTANEOUS
Qty: 3 ML | Refills: 2 | Status: SHIPPED | OUTPATIENT
Start: 2025-04-09

## 2025-04-09 RX ORDER — TAMSULOSIN HYDROCHLORIDE 0.4 MG/1
0.4 CAPSULE ORAL DAILY
Qty: 90 CAPSULE | Refills: 0 | Status: SHIPPED | OUTPATIENT
Start: 2025-04-09

## 2025-04-29 DIAGNOSIS — M35.3 PMR (POLYMYALGIA RHEUMATICA): ICD-10-CM

## 2025-04-29 DIAGNOSIS — G62.9 PERIPHERAL POLYNEUROPATHY: ICD-10-CM

## 2025-04-29 DIAGNOSIS — E78.00 HYPERCHOLESTEROLEMIA: ICD-10-CM

## 2025-04-29 RX ORDER — NORTRIPTYLINE HYDROCHLORIDE 10 MG/1
CAPSULE ORAL
Qty: 270 CAPSULE | Refills: 0 | Status: SHIPPED | OUTPATIENT
Start: 2025-04-29

## 2025-04-29 RX ORDER — ROSUVASTATIN CALCIUM 10 MG/1
10 TABLET, COATED ORAL DAILY
Qty: 90 TABLET | Refills: 0 | Status: SHIPPED | OUTPATIENT
Start: 2025-04-29

## 2025-04-29 RX ORDER — TRAZODONE HYDROCHLORIDE 50 MG/1
TABLET ORAL
Qty: 180 TABLET | Refills: 0 | Status: SHIPPED | OUTPATIENT
Start: 2025-04-29

## 2025-04-29 NOTE — TELEPHONE ENCOUNTER
"Med: traZODone (DESYREL) 50 MG , rosuvastatin 10 mg , nortriptyline 10 mg    LOV (related): 6/18/24 AWV    Last Lab: 6/18/24      Due for F/U around: June 2025    Next Appt: none  Called patient who agreed to schedule for June 2025        Cholesterol   Date Value Ref Range Status   06/18/2024 160 100 - 199 mg/dL Final   07/17/2023 152 100 - 199 mg/dL Final   09/13/2021 229 (H) 100 - 199 mg/dL Final   06/16/2020 226 (H) 100 - 199 mg/dL Final     HDL Cholesterol   Date Value Ref Range Status   09/13/2021 56 >39 mg/dL Final   06/16/2020 61 >39 mg/dL Final     HDL   Date Value Ref Range Status   06/18/2024 61 >=40 mg/dL Final   07/17/2023 53 >=40 mg/dL Final     LDL Cholesterol Calculated   Date Value Ref Range Status   09/13/2021 154 (H) 0 - 99 mg/dL Final   06/16/2020 148 (H) 0 - 99 mg/dL Final     LDL CALCULATED ([facility])   Date Value Ref Range Status   06/18/2024 75 0 - 130 mg/dL Final   07/17/2023 76 0 - 130 mg/dL Final     Triglycerides   Date Value Ref Range Status   06/18/2024 70 0 - 149 mg/dL Final   07/17/2023 115 0 - 149 mg/dL Final   09/13/2021 107 0 - 149 mg/dL Final   06/16/2020 84 0 - 149 mg/dL Final     No results found for: \"CHOLHDLRATIO\"     "

## 2025-05-01 DIAGNOSIS — N52.9 ERECTILE DYSFUNCTION, UNSPECIFIED ERECTILE DYSFUNCTION TYPE: ICD-10-CM

## 2025-05-02 NOTE — TELEPHONE ENCOUNTER
Med: Tadalafil      LOV (related): 6/18/24 (CPX)      Due for F/U around:  due for CPX      Next Appt: 8/11/25 (AWV) with Beyn Winters

## 2025-05-05 RX ORDER — TADALAFIL 20 MG/1
20 TABLET ORAL
Qty: 10 TABLET | Refills: 0 | Status: SHIPPED | OUTPATIENT
Start: 2025-05-05

## 2025-05-28 ENCOUNTER — TRANSFERRED RECORDS (OUTPATIENT)
Dept: FAMILY MEDICINE | Facility: CLINIC | Age: 75
End: 2025-05-28

## 2025-06-11 NOTE — PLAN OF CARE
Problem: Adult Inpatient Plan of Care  Goal: Plan of Care Review  Outcome: Progressing  Flowsheets (Taken 6/11/2025 0258)  Progress: no change  Outcome Evaluation: Patient alert, oriented only to self. Room air. Roberts in place. No complaints of pain. Bed alarm set. call light in reach, safety maintained.  Plan of Care Reviewed With: patient        DATE & TIME: 7/14/21 Day shift  Cognitive Concerns/ Orientation : A&OX4   BEHAVIOR & AGGRESSION TOOL COLOR: green   ABNL VS/O2: VSS on RA  MOBILITY: IND  PAIN MANAGMENT: C/o of mild abdomen pain from incision sites from surgery; 10 mg oxycodone given  DIET: Full liquid diet- hasn't eaten yet  BOWEL/BLADDER: continent, no BM this shift  ABNL LAB/BG: WDL  DRAIN/DEVICES: PIV infusing NS at 100mL/hr. on IV cipro and flagyl   TELEMETRY RHYTHM: n/a  SKIN: WDL except for 4 abdomen incision covered with bandages- CDI  TESTS/PROCEDURES: laparoscopic appendicitis today; went well  D/C DAY/GOALS/PLACE: Surgery will talk to patient, but most likely discharge tomorrow   OTHER IMPORTANT INFO: IV zosyn given in ED- pt had allergic reaction and was given IV benadryl. Abx changed to ciprofloxacin and metronidazole.

## 2025-06-15 DIAGNOSIS — M35.3 PMR (POLYMYALGIA RHEUMATICA): ICD-10-CM

## 2025-06-15 DIAGNOSIS — E78.00 HYPERCHOLESTEROLEMIA: ICD-10-CM

## 2025-06-16 DIAGNOSIS — I10 BENIGN ESSENTIAL HYPERTENSION: ICD-10-CM

## 2025-06-16 RX ORDER — ROSUVASTATIN CALCIUM 10 MG/1
10 TABLET, COATED ORAL DAILY
Qty: 90 TABLET | Refills: 0 | Status: SHIPPED | OUTPATIENT
Start: 2025-06-16

## 2025-06-16 RX ORDER — TRAZODONE HYDROCHLORIDE 50 MG/1
100 TABLET ORAL AT BEDTIME
Qty: 180 TABLET | Refills: 0 | Status: SHIPPED | OUTPATIENT
Start: 2025-06-16

## 2025-06-16 NOTE — TELEPHONE ENCOUNTER
"Med: Trazodone, Rosuvastatin     LOV (related): 8/15/2024 Dr. Madden     Last Lab: 6/18/24      Due for F/U around: 8/2025    Next Appt: 8/11/25 with Dr. Winters     Cholesterol   Date Value Ref Range Status   06/18/2024 160 100 - 199 mg/dL Final   07/17/2023 152 100 - 199 mg/dL Final   09/13/2021 229 (H) 100 - 199 mg/dL Final   06/16/2020 226 (H) 100 - 199 mg/dL Final     HDL Cholesterol   Date Value Ref Range Status   09/13/2021 56 >39 mg/dL Final   06/16/2020 61 >39 mg/dL Final     HDL   Date Value Ref Range Status   06/18/2024 61 >=40 mg/dL Final   07/17/2023 53 >=40 mg/dL Final     LDL Cholesterol Calculated   Date Value Ref Range Status   09/13/2021 154 (H) 0 - 99 mg/dL Final   06/16/2020 148 (H) 0 - 99 mg/dL Final     LDL CALCULATED ([facility])   Date Value Ref Range Status   06/18/2024 75 0 - 130 mg/dL Final   07/17/2023 76 0 - 130 mg/dL Final     Triglycerides   Date Value Ref Range Status   06/18/2024 70 0 - 149 mg/dL Final   07/17/2023 115 0 - 149 mg/dL Final   09/13/2021 107 0 - 149 mg/dL Final   06/16/2020 84 0 - 149 mg/dL Final     No results found for: \"CHOLHDLRATIO\"    "

## 2025-06-16 NOTE — TELEPHONE ENCOUNTER
PAPER REFILL, WILL GIVE PAPER.    Med: Lisinopril    LOV (related): 6/18/2024    Last Lab: comprehensive metabolic panel 11/21/2024    Due for F/U around: due for AWV 6/18/2025    Next Appt: 8/11/2025 for AWV          BP Readings from Last 3 Encounters:   12/23/24 (!) 141/81   11/21/24 (!) 153/103   11/11/24 136/87       Last Comprehensive Metabolic Panel:  Lab Results   Component Value Date     11/21/2024    POTASSIUM 4.2 11/21/2024    CHLORIDE 102 11/21/2024    CO2 26 11/21/2024    ANIONGAP 10 11/21/2024    GLC 98 11/21/2024    BUN 12.3 11/21/2024    CR 0.78 11/21/2024    GFRESTIMATED >90 11/21/2024    TRI 9.2 11/21/2024

## 2025-06-17 RX ORDER — LISINOPRIL 20 MG/1
20 TABLET ORAL DAILY
Qty: 90 TABLET | Refills: 0 | Status: SHIPPED | OUTPATIENT
Start: 2025-06-17

## 2025-06-25 ENCOUNTER — TRANSFERRED RECORDS (OUTPATIENT)
Dept: FAMILY MEDICINE | Facility: CLINIC | Age: 75
End: 2025-06-25

## 2025-07-15 DIAGNOSIS — N52.9 ERECTILE DYSFUNCTION, UNSPECIFIED ERECTILE DYSFUNCTION TYPE: ICD-10-CM

## 2025-07-15 RX ORDER — TADALAFIL 20 MG/1
20 TABLET ORAL DAILY
Qty: 30 TABLET | Refills: 0 | Status: SHIPPED | OUTPATIENT
Start: 2025-07-15

## 2025-07-15 NOTE — TELEPHONE ENCOUNTER
Med: Tadalafil    LOV (related): 6/18/24 CPX      Due for F/U around: 8/2025 CPX    Next Appt: 8/11/25

## 2025-07-23 ENCOUNTER — TRANSFERRED RECORDS (OUTPATIENT)
Dept: FAMILY MEDICINE | Facility: CLINIC | Age: 75
End: 2025-07-23

## 2025-08-04 SDOH — HEALTH STABILITY: PHYSICAL HEALTH: ON AVERAGE, HOW MANY DAYS PER WEEK DO YOU ENGAGE IN MODERATE TO STRENUOUS EXERCISE (LIKE A BRISK WALK)?: 4 DAYS

## 2025-08-04 SDOH — HEALTH STABILITY: PHYSICAL HEALTH: ON AVERAGE, HOW MANY MINUTES DO YOU ENGAGE IN EXERCISE AT THIS LEVEL?: 40 MIN

## 2025-08-04 ASSESSMENT — SOCIAL DETERMINANTS OF HEALTH (SDOH): HOW OFTEN DO YOU GET TOGETHER WITH FRIENDS OR RELATIVES?: TWICE A WEEK

## 2025-08-11 ENCOUNTER — OFFICE VISIT (OUTPATIENT)
Dept: FAMILY MEDICINE | Facility: CLINIC | Age: 75
End: 2025-08-11

## 2025-08-11 VITALS
OXYGEN SATURATION: 96 % | WEIGHT: 231.6 LBS | HEART RATE: 80 BPM | DIASTOLIC BLOOD PRESSURE: 95 MMHG | BODY MASS INDEX: 30.56 KG/M2 | SYSTOLIC BLOOD PRESSURE: 132 MMHG

## 2025-08-11 DIAGNOSIS — I10 PRIMARY HYPERTENSION: ICD-10-CM

## 2025-08-11 DIAGNOSIS — Z01.818 PREOP GENERAL PHYSICAL EXAM: ICD-10-CM

## 2025-08-11 DIAGNOSIS — E27.49 SECONDARY ADRENAL INSUFFICIENCY: ICD-10-CM

## 2025-08-11 DIAGNOSIS — G89.4 CHRONIC PAIN SYNDROME: ICD-10-CM

## 2025-08-11 DIAGNOSIS — R91.1 PULMONARY NODULE: ICD-10-CM

## 2025-08-11 DIAGNOSIS — Z13.1 SCREENING FOR DIABETES MELLITUS: ICD-10-CM

## 2025-08-11 DIAGNOSIS — Z00.00 ENCOUNTER FOR MEDICARE ANNUAL WELLNESS EXAM: Primary | ICD-10-CM

## 2025-08-11 DIAGNOSIS — E78.5 DYSLIPIDEMIA: ICD-10-CM

## 2025-08-11 DIAGNOSIS — F11.20 NARCOTIC DEPENDENCE (H): ICD-10-CM

## 2025-08-11 DIAGNOSIS — I50.9 HEART FAILURE, ACC/AHA STAGE B (H): ICD-10-CM

## 2025-08-11 DIAGNOSIS — E27.1 ADDISON'S DISEASE (H): ICD-10-CM

## 2025-08-11 PROBLEM — F19.20 CORTICOSTEROID DEPENDENCE (H): Status: RESOLVED | Noted: 2024-06-18 | Resolved: 2025-08-11

## 2025-08-11 LAB
ALBUMIN SERPL BCG-MCNC: 4.4 G/DL (ref 3.5–5.2)
ALP SERPL-CCNC: 59 U/L (ref 40–150)
ALT SERPL W P-5'-P-CCNC: 16 U/L (ref 0–70)
ANION GAP SERPL CALCULATED.3IONS-SCNC: 11 MMOL/L (ref 7–15)
APO A-I SERPL-MCNC: 20 MG/DL
AST SERPL W P-5'-P-CCNC: 21 U/L (ref 0–45)
BASOPHILS # BLD AUTO: 0.1 10E3/UL (ref 0–0.2)
BASOPHILS NFR BLD AUTO: 1 %
BILIRUB SERPL-MCNC: 0.8 MG/DL
BUN SERPL-MCNC: 13.3 MG/DL (ref 8–23)
CALCIUM SERPL-MCNC: 9.2 MG/DL (ref 8.8–10.4)
CHLORIDE SERPL-SCNC: 105 MMOL/L (ref 98–107)
CHOLESTEROL: 148 MG/DL (ref 100–199)
CREAT SERPL-MCNC: 0.89 MG/DL (ref 0.67–1.17)
EGFRCR SERPLBLD CKD-EPI 2021: 90 ML/MIN/1.73M2
EOSINOPHIL # BLD AUTO: 0.3 10E3/UL (ref 0–0.7)
EOSINOPHIL NFR BLD AUTO: 4 %
ERYTHROCYTE [DISTWIDTH] IN BLOOD BY AUTOMATED COUNT: 12.4 % (ref 10–15)
EST. AVERAGE GLUCOSE BLD GHB EST-MCNC: 111 MG/DL
FASTING STATUS PATIENT QL REPORTED: YES
FASTING?: YES
GLUCOSE SERPL-MCNC: 98 MG/DL (ref 70–99)
HBA1C MFR BLD: 5.5 %
HCO3 SERPL-SCNC: 24 MMOL/L (ref 22–29)
HCT VFR BLD AUTO: 47.1 % (ref 40–53)
HDL (RMG): 53 MG/DL (ref 40–?)
HGB BLD-MCNC: 16.3 G/DL (ref 13.3–17.7)
IMM GRANULOCYTES # BLD: 0 10E3/UL
IMM GRANULOCYTES NFR BLD: 0 %
LDL CALCULATED (RMG): 76 MG/DL (ref 0–130)
LYMPHOCYTES # BLD AUTO: 1.2 10E3/UL (ref 0.8–5.3)
LYMPHOCYTES NFR BLD AUTO: 18 %
MCH RBC QN AUTO: 32.5 PG (ref 26.5–33)
MCHC RBC AUTO-ENTMCNC: 34.6 G/DL (ref 31.5–36.5)
MCV RBC AUTO: 94 FL (ref 78–100)
MONOCYTES # BLD AUTO: 0.5 10E3/UL (ref 0–1.3)
MONOCYTES NFR BLD AUTO: 8 %
NEUTROPHILS # BLD AUTO: 4.5 10E3/UL (ref 1.6–8.3)
NEUTROPHILS NFR BLD AUTO: 69 %
NRBC # BLD AUTO: 0 10E3/UL
NRBC BLD AUTO-RTO: 0 /100
PLATELET # BLD AUTO: 276 10E3/UL (ref 150–450)
POTASSIUM SERPL-SCNC: 4.2 MMOL/L (ref 3.4–5.3)
PROT SERPL-MCNC: 6.8 G/DL (ref 6.4–8.3)
RBC # BLD AUTO: 5.02 10E6/UL (ref 4.4–5.9)
SODIUM SERPL-SCNC: 140 MMOL/L (ref 135–145)
TRIGLYCERIDES (RMG): 90 MG/DL (ref 0–149)
WBC # BLD AUTO: 6.5 10E3/UL (ref 4–11)

## 2025-08-11 PROCEDURE — G0439 PPPS, SUBSEQ VISIT: HCPCS | Performed by: FAMILY MEDICINE

## 2025-08-11 PROCEDURE — 83036 HEMOGLOBIN GLYCOSYLATED A1C: CPT | Mod: ORL | Performed by: FAMILY MEDICINE

## 2025-08-11 PROCEDURE — 99214 OFFICE O/P EST MOD 30 MIN: CPT | Mod: 25 | Performed by: FAMILY MEDICINE

## 2025-08-11 PROCEDURE — 85025 COMPLETE CBC W/AUTO DIFF WBC: CPT | Mod: ORL | Performed by: FAMILY MEDICINE

## 2025-08-11 PROCEDURE — 82172 ASSAY OF APOLIPOPROTEIN: CPT | Performed by: FAMILY MEDICINE

## 2025-08-11 PROCEDURE — 83695 ASSAY OF LIPOPROTEIN(A): CPT | Mod: ORL | Performed by: FAMILY MEDICINE

## 2025-08-11 PROCEDURE — 36415 COLL VENOUS BLD VENIPUNCTURE: CPT | Performed by: FAMILY MEDICINE

## 2025-08-11 PROCEDURE — 84155 ASSAY OF PROTEIN SERUM: CPT | Mod: ORL | Performed by: FAMILY MEDICINE

## 2025-08-11 PROCEDURE — 80061 LIPID PANEL: CPT | Mod: QW | Performed by: FAMILY MEDICINE

## 2025-08-11 PROCEDURE — 3080F DIAST BP >= 90 MM HG: CPT | Performed by: FAMILY MEDICINE

## 2025-08-11 PROCEDURE — 3048F LDL-C <100 MG/DL: CPT | Performed by: FAMILY MEDICINE

## 2025-08-11 PROCEDURE — 3044F HG A1C LEVEL LT 7.0%: CPT | Performed by: FAMILY MEDICINE

## 2025-08-11 PROCEDURE — 3075F SYST BP GE 130 - 139MM HG: CPT | Performed by: FAMILY MEDICINE

## 2025-08-11 RX ORDER — DOXYCYCLINE HYCLATE 20 MG
20 TABLET ORAL
COMMUNITY
Start: 2025-08-02

## 2025-08-12 LAB — APO B100 SERPL-MCNC: 70 MG/DL

## 2025-08-17 DIAGNOSIS — E66.3 OVERWEIGHT (BMI 25.0-29.9): ICD-10-CM

## 2025-08-18 RX ORDER — SEMAGLUTIDE 2.4 MG/.75ML
INJECTION, SOLUTION SUBCUTANEOUS
Qty: 3 ML | Refills: 2 | Status: SHIPPED | OUTPATIENT
Start: 2025-08-18

## 2025-08-29 ENCOUNTER — ANCILLARY PROCEDURE (OUTPATIENT)
Dept: CT IMAGING | Facility: CLINIC | Age: 75
End: 2025-08-29
Attending: FAMILY MEDICINE
Payer: MEDICARE

## 2025-08-29 ENCOUNTER — TRANSFERRED RECORDS (OUTPATIENT)
Dept: FAMILY MEDICINE | Facility: CLINIC | Age: 75
End: 2025-08-29

## 2025-08-29 DIAGNOSIS — R91.1 PULMONARY NODULE: ICD-10-CM

## 2025-08-29 PROCEDURE — 71250 CT THORAX DX C-: CPT

## 2025-08-30 DIAGNOSIS — G62.9 PERIPHERAL POLYNEUROPATHY: ICD-10-CM

## 2025-09-01 DIAGNOSIS — N52.9 ERECTILE DYSFUNCTION, UNSPECIFIED ERECTILE DYSFUNCTION TYPE: ICD-10-CM

## 2025-09-02 ENCOUNTER — TRANSFERRED RECORDS (OUTPATIENT)
Dept: FAMILY MEDICINE | Facility: CLINIC | Age: 75
End: 2025-09-02

## 2025-09-02 RX ORDER — TADALAFIL 20 MG/1
20 TABLET ORAL DAILY
Qty: 90 TABLET | Refills: 3 | Status: SHIPPED | OUTPATIENT
Start: 2025-09-02

## 2025-09-02 RX ORDER — NORTRIPTYLINE HYDROCHLORIDE 10 MG/1
CAPSULE ORAL
Qty: 270 CAPSULE | Refills: 3 | Status: SHIPPED | OUTPATIENT
Start: 2025-09-02

## (undated) DEVICE — STPL RELOAD REG TISSUE ECHELON 45 X 3.6MM BLUE GST45B

## (undated) DEVICE — LINEN TOWEL PACK X5 5464

## (undated) DEVICE — DRAPE EYE SHEET 8441

## (undated) DEVICE — SU MONOCRYL 4-0 PS-2 18" UND Y496G

## (undated) DEVICE — EYE SHIELD PLASTIC

## (undated) DEVICE — PACK TOTAL KNEE SOP15TKFSD

## (undated) DEVICE — DRSG KERLIX FLUFFS X5

## (undated) DEVICE — EYE PACK CUSTOM ANTERIOR 30DEG TIP CENTURION PPK6682-04

## (undated) DEVICE — TAPE MICROPORE 2"X1.5YD 1530S-2

## (undated) DEVICE — CATH EP 6FR 2MM TIP 2-8-2 115C

## (undated) DEVICE — INTRODUCER SHEATH GREEN 6.5FRX11CM .038IN PSI-6F-11-038ACT

## (undated) DEVICE — SU MONOCRYL 3-0 PS-2 27" Y427H

## (undated) DEVICE — RAD INTRODUCER KIT MICRO 5FRX10CM .018 NITINOL G/W

## (undated) DEVICE — SUCTION IRR STRYKERFLOW II W/TIP 250-070-520

## (undated) DEVICE — SOL BENZOIN 0.5OZ

## (undated) DEVICE — PACK EP SRG PROC LF DISP SAN32EPFSR

## (undated) DEVICE — BONE CLEANING TIP INTERPULSE  0210-010-000

## (undated) DEVICE — SOL NACL 0.9% INJ 1000ML BAG 2B1324X

## (undated) DEVICE — ESU HOLDER LAP INST DISP PURPLE LONG 330MM H-PRO-330

## (undated) DEVICE — BLADE CLIPPER 4412A

## (undated) DEVICE — INTRODUCER SHEATH FAST-CATH 9FRX12CM 406116

## (undated) DEVICE — CATARACT BLADE PACK 2.4MM 58001897

## (undated) DEVICE — PACK CATARACT CUSTOM ASC SEY15CPUMC

## (undated) DEVICE — CAST PADDING 6" UNSTERILE 9046

## (undated) DEVICE — BLADE SAW SAGITTAL STRK 18X90X1.19MM HD SYS 6 6118-119-090

## (undated) DEVICE — SYR 50ML LL W/O NDL 309653

## (undated) DEVICE — EYE MARKING PAD 581057

## (undated) DEVICE — GOWN IMPERVIOUS SPECIALTY XL/XLONG 39049

## (undated) DEVICE — SOL NACL 0.9% IRRIG 3000ML BAG 2B7477

## (undated) DEVICE — GLOVE PROTEXIS BLUE W/NEU-THERA 8.0  2D73EB80

## (undated) DEVICE — GLOVE BIOGEL PI ULTRATOUCH G SZ 7.5 42175

## (undated) DEVICE — ESU GROUND PAD UNIVERSAL W/O CORD

## (undated) DEVICE — SU ETHIBOND 0 CTX CR  8X18" CX31D

## (undated) DEVICE — EYE CANN IRR 25GA HYDRODISSECTING 585037

## (undated) DEVICE — BONE CEMENT MIXEVAC III HI VAC KIT  0206-015-000

## (undated) DEVICE — DRSG STERI STRIP 1/2X4" R1547

## (undated) DEVICE — EYE TIP IRRIGATION & ASPIRATION POLYMER 35D BENT 8065751511

## (undated) DEVICE — NDL SPINAL 18GA 3.5" 405184

## (undated) DEVICE — SUCTION IRR SYSTEM W/O TIP INTERPULSE HANDPIECE 0210-100-000

## (undated) DEVICE — DEFIB PRO-PADZ LVP LQD GEL ADULT 8900-2105-01

## (undated) DEVICE — MANIFOLD NEPTUNE 4 PORT 700-20

## (undated) DEVICE — ESU LIGASURE MARYLAND LAPAROSCOPIC SLR/DVDR 5MMX37CM LF1937

## (undated) DEVICE — BLADE SAW RECIP STRK 70X12.5X1.2MM 0277-096-281

## (undated) DEVICE — GLOVE PROTEXIS BLUE W/NEU-THERA 6.5  2D73EB65

## (undated) DEVICE — SOLUTION WOUND CLEANSING 3/4OZ 10% PVP EA-L3011FB-50

## (undated) DEVICE — SU VICRYL 0 CP-1 27" J467H

## (undated) DEVICE — STPL POWERED ECHELON 45MM PSEE45A

## (undated) DEVICE — ENDO TROCAR SLEEVE KII Z-THREADED 05X100MM CTS02

## (undated) DEVICE — INTRO CATH 12CM 8.5FR FST-CATH

## (undated) DEVICE — GLOVE PROTEXIS MICRO 6.0  2D73PM60

## (undated) DEVICE — TUBE SET SMARKABLATE IRRIGATION

## (undated) DEVICE — DRSG TEGADERM 4X4 3/4" 1626W

## (undated) DEVICE — ESU CORD MONOPOLAR 10'  E0510

## (undated) DEVICE — DRSG GAUZE 4X4" 3033

## (undated) DEVICE — PACK LAP CHOLE SLC15LCFSD

## (undated) DEVICE — SU VICRYL 2-0 CP-1 27" J466H

## (undated) DEVICE — SU DERMABOND PRINEO 22CM CLR222US

## (undated) DEVICE — ENDO POUCH UNIV RETRIEVAL SYSTEM INZII 10MM CD001

## (undated) DEVICE — CATH THERMOCOOL SMARTTOUCH SF FJ CURVE

## (undated) DEVICE — SYR 50ML CATH TIP W/O NDL 309620

## (undated) DEVICE — PREP CHLORAPREP 26ML TINTED ORANGE  260815

## (undated) DEVICE — GLOVE PROTEXIS MICRO 8.0  2D73PM80

## (undated) DEVICE — DRAPE STERI TOWEL LG 1010

## (undated) DEVICE — CATH SOUNDSTAR 8FRX90CM 10439011

## (undated) DEVICE — CAST PADDING 6" STERILE 9046S

## (undated) DEVICE — DRSG ADAPTIC 3X8" 6113

## (undated) DEVICE — CLIP APPLIER ENDO ROTATING 10MM MED/LG ER320

## (undated) DEVICE — SOL WATER IRRIG 500ML BOTTLE 2F7113

## (undated) DEVICE — SU STRATAFIX PDS PLUS 0 CT-1 18" SXPP1A401

## (undated) DEVICE — INTRODUCER SHEATH FAST-CATH SWARTZ 8.5FRX63CM SL1 CVD 406849

## (undated) DEVICE — DEVICE CLOSURE VASCADE PERCUTANEOUS 800-612C-10U

## (undated) DEVICE — NDL TRANSSEPTAL BRK 18GA 71CM BRK CVD 407200

## (undated) DEVICE — SUCTION CANISTER MEDIVAC LINER 3000ML W/LID 65651-530

## (undated) DEVICE — PATCH CARTO 3 EXTERNAL REFERENCE 3D MAPPING CREFP6

## (undated) DEVICE — ENDO TROCAR FIRST ENTRY KII FIOS Z-THRD 05X100MM CTF03

## (undated) DEVICE — CATH EP CATH EP REPRO DAIG RSPN FX CRV DX EP C

## (undated) DEVICE — SU VICRYL 0 UR-6 27" J603H

## (undated) DEVICE — SOL WATER IRRIG 1000ML BOTTLE 2F7114

## (undated) DEVICE — GUIDEWIRE TRNSEPT 0.014 X35CM SAFE SE

## (undated) DEVICE — WRAP EZY KNEE

## (undated) DEVICE — ENDO TROCAR FIRST ENTRY KII FIOS Z-THRD 12X100MM CTF73

## (undated) DEVICE — GLOVE PROTEXIS W/NEU-THERA 8.0  2D73TE80

## (undated) DEVICE — ENDO SCOPE WARMER LF TM500

## (undated) DEVICE — SYR 20ML SLIP TIP W/O NDL 302831

## (undated) DEVICE — DEVICE SUTURE PASSER 14GA WECK EFX EFXSP2

## (undated) DEVICE — DRAPE IOBAN INCISE 23X17" 6650EZ

## (undated) RX ORDER — HEPARIN SODIUM 200 [USP'U]/100ML
INJECTION, SOLUTION INTRAVENOUS
Status: DISPENSED
Start: 2024-11-21

## (undated) RX ORDER — FENTANYL CITRATE 50 UG/ML
INJECTION, SOLUTION INTRAMUSCULAR; INTRAVENOUS
Status: DISPENSED
Start: 2024-05-23

## (undated) RX ORDER — FENTANYL CITRATE 50 UG/ML
INJECTION, SOLUTION INTRAMUSCULAR; INTRAVENOUS
Status: DISPENSED
Start: 2024-11-21

## (undated) RX ORDER — FENTANYL CITRATE 50 UG/ML
INJECTION, SOLUTION INTRAMUSCULAR; INTRAVENOUS
Status: DISPENSED
Start: 2022-02-25

## (undated) RX ORDER — HYDROMORPHONE HYDROCHLORIDE 1 MG/ML
INJECTION, SOLUTION INTRAMUSCULAR; INTRAVENOUS; SUBCUTANEOUS
Status: DISPENSED
Start: 2021-07-14

## (undated) RX ORDER — PROPOFOL 10 MG/ML
INJECTION, EMULSION INTRAVENOUS
Status: DISPENSED
Start: 2022-02-25

## (undated) RX ORDER — LIDOCAINE HYDROCHLORIDE 20 MG/ML
INJECTION, SOLUTION EPIDURAL; INFILTRATION; INTRACAUDAL; PERINEURAL
Status: DISPENSED
Start: 2021-07-14

## (undated) RX ORDER — ONDANSETRON 2 MG/ML
INJECTION INTRAMUSCULAR; INTRAVENOUS
Status: DISPENSED
Start: 2021-07-14

## (undated) RX ORDER — ACETAMINOPHEN 325 MG/1
TABLET ORAL
Status: DISPENSED
Start: 2024-05-23

## (undated) RX ORDER — GLYCOPYRROLATE 0.2 MG/ML
INJECTION, SOLUTION INTRAMUSCULAR; INTRAVENOUS
Status: DISPENSED
Start: 2021-07-14

## (undated) RX ORDER — DEXAMETHASONE SODIUM PHOSPHATE 4 MG/ML
INJECTION, SOLUTION INTRA-ARTICULAR; INTRALESIONAL; INTRAMUSCULAR; INTRAVENOUS; SOFT TISSUE
Status: DISPENSED
Start: 2021-07-14

## (undated) RX ORDER — ACETAMINOPHEN 500 MG
TABLET ORAL
Status: DISPENSED
Start: 2021-07-14

## (undated) RX ORDER — TRANEXAMIC ACID 650 MG/1
TABLET ORAL
Status: DISPENSED
Start: 2022-02-25

## (undated) RX ORDER — ONDANSETRON 2 MG/ML
INJECTION INTRAMUSCULAR; INTRAVENOUS
Status: DISPENSED
Start: 2022-02-25

## (undated) RX ORDER — HYDROMORPHONE HCL IN WATER/PF 6 MG/30 ML
PATIENT CONTROLLED ANALGESIA SYRINGE INTRAVENOUS
Status: DISPENSED
Start: 2021-07-14

## (undated) RX ORDER — NEOSTIGMINE METHYLSULFATE 1 MG/ML
VIAL (ML) INJECTION
Status: DISPENSED
Start: 2021-07-14

## (undated) RX ORDER — CEFAZOLIN SODIUM 2 G/100ML
INJECTION, SOLUTION INTRAVENOUS
Status: DISPENSED
Start: 2021-07-14

## (undated) RX ORDER — FENTANYL CITRATE 50 UG/ML
INJECTION, SOLUTION INTRAMUSCULAR; INTRAVENOUS
Status: DISPENSED
Start: 2021-07-14

## (undated) RX ORDER — FENTANYL CITRATE 0.05 MG/ML
INJECTION, SOLUTION INTRAMUSCULAR; INTRAVENOUS
Status: DISPENSED
Start: 2022-02-25

## (undated) RX ORDER — PROTAMINE SULFATE 10 MG/ML
INJECTION, SOLUTION INTRAVENOUS
Status: DISPENSED
Start: 2024-11-21

## (undated) RX ORDER — FENTANYL CITRATE 50 UG/ML
INJECTION, SOLUTION INTRAMUSCULAR; INTRAVENOUS
Status: DISPENSED
Start: 2024-05-16

## (undated) RX ORDER — HEPARIN SODIUM 1000 [USP'U]/ML
INJECTION, SOLUTION INTRAVENOUS; SUBCUTANEOUS
Status: DISPENSED
Start: 2024-11-21

## (undated) RX ORDER — CLINDAMYCIN PHOSPHATE 900 MG/50ML
INJECTION, SOLUTION INTRAVENOUS
Status: DISPENSED
Start: 2022-02-25

## (undated) RX ORDER — LIDOCAINE HYDROCHLORIDE 10 MG/ML
INJECTION, SOLUTION EPIDURAL; INFILTRATION; INTRACAUDAL; PERINEURAL
Status: DISPENSED
Start: 2024-11-21

## (undated) RX ORDER — LIDOCAINE HYDROCHLORIDE AND EPINEPHRINE 10; 10 MG/ML; UG/ML
INJECTION, SOLUTION INFILTRATION; PERINEURAL
Status: DISPENSED
Start: 2024-05-23